# Patient Record
Sex: FEMALE | Race: WHITE | Employment: OTHER | URBAN - METROPOLITAN AREA
[De-identification: names, ages, dates, MRNs, and addresses within clinical notes are randomized per-mention and may not be internally consistent; named-entity substitution may affect disease eponyms.]

---

## 2017-01-03 ENCOUNTER — OP HISTORICAL/CONVERTED ENCOUNTER (OUTPATIENT)
Dept: OTHER | Age: 82
End: 2017-01-03

## 2017-01-13 ENCOUNTER — IP HISTORICAL/CONVERTED ENCOUNTER (OUTPATIENT)
Dept: OTHER | Age: 82
End: 2017-01-13

## 2017-01-24 ENCOUNTER — OFFICE VISIT (OUTPATIENT)
Dept: ENDOCRINOLOGY | Age: 82
End: 2017-01-24

## 2017-01-24 VITALS
RESPIRATION RATE: 20 BRPM | HEART RATE: 62 BPM | DIASTOLIC BLOOD PRESSURE: 57 MMHG | SYSTOLIC BLOOD PRESSURE: 112 MMHG | HEIGHT: 66 IN | WEIGHT: 183 LBS | BODY MASS INDEX: 29.41 KG/M2 | OXYGEN SATURATION: 95 % | TEMPERATURE: 98.7 F

## 2017-01-24 DIAGNOSIS — M81.0 OSTEOPOROSIS, UNSPECIFIED: ICD-10-CM

## 2017-01-24 DIAGNOSIS — E11.65 TYPE 2 DIABETES MELLITUS WITH HYPERGLYCEMIA, WITHOUT LONG-TERM CURRENT USE OF INSULIN (HCC): Primary | ICD-10-CM

## 2017-01-24 RX ORDER — ISOSORBIDE MONONITRATE 30 MG/1
TABLET, EXTENDED RELEASE ORAL
Refills: 0 | COMMUNITY
Start: 2017-01-09 | End: 2018-09-11 | Stop reason: CLARIF

## 2017-01-24 RX ORDER — HYDROCODONE BITARTRATE AND ACETAMINOPHEN 10; 325 MG/1; MG/1
TABLET ORAL
Refills: 0 | COMMUNITY
Start: 2017-01-17 | End: 2020-12-17

## 2017-01-24 RX ORDER — AMMONIUM LACTATE 12 G/100G
LOTION TOPICAL AS NEEDED
Refills: 0 | COMMUNITY
Start: 2016-12-20 | End: 2020-12-17

## 2017-01-24 RX ORDER — FOLIC ACID 1 MG/1
TABLET ORAL DAILY
Refills: 0 | COMMUNITY
Start: 2016-12-20

## 2017-01-24 RX ORDER — ALENDRONATE SODIUM 70 MG/1
TABLET ORAL
COMMUNITY
End: 2018-09-11 | Stop reason: CLARIF

## 2017-01-24 RX ORDER — ESTRADIOL 0.1 MG/G
CREAM VAGINAL 2 TIMES DAILY
Refills: 0 | COMMUNITY
Start: 2016-12-21

## 2017-01-24 RX ORDER — BENZONATATE 100 MG/1
CAPSULE ORAL
Refills: 0 | COMMUNITY
Start: 2016-12-16 | End: 2020-12-17

## 2017-01-24 RX ORDER — BUTALBITAL, ACETAMINOPHEN AND CAFFEINE 50; 325; 40 MG/1; MG/1; MG/1
1 TABLET ORAL
Refills: 0 | COMMUNITY
Start: 2017-01-05

## 2017-01-24 RX ORDER — LANOLIN ALCOHOL/MO/W.PET/CERES
CREAM (GRAM) TOPICAL
Refills: 0 | COMMUNITY
Start: 2016-12-22

## 2017-01-24 RX ORDER — DICYCLOMINE HYDROCHLORIDE 10 MG/1
1 CAPSULE ORAL 3 TIMES DAILY
Refills: 0 | COMMUNITY
Start: 2016-10-21 | End: 2020-12-17

## 2017-01-24 NOTE — MR AVS SNAPSHOT
Visit Information Date & Time Provider Department Dept. Phone Encounter #  
 1/24/2017  3:45 PM Pricila Ma MD [facility] [phone] [id number] Follow-up Instructions Return in about 6 months (around 7/24/2017). Upcoming Health Maintenance Date Due  
 FOOT EXAM Q1 7/13/1945 EYE EXAM RETINAL OR DILATED Q1 7/13/1945 DTaP/Tdap/Td series (1 - Tdap) 7/13/1958 ZOSTER VACCINE AGE 60> 7/13/1997 GLAUCOMA SCREENING Q2Y 7/13/2002 Pneumococcal 65+ Low/Medium Risk (1 of 2 - PCV13) 7/13/2002 MEDICARE YEARLY EXAM 7/13/2002 INFLUENZA AGE 9 TO ADULT 8/1/2016 HEMOGLOBIN A1C Q6M 4/30/2017 MICROALBUMIN Q1 5/17/2017 LIPID PANEL Q1 10/31/2017 Allergies as of 1/24/2017  Review Complete On: 1/24/2017 By: Pricila Ma MD  
  
 Severity Noted Reaction Type Reactions Iodinated Contrast Media - Oral And Iv Dye High 10/10/2011    Other (comments) Iv dye causes heart to stop Chicken Derived  10/01/2014    Other (comments) Closes throat Darvon [Propoxyphene]  02/08/2011    Rash Tetanus Vaccines And Toxoid  02/08/2011    Other (comments) Blisters all over body Current Immunizations  Never Reviewed No immunizations on file. Not reviewed this visit You Were Diagnosed With   
  
 Codes Comments Osteoporosis, unspecified    -  Primary ICD-10-CM: M81.0 ICD-9-CM: 733.00 Type 2 diabetes mellitus with hyperglycemia, without long-term current use of insulin (HCC)     ICD-10-CM: E11.65 ICD-9-CM: 250.00, 790.29 Vitals BP Pulse Temp Resp Height(growth percentile) Weight(growth percentile) 112/57 62 98.7 °F (37.1 °C) (Oral) 20 5' 6\" (1.676 m) 183 lb (83 kg) SpO2 BMI OB Status Smoking Status 95% 29.54 kg/m2 Hysterectomy Former Smoker BMI and BSA Data Body Mass Index Body Surface Area  
 29.54 kg/m 2 1.97 m 2 Preferred Pharmacy Pharmacy Name Phone  [id number] - [address] [facility] [phone] Your Updated Medication List  
  
   
This list is accurate as of: 1/24/17  4:29 PM.  Always use your most recent med list.  
  
  
  
  
 ammonium lactate 12 % lotion Commonly known as:  LAC-HYDRIN  
  
 aspirin delayed-release 81 mg tablet Take  by mouth daily. B-12 DOTS PO Take  by mouth.  
  
 benzonatate 100 mg capsule Commonly known as:  TESSALON  
take 1 capsule by mouth every 6 hours if needed for cough  
  
 beta carotene 25,000 unit capsule Take 25,000 Units by mouth daily. butalbital-acetaminophen-caffeine -40 mg per tablet Commonly known as:  FIORICET, ESGIC  
  
 CALCIUM 500 500 mg calcium (1,250 mg) tablet Generic drug:  calcium carbonate Take 1,250 mg by mouth daily. CeleBREX 100 mg capsule Generic drug:  celecoxib Take 400 mg by mouth daily. CINNAMON PO Take  by mouth two (2) times a day. 2 in the am and one in the pm  
  
 dicyclomine 10 mg capsule Commonly known as:  BENTYL Take 1 Cap by mouth three (3) times daily. ESTRACE 0.01 % (0.1 mg/gram) vaginal cream  
Generic drug:  estradiol  
  
 ferrous sulfate 325 mg (65 mg iron) tablet  
take 1 tablet by mouth three times a day  
  
 folic acid 1 mg tablet Commonly known as:  FOLVITE  
  
 gabapentin 300 mg capsule Commonly known as:  NEURONTIN Take 300 mg by mouth nightly. GINKOBA PO Take  by mouth. glucose blood VI test strips strip Commonly known as:  TRUE METRIX GLUCOSE TEST STRIP Test 2 times daily Dx Code E11.65 HYDROcodone-acetaminophen  mg tablet Commonly known as:  NORCO  
take 1 tablet by mouth every 6 hours if needed for pain Insulin Needles (Disposable) 32 gauge x 5/32\" Ndle Commonly known as:  Venus Pen Needle Use twice daily. Dx code 250.00  
  
 isosorbide mononitrate ER 30 mg tablet Commonly known as:  IMDUR  
take 1 tablet by mouth once daily Lancets Misc Test 2 times daily Dx Code E11.65 (True Metrix Meter) LASIX 40 mg tablet Generic drug:  furosemide Take  by mouth daily. Liraglutide 0.6 mg/0.1 mL (18 mg/3 mL) sub-q pen Commonly known as:  Kamilla Halon 3-WEST INJECT 1.8 MG UNDER THE SKIN DAILY (STOP HUMALOG AND LANTUS)  
  
 meclizine 25 mg tablet Commonly known as:  ANTIVERT Take  by mouth three (3) times daily as needed. METHOTREXATE  
by Does Not Apply route every seven (7) days. metoprolol tartrate 25 mg tablet Commonly known as:  LOPRESSOR  
TAKE ONE-HALF (1/2) TABLET TWICE A DAY  
  
 nitrofurantoin 25 mg capsule Commonly known as:  MACRODANTIN Take 50 mg by mouth daily. NITROSTAT 0.4 mg SL tablet Generic drug:  nitroglycerin  
  
 omega-3 acid ethyl esters 1 gram capsule Commonly known as:  Carollynn Raw TAKE 1 CAPSULE TWICE A DAY  
  
 ondansetron hcl 4 mg tablet Commonly known as:  Alexandra Scriver Take 4 mg by mouth every twelve (12) hours as needed. pantoprazole 40 mg tablet Commonly known as:  PROTONIX Take 40 mg by mouth daily. polyethylene glycol 17 gram/dose powder Commonly known as:  Leta Saima Take 17 g by mouth daily. potassium chloride SA 10 mEq capsule Commonly known as:  Navarro Balint Take 10 mEq by mouth daily. promethazine 25 mg tablet Commonly known as:  PHENERGAN Take 25 mg by mouth every six (6) hours as needed. selenium 200 mcg Tab Take  by mouth. SINGULAIR 10 mg tablet Generic drug:  montelukast  
Take 10 mg by mouth two (2) times a day. sotalol 80 mg tablet Commonly known as:  Delle Charles Take  by mouth. VITAMIN C 1,000 mg tablet Generic drug:  ascorbic acid (vitamin C) Take 1,000 mg by mouth daily. vitamin e 1,000 unit capsule Commonly known as:  E GEMS Take 1,000 Units by mouth daily. Follow-up Instructions Return in about 6 months (around 7/24/2017). To-Do List   
 01/24/2017   Imaging:  DEXA BONE DENSITY STUDY AXIAL Patient Instructions Off insulin  
 
continue on victoza 1.8 mg a day Introducing [facility]! Wayne Beltran introduces [employer] patient portal. Now you can access parts of your medical record, email your doctor's office, and request medication refills online. 1. In your internet browser, go to [email or web address] 2. Click on the First Time User? Click Here link in the Sign In box. You will see the New Member Sign Up page. 3. Enter your [employer] Access Code exactly as it appears below. You will not need to use this code after youve completed the sign-up process. If you do not sign up before the expiration date, you must request a new code. · [employer] Access Code: [email or web address] Expires: 4/24/2017  4:22 PM 
 
4. Enter the last four digits of your Social Security Number (xxxx) and Date of Birth (mm/dd/yyyy) as indicated and click Submit. You will be taken to the next sign-up page. 5. Create a [employer] ID. This will be your [employer] login ID and cannot be changed, so think of one that is secure and easy to remember. 6. Create a [employer] password. You can change your password at any time. 7. Enter your Password Reset Question and Answer. This can be used at a later time if you forget your password. 8. Enter your e-mail address. You will receive e-mail notification when new information is available in [address] 9. Click Sign Up. You can now view and download portions of your medical record. 10. Click the Download Summary menu link to download a portable copy of your medical information. If you have questions, please visit the Frequently Asked Questions section of the [employer] website. Remember, [employer] is NOT to be used for urgent needs. For medical emergencies, dial 911. Now available from your iPhone and Android! Please provide this summary of care documentation to your next provider.  
  
  
 Your primary care clinician is listed as Chadd Renteria. If you have any questions after today's visit, please call [phone].

## 2017-01-24 NOTE — PROGRESS NOTES
HISTORY OF PRESENT ILLNESS   Bela Martinez is a 80 y.o. female. HPI   f/u for DM2 management after Aug 2016    visit --     Had  A long gap in f/u   She needed to talk to me reg her health     She had left carpel tunnel surgery     She had GI bleeding and got hospitalized   She has hematochezia     [place] [facility] [place]   On and off  prednisone          Review of Systems   Constitutional: Negative. HENT: Negative. Eyes: Negative for pain and redness. Respiratory: Negative. Cardiovascular: Negative for chest pain, palpitations and leg swelling. Gastrointestinal: Negative. Negative for constipation. Genitourinary: Negative. Musculoskeletal: Negative for myalgias. Skin: Negative. Neurological: Negative. Endo/Heme/Allergies: Negative. Psychiatric/Behavioral: Negative for depression and memory loss. The patient does not have insomnia. Physical Exam   Constitutional: She is oriented to person, place, and time. She appears well-developed and well-nourished. HENT:   Head: Normocephalic. Eyes: Conjunctivae and extraocular motions are normal. Pupils are equal, round, and reactive to light. Neck: Normal range of motion. Neck supple. No JVD present. No tracheal deviation present. No thyromegaly present. Cardiovascular: Normal rate, regular rhythm and normal heart sounds. No murmur heard. Pulmonary/Chest: Breath sounds normal.   Abdominal: Soft. Bowel sounds are normal.   Midline surgical scar healed , abdominal skin around the wound is healed and looks clean   Musculoskeletal: Normal range of motion. Lymphadenopathy:   She has no cervical adenopathy. Neurological: She is alert and oriented to person, place, and time. She has normal reflexes. Skin: Skin is warm. Psychiatric: She has a normal mood and affect.           Lab Results   Component Value Date/Time    Hemoglobin A1c 5.5 10/31/2016 09:43 AM    Hemoglobin A1c 6.3 05/17/2016 08:42 AM Hemoglobin A1c 5.9 04/07/2015 08:39 AM    Glucose 90 10/31/2016 09:43 AM    Glucose  04/14/2015 01:59 PM    Microalb/Creat ratio (ug/mg creat.) <42.9 05/17/2016 08:42 AM    LDL, calculated 112 10/31/2016 09:43 AM    Creatinine 0.76 10/31/2016 09:43 AM      Lab Results   Component Value Date/Time    Cholesterol, total 180 10/31/2016 09:43 AM    HDL Cholesterol 44 10/31/2016 09:43 AM    LDL, calculated 112 10/31/2016 09:43 AM    Triglyceride 119 10/31/2016 09:43 AM     Lab Results   Component Value Date/Time    ALT 24 10/31/2016 09:43 AM    AST 37 10/31/2016 09:43 AM    Alk. phosphatase 68 10/31/2016 09:43 AM    Bilirubin, total 0.4 10/31/2016 09:43 AM     Lab Results   Component Value Date/Time    GFR est AA 86 10/31/2016 09:43 AM    GFR est non-AA 75 10/31/2016 09:43 AM    Creatinine 0.76 10/31/2016 09:43 AM    BUN 6 10/31/2016 09:43 AM    Sodium 141 10/31/2016 09:43 AM    Potassium 4.3 10/31/2016 09:43 AM    Chloride 98 10/31/2016 09:43 AM    CO2 28 10/31/2016 09:43 AM            ASSESSMENT and PLAN         1. DM2 uncontrolled : A1c is  5.5 %    From oct 2016   Compared to   6.3 %   From aug 2016 compared to  5.9 %   From  April 2015   Compared to 5.3 %    From sept 2014 compared to  6.2 %   From  March 2014   5.9 % from dec 2013 compared to   5.5 % June 2013 compared to 5.7 % from sept 2012 compared to 5.3 % compared to 5.7 % compared to 6.2 % ; A1c 10/2010 was 6.1% and from 2/8/11 A1C is 6.0 %. And it was 8.1 % from June 2011      maintained glycemic control only on victoza  Off  prednisone 5 mg bid     Agree with staying off [address] and she has done well, as the intention of starting her on insulin was purely to help her heal well  she is tolerating Victoza at 1.8 mcg a day and would like to continue only on this   For future will consider oral incretins or invokana      2. HTN : well controlled, Continuing sotalol  and cozaar. 3. Hypertriglyceridemia ; lipids are great .  continue on Lovaza 1 gm bid , on pravachol. 4.. DEXA -   Date : oct 2012  Bone DEXA  ap spine T-score -0.3; left femoral Neck T- score  -0.5, right femoral neck T-score -0.1  Her forearm scores are >-3.0    Date : oct 2014  Bone DEXA  ap spine T-score n/a; left femoral Neck T- score  -0.3, right femoral neck T-score -0.2    Due for dexa in oct 2016     She has likely secondary causes , not primary cause like bone loss from aging    She takes bisphosphonates  Weekly fosamax-- prescribed by Dr. Zavala Phi  Will change to risedrinate/actonel   Because of GI distress   She is on several meds, to cause GERD- prednisone, iron, potassium       5. - f/u with cardio and pulm  Due for pulm testing - history of partial pneumonectomy     H/O CAD - H/o afib -- last episode was jan to april 2015    she had her last heart attack - over 5 years   F/u with  Dr. Florian Martinez   OFF PRADEXA  ON ASA      6. Rheumatoid arthritis : on MTX, she gets on and off prednisone   Not on prednisone      7. On neurontin for RLS       8.  Recurrent  UTI  From bladder prolapse   She self catheters herself 5 times a day   She sees 2 urologists and takes estrogen  Vaginally   On cranberry pills   On chronic antibiotic       F/u in 3 months

## 2017-01-24 NOTE — PROGRESS NOTES
Wt Readings from Last 3 Encounters:   01/24/17 183 lb (83 kg)   08/01/16 185 lb (83.9 kg)   04/14/15 190 lb (86.2 kg)     Temp Readings from Last 3 Encounters:   01/24/17 98.7 °F (37.1 °C) (Oral)   08/01/16 97 °F (36.1 °C) (Oral)   04/14/15 97.8 °F (36.6 °C) (Oral)     BP Readings from Last 3 Encounters:   01/24/17 112/57   08/01/16 113/81   04/14/15 126/49     Pulse Readings from Last 3 Encounters:   01/24/17 62   08/01/16 71   04/14/15 (!) 59     Lab Results   Component Value Date/Time    Hemoglobin A1c 5.5 10/31/2016 09:43 AM    Hemoglobin A1c (POC) 5.3 10/01/2014 10:30 AM     No Podiatry  Last Eye exam Jan 2017

## 2017-01-31 ENCOUNTER — HOSPITAL ENCOUNTER (OUTPATIENT)
Dept: MAMMOGRAPHY | Age: 82
Discharge: HOME | End: 2017-01-31
Attending: INTERNAL MEDICINE
Payer: MEDICARE

## 2017-01-31 DIAGNOSIS — M81.0 OSTEOPOROSIS, UNSPECIFIED: ICD-10-CM

## 2017-01-31 DIAGNOSIS — E11.65 TYPE 2 DIABETES MELLITUS WITH HYPERGLYCEMIA, WITHOUT LONG-TERM CURRENT USE OF INSULIN (HCC): ICD-10-CM

## 2017-01-31 PROCEDURE — 77080 DXA BONE DENSITY AXIAL: CPT

## 2017-02-18 ENCOUNTER — IP HISTORICAL/CONVERTED ENCOUNTER (OUTPATIENT)
Dept: OTHER | Age: 82
End: 2017-02-18

## 2017-03-30 DIAGNOSIS — E11.9 TYPE 2 DIABETES MELLITUS WITHOUT COMPLICATION (HCC): ICD-10-CM

## 2017-04-17 RX ORDER — OMEGA-3-ACID ETHYL ESTERS 1 G/1
CAPSULE, LIQUID FILLED ORAL
Qty: 180 CAP | Refills: 3 | OUTPATIENT
Start: 2017-04-17

## 2017-06-11 RX ORDER — OMEGA-3-ACID ETHYL ESTERS 1 G/1
CAPSULE, LIQUID FILLED ORAL
Qty: 180 CAP | Refills: 4 | OUTPATIENT
Start: 2017-06-11

## 2017-07-17 ENCOUNTER — HOSPITAL ENCOUNTER (OUTPATIENT)
Dept: LAB | Age: 82
Discharge: HOME | End: 2017-07-17
Payer: MEDICARE

## 2017-07-17 PROCEDURE — 36415 COLL VENOUS BLD VENIPUNCTURE: CPT

## 2017-07-17 PROCEDURE — 83036 HEMOGLOBIN GLYCOSYLATED A1C: CPT

## 2017-07-17 PROCEDURE — 82306 VITAMIN D 25 HYDROXY: CPT

## 2017-07-17 PROCEDURE — 80053 COMPREHEN METABOLIC PANEL: CPT

## 2017-07-17 PROCEDURE — 80061 LIPID PANEL: CPT

## 2017-07-31 ENCOUNTER — OFFICE VISIT (OUTPATIENT)
Dept: ENDOCRINOLOGY | Age: 82
End: 2017-07-31

## 2017-07-31 VITALS
WEIGHT: 179 LBS | BODY MASS INDEX: 28.77 KG/M2 | HEIGHT: 66 IN | HEART RATE: 61 BPM | DIASTOLIC BLOOD PRESSURE: 63 MMHG | SYSTOLIC BLOOD PRESSURE: 115 MMHG | OXYGEN SATURATION: 96 % | TEMPERATURE: 98.1 F | RESPIRATION RATE: 18 BRPM

## 2017-07-31 DIAGNOSIS — M81.0 OSTEOPOROSIS, UNSPECIFIED OSTEOPOROSIS TYPE, UNSPECIFIED PATHOLOGICAL FRACTURE PRESENCE: ICD-10-CM

## 2017-07-31 DIAGNOSIS — E11.65 TYPE 2 DIABETES MELLITUS WITH HYPERGLYCEMIA, WITHOUT LONG-TERM CURRENT USE OF INSULIN (HCC): Primary | ICD-10-CM

## 2017-07-31 RX ORDER — DICLOFENAC SODIUM 10 MG/G
GEL TOPICAL AS NEEDED
Refills: 0 | COMMUNITY
Start: 2017-06-28

## 2017-07-31 NOTE — MR AVS SNAPSHOT
Visit Information Date & Time Provider Department Dept. Phone Encounter #  
 7/31/2017  9:45 AM Ivis Aponte [facility] [phone] [id number] Follow-up Instructions Return in about 6 months (around 1/31/2018). Upcoming Health Maintenance Date Due  
 FOOT EXAM Q1 7/13/1945 EYE EXAM RETINAL OR DILATED Q1 7[birth year]13/1945 DTaP/Tdap/Td series (1 - Tdap) 7/13/1958 ZOSTER VACCINE AGE 60> 5/13/1997 GLAUCOMA SCREENING Q2Y 7/13/2002 Pneumococcal 65+ Low/Medium Risk (1 of 2 - PCV13) 7/13/2002 MEDICARE YEARLY EXAM 7/13/2002 MICROALBUMIN Q1 5/17/2017 INFLUENZA AGE 9 TO ADULT 8/1/2017 HEMOGLOBIN A1C Q6M 1/17/2018 LIPID PANEL Q1 7/17/2018 Allergies as of 7/31/2017  Review Complete On: 7/31/2017 By: Ivis Aponte MD  
  
 Severity Noted Reaction Type Reactions Iodinated Contrast- Oral And Iv Dye High 10/10/2011    Other (comments) Iv dye causes heart to stop Chicken Derived  10/01/2014    Other (comments) Closes throat Darvon [Propoxyphene]  02/08/2011    Rash Tetanus Vaccines And Toxoid  02/08/2011    Other (comments) Blisters all over body Current Immunizations  Never Reviewed No immunizations on file. Not reviewed this visit Vitals BP Pulse Temp Resp Height(growth percentile) Weight(growth percentile)  
 115/63 61 98.1 °F (36.7 °C) (Oral) 18 5' 6\" (1.676 m) 179 lb (81.2 kg) SpO2 BMI OB Status Smoking Status 96% 28.89 kg/m2 Hysterectomy Former Smoker BMI and BSA Data Body Mass Index Body Surface Area  
 28.89 kg/m 2 1.94 m 2 Preferred Pharmacy Pharmacy Name Phone [facility] [phone] Your Updated Medication List  
  
   
This list is accurate as of: 7/31/17 10:19 AM.  Always use your most recent med list.  
  
  
  
  
 ammonium lactate 12 % lotion Commonly known as:  LAC-HYDRIN  
  
 aspirin delayed-release 81 mg tablet Take  by mouth daily. B-12 DOTS PO Take  by mouth.  
  
 benzonatate 100 mg capsule Commonly known as:  TESSALON  
take 1 capsule by mouth every 6 hours if needed for cough  
  
 beta carotene 25,000 unit capsule Take 25,000 Units by mouth daily. butalbital-acetaminophen-caffeine -40 mg per tablet Commonly known as:  FIORICET, ESGIC  
  
 CALCIUM 500 500 mg calcium (1,250 mg) tablet Generic drug:  calcium carbonate Take 1,250 mg by mouth daily. CeleBREX 100 mg capsule Generic drug:  celecoxib Take 400 mg by mouth daily. CINNAMON PO Take  by mouth two (2) times a day. 2 in the am and one in the pm  
  
 diclofenac 1 % Gel Commonly known as:  VOLTAREN  
  
 dicyclomine 10 mg capsule Commonly known as:  BENTYL Take 1 Cap by mouth three (3) times daily. ESTRACE 0.01 % (0.1 mg/gram) vaginal cream  
Generic drug:  estradiol  
  
 ferrous sulfate 325 mg (65 mg iron) tablet  
take 1 tablet by mouth three times a day  
  
 folic acid 1 mg tablet Commonly known as:  FOLVITE  
  
 FOSAMAX 70 mg tablet Generic drug:  alendronate Take  by mouth every seven (7) days. gabapentin 300 mg capsule Commonly known as:  NEURONTIN Take 300 mg by mouth nightly. GINKOBA PO Take  by mouth. glucose blood VI test strips strip Commonly known as:  TRUE METRIX GLUCOSE TEST STRIP Test 2 times daily Dx Code E11.65 HYDROcodone-acetaminophen  mg tablet Commonly known as:  NORCO  
take 1 tablet by mouth every 6 hours if needed for pain Insulin Needles (Disposable) 32 gauge x 5/32\" Ndle Commonly known as:  Venus Pen Needle Use twice daily. Dx code 250.00  
  
 isosorbide mononitrate ER 30 mg tablet Commonly known as:  IMDUR  
take 1 tablet by mouth once daily Lancets Misc Test 2 times daily Dx Code E11.65 (True Metrix Meter) LASIX 40 mg tablet Generic drug:  furosemide Take  by mouth daily.   
  
 meclizine 25 mg tablet Commonly known as:  ANTIVERT Take  by mouth three (3) times daily as needed. METHOTREXATE  
by Does Not Apply route every seven (7) days. metoprolol tartrate 25 mg tablet Commonly known as:  LOPRESSOR  
TAKE ONE-HALF (1/2) TABLET TWICE A DAY  
  
 nitrofurantoin 25 mg capsule Commonly known as:  MACRODANTIN Take 50 mg by mouth daily. NITROSTAT 0.4 mg SL tablet Generic drug:  nitroglycerin  
  
 omega-3 acid ethyl esters 1 gram capsule Commonly known as:  Fairy Camel TAKE 1 CAPSULE TWICE DAILY  
  
 ondansetron hcl 4 mg tablet Commonly known as:  Amina Kurk Take 4 mg by mouth every twelve (12) hours as needed. pantoprazole 40 mg tablet Commonly known as:  PROTONIX Take 40 mg by mouth daily. polyethylene glycol 17 gram/dose powder Commonly known as:  Rodríguez Lies Take 17 g by mouth daily. potassium chloride SA 10 mEq capsule Commonly known as:  Ebbie Skeans Take 10 mEq by mouth daily. promethazine 25 mg tablet Commonly known as:  PHENERGAN Take 25 mg by mouth every six (6) hours as needed. selenium 200 mcg Tab Take  by mouth. SINGULAIR 10 mg tablet Generic drug:  montelukast  
Take 10 mg by mouth two (2) times a day. sotalol 80 mg tablet Commonly known as:  [place] Take  by mouth. VITAMIN C 1,000 mg tablet Generic drug:  ascorbic acid (vitamin C) Take 1,000 mg by mouth daily. vitamin e 1,000 unit capsule Commonly known as:  E GEMS Take 1,000 Units by mouth daily. Follow-up Instructions Return in about 6 months (around 1/31/2018). Patient Instructions STOP Victoza I/v reclast -- stop Fosamax Introducing [facility]! Belgica Damon introduces [employer] patient portal. Now you can access parts of your medical record, email your doctor's office, and request medication refills online.    
 
1. In your internet browser, go to [email or web address] 2. Click on the First Time User? Click Here link in the Sign In box. You will see the New Member Sign Up page. 3. Enter your [employer] Access Code exactly as it appears below. You will not need to use this code after youve completed the sign-up process. If you do not sign up before the expiration date, you must request a new code. · [employer] Access Code: [email or web address] Expires: 10/29/2017 10:18 AM 
 
4. Enter the last four digits of your Social Security Number (xxxx) and Date of Birth (mm/dd/yyyy) as indicated and click Submit. You will be taken to the next sign-up page. 5. Create a [employer] ID. This will be your [employer] login ID and cannot be changed, so think of one that is secure and easy to remember. 6. Create a [employer] password. You can change your password at any time. 7. Enter your Password Reset Question and Answer. This can be used at a later time if you forget your password. 8. Enter your e-mail address. You will receive e-mail notification when new information is available in [address] 9. Click Sign Up. You can now view and download portions of your medical record. 10. Click the Download Summary menu link to download a portable copy of your medical information. If you have questions, please visit the Frequently Asked Questions section of the [employer] website. Remember, [employer] is NOT to be used for urgent needs. For medical emergencies, dial 911. Now available from your iPhone and Android! Please provide this summary of care documentation to your next provider. Your primary care clinician is listed as Ruben Maldonado. If you have any questions after today's visit, please call [phone].

## 2017-07-31 NOTE — PROGRESS NOTES
Wt Readings from Last 3 Encounters:   07/31/17 179 lb (81.2 kg)   01/24/17 183 lb (83 kg)   08/01/16 185 lb (83.9 kg)     Temp Readings from Last 3 Encounters:   07/31/17 98.1 °F (36.7 °C) (Oral)   01/24/17 98.7 °F (37.1 °C) (Oral)   08/01/16 97 °F (36.1 °C) (Oral)     BP Readings from Last 3 Encounters:   07/31/17 115/63   01/24/17 112/57   08/01/16 113/81     Pulse Readings from Last 3 Encounters:   07/31/17 61   01/24/17 62   08/01/16 71     Lab Results   Component Value Date/Time    Hemoglobin A1c 5.1 07/17/2017 09:24 AM    Hemoglobin A1c (POC) 5.3 10/01/2014 10:30 AM     No Podiatry  Last Eye exam 2016

## 2017-07-31 NOTE — PROGRESS NOTES
HISTORY OF PRESENT ILLNESS   Nori Lopez is a [de-identified] y.o. female. HPI   f/u for DM2 management after jan 24 2017     visit --     Had  A varicose vein bleeding on right leg , had to get it stripped off     She sees dr. Stephie Mota in [facility] for Rheumatoid arthritis   On and off  prednisone          Review of Systems   Constitutional: Negative. HENT: Negative. Eyes: Negative for pain and redness. Respiratory: Negative. Cardiovascular: Negative for chest pain, palpitations and leg swelling. Gastrointestinal: Negative. Negative for constipation. Genitourinary: Negative. Musculoskeletal: Negative for myalgias. Skin: Negative. Neurological: Negative. Endo/Heme/Allergies: Negative. Psychiatric/Behavioral: Negative for depression and memory loss. The patient does not have insomnia. Physical Exam   Constitutional: She is oriented to person, place, and time. She appears well-developed and well-nourished. HENT:   Head: Normocephalic. Eyes: Conjunctivae and extraocular motions are normal. Pupils are equal, round, and reactive to light. Neck: Normal range of motion. Neck supple. No JVD present. No tracheal deviation present. No thyromegaly present. Cardiovascular: Normal rate, regular rhythm and normal heart sounds. No murmur heard. Pulmonary/Chest: Breath sounds normal.   Abdominal: Soft. Bowel sounds are normal.   Midline surgical scar healed , abdominal skin around the wound is healed and looks clean   Musculoskeletal: Normal range of motion. Lymphadenopathy:   She has no cervical adenopathy. Neurological: She is alert and oriented to person, place, and time. She has normal reflexes. Skin: Skin is warm. Psychiatric: She has a normal mood and affect.           Lab Results   Component Value Date/Time    Hemoglobin A1c 5.1 07/17/2017 09:24 AM    Hemoglobin A1c 5.5 10/31/2016 09:43 AM    Hemoglobin A1c 6.3 05/17/2016 08:42 AM    Glucose 93 07/17/2017 09:24 AM    Glucose  04/14/2015 01:59 PM    Microalb/Creat ratio (ug/mg creat.) <42.9 05/17/2016 08:42 AM    LDL, calculated 103 07/17/2017 09:24 AM    Creatinine 0.77 07/17/2017 09:24 AM      Lab Results   Component Value Date/Time    Cholesterol, total 179 07/17/2017 09:24 AM    HDL Cholesterol 60 07/17/2017 09:24 AM    LDL, calculated 103 07/17/2017 09:24 AM    Triglyceride 78 07/17/2017 09:24 AM     Lab Results   Component Value Date/Time    ALT (SGPT) 22 07/17/2017 09:24 AM    AST (SGOT) 30 07/17/2017 09:24 AM    Alk. phosphatase 82 07/17/2017 09:24 AM    Bilirubin, total 0.5 07/17/2017 09:24 AM     Lab Results   Component Value Date/Time    GFR est AA 84 07/17/2017 09:24 AM    GFR est non-AA 73 07/17/2017 09:24 AM    Creatinine 0.77 07/17/2017 09:24 AM    BUN 10 07/17/2017 09:24 AM    Sodium 143 07/17/2017 09:24 AM    Potassium 4.6 07/17/2017 09:24 AM    Chloride 101 07/17/2017 09:24 AM    CO2 28 07/17/2017 09:24 AM            ASSESSMENT and PLAN         1. DM2 uncontrolled : A1c is  5.1 %   From  July 2017      Compared to    5.5 %    From oct 2016   Compared to   6.3 %   From aug 2016 compared to  5.9 %   From  April 2015   Compared to 5.3 %    From sept 2014 compared to  6.2 %   From  March 2014   5.9 % from dec 2013 compared to   5.5 % June 2013 compared to 5.7 % from sept 2012 compared to 5.3 % compared to 5.7 % compared to 6.2 % ; A1c 10/2010 was 6.1% and from 2/8/11 A1C is 6.0 %. And it was 8.1 % from June 2011      maintained glycemic control only on victoza  Off  prednisone 5 mg bid     Agree with staying off [address] and she has done well, as the intention of starting her on insulin was purely to help her heal well  she is tolerating Victoza at 1.8 mcg a day and would like to continue only on this   For NAUSEA being her only symptom-    Will STOP  victoza     For future will consider oral incretins or invokana      2. HTN : well controlled, Continuing sotalol  and cozaar. 3. Hypertriglyceridemia ; lipids are great . continue on Lovaza 1 gm bid , on pravachol. 4.. DEXA -   Date : oct 2012  Bone DEXA  ap spine T-score -0.3; left femoral Neck T- score  -0.5, right femoral neck T-score -0.1  Her forearm scores are >-3.0    Date : oct 2014  Bone DEXA  ap spine T-score n/a; left femoral Neck T- score  -0.3, right femoral neck T-score -0.2  Date : [facility] 2017   Bone DEXA  ap spine T-score ( steel )n/a; left femoral Neck T- score  n/a, right femoral neck T-score -1.2     Distal third of radius is -2.6      She has likely secondary causes , not primary cause like bone loss from aging    She takes bisphosphonates  Weekly fosamax-- prescribed by Dr. Diana Gardner  Will change to risedrinate/actonel   Because of GI distress   She is on several meds, to cause GERD- prednisone, iron, potassium     Yearly reclast   OR   prolia 6 months ( has RA )      5. - f/u with cardio and pulm  Due for pulm testing - history of partial pneumonectomy     H/O CAD - H/o afib -- last episode was jan to april 2015    she had her last heart attack - over 5 years   F/u with  Dr. Kumar Later   OFF PRADEXA  ON ASA      6. Rheumatoid arthritis : on MTX, she gets on and off prednisone   Not on prednisone      7. On neurontin for RLS       8.  Recurrent  UTI  From bladder prolapse   She self catheters herself 5 times a day   She sees 2 urologists and takes estrogen  Vaginally   On cranberry pills   On chronic antibiotic       F/u in 6  months

## 2017-08-02 ENCOUNTER — TELEPHONE (OUTPATIENT)
Dept: ENDOCRINOLOGY | Age: 82
End: 2017-08-02

## 2017-08-02 NOTE — TELEPHONE ENCOUNTER
Called patient and informed her IV reclast has been set up for August 7, 2017 at 3pm at [address] [place] [address] infusion [place]. Patient given phone number and address. Patient verbalized understanding. Patient will call with any further questions.

## 2017-08-04 DIAGNOSIS — M81.0 SENILE OSTEOPOROSIS: Primary | ICD-10-CM

## 2017-08-04 RX ORDER — ZOLEDRONIC ACID 5 MG/100ML
5 INJECTION, SOLUTION INTRAVENOUS ONCE
Qty: 100 ML | Refills: 0 | Status: SHIPPED | OUTPATIENT
Start: 2017-08-04 | End: 2017-08-04

## 2017-08-07 ENCOUNTER — HOSPITAL ENCOUNTER (OUTPATIENT)
Dept: INFUSION THERAPY | Age: 82
Discharge: HOME | End: 2017-08-07
Payer: MEDICARE

## 2017-08-07 VITALS
HEART RATE: 67 BPM | TEMPERATURE: 99 F | BODY MASS INDEX: 28.93 KG/M2 | DIASTOLIC BLOOD PRESSURE: 64 MMHG | RESPIRATION RATE: 18 BRPM | SYSTOLIC BLOOD PRESSURE: 127 MMHG | OXYGEN SATURATION: 95 % | WEIGHT: 180 LBS | HEIGHT: 66 IN

## 2017-08-07 LAB
ALBUMIN SERPL BCP-MCNC: 3.6 G/DL (ref 3.5–5)
ANION GAP BLD CALC-SCNC: 8 MMOL/L (ref 5–15)
BUN SERPL-MCNC: 10 MG/DL (ref 6–20)
BUN/CREAT SERPL: 13 (ref 12–20)
CALCIUM SERPL-MCNC: 8.8 MG/DL (ref 8.5–10.1)
CHLORIDE SERPL-SCNC: 102 MMOL/L (ref 97–108)
CO2 SERPL-SCNC: 30 MMOL/L (ref 21–32)
CREAT SERPL-MCNC: 0.79 MG/DL (ref 0.55–1.02)
GLUCOSE SERPL-MCNC: 146 MG/DL (ref 65–100)
PHOSPHATE SERPL-MCNC: 3.8 MG/DL (ref 2.6–4.7)
POTASSIUM SERPL-SCNC: 3.8 MMOL/L (ref 3.5–5.1)
SODIUM SERPL-SCNC: 140 MMOL/L (ref 136–145)

## 2017-08-07 PROCEDURE — 80069 RENAL FUNCTION PANEL: CPT | Performed by: INTERNAL MEDICINE

## 2017-08-07 PROCEDURE — 74011250636 HC RX REV CODE- 250/636: Performed by: INTERNAL MEDICINE

## 2017-08-07 PROCEDURE — 96374 THER/PROPH/DIAG INJ IV PUSH: CPT

## 2017-08-07 PROCEDURE — 36415 COLL VENOUS BLD VENIPUNCTURE: CPT | Performed by: INTERNAL MEDICINE

## 2017-08-07 RX ORDER — ZOLEDRONIC ACID 5 MG/100ML
5 INJECTION, SOLUTION INTRAVENOUS ONCE
Status: COMPLETED | OUTPATIENT
Start: 2017-08-07 | End: 2017-08-07

## 2017-08-07 RX ADMIN — ZOLEDRONIC ACID 5 MG: 5 INJECTION, SOLUTION INTRAVENOUS at 15:41

## 2017-08-07 NOTE — PROGRESS NOTES
[facility] Progress Note    Date: 4266    Name: Jody Cesar    MRN: [id number]         : 1935    Ms. [address] Arrived ambulatory and in no distress for RECLAST regimen. Assessment was completed, no acute issues at this time, no new complaints voiced. 24 gauge IV placed to the left FA, labs drawn and processed. Ms. [place] vitals were reviewed. Visit Vitals    /66 (BP 1 Location: Right arm, BP Patient Position: Sitting)    Pulse 74    Temp 99 °F (37.2 °C)    Resp 18    Ht 5' 5.5\" (1.664 m)    Wt 81.6 kg (180 lb)    SpO2 95%    Breastfeeding No    BMI 29.5 kg/m2       Lab results were obtained and reviewed. Recent Results (from the past 12 hour(s))   RENAL FUNCTION PANEL    Collection Time: 17  3:17 PM   Result Value Ref Range    Sodium 140 136 - 145 mmol/L    Potassium 3.8 3.5 - 5.1 mmol/L    Chloride 102 97 - 108 mmol/L    CO2 30 21 - 32 mmol/L    Anion gap 8 5 - 15 mmol/L    Glucose 146 (H) 65 - 100 mg/dL    BUN 10 6 - 20 MG/DL    Creatinine 0.79 0.55 - 1.02 MG/DL    BUN/Creatinine ratio 13 12 - 20      GFR est AA >60 >60 ml/min/1.73m2    GFR est non-AA >60 >60 ml/min/1.73m2    Calcium 8.8 8.5 - 10.1 MG/DL    Phosphorus 3.8 2.6 - 4.7 MG/DL    Albumin 3.6 3.5 - 5.0 g/dL       Medications administered:  zoledronic acid (RECLAST) IVPB 5 mg       Ms. Victor tolerated treatment well, IV removed and site wrapped in coban, patient was discharged from [place] [address] in stable condition at 1605. Patient states she had to leave and could not stay for the 30 mins observation period because she had to go  her great grand child.      Jimbo Mckinley RN     2017

## 2017-12-14 ENCOUNTER — IP HISTORICAL/CONVERTED ENCOUNTER (OUTPATIENT)
Dept: OTHER | Age: 82
End: 2017-12-14

## 2018-04-12 RX ORDER — OMEGA-3-ACID ETHYL ESTERS 1 G/1
CAPSULE, LIQUID FILLED ORAL
Qty: 180 CAP | Refills: 3 | Status: SHIPPED | OUTPATIENT
Start: 2018-04-12 | End: 2019-04-20 | Stop reason: SDUPTHER

## 2018-05-15 ENCOUNTER — OP HISTORICAL/CONVERTED ENCOUNTER (OUTPATIENT)
Dept: OTHER | Age: 83
End: 2018-05-15

## 2018-06-05 ENCOUNTER — OP HISTORICAL/CONVERTED ENCOUNTER (OUTPATIENT)
Dept: OTHER | Age: 83
End: 2018-06-05

## 2018-06-07 ENCOUNTER — ED HISTORICAL/CONVERTED ENCOUNTER (OUTPATIENT)
Dept: OTHER | Age: 83
End: 2018-06-07

## 2018-07-01 ENCOUNTER — OP HISTORICAL/CONVERTED ENCOUNTER (OUTPATIENT)
Dept: OTHER | Age: 83
End: 2018-07-01

## 2018-07-07 ENCOUNTER — ED HISTORICAL/CONVERTED ENCOUNTER (OUTPATIENT)
Dept: OTHER | Age: 83
End: 2018-07-07

## 2018-07-08 ENCOUNTER — IP HISTORICAL/CONVERTED ENCOUNTER (OUTPATIENT)
Dept: OTHER | Age: 83
End: 2018-07-08

## 2018-07-13 ENCOUNTER — ED HISTORICAL/CONVERTED ENCOUNTER (OUTPATIENT)
Dept: OTHER | Age: 83
End: 2018-07-13

## 2018-09-11 RX ORDER — SULFASALAZINE 500 MG/1
500 TABLET ORAL 2 TIMES DAILY
COMMUNITY
End: 2020-12-17

## 2018-09-12 ENCOUNTER — ANESTHESIA EVENT (OUTPATIENT)
Dept: ENDOSCOPY | Age: 83
End: 2018-09-12
Payer: MEDICARE

## 2018-09-12 ENCOUNTER — HOSPITAL ENCOUNTER (OUTPATIENT)
Age: 83
Setting detail: OUTPATIENT SURGERY
Discharge: HOME | End: 2018-09-12
Attending: INTERNAL MEDICINE | Admitting: INTERNAL MEDICINE
Payer: MEDICARE

## 2018-09-12 ENCOUNTER — ANESTHESIA (OUTPATIENT)
Dept: ENDOSCOPY | Age: 83
End: 2018-09-12
Payer: MEDICARE

## 2018-09-12 VITALS
HEART RATE: 56 BPM | OXYGEN SATURATION: 100 % | DIASTOLIC BLOOD PRESSURE: 77 MMHG | BODY MASS INDEX: 30.32 KG/M2 | SYSTOLIC BLOOD PRESSURE: 181 MMHG | WEIGHT: 182 LBS | TEMPERATURE: 97.6 F | HEIGHT: 65 IN | RESPIRATION RATE: 10 BRPM

## 2018-09-12 PROCEDURE — 74011250636 HC RX REV CODE- 250/636: Performed by: INTERNAL MEDICINE

## 2018-09-12 PROCEDURE — 74011250636 HC RX REV CODE- 250/636: Performed by: ANESTHESIOLOGY

## 2018-09-12 PROCEDURE — 76040000007: Performed by: INTERNAL MEDICINE

## 2018-09-12 PROCEDURE — 77030019988 HC FCPS ENDOSC DISP BSC -B: Performed by: INTERNAL MEDICINE

## 2018-09-12 PROCEDURE — 88305 TISSUE EXAM BY PATHOLOGIST: CPT | Performed by: INTERNAL MEDICINE

## 2018-09-12 PROCEDURE — 74011250636 HC RX REV CODE- 250/636

## 2018-09-12 PROCEDURE — 76060000032 HC ANESTHESIA 0.5 TO 1 HR: Performed by: INTERNAL MEDICINE

## 2018-09-12 PROCEDURE — 77030022875 HC PRB AR PLSM COAG ERBE -C: Performed by: INTERNAL MEDICINE

## 2018-09-12 PROCEDURE — 77030008565 HC TBNG SUC IRR ERBE -B: Performed by: INTERNAL MEDICINE

## 2018-09-12 RX ORDER — SODIUM CHLORIDE 0.9 % (FLUSH) 0.9 %
5-10 SYRINGE (ML) INJECTION EVERY 8 HOURS
Status: DISCONTINUED | OUTPATIENT
Start: 2018-09-12 | End: 2018-09-12 | Stop reason: HOSPADM

## 2018-09-12 RX ORDER — SODIUM CHLORIDE 9 MG/ML
50 INJECTION, SOLUTION INTRAVENOUS CONTINUOUS
Status: DISCONTINUED | OUTPATIENT
Start: 2018-09-12 | End: 2018-09-12 | Stop reason: HOSPADM

## 2018-09-12 RX ORDER — SODIUM CHLORIDE 0.9 % (FLUSH) 0.9 %
5-10 SYRINGE (ML) INJECTION AS NEEDED
Status: DISCONTINUED | OUTPATIENT
Start: 2018-09-12 | End: 2018-09-12 | Stop reason: HOSPADM

## 2018-09-12 RX ORDER — LIDOCAINE HYDROCHLORIDE 20 MG/ML
INJECTION, SOLUTION EPIDURAL; INFILTRATION; INTRACAUDAL; PERINEURAL AS NEEDED
Status: DISCONTINUED | OUTPATIENT
Start: 2018-09-12 | End: 2018-09-12 | Stop reason: HOSPADM

## 2018-09-12 RX ORDER — ONDANSETRON 2 MG/ML
4 INJECTION INTRAMUSCULAR; INTRAVENOUS ONCE
Status: COMPLETED | OUTPATIENT
Start: 2018-09-12 | End: 2018-09-12

## 2018-09-12 RX ORDER — PROPOFOL 10 MG/ML
INJECTION, EMULSION INTRAVENOUS AS NEEDED
Status: DISCONTINUED | OUTPATIENT
Start: 2018-09-12 | End: 2018-09-12 | Stop reason: HOSPADM

## 2018-09-12 RX ORDER — SODIUM CHLORIDE 9 MG/ML
75 INJECTION, SOLUTION INTRAVENOUS CONTINUOUS
Status: DISCONTINUED | OUTPATIENT
Start: 2018-09-12 | End: 2018-09-12 | Stop reason: HOSPADM

## 2018-09-12 RX ADMIN — PROPOFOL 30 MG: 10 INJECTION, EMULSION INTRAVENOUS at 09:25

## 2018-09-12 RX ADMIN — PROPOFOL 30 MG: 10 INJECTION, EMULSION INTRAVENOUS at 09:18

## 2018-09-12 RX ADMIN — PROPOFOL 30 MG: 10 INJECTION, EMULSION INTRAVENOUS at 09:12

## 2018-09-12 RX ADMIN — SODIUM CHLORIDE 75 ML/HR: 900 INJECTION, SOLUTION INTRAVENOUS at 08:56

## 2018-09-12 RX ADMIN — PROPOFOL 30 MG: 10 INJECTION, EMULSION INTRAVENOUS at 09:15

## 2018-09-12 RX ADMIN — PROPOFOL 80 MG: 10 INJECTION, EMULSION INTRAVENOUS at 09:09

## 2018-09-12 RX ADMIN — SODIUM CHLORIDE: 900 INJECTION, SOLUTION INTRAVENOUS at 08:56

## 2018-09-12 RX ADMIN — LIDOCAINE HYDROCHLORIDE 80 MG: 20 INJECTION, SOLUTION EPIDURAL; INFILTRATION; INTRACAUDAL; PERINEURAL at 09:09

## 2018-09-12 RX ADMIN — ONDANSETRON 4 MG: 2 INJECTION, SOLUTION INTRAMUSCULAR; INTRAVENOUS at 08:56

## 2018-09-12 RX ADMIN — PROPOFOL 30 MG: 10 INJECTION, EMULSION INTRAVENOUS at 09:22

## 2018-09-12 RX ADMIN — PROPOFOL 30 MG: 10 INJECTION, EMULSION INTRAVENOUS at 09:31

## 2018-09-12 RX ADMIN — PROPOFOL 30 MG: 10 INJECTION, EMULSION INTRAVENOUS at 09:28

## 2018-09-12 NOTE — H&P
Pre-endoscopy H and P    The patient was seen and examined in the room/pre-op holding area. The airway was assessed and documented. The problem list, past medical history, and medications were reviewed. Patient Active Problem List   Diagnosis Code    Stroke ([facility] I63.9    Heart attack ([facility] I21.9    Chest pain 786.5    SOB (shortness of breath) R06.02    DM (diabetes mellitus) ([facility] E11.9    Stroke ([facility]) I63.9     Social History     Social History    Marital status:      Spouse name: N/A    Number of children: N/A    Years of education: N/A     Occupational History    Not on file. Social History Main Topics    Smoking status: Former Smoker     Packs/day: 1.00     Years: 40.00     Quit date: 2/8/2011    Smokeless tobacco: Never Used    Alcohol use Yes      Comment: occassionally alcohol    Drug use: No    Sexual activity: Not on file     Other Topics Concern    Not on file     Social History Narrative     Past Medical History:   Diagnosis Date    Arrhythmia     a. 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 Taking? CINNAMON BARK (CINNAMON PO) Not Taking at Unknown time  Yes No   Sig: Take  by mouth two (2) times a day. 2 in the am and one in the pm    CYANOCOBALAMIN, VITAMIN B-12, (B-12 DOTS PO) 9/11/2018  Yes No   Sig: Take 1 Tab by mouth daily. ESTRACE 0.01 % (0.1 mg/gram) vaginal cream Not Taking at Unknown time  Yes No   Sig: Insert  into vagina two (2) times a day. HYDROcodone-acetaminophen (NORCO)  mg tablet   Yes No   Sig: take 1 tablet by mouth every 6 hours if needed for pain   Insulin Needles, Disposable, (YULIYA PEN NEEDLE) 32 x 5/32 \" ndle   No No   Sig: Use twice daily.  Dx code 250.00   Lancets misc   No No   Sig: Test 2 times daily Dx Code E11.65 (True Metrix Meter)   METHOTREXATE 2018  Yes No   Si mg by Does Not Apply route every seven (7) days. NITROSTAT 0.4 mg SL tablet 9/10/2018  Yes No   Sig: by SubLINGual route every five (5) minutes as needed. ammonium lactate (LAC-HYDRIN) 12 % lotion   Yes No   Sig: Apply  to affected area as needed. apixaban (ELIQUIS) 2.5 mg tablet 9/10/2018  Yes Yes   Sig: Take 2.5 mg by mouth two (2) times a day. ascorbic acid (VITAMIN C) 1,000 mg tablet 2018  Yes No   Sig: Take 1,000 mg by mouth daily. benzonatate (TESSALON) 100 mg capsule Not Taking at Unknown time  Yes No   Sig: take 1 capsule by mouth every 6 hours if needed for cough   beta carotene 25,000 unit capsule 2018  Yes No   Sig: Take 25,000 Units by mouth daily. butalbital-acetaminophen-caffeine (FIORICET, ESGIC) -40 mg per tablet 9/10/2018  Yes No   Sig: Take 1 Tab by mouth every four (4) hours as needed. calcium carbonate (CALCIUM 500) 500 mg (1,250 mg) tablet 2018  Yes No   Sig: Take 1,250 mg by mouth daily. diclofenac (VOLTAREN) 1 % gel 2018 at Unknown time  Yes Yes   Sig: as needed. dicyclomine (BENTYL) 10 mg capsule 2018 at Unknown time  Yes Yes   Sig: Take 1 Cap by mouth three (3) times daily. ferrous sulfate 325 mg (65 mg iron) tablet 2018  Yes No   Sig: take 1 tablet by mouth one time a day   folic acid (FOLVITE) 1 mg tablet 2018  Yes No   Sig: Take  by mouth daily. furosemide (LASIX) 40 mg tablet 2018  Yes No   Sig: Take  by mouth daily. gabapentin (NEURONTIN) 300 mg capsule 2018 at Unknown time  Yes Yes   Sig: Take 300 mg by mouth nightly. glucose blood VI test strips (TRUE METRIX GLUCOSE TEST STRIP) strip   No No   Sig: Test 2 times daily Dx Code E11.65   meclizine (ANTIVERT) 25 mg tablet 2018 at Unknown time  Yes Yes   Sig: Take  by mouth three (3) times daily as needed.    metoprolol (LOPRESSOR) 25 mg tablet 2018  No No   Sig: TAKE ONE-HALF (1/2) TABLET TWICE A DAY   montelukast (SINGULAIR) 10 mg tablet 9/11/2018  Yes No   Sig: Take 10 mg by mouth daily. nitrofurantoin (MACRODANTIN) 25 mg capsule   Yes No   Sig: Take 100 mg by mouth nightly. omega-3 acid ethyl esters (LOVAZA) 1 gram capsule Not Taking at Unknown time  No No   Sig: TAKE 1 CAPSULE TWICE DAILY   ondansetron hcl (ZOFRAN) 4 mg tablet   Yes No   Sig: Take 4 mg by mouth every twelve (12) hours as needed. pantoprazole (PROTONIX) 40 mg tablet 9/11/2018  Yes No   Sig: Take 40 mg by mouth daily. polyethylene glycol (MIRALAX) 17 gram/dose powder   Yes No   Sig: Take 17 g by mouth daily. potassium chloride SA (MICRO-K) 10 mEq capsule 9/11/2018  Yes No   Sig: Take 10 mEq by mouth daily. promethazine (PHENERGAN) 25 mg tablet   Yes No   Sig: Take 25 mg by mouth every six (6) hours as needed. sotalol (BETAPACE) 80 mg tablet   Yes No   Sig: Take 80 mg by mouth two (2) times a day. sulfaSALAzine (AZULFIDINE) 500 mg tablet 9/11/2018  Yes Yes   Sig: Take 500 mg by mouth two (2) times a day. vitamin e (E GEMS) 1,000 unit capsule 9/11/2018 at Unknown time  Yes Yes   Sig: Take 1,000 Units by mouth daily. Facility-Administered Medications: None           The review of systems is:  Negative  for shortness of breath or chest pain      The heart, lungs, and mental status were satisfactory for the administration of deep sedation and for the procedure. I discussed with the patient the objectives, risks, consequences and alternatives to the procedure.       Margarita Lee MD  9/12/2018  9:03 AM

## 2018-09-12 NOTE — ROUTINE PROCESS
[facility]  [birth year]  [id number]    Situation:  Verbal report received from: Carleen Mahoney RN  Procedure: Procedure(s):  COLONOSCOPY  ESOPHAGOGASTRODUODENOSCOPY (EGD)  ESOPHAGOGASTRODUODENAL (EGD) BIOPSY  ENDOSCOPIC ARGON PLASMA COAGULATION    Background:    Preoperative diagnosis: Chronic constipation [K59.09]  Abnormal swallowing [R13.10]  Black stool [K92.1]  Abdominal cramping in left lower quadrant [R10.32]  Chemotherapy-induced nausea [R11.0, T45.1X5A]  Postoperative diagnosis: Esaphagitis  Hiatal Hernia  Gastritis  Diverticulosis  Hemrrhoids  Anastamosis    :  Dr. Amy Mixon  Assistant(s): Endoscopy Technician-1: Antonia Wolfe  Endoscopy RN-1: Steph Warner RN    Specimens:   ID Type Source Tests Collected by Time Destination   1 : Duodenum bx Preservative Duodenum  Boyd Trinh MD 9/12/2018 1460 Pathology   2 : Gastic bx Preservative Gastric  Moses Perez MD 9/12/2018 0920 Pathology     H. Pylori  no    Assessment:  Intra-procedure medications     Anesthesia gave intra-procedure sedation and medications, see anesthesia flow sheet     Intravenous fluids: NS@ KVO     Vital signs stable     Abdominal assessment: round and soft     Recommendation:  Discharge patient per MD order.   Family or Friend   Permission to share finding with family or friend yes

## 2018-09-12 NOTE — DISCHARGE INSTRUCTIONS
[place] Office: [phone]    Sary Menjivar  [id number]  1935    EGD/COLONOSCOPY DISCHARGE INSTRUCTIONS  Discomfort:  Sore throat- throat lozenges or warm salt water gargle  redness at IV site- apply warm compress to area; if redness or soreness persist- contact your physician  Gaseous discomfort- walking, belching will help relieve any discomfort  You may not operate a vehicle for 12 hours  You may not engage in an occupation involving machinery or appliances for rest of today. You may not drink alcoholic beverages for at least 12 hours  Avoid making any critical decisions for at least 24 hour  DIET  You may resume your regular diet - however -  remember your colon is empty and a heavy meal will produce gas. Avoid these foods:  fried / greasy foods, excessive carbonated drinks or too much caffeine  MEDICATIONS   Regarding Aspirin or Nonsteroidal medications specifically, please see below. ACTIVITY  You may resume your normal daily activities. Spend the remainder of the day resting -  avoid any strenuous activity. CALL M.D. ANY SIGN OF   Increasing pain, nausea, vomiting  Abdominal distension (swelling)  New increased bleeding (oral or rectal)  Fever (chills)  Pain in chest area  Bloody discharge from nose or mouth  Shortness of breath    You may not take any Advil, Aspirin, Ibuprofen, Motrin, Aleve, or Goodys for 5 days, ONLY  Tylenol as needed for pain. Start Eliquis in 3 days. Follow-up Instructions:   Call  Boyd Wilson MD for any questions or concerns  Results of procedure / biopsy in 7 days   Telephone # [phone]      Follow-up Information     None

## 2018-09-12 NOTE — IP AVS SNAPSHOT
[address] [facility] 
[phone] Patient: Luigi Mondragon MRN: [id number] :1935 About your hospitalization You were admitted on:  2018 You last received care in the:  [facility] ENDOSCOPY You were discharged on:  2018 Why you were hospitalized Your primary diagnosis was:  Not on File Follow-up Information None Discharge Orders None A check anna indicates which time of day the medication should be taken. My Medications CONTINUE taking these medications Instructions Each Dose to Equal  
 Morning Noon Evening Bedtime  
 ammonium lactate 12 % lotion Commonly known as:  LAC-HYDRIN Your last dose was: Your next dose is:    
   
   
 Apply  to affected area as needed. B-12 DOTS PO Your last dose was: Your next dose is: Take 1 Tab by mouth daily. 1 Tab  
    
   
   
   
  
 benzonatate 100 mg capsule Commonly known as:  TESSALON Your last dose was: Your next dose is:    
   
   
 take 1 capsule by mouth every 6 hours if needed for cough  
     
   
   
   
  
 beta carotene 25,000 unit capsule Your last dose was: Your next dose is: Take 25,000 Units by mouth daily. 41623 Units  
    
   
   
   
  
 butalbital-acetaminophen-caffeine -40 mg per tablet Commonly known as:  Laveda [place] Your last dose was: Your next dose is: Take 1 Tab by mouth every four (4) hours as needed. 1 Tab CALCIUM 500 500 mg calcium (1,250 mg) tablet Generic drug:  calcium carbonate Your last dose was: Your next dose is: Take 1,250 mg by mouth daily. 1250 mg  
    
   
   
   
  
 CINNAMON PO Your last dose was: Your next dose is:     
   
   
 Take  by mouth two (2) times a day. 2 in the am and one in the pm  
     
   
   
   
  
 diclofenac 1 % Gel Commonly known as:  VOLTAREN Your last dose was: Your next dose is:    
   
   
 as needed. dicyclomine 10 mg capsule Commonly known as:  BENTYL Your last dose was: Your next dose is: Take 1 Cap by mouth three (3) times daily. 1 Cap ELIQUIS 2.5 mg tablet Generic drug:  apixaban Your last dose was: Your next dose is: Take 2.5 mg by mouth two (2) times a day. 2.5 mg  
    
   
   
   
  
 ESTRACE 0.01 % (0.1 mg/gram) vaginal cream  
Generic drug:  estradiol Your last dose was: Your next dose is: Insert  into vagina two (2) times a day. ferrous sulfate 325 mg (65 mg iron) tablet Your last dose was: Your next dose is:    
   
   
 take 1 tablet by mouth one time a day  
     
   
   
   
  
 folic acid 1 mg tablet Commonly known as:  Catrachito Your last dose was: Your next dose is: Take  by mouth daily. gabapentin 300 mg capsule Commonly known as:  NEURONTIN Your last dose was: Your next dose is: Take 300 mg by mouth nightly. 300 mg  
    
   
   
   
  
 glucose blood VI test strips strip Commonly known as:  TRUE METRIX GLUCOSE TEST STRIP Your last dose was: Your next dose is:    
   
   
 Test 2 times daily Dx Code E11.65 HYDROcodone-acetaminophen  mg tablet Commonly known as:  Bridgette Wisdom Your last dose was: Your next dose is:    
   
   
 take 1 tablet by mouth every 6 hours if needed for pain Insulin Needles (Disposable) 32 gauge x 5/32\" Ndle Commonly known as:  Venus Pen Needle Your last dose was: Your next dose is:    
   
   
 Use twice daily. Dx code 250.00 Lancets Misc Your last dose was: Your next dose is:    
   
   
 Test 2 times daily Dx Code E11.65 (True Metrix Meter) LASIX 40 mg tablet Generic drug:  furosemide Your last dose was: Your next dose is: Take  by mouth daily. meclizine 25 mg tablet Commonly known as:  ANTIVERT Your last dose was: Your next dose is: Take  by mouth three (3) times daily as needed. METHOTREXATE Your last dose was: Your next dose is:    
   
   
 20 mg by Does Not Apply route every seven (7) days. 20 mg  
    
   
   
   
  
 metoprolol tartrate 25 mg tablet Commonly known as:  LOPRESSOR Your last dose was: Your next dose is: TAKE ONE-HALF (1/2) TABLET TWICE A DAY  
     
   
   
   
  
 nitrofurantoin 25 mg capsule Commonly known as:  MACRODANTIN Your last dose was: Your next dose is: Take 100 mg by mouth nightly. 100 mg NITROSTAT 0.4 mg SL tablet Generic drug:  nitroglycerin Your last dose was: Your next dose is:    
   
   
 by SubLINGual route every five (5) minutes as needed. omega-3 acid ethyl esters 1 gram capsule Commonly known as:  Mare Hernandez Your last dose was: Your next dose is: TAKE 1 CAPSULE TWICE DAILY  
     
   
   
   
  
 ondansetron hcl 4 mg tablet Commonly known as:  Gloria Bhatt Your last dose was: Your next dose is: Take 4 mg by mouth every twelve (12) hours as needed. 4 mg  
    
   
   
   
  
 pantoprazole 40 mg tablet Commonly known as:  PROTONIX Your last dose was: Your next dose is: Take 40 mg by mouth daily. 40 mg  
    
   
   
   
  
 polyethylene glycol 17 gram/dose powder Commonly known as:  Beola [place] Your last dose was:    
   
Your next dose is: Take 17 g by mouth daily. 17 g  
    
   
   
   
  
 potassium chloride SA 10 mEq capsule Commonly known as:  Isauro Donna Your last dose was: Your next dose is: Take 10 mEq by mouth daily. 10 mEq  
    
   
   
   
  
 promethazine 25 mg tablet Commonly known as:  PHENERGAN Your last dose was: Your next dose is: Take 25 mg by mouth every six (6) hours as needed. 25 mg  
    
   
   
   
  
 SINGULAIR 10 mg tablet Generic drug:  montelukast  
   
Your last dose was: Your next dose is: Take 10 mg by mouth daily. 10 mg  
    
   
   
   
  
 sotalol 80 mg tablet Commonly known as:  Swapna Fair Your last dose was: Your next dose is: Take 80 mg by mouth two (2) times a day. 80 mg  
    
   
   
   
  
 sulfaSALAzine 500 mg tablet Commonly known as:  AZULFIDINE Your last dose was: Your next dose is: Take 500 mg by mouth two (2) times a day. 500 mg  
    
   
   
   
  
 VITAMIN C 1,000 mg tablet Generic drug:  ascorbic acid (vitamin C) Your last dose was: Your next dose is: Take 1,000 mg by mouth daily. 1000 mg  
    
   
   
   
  
 vitamin e 1,000 unit capsule Commonly known as:  E GEMS Your last dose was: Your next dose is: Take 1,000 Units by mouth daily. 1000 Units Opioid Education Prescription Opioids: What You Need to Know: 
 
Prescription opioids can be used to help relieve moderate-to-severe pain and are often prescribed following a surgery or injury, or for certain health conditions. These medications can be an important part of treatment but also come with serious risks. Opioids are strong pain medicines. Examples include hydrocodone, oxycodone, fentanyl, and morphine. Heroin is an example of an illegal opioid.   It is important to work with your health care provider to make sure you are getting the safest, most effective care. WHAT ARE THE RISKS AND SIDE EFFECTS OF OPIOID USE? Prescription opioids carry serious risks of addiction and overdose, especially with prolonged use. An opioid overdose, often marked by slow breathing, can cause sudden death. The use of prescription opioids can have a number of side effects as well, even when taken as directed. · Tolerance-meaning you might need to take more of a medication for the same pain relief · Physical dependence-meaning you have symptoms of withdrawal when the medication is stopped. Withdrawal symptoms can include nausea, sweating, chills, diarrhea, stomach cramps, and muscle aches. Withdrawal can last up to several weeks, depending on which drug you took and how long you took it. · Increased sensitivity to pain · Constipation · Nausea, vomiting, and dry mouth · Sleepiness and dizziness · Confusion · Depression · Low levels of testosterone that can result in lower sex drive, energy, and strength · Itching and sweating RISKS ARE GREATER WITH:      
· History of drug misuse, substance use disorder, or overdose · Mental health conditions (such as depression or anxiety) · Sleep apnea · Older age (72 years or older) · Pregnancy Avoid alcohol while taking prescription opioids. Also, unless specifically advised by your health care provider, medications to avoid include: · Benzodiazepines (such as Xanax or Valium) · Muscle relaxants (such as Soma or Flexeril) · Hypnotics (such as Ambien or Lunesta) · Other prescription opioids KNOW YOUR OPTIONS Talk to your health care provider about ways to manage your pain that don't involve prescription opioids. Some of these options may actually work better and have fewer risks and side effects. Options may include: 
· Pain relievers such as acetaminophen, ibuprofen, and naproxen · Some medications that are also used for depression or seizures · Physical therapy and exercise · Counseling to help patients learn how to cope better with triggers of pain and stress. · Application of heat or cold compress · Massage therapy · Relaxation techniques Be Informed Make sure you know the name of your medication, how much and how often to take it, and its potential risks & side effects. IF YOU ARE PRESCRIBED OPIOIDS FOR PAIN: 
· Never take opioids in greater amounts or more often than prescribed. Remember the goal is not to be pain-free but to manage your pain at a tolerable level. · Follow up with your primary care provider to: · Work together to create a plan on how to manage your pain. · Talk about ways to help manage your pain that don't involve prescription opioids. · Talk about any and all concerns and side effects. · Help prevent misuse and abuse. · Never sell or share prescription opioids · Help prevent misuse and abuse. · Store prescription opioids in a secure place and out of reach of others (this may include visitors, children, friends, and family). · Safely dispose of unused/unwanted prescription opioids: Find your community drug take-back program or your pharmacy mail-back program, or flush them down the toilet, following guidance from the Food and Drug Administration (www.fda.gov/Drugs/ResourcesForYou). · Visit www.cdc.gov/drugoverdose to learn about the risks of opioid abuse and overdose. · If you believe you may be struggling with addiction, tell your health care provider and ask for guidance or call [address] [place] [address] at [phone]. Discharge Instructions [place] Office: [phone] [place] [phone] 
1935 EGD/COLONOSCOPY DISCHARGE INSTRUCTIONS Discomfort: 
Sore throat- throat lozenges or warm salt water gargle 
redness at IV site- apply warm compress to area; if redness or soreness persist- contact your physician Gaseous discomfort- walking, belching will help relieve any discomfort You may not operate a vehicle for 12 hours You may not engage in an occupation involving machinery or appliances for rest of today. You may not drink alcoholic beverages for at least 12 hours Avoid making any critical decisions for at least 24 hour DIET You may resume your regular diet  however -  remember your colon is empty and a heavy meal will produce gas. Avoid these foods:  fried / greasy foods, excessive carbonated drinks or too much caffeine MEDICATIONS Regarding Aspirin or Nonsteroidal medications specifically, please see below. ACTIVITY You may resume your normal daily activities. Spend the remainder of the day resting -  avoid any strenuous activity. CALL M.D. ANY SIGN OF Increasing pain, nausea, vomiting Abdominal distension (swelling) New increased bleeding (oral or rectal) Fever (chills) Pain in chest area Bloody discharge from nose or mouth Shortness of breath You may not take any Advil, Aspirin, Ibuprofen, Motrin, Aleve, or Goodys for 5 days, ONLY  Tylenol as needed for pain. Start Eliquis in 3 days. Follow-up Instructions: 
 Call  Boyd Quintanilla MD for any questions or concerns Results of procedure / biopsy in 7 days Telephone # [phone] Follow-up Information None Introducing [facility]! Juanito Cruz introduces [employer] patient portal. Now you can access parts of your medical record, email your doctor's office, and request medication refills online. 1. In your internet browser, go to [email or web address] [employer]akt 2. Click on the First Time User? Click Here link in the Sign In box. You will see the New Member Sign Up page. 3. Enter your [employer] Access Code exactly as it appears below. You will not need to use this code after youve completed the sign-up process.  If you do not sign up before the expiration date, you must request a new code. · [employer] Access Code: [email or web address] Expires: 9/14/2018 11:03 AM 
 
4. Enter the last four digits of your Social Security Number (xxxx) and Date of Birth (mm/dd/yyyy) as indicated and click Submit. You will be taken to the next sign-up page. 5. Create a [employer] ID. This will be your [employer] login ID and cannot be changed, so think of one that is secure and easy to remember. 6. Create a [employer] password. You can change your password at any time. 7. Enter your Password Reset Question and Answer. This can be used at a later time if you forget your password. 8. Enter your e-mail address. You will receive e-mail notification when new information is available in [address] 9. Click Sign Up. You can now view and download portions of your medical record. 10. Click the Download Summary menu link to download a portable copy of your medical information. If you have questions, please visit the Frequently Asked Questions section of the [employer] website. Remember, [employer] is NOT to be used for urgent needs. For medical emergencies, dial 911. Now available from your iPhone and Android! Introducing Adan Hunter As a [facility] patient, I wanted to make you aware of our electronic visit tool called Adan Lewislacey. [facility] 24/7 allows you to connect within minutes with a medical provider 24 hours a day, seven days a week via a mobile device or tablet or logging into a secure website from your computer. You can access Adan Nickersonfin from anywhere in the [place]. A virtual visit might be right for you when you have a simple condition and feel like you just dont want to get out of bed, or cant get away from work for an appointment, when your regular [facility] provider is not available (evenings, weekends or holidays), or when youre out of town and need minor care.   Electronic visits cost only $49 and if the Adan Lewislacey provider determines a prescription is needed to treat your condition, one can be electronically transmitted to a nearby pharmacy*. Please take a moment to enroll today if you have not already done so. The enrollment process is free and takes just a few minutes. To enroll, please download the [employer] 24/7 dilan to your tablet or phone, or visit [email or web address] to enroll on your computer. And, as an [address] [place] [address] patient with a [email or web address] account, the results of your visits will be scanned into your electronic medical record and your primary care provider will be able to view the scanned results. We urge you to continue to see your regular [employer] provider for your ongoing medical care. And while your primary care provider may not be the one available when you seek a [employer] virtual visit, the peace of mind you get from getting a real diagnosis real time can be priceless. For more information on [employer], view our Frequently Asked Questions (FAQs) at [email or web address]. Sincerely, 
 
Analilia Art MD 
Chief Medical Officer [address] [place] [address] *:  certain medications cannot be prescribed via [employer] Providers Seen During Your Hospitalization Provider Specialty Primary office phone Hans Rodriguez MD Gastroenterology [phone] Your Primary Care Physician (PCP) Primary Care Physician Office Phone Office Fax 9257 [address] [phone] You are allergic to the following Allergen Reactions Iodinated Contrast- Oral And Iv Dye Other (comments) Iv dye causes heart to stop Chicken Derived Other (comments) Closes throat Darvon (Propoxyphene) Rash Tetanus Vaccines And Toxoid Other (comments) Blisters all over body Recent Documentation Height Weight Breastfeeding? BMI OB Status Smoking Status  1.651 m 82.6 kg No 30.29 kg/m2 Hysterectomy Former Smoker Emergency Contacts Name Discharge Info Relation Home Work Mobile [employer] DISCHARGE CAREGIVER [3] Spouse [3] [phone] Patient Belongings The following personal items are in your possession at time of discharge: 
  Dental Appliances: Uppers, Lowers  Visual Aid: Glasses Please provide this summary of care documentation to your next provider. Signatures-by signing, you are acknowledging that this After Visit Summary has been reviewed with you and you have received a copy. Patient Signature:  ____________________________________________________________ Date:  ____________________________________________________________  
  
[facility] Signature:  ____________________________________________________________ Date:  ____________________________________________________________

## 2018-09-12 NOTE — PROCEDURES
[place] Office: [phone]      Esophagogastroduodenoscopy Procedure Note      Kendra Pena  [birth year]  [id number]    Indication: nausea    : Brian Heard MD    Referring Provider:  Miah Gilbert MD    Sedation:  MAC anesthesia Propofol    Procedure Details:  After detailed informed consent was obtained for the procedure, with all risks and benefits of procedure explained the patient was taken to the endoscopy suite and placed in the left lateral decubitus position. Following sequential administration of sedation as per above, the endoscope was inserted into the mouth and advanced under direct vision to second portion of the duodenum. A careful inspection was made as the gastroscope was withdrawn, including a retroflexed view of the proximal stomach; findings and interventions are described below. Findings:     Oropharynx: Copious whitish pink liquid is noted. ? Fixodent    Esophagus: The esophageal mucosa in the proximal, mid and distal esophagus is covered by the same liquid as above. It can be washed. There is distyal grade C esophagitis and a 2 cm hiatal hernia. The squamo-columnar junction is at 40 cm where the Z-line was noted. Stomach: The gastric mucosa has shiny diffuse erythema. Antral and body biopsies are taken. A tiny polyp was seen and removed. The fundus was found to be normal with no lesions noted on retroflexion. The angularis is normal as well. Duodenum:   The bulb and post bulbar mucosa is normal in appearance. The duodenal folds are normal. Biopsies taken to r/o celiac disease. Therapies:  biopsy of stomach body, antrum  biopsy of duodenal distal bulb, second portion    Specimen: Specimens were collected as described and send to the laboratory. Complications:   None were encountered during the procedure. EBL:  None. Recommendations:     -Continue acid suppression. ,   -Await pathology. ,   -Follow symptoms. ,   -No NSAIDS      Thank you for entrusting me with this patient's care. Please do not hesitate to contact me with any questions or if I can be of assistance with any of your other patients' GI needs. Boyd Vera MD  9/12/2018  9:18 AM

## 2018-09-12 NOTE — PROGRESS NOTES
Anesthesia reports 290 mg Propofol, 80 mg Lidocaine and 400 Normal Saline were given during procedure. Received report from anesthesia staff on vital signs and status of patient.

## 2018-09-12 NOTE — ANESTHESIA POSTPROCEDURE EVALUATION
Post-Anesthesia Evaluation and Assessment Patient: William Kang MRN: [id number]  SSN: [id number] YOB: 1935  Age: 82 y.o. Sex: female Cardiovascular Function/Vital Signs Visit Vitals  /77  Pulse (!) 56  Temp 36.4 °C (97.6 °F)  Resp 10  
 Ht 5' 5\" (1.651 m)  Wt 82.6 kg (182 lb)  SpO2 100%  Breastfeeding No  
 BMI 30.29 kg/m2 Patient is status post MAC anesthesia for Procedure(s): 
COLONOSCOPY 
ESOPHAGOGASTRODUODENOSCOPY (EGD) ESOPHAGOGASTRODUODENAL (EGD) BIOPSY ENDOSCOPIC ARGON PLASMA COAGULATION. Nausea/Vomiting: None Postoperative hydration reviewed and adequate. Pain: 
Pain Scale 1: Numeric (0 - 10) (09/12/18 1018) Pain Intensity 1: 0 (09/12/18 1018) Managed Neurological Status: At baseline Mental Status and Level of Consciousness: Arousable Pulmonary Status:  
O2 Device: Room air (09/12/18 1018) Adequate oxygenation and airway patent Complications related to anesthesia: None Post-anesthesia assessment completed. No concerns Signed By: Thu Varma MD   
 September 12, 2018

## 2018-09-12 NOTE — PROCEDURES
Colonoscopy Procedure Note    [facility]  2/[birth year]/5252  [id number]    Indications:  Please see below. Pre-operative Diagnosis: hematochezia    Post-operative Diagnosis:  AVM colon, diverticulosis,internal hemorrhoids    : Boyd Brunson MD    Referring Provider: Yon Ashford MD    Sedation:  MAC anesthesia Propofol        Procedure Details:    After detailed informed consent was obtained with all risks and benefits of procedure explained and preoperative exam completed, the patient was taken to the endoscopy suite and placed in the left lateral decubitus position. Upon sequential sedation as per above, a digital rectal exam was performed  And was normal.  The Olympus videocolonoscope  was inserted in the rectum and carefully advanced to the cecum, which was identified by the ileocecal valve and appendiceal orifice. The quality of preparation was good. The colonoscope was slowly withdrawn with careful evaluation between folds. Retroflexion in the rectum was performed. 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 A medium sized proximal transverse colon and a small proximal ascending colon. Both were ablated with APC rt colon setting with a straight firing APC probe. · [place] colonic anastomosis is seen at 20 cm. · There is mild left sided diverticulosis. · Rest of mucosa is normal  · Large internal hemorrhoids are noted on retroflexion      Therapies:  APC of colonic AVMs    Specimen:  none     Complications: None were encountered during the procedure. EBL:  None. Recommendations:     -High fiber diet.  -Avoid constipation. -CRS referral for hemorrhoid surgery. Boyd Brunson MD  9/12/2018  9:41 AM

## 2019-03-02 ENCOUNTER — OP HISTORICAL/CONVERTED ENCOUNTER (OUTPATIENT)
Dept: OTHER | Age: 84
End: 2019-03-02

## 2019-04-21 RX ORDER — OMEGA-3-ACID ETHYL ESTERS 1 G/1
CAPSULE, LIQUID FILLED ORAL
Qty: 180 CAP | Refills: 3 | Status: SHIPPED | OUTPATIENT
Start: 2019-04-21

## 2020-01-20 ENCOUNTER — OP HISTORICAL/CONVERTED ENCOUNTER (OUTPATIENT)
Dept: OTHER | Age: 85
End: 2020-01-20

## 2020-02-26 ENCOUNTER — HOSPITAL ENCOUNTER (OUTPATIENT)
Dept: CT IMAGING | Age: 85
Discharge: HOME | End: 2020-02-26
Attending: SURGERY
Payer: MEDICARE

## 2020-02-26 DIAGNOSIS — I65.29 CAROTID ARTERY STENOSIS: ICD-10-CM

## 2020-02-26 LAB — CREAT BLD-MCNC: 0.6 MG/DL (ref 0.6–1.3)

## 2020-02-26 PROCEDURE — 70498 CT ANGIOGRAPHY NECK: CPT

## 2020-02-26 PROCEDURE — 74011636320 HC RX REV CODE- 636/320: Performed by: SURGERY

## 2020-02-26 PROCEDURE — 82565 ASSAY OF CREATININE: CPT

## 2020-02-26 RX ORDER — SODIUM CHLORIDE 0.9 % (FLUSH) 0.9 %
10 SYRINGE (ML) INJECTION
Status: COMPLETED | OUTPATIENT
Start: 2020-02-26 | End: 2020-02-26

## 2020-02-26 RX ADMIN — Medication 10 ML: at 09:05

## 2020-02-26 RX ADMIN — IOPAMIDOL 100 ML: 755 INJECTION, SOLUTION INTRAVENOUS at 09:05

## 2020-04-14 RX ORDER — OMEGA-3-ACID ETHYL ESTERS 1 G/1
CAPSULE, LIQUID FILLED ORAL
Qty: 180 CAP | Refills: 3 | OUTPATIENT
Start: 2020-04-14

## 2020-11-04 ENCOUNTER — TRANSCRIBE ORDER (OUTPATIENT)
Dept: SCHEDULING | Age: 85
End: 2020-11-04

## 2020-11-04 DIAGNOSIS — R10.30 LOWER ABDOMINAL PAIN: Primary | ICD-10-CM

## 2020-11-04 DIAGNOSIS — R11.0 NAUSEA: ICD-10-CM

## 2020-11-11 ENCOUNTER — HOSPITAL ENCOUNTER (OUTPATIENT)
Dept: NUCLEAR MEDICINE | Age: 85
Discharge: HOME | End: 2020-11-11
Attending: NURSE PRACTITIONER
Payer: MEDICARE

## 2020-11-11 DIAGNOSIS — R10.30 LOWER ABDOMINAL PAIN: ICD-10-CM

## 2020-11-11 DIAGNOSIS — R11.0 NAUSEA: ICD-10-CM

## 2020-11-11 PROCEDURE — 78264 GASTRIC EMPTYING IMG STUDY: CPT

## 2020-12-17 ENCOUNTER — HOSPITAL ENCOUNTER (EMERGENCY)
Age: 85
Discharge: HOME | End: 2020-12-17
Attending: EMERGENCY MEDICINE
Payer: MEDICARE

## 2020-12-17 ENCOUNTER — APPOINTMENT (OUTPATIENT)
Dept: GENERAL RADIOLOGY | Age: 85
End: 2020-12-17
Attending: EMERGENCY MEDICINE
Payer: MEDICARE

## 2020-12-17 VITALS
OXYGEN SATURATION: 98 % | DIASTOLIC BLOOD PRESSURE: 69 MMHG | RESPIRATION RATE: 14 BRPM | SYSTOLIC BLOOD PRESSURE: 172 MMHG | WEIGHT: 183 LBS | TEMPERATURE: 98.5 F | BODY MASS INDEX: 30.49 KG/M2 | HEIGHT: 65 IN

## 2020-12-17 DIAGNOSIS — M25.512 ARTHRALGIA OF SHOULDER REGION, LEFT: Primary | ICD-10-CM

## 2020-12-17 PROCEDURE — 99283 EMERGENCY DEPT VISIT LOW MDM: CPT

## 2020-12-17 PROCEDURE — 73030 X-RAY EXAM OF SHOULDER: CPT

## 2020-12-17 RX ORDER — PREDNISONE 5 MG/1
TABLET ORAL
COMMUNITY

## 2020-12-17 RX ORDER — MELATONIN 10 MG
CAPSULE ORAL
COMMUNITY

## 2020-12-17 RX ORDER — PRAVASTATIN SODIUM 40 MG/1
TABLET ORAL
COMMUNITY

## 2020-12-17 RX ORDER — CARVEDILOL 6.25 MG/1
TABLET ORAL
COMMUNITY

## 2020-12-17 RX ORDER — PROMETHAZINE HYDROCHLORIDE 25 MG/1
TABLET ORAL
COMMUNITY

## 2020-12-17 RX ORDER — PANTOPRAZOLE SODIUM 40 MG/1
TABLET, DELAYED RELEASE ORAL
COMMUNITY

## 2020-12-17 RX ORDER — TRIFAROTENE 50 UG/G
CREAM TOPICAL
COMMUNITY
Start: 2020-11-16

## 2020-12-17 RX ORDER — CYCLOBENZAPRINE HCL 10 MG
TABLET ORAL
COMMUNITY
Start: 2020-12-14 | End: 2020-12-17 | Stop reason: ALTCHOICE

## 2020-12-17 RX ORDER — TIZANIDINE 2 MG/1
TABLET ORAL
COMMUNITY
Start: 2020-12-09 | End: 2020-12-17 | Stop reason: ALTCHOICE

## 2020-12-17 RX ORDER — METHOTREXATE 2.5 MG/1
TABLET ORAL
COMMUNITY
Start: 2020-12-16

## 2020-12-17 RX ORDER — LOSARTAN POTASSIUM 100 MG/1
TABLET ORAL
COMMUNITY
Start: 2020-10-18

## 2020-12-17 RX ORDER — ATORVASTATIN CALCIUM 40 MG/1
TABLET, FILM COATED ORAL
COMMUNITY
Start: 2020-10-09

## 2020-12-17 RX ORDER — ISOSORBIDE MONONITRATE 30 MG/1
TABLET, EXTENDED RELEASE ORAL
COMMUNITY
Start: 2020-12-14

## 2020-12-17 RX ORDER — ONDANSETRON 8 MG/1
TABLET, ORALLY DISINTEGRATING ORAL
COMMUNITY
Start: 2020-11-25

## 2020-12-17 RX ORDER — FUROSEMIDE 40 MG/1
TABLET ORAL
COMMUNITY

## 2020-12-17 RX ORDER — HYDROCODONE BITARTRATE AND ACETAMINOPHEN 10; 325 MG/1; MG/1
1 TABLET ORAL
COMMUNITY
Start: 2019-10-17

## 2020-12-17 RX ORDER — BACLOFEN 10 MG/1
10 TABLET ORAL 3 TIMES DAILY
Qty: 15 TAB | Refills: 0 | Status: SHIPPED | OUTPATIENT
Start: 2020-12-17 | End: 2020-12-17 | Stop reason: ALTCHOICE

## 2020-12-17 RX ORDER — HYDROCORTISONE ACETATE PRAMOXINE HCL 2.5; 1 G/100G; G/100G
CREAM TOPICAL
COMMUNITY

## 2020-12-17 RX ORDER — BETHANECHOL CHLORIDE 50 MG/1
TABLET ORAL
COMMUNITY

## 2020-12-17 RX ORDER — MONTELUKAST SODIUM 10 MG/1
TABLET ORAL
COMMUNITY

## 2020-12-17 RX ORDER — METHYLPREDNISOLONE 4 MG/1
TABLET ORAL
COMMUNITY
Start: 2020-12-09

## 2020-12-17 RX ORDER — MECLIZINE HYDROCHLORIDE 25 MG/1
TABLET ORAL
COMMUNITY

## 2020-12-17 RX ORDER — MUPIROCIN 20 MG/G
OINTMENT TOPICAL
COMMUNITY
Start: 2020-10-08

## 2020-12-17 NOTE — ED TRIAGE NOTES
Pt c/o L shoulder pain x10+ yrs, exacerbation x2 wks, received \"another shot for bursitis\" approx 1 wk ago, but this time had little relief. Increased pain with movement. Denies any other c/o.

## 2020-12-17 NOTE — DISCHARGE INSTRUCTIONS
Take medicines as prescribed avoid any strenuous use of your shoulder. Follow-up with your orthopedic surgeon tomorrow for further evaluation and treatment, to discuss elective left shoulder replacement surgery. Continue Flexeril and hydrocodone as prescribed by your PCP.

## 2020-12-17 NOTE — ED PROVIDER NOTES
EMERGENCY DEPARTMENT HISTORY AND PHYSICAL EXAM      Date: 12/17/2020  Patient Name: Pati Dukes    History of Presenting Illness     Chief Complaint   Patient presents with    Shoulder Pain       History Provided By: Patient    HPI: Pati Dukes, 82 y.o. female with a past medical history significant diabetes, hypertension, hyperlipidemia, stroke and Atrial fibrillation presents to the ED with cc of recurrent left shoulder pain s/p steroid injection left shoulder. She denies any injury, fall or strain and has had no relief despite use of hydrocodone/acetaminophen 10/325 mg tablets. She was last seen by her orthopedic nurse practitioner 6 days ago when she had the steroid injections reportedly. There are no other complaints, changes, or physical findings at this time. PCP: Juice Arevalo MD    No current facility-administered medications on file prior to encounter. Current Outpatient Medications on File Prior to Encounter   Medication Sig Dispense Refill    HYDROcodone-acetaminophen (NORCO)  mg tablet Take 1 Tab by mouth every four (4) hours as needed.       meclizine (ANTIVERT) 25 mg tablet meclizine 25 mg tablet      montelukast (SINGULAIR) 10 mg tablet montelukast 10 mg tablet      pantoprazole (PROTONIX) 40 mg tablet pantoprazole 40 mg tablet,delayed release      promethazine (PHENERGAN) 25 mg tablet promethazine 25 mg tablet      furosemide (LASIX) 40 mg tablet furosemide 40 mg tablet      apixaban (ELIQUIS) 2.5 mg tablet Eliquis 2.5 mg tablet   take 1 tablet by mouth twice a day      tiotropium-olodateroL (STIOLTO RESPIMAT) 2.5-2.5 mcg/actuation inhaler Stiolto Respimat 2.5 mcg-2.5 mcg/actuation solution for inhalation      predniSONE (DELTASONE) 5 mg tablet prednisone 5 mg tablet      pravastatin (PRAVACHOL) 40 mg tablet pravastatin 40 mg tablet      ondansetron (ZOFRAN ODT) 8 mg disintegrating tablet Place 1 tablet (8 mg) on the tongue and allow to dissolve every 6 hours as needed      mupirocin (BACTROBAN) 2 % ointment apply to affected area three times a day      methylPREDNISolone (MEDROL DOSEPACK) 4 mg tablet use as directed FOLLOW DIRECTIONS ON BACK OF FOIL PACK      methotrexate (RHEUMATREX) 2.5 mg tablet       melatonin 10 mg capsule melatonin 10 mg capsule   Take by oral route.  losartan (COZAAR) 100 mg tablet take 1 tablet by mouth once daily      isosorbide mononitrate ER (IMDUR) 30 mg tablet take 1 tablet by mouth once daily      hydrocortisone-pramoxine (ANALPRAM HC 2.5%-1%) 2.5-1 % rectal cream hydrocortisone-pramoxine 2.5 %-1 % (4g) rectal cream      carvediloL (COREG) 6.25 mg tablet carvedilol 6.25 mg tablet      bethanechol chloride (URECHOLINE) 50 mg tablet bethanechol chloride 50 mg tablet      atorvastatin (LIPITOR) 40 mg tablet take 2 tablets by mouth once daily      Aklief 0.005 % crea apply to affected area once daily      [DISCONTINUED] tiZANidine (ZANAFLEX) 2 mg tablet take 1 tablet by mouth every 6 hours if needed      [DISCONTINUED] cyclobenzaprine (FLEXERIL) 10 mg tablet TAKE 1/2 tablet BY MOUTH 3 times a day as needed      omega-3 acid ethyl esters (LOVAZA) 1 gram capsule TAKE 1 CAPSULE TWICE DAILY 180 Cap 3    [DISCONTINUED] sulfaSALAzine (AZULFIDINE) 500 mg tablet Take 500 mg by mouth two (2) times a day.  [DISCONTINUED] apixaban (ELIQUIS) 2.5 mg tablet Take 2.5 mg by mouth two (2) times a day.  diclofenac (VOLTAREN) 1 % gel as needed. 0    butalbital-acetaminophen-caffeine (FIORICET, ESGIC) -40 mg per tablet Take 1 Tab by mouth every four (4) hours as needed. 0    ESTRACE 0.01 % (0.1 mg/gram) vaginal cream Insert  into vagina two (2) times a day.  0    folic acid (FOLVITE) 1 mg tablet Take  by mouth daily. 0    ferrous sulfate 325 mg (65 mg iron) tablet take 1 tablet by mouth one time a day  0    [DISCONTINUED] ammonium lactate (LAC-HYDRIN) 12 % lotion Apply  to affected area as needed.   0    [DISCONTINUED] benzonatate (TESSALON) 100 mg capsule take 1 capsule by mouth every 6 hours if needed for cough  0    [DISCONTINUED] dicyclomine (BENTYL) 10 mg capsule Take 1 Cap by mouth three (3) times daily. 0    [DISCONTINUED] HYDROcodone-acetaminophen (NORCO)  mg tablet take 1 tablet by mouth every 6 hours if needed for pain  0    glucose blood VI test strips (TRUE METRIX GLUCOSE TEST STRIP) strip Test 2 times daily Dx Code E11.65 100 Strip 6    Lancets misc Test 2 times daily Dx Code E11.65 (True Metrix Meter) 100 Each 6    [DISCONTINUED] METHOTREXATE 20 mg by Does Not Apply route every seven (7) days.  Insulin Needles, Disposable, (YULIYA PEN NEEDLE) 32 x 5/32 \" ndle Use twice daily. Dx code 250.00 200 Each 4    [DISCONTINUED] metoprolol (LOPRESSOR) 25 mg tablet TAKE ONE-HALF (1/2) TABLET TWICE A DAY 90 tablet 2    [DISCONTINUED] sotalol (BETAPACE) 80 mg tablet Take 80 mg by mouth two (2) times a day.  gabapentin (NEURONTIN) 300 mg capsule Take 300 mg by mouth nightly.  NITROSTAT 0.4 mg SL tablet by SubLINGual route every five (5) minutes as needed.  [DISCONTINUED] ondansetron hcl (ZOFRAN) 4 mg tablet Take 4 mg by mouth every twelve (12) hours as needed.  polyethylene glycol (MIRALAX) 17 gram/dose powder Take 17 g by mouth daily.  potassium chloride SA (MICRO-K) 10 mEq capsule Take 10 mEq by mouth daily.  [DISCONTINUED] pantoprazole (PROTONIX) 40 mg tablet Take 40 mg by mouth daily.  [DISCONTINUED] meclizine (ANTIVERT) 25 mg tablet Take  by mouth three (3) times daily as needed.  [DISCONTINUED] montelukast (SINGULAIR) 10 mg tablet Take 10 mg by mouth daily.  [DISCONTINUED] furosemide (LASIX) 40 mg tablet Take  by mouth daily.  [DISCONTINUED] promethazine (PHENERGAN) 25 mg tablet Take 25 mg by mouth every six (6) hours as needed.  [DISCONTINUED] nitrofurantoin (MACRODANTIN) 25 mg capsule Take 100 mg by mouth nightly.       [DISCONTINUED] ascorbic acid (VITAMIN C) 1,000 mg tablet Take 1,000 mg by mouth daily.  [DISCONTINUED] calcium carbonate (CALCIUM 500) 500 mg (1,250 mg) tablet Take 1,250 mg by mouth daily.  [DISCONTINUED] vitamin e (E GEMS) 1,000 unit capsule Take 1,000 Units by mouth daily.  [DISCONTINUED] CYANOCOBALAMIN, VITAMIN B-12, (B-12 DOTS PO) Take 1 Tab by mouth daily.  [DISCONTINUED] beta carotene 25,000 unit capsule Take 25,000 Units by mouth daily.  [DISCONTINUED] CINNAMON BARK (CINNAMON PO) Take  by mouth two (2) times a day. 2 in the am and one in the pm          Past History     Past Medical History:  Past Medical History:   Diagnosis Date    Arrhythmia     a. 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     SOB (shortness of breath)     on exertion    Stroke ([facility] [address] 2017       Past Surgical History:  Past Surgical History:   Procedure Laterality Date    COLONOSCOPY Left 2018    COLONOSCOPY performed by Wolfgang Dumont MD at [facility] ENDOSCOPY    HX BACK SURGERY      eight surgeries    HX CATARACT REMOVAL      left eye    HX HERNIA REPAIR      HX OTHER SURGICAL      rt middle lobe removed d/t necrosis    HX PARTIAL HYSTERECTOMY      HX REFRACTIVE SURGERY      PART REMOVAL COLON,ABD/TRANSANAL RALPH      pt unsure why    REMOVAL GALLBLADDER         Family History:  Family History   Problem Relation Age of Onset    Diabetes Mother     Heart Disease Father     Stroke Father     Diabetes Sister     Cancer Brother     Diabetes Maternal Aunt     Heart Disease Maternal Aunt     Cancer Maternal Grandmother        Social History:  Social History     Tobacco Use    Smoking status: Former Smoker     Packs/day: 1.00     Years: 40.00     Pack years: 40.00     Quit date: 2011     Years since quittin.8    Smokeless tobacco: Never Used   Substance Use Topics    Alcohol use: Yes     Comment: occassionally alcohol    Drug use:  No Allergies: Allergies   Allergen Reactions    Iodinated Contrast Media Other (comments)     Iv dye causes heart to stop    Chicken Derived Other (comments)     Closes throat    Darvon [Propoxyphene] Rash    Tetanus Vaccines And Toxoid Other (comments)     Blisters all over body         Review of Systems     Review of Systems   Constitutional: Negative. HENT: Negative. Eyes: Negative. Respiratory: Negative. Cardiovascular: Negative. Gastrointestinal: Negative. Endocrine: Negative. Genitourinary: Negative. Musculoskeletal: Positive for arthralgias. Left shoulder pain. Skin: Negative. Neurological: Negative. Hematological: Negative. Psychiatric/Behavioral: Negative. All other systems reviewed and are negative. Physical Exam     Physical Exam  Vitals signs and nursing note reviewed. Constitutional:       Appearance: Normal appearance. She is normal weight. HENT:      Head: Normocephalic and atraumatic. Nose: Nose normal.      Mouth/Throat:      Mouth: Mucous membranes are moist.      Pharynx: Oropharynx is clear. Eyes:      Extraocular Movements: Extraocular movements intact. Conjunctiva/sclera: Conjunctivae normal.      Pupils: Pupils are equal, round, and reactive to light. Neck:      Musculoskeletal: Normal range of motion and neck supple. Cardiovascular:      Rate and Rhythm: Normal rate and regular rhythm. Pulses: Normal pulses. Heart sounds: Normal heart sounds. Pulmonary:      Effort: Pulmonary effort is normal.      Breath sounds: Normal breath sounds. Abdominal:      General: Abdomen is flat. Palpations: Abdomen is soft. Musculoskeletal: Normal range of motion. General: Tenderness and signs of injury present. Comments: Reduced range of motion left shoulder. Skin:     General: Skin is warm and dry. Capillary Refill: Capillary refill takes less than 2 seconds.    Neurological:      General: No focal deficit present. Mental Status: She is alert and oriented to person, place, and time. Psychiatric:         Mood and Affect: Mood normal.         Behavior: Behavior normal.         Lab and Diagnostic Study Results     Labs -   No results found for this or any previous visit (from the past 12 hour(s)). Radiologic Studies -     CT Results  (Last 48 hours)    None        CXR Results  (Last 48 hours)    None        Study Result    Shoulder pain.     FINDINGS: 3 views of the left shoulder. No acute fracture or dislocation. Glenohumeral joint narrowing, sclerosis, osteophytosis. Small osteophytes from  the inferior aspect of the [facility] joint. Calcific atherosclerosis. Cardiac valve  prosthesis. Anastomotic suture line right hemithorax.     IMPRESSION  IMPRESSION:  1. No acute bony findings. 2. Left shoulder joint osteoarthritis. Medical Decision Making     - I am the first provider for this patient. - I reviewed the vital signs, available nursing notes, past medical history, past surgical history, family history and social history. - Initial assessment performed. The patients presenting problems have been discussed, and they are in agreement with the care plan formulated and outlined with them. I have encouraged them to ask questions as they arise throughout their visit. Vital Signs-Reviewed the patient's vital signs. Patient Vitals for the past 12 hrs:   Temp Resp BP SpO2   12/17/20 1107 98.5 °F (36.9 °C) 14 (!) 172/69 98 %       Records Reviewed: Nursing Notes    ED Course/Provider Notes (Medical Decision Making): Uneventful ED course, clinical improvement with therapy, patient will be discharged to followup with PCP as directed      Disposition     Disposition: Condition unchanged  DC- Adult Discharges: All of the diagnostic tests were reviewed and questions answered. Diagnosis, care plan and treatment options were discussed.   The patient understands the instructions and will follow up as directed. The patients results have been reviewed with them. They have been counseled regarding their diagnosis. The patient verbally convey understanding and agreement of the signs, symptoms, diagnosis, treatment and prognosis and additionally agrees to follow up as recommended with their PCP in 24 - 48 hours. They also agree with the care-plan and convey that all of their questions have been answered. I have also put together some discharge instructions for them that include: 1) educational information regarding their diagnosis, 2) how to care for their diagnosis at home, as well a 3) list of reasons why they would want to return to the ED prior to their follow-up appointment, should their condition change. Discharged    DISCHARGE PLAN:  1. Current Discharge Medication List      CONTINUE these medications which have NOT CHANGED    Details   HYDROcodone-acetaminophen (NORCO)  mg tablet Take 1 Tab by mouth every four (4) hours as needed.       meclizine (ANTIVERT) 25 mg tablet meclizine 25 mg tablet      montelukast (SINGULAIR) 10 mg tablet montelukast 10 mg tablet      pantoprazole (PROTONIX) 40 mg tablet pantoprazole 40 mg tablet,delayed release      promethazine (PHENERGAN) 25 mg tablet promethazine 25 mg tablet      furosemide (LASIX) 40 mg tablet furosemide 40 mg tablet      apixaban (ELIQUIS) 2.5 mg tablet Eliquis 2.5 mg tablet   take 1 tablet by mouth twice a day      tiZANidine (ZANAFLEX) 2 mg tablet take 1 tablet by mouth every 6 hours if needed      tiotropium-olodateroL (STIOLTO RESPIMAT) 2.5-2.5 mcg/actuation inhaler Stiolto Respimat 2.5 mcg-2.5 mcg/actuation solution for inhalation      predniSONE (DELTASONE) 5 mg tablet prednisone 5 mg tablet      pravastatin (PRAVACHOL) 40 mg tablet pravastatin 40 mg tablet      ondansetron (ZOFRAN ODT) 8 mg disintegrating tablet Place 1 tablet (8 mg) on the tongue and allow to dissolve every 6 hours as needed      mupirocin (BACTROBAN) 2 % ointment apply to affected area three times a day      methylPREDNISolone (MEDROL DOSEPACK) 4 mg tablet use as directed FOLLOW DIRECTIONS ON BACK OF FOIL PACK      methotrexate (RHEUMATREX) 2.5 mg tablet       melatonin 10 mg capsule melatonin 10 mg capsule   Take by oral route. losartan (COZAAR) 100 mg tablet take 1 tablet by mouth once daily      isosorbide mononitrate ER (IMDUR) 30 mg tablet take 1 tablet by mouth once daily      hydrocortisone-pramoxine (ANALPRAM HC 2.5%-1%) 2.5-1 % rectal cream hydrocortisone-pramoxine 2.5 %-1 % (4g) rectal cream      carvediloL (COREG) 6.25 mg tablet carvedilol 6.25 mg tablet      cyclobenzaprine (FLEXERIL) 10 mg tablet TAKE 1/2 tablet BY MOUTH 3 times a day as needed      bethanechol chloride (URECHOLINE) 50 mg tablet bethanechol chloride 50 mg tablet      atorvastatin (LIPITOR) 40 mg tablet take 2 tablets by mouth once daily      Aklief 0.005 % crea apply to affected area once daily      omega-3 acid ethyl esters (LOVAZA) 1 gram capsule TAKE 1 CAPSULE TWICE DAILY  Qty: 180 Cap, Refills: 3      diclofenac (VOLTAREN) 1 % gel as needed. Refills: 0    Associated Diagnoses: Type 2 diabetes mellitus with hyperglycemia, without long-term current use of insulin ([facility] [address] Osteoporosis, unspecified osteoporosis type, unspecified pathological fracture presence      butalbital-acetaminophen-caffeine (FIORICET, ESGIC) -40 mg per tablet Take 1 Tab by mouth every four (4) hours as needed. Refills: 0    Associated Diagnoses: Osteoporosis, unspecified; Type 2 diabetes mellitus with hyperglycemia, without long-term current use of insulin ([facility])      ESTRACE 0.01 % (0.1 mg/gram) vaginal cream Insert  into vagina two (2) times a day. Refills: 0    Associated Diagnoses: Osteoporosis, unspecified; Type 2 diabetes mellitus with hyperglycemia, without long-term current use of insulin ([facility])      folic acid (FOLVITE) 1 mg tablet Take  by mouth daily.   Refills: 0    Associated Diagnoses: Osteoporosis, unspecified; Type 2 diabetes mellitus with hyperglycemia, without long-term current use of insulin (HCC)      ferrous sulfate 325 mg (65 mg iron) tablet take 1 tablet by mouth one time a day  Refills: 0    Associated Diagnoses: Osteoporosis, unspecified; Type 2 diabetes mellitus with hyperglycemia, without long-term current use of insulin (HCC)      glucose blood VI test strips (TRUE METRIX GLUCOSE TEST STRIP) strip Test 2 times daily Dx Code E11.65  Qty: 100 Strip, Refills: 6    Associated Diagnoses: Type 2 diabetes mellitus without complication (HCC)      Lancets misc Test 2 times daily Dx Code E11.65 (True Metrix Meter)  Qty: 100 Each, Refills: 6    Associated Diagnoses: Type 2 diabetes mellitus without complication ([facility])      Insulin Needles, Disposable, (YULIYA PEN NEEDLE) 32 x 5/32 \" ndle Use twice daily. Dx code 250.00  Qty: 200 Each, Refills: 4      gabapentin (NEURONTIN) 300 mg capsule Take 300 mg by mouth nightly. Associated Diagnoses: DM (diabetes mellitus) ([facility] [address] Type II or unspecified type diabetes mellitus without mention of complication, not stated as uncontrolled      NITROSTAT 0.4 mg SL tablet by SubLINGual route every five (5) minutes as needed. Associated Diagnoses: DM (diabetes mellitus) ([address] Type II or unspecified type diabetes mellitus without mention of complication, not stated as uncontrolled      polyethylene glycol (MIRALAX) 17 gram/dose powder Take 17 g by mouth daily. Associated Diagnoses: Type II or unspecified type diabetes mellitus without mention of complication, not stated as uncontrolled      potassium chloride SA (MICRO-K) 10 mEq capsule Take 10 mEq by mouth daily. Associated Diagnoses: Type II or unspecified type diabetes mellitus without mention of complication, not stated as uncontrolled           2.    Follow-up Information     Follow up With Specialties Details Why Contact Info    Jamia Llamas MD Internal Medicine   [address] [id number]  [phone]      Follow-up with your orthopedic surgeon tomorrow for further evaluation and treatment. In 1 day If symptoms worsen, As needed         3. Return to ED if worse   4.    Discharge Medication List as of 12/17/2020  1:10 PM      CONTINUE these medications which have NOT CHANGED    Details   HYDROcodone-acetaminophen (NORCO)  mg tablet Take 1 Tab by mouth every four (4) hours as needed., Historical Med      meclizine (ANTIVERT) 25 mg tablet meclizine 25 mg tablet, Historical Med      montelukast (SINGULAIR) 10 mg tablet montelukast 10 mg tablet, Historical Med      pantoprazole (PROTONIX) 40 mg tablet pantoprazole 40 mg tablet,delayed release, Historical Med      promethazine (PHENERGAN) 25 mg tablet promethazine 25 mg tablet, Historical Med      furosemide (LASIX) 40 mg tablet furosemide 40 mg tablet, Historical Med      apixaban (ELIQUIS) 2.5 mg tablet Eliquis 2.5 mg tablet   take 1 tablet by mouth twice a day, Historical Med      tiotropium-olodateroL (STIOLTO RESPIMAT) 2.5-2.5 mcg/actuation inhaler Stiolto Respimat 2.5 mcg-2.5 mcg/actuation solution for inhalation, Historical Med      predniSONE (DELTASONE) 5 mg tablet prednisone 5 mg tablet, Historical Med      pravastatin (PRAVACHOL) 40 mg tablet pravastatin 40 mg tablet, Historical Med      ondansetron (ZOFRAN ODT) 8 mg disintegrating tablet Place 1 tablet (8 mg) on the tongue and allow to dissolve every 6 hours as needed, Historical Med      mupirocin (BACTROBAN) 2 % ointment apply to affected area three times a day, Historical Med      methylPREDNISolone (MEDROL DOSEPACK) 4 mg tablet use as directed FOLLOW DIRECTIONS ON BACK OF FOIL PACK, Historical Med      methotrexate (RHEUMATREX) 2.5 mg tablet Historical Med      melatonin 10 mg capsule melatonin 10 mg capsule   Take by oral route., Historical Med      losartan (COZAAR) 100 mg tablet take 1 tablet by mouth once daily, Historical Med      isosorbide mononitrate ER (IMDUR) 30 mg tablet take 1 tablet by mouth once daily, Historical Med      hydrocortisone-pramoxine (ANALPRAM HC 2.5%-1%) 2.5-1 % rectal cream hydrocortisone-pramoxine 2.5 %-1 % (4g) rectal cream, Historical Med      carvediloL (COREG) 6.25 mg tablet carvedilol 6.25 mg tablet, Historical Med      bethanechol chloride (URECHOLINE) 50 mg tablet bethanechol chloride 50 mg tablet, Historical Med      atorvastatin (LIPITOR) 40 mg tablet take 2 tablets by mouth once daily, Historical Med      Aklief 0.005 % crea apply to affected area once daily, Historical Med, EN      omega-3 acid ethyl esters (LOVAZA) 1 gram capsule TAKE 1 CAPSULE TWICE DAILY, Normal, Disp-180 Cap, R-3      diclofenac (VOLTAREN) 1 % gel as needed., Historical Med, R-0      butalbital-acetaminophen-caffeine (FIORICET, ESGIC) -40 mg per tablet Take 1 Tab by mouth every four (4) hours as needed., Historical Med, R-0      ESTRACE 0.01 % (0.1 mg/gram) vaginal cream Insert  into vagina two (2) times a day., Historical Med, R-0, EN      folic acid (FOLVITE) 1 mg tablet Take  by mouth daily. , Historical Med, R-0      ferrous sulfate 325 mg (65 mg iron) tablet take 1 tablet by mouth one time a day, Historical Med, R-0      glucose blood VI test strips (TRUE METRIX GLUCOSE TEST STRIP) strip Test 2 times daily Dx Code E11.65, Normal, Disp-100 Strip, R-6      Lancets misc Test 2 times daily Dx Code E11.65 (True Metrix Meter), Normal, Disp-100 Each, R-6      Insulin Needles, Disposable, (YULIYA PEN NEEDLE) 32 x 5/32 \" ndle Use twice daily. Dx code 250.00, Normal, Disp-200 Each, R-4      gabapentin (NEURONTIN) 300 mg capsule Take 300 mg by mouth nightly., Historical Med      NITROSTAT 0.4 mg SL tablet by SubLINGual route every five (5) minutes as needed., Historical Med      polyethylene glycol (MIRALAX) 17 gram/dose powder Take 17 g by mouth daily. , Historical Med      potassium chloride SA (MICRO-K) 10 mEq capsule Take 10 mEq by mouth daily. , Historical Med         STOP taking these medications       tiZANidine (ZANAFLEX) 2 mg tablet Comments:   Reason for Stopping:         cyclobenzaprine (FLEXERIL) 10 mg tablet Comments:   Reason for Stopping:                 Diagnosis     Clinical Impression:   1. Arthralgia of shoulder region, left        Attestations:    Eliazar Hinson MD    Please note that this dictation was completed with [employer], the computer voice recognition software. Quite often unanticipated grammatical, syntax, homophones, and other interpretive errors are inadvertently transcribed by the computer software. Please disregard these errors. Please excuse any errors that have escaped final proofreading. Thank you.

## 2021-05-30 ENCOUNTER — HOSPITAL ENCOUNTER (OUTPATIENT)
Dept: LAB | Age: 86
Discharge: HOME | End: 2021-05-30

## 2021-05-30 LAB
ALBUMIN SERPL-MCNC: 2.3 G/DL (ref 3.5–5)
ALBUMIN/GLOB SERPL: 0.6 {RATIO} (ref 1.1–2.2)
ALP SERPL-CCNC: 67 U/L (ref 45–117)
ALT SERPL-CCNC: 29 U/L (ref 12–78)
ANION GAP SERPL CALC-SCNC: 4 MMOL/L (ref 5–15)
AST SERPL W P-5'-P-CCNC: 46 U/L (ref 15–37)
BASOPHILS # BLD: 0.1 K/UL (ref 0–0.1)
BASOPHILS NFR BLD: 1 % (ref 0–1)
BILIRUB SERPL-MCNC: 0.8 MG/DL (ref 0.2–1)
BUN SERPL-MCNC: 12 MG/DL (ref 6–20)
BUN/CREAT SERPL: 21 (ref 12–20)
CA-I BLD-MCNC: 8.6 MG/DL (ref 8.5–10.1)
CHLORIDE SERPL-SCNC: 100 MMOL/L (ref 97–108)
CO2 SERPL-SCNC: 32 MMOL/L (ref 21–32)
CREAT SERPL-MCNC: 0.58 MG/DL (ref 0.55–1.02)
DIFFERENTIAL METHOD BLD: ABNORMAL
EOSINOPHIL # BLD: 0.1 K/UL (ref 0–0.4)
EOSINOPHIL NFR BLD: 1 % (ref 0–7)
ERYTHROCYTE [DISTWIDTH] IN BLOOD BY AUTOMATED COUNT: 14.3 % (ref 11.5–14.5)
GLOBULIN SER CALC-MCNC: 3.6 G/DL (ref 2–4)
GLUCOSE SERPL-MCNC: 120 MG/DL (ref 65–100)
HCT VFR BLD AUTO: 28.7 % (ref 35–47)
HGB BLD-MCNC: 9.4 G/DL (ref 11.5–16)
IMM GRANULOCYTES # BLD AUTO: 0.1 K/UL (ref 0–0.04)
IMM GRANULOCYTES NFR BLD AUTO: 1 % (ref 0–0.5)
LYMPHOCYTES # BLD: 1.3 K/UL (ref 0.8–3.5)
LYMPHOCYTES NFR BLD: 11 % (ref 12–49)
MCH RBC QN AUTO: 33.2 PG (ref 26–34)
MCHC RBC AUTO-ENTMCNC: 32.8 G/DL (ref 30–36.5)
MCV RBC AUTO: 101.4 FL (ref 80–99)
MONOCYTES # BLD: 1.6 K/UL (ref 0–1)
MONOCYTES NFR BLD: 14 % (ref 5–13)
NEUTS SEG # BLD: 8 K/UL (ref 1.8–8)
NEUTS SEG NFR BLD: 72 % (ref 32–75)
NRBC # BLD: 0 K/UL (ref 0–0.01)
NRBC BLD-RTO: 0 PER 100 WBC
PLATELET # BLD AUTO: 220 K/UL (ref 150–400)
PMV BLD AUTO: 12.6 FL (ref 8.9–12.9)
POTASSIUM SERPL-SCNC: 3.6 MMOL/L (ref 3.5–5.1)
PROT SERPL-MCNC: 5.9 G/DL (ref 6.4–8.2)
RBC # BLD AUTO: 2.83 M/UL (ref 3.8–5.2)
SODIUM SERPL-SCNC: 136 MMOL/L (ref 136–145)
WBC # BLD AUTO: 11.1 K/UL (ref 3.6–11)

## 2021-05-30 PROCEDURE — 85025 COMPLETE CBC W/AUTO DIFF WBC: CPT

## 2021-05-30 PROCEDURE — 80053 COMPREHEN METABOLIC PANEL: CPT

## 2021-05-31 ENCOUNTER — HOSPITAL ENCOUNTER (OUTPATIENT)
Dept: LAB | Age: 86
Discharge: HOME | End: 2021-05-31

## 2021-05-31 LAB
APPEARANCE UR: CLEAR
BACTERIA URNS QL MICRO: NEGATIVE /HPF
BILIRUB UR QL: NEGATIVE
COLOR UR: ABNORMAL
GLUCOSE UR STRIP.AUTO-MCNC: NEGATIVE MG/DL
HGB UR QL STRIP: ABNORMAL
KETONES UR QL STRIP.AUTO: NEGATIVE MG/DL
LEUKOCYTE ESTERASE UR QL STRIP.AUTO: ABNORMAL
NITRITE UR QL STRIP.AUTO: NEGATIVE
PH UR STRIP: 6 [PH] (ref 5–8)
PROT UR STRIP-MCNC: NEGATIVE MG/DL
RBC #/AREA URNS HPF: >100 /HPF (ref 0–5)
SP GR UR REFRACTOMETRY: 1.01 (ref 1–1.03)
UA: UC IF INDICATED,UAUC: ABNORMAL
UROBILINOGEN UR QL STRIP.AUTO: 0.1 EU/DL (ref 0.1–1)
WBC URNS QL MICRO: ABNORMAL /HPF (ref 0–4)

## 2021-05-31 PROCEDURE — 81001 URINALYSIS AUTO W/SCOPE: CPT

## 2021-05-31 PROCEDURE — 87086 URINE CULTURE/COLONY COUNT: CPT

## 2021-06-01 ENCOUNTER — HOSPITAL ENCOUNTER (OUTPATIENT)
Dept: LAB | Age: 86
Discharge: HOME | End: 2021-06-01

## 2021-06-01 LAB
ANION GAP SERPL CALC-SCNC: 6 MMOL/L (ref 5–15)
BASOPHILS # BLD: 0.1 K/UL (ref 0–0.1)
BASOPHILS NFR BLD: 1 % (ref 0–1)
BUN SERPL-MCNC: 10 MG/DL (ref 6–20)
BUN/CREAT SERPL: 18 (ref 12–20)
CA-I BLD-MCNC: 8.5 MG/DL (ref 8.5–10.1)
CHLORIDE SERPL-SCNC: 99 MMOL/L (ref 97–108)
CO2 SERPL-SCNC: 31 MMOL/L (ref 21–32)
CREAT SERPL-MCNC: 0.57 MG/DL (ref 0.55–1.02)
DIFFERENTIAL METHOD BLD: ABNORMAL
EOSINOPHIL # BLD: 0.1 K/UL (ref 0–0.4)
EOSINOPHIL NFR BLD: 1 % (ref 0–7)
ERYTHROCYTE [DISTWIDTH] IN BLOOD BY AUTOMATED COUNT: 14.3 % (ref 11.5–14.5)
FOLATE SERPL-MCNC: 23 NG/ML (ref 5–21)
GLUCOSE SERPL-MCNC: 173 MG/DL (ref 65–100)
HCT VFR BLD AUTO: 31 % (ref 35–47)
HGB BLD-MCNC: 10.1 G/DL (ref 11.5–16)
IMM GRANULOCYTES # BLD AUTO: 0.2 K/UL (ref 0–0.04)
IMM GRANULOCYTES NFR BLD AUTO: 2 % (ref 0–0.5)
LYMPHOCYTES # BLD: 1.1 K/UL (ref 0.8–3.5)
LYMPHOCYTES NFR BLD: 11 % (ref 12–49)
MAGNESIUM SERPL-MCNC: 1.8 MG/DL (ref 1.6–2.4)
MCH RBC QN AUTO: 32.6 PG (ref 26–34)
MCHC RBC AUTO-ENTMCNC: 32.6 G/DL (ref 30–36.5)
MCV RBC AUTO: 100 FL (ref 80–99)
MONOCYTES # BLD: 1.7 K/UL (ref 0–1)
MONOCYTES NFR BLD: 17 % (ref 5–13)
NEUTS SEG # BLD: 7.1 K/UL (ref 1.8–8)
NEUTS SEG NFR BLD: 68 % (ref 32–75)
NRBC # BLD: 0 K/UL (ref 0–0.01)
NRBC BLD-RTO: 0 PER 100 WBC
PLATELET # BLD AUTO: 301 K/UL (ref 150–400)
PMV BLD AUTO: 12.5 FL (ref 8.9–12.9)
POTASSIUM SERPL-SCNC: 3 MMOL/L (ref 3.5–5.1)
RBC # BLD AUTO: 3.1 M/UL (ref 3.8–5.2)
SODIUM SERPL-SCNC: 136 MMOL/L (ref 136–145)
VIT B12 SERPL-MCNC: 1130 PG/ML (ref 193–986)
WBC # BLD AUTO: 10.3 K/UL (ref 3.6–11)

## 2021-06-01 PROCEDURE — 80048 BASIC METABOLIC PNL TOTAL CA: CPT

## 2021-06-01 PROCEDURE — 83735 ASSAY OF MAGNESIUM: CPT

## 2021-06-01 PROCEDURE — 82746 ASSAY OF FOLIC ACID SERUM: CPT

## 2021-06-01 PROCEDURE — 85025 COMPLETE CBC W/AUTO DIFF WBC: CPT

## 2021-06-02 LAB
BACTERIA SPEC CULT: NORMAL
SPECIAL REQUESTS,SREQ: NORMAL

## 2021-06-03 ENCOUNTER — HOSPITAL ENCOUNTER (OUTPATIENT)
Dept: LAB | Age: 86
Discharge: HOME | End: 2021-06-03

## 2021-06-03 LAB
MAGNESIUM SERPL-MCNC: 1.9 MG/DL (ref 1.6–2.4)
POTASSIUM SERPL-SCNC: 3.4 MMOL/L (ref 3.5–5.1)

## 2021-06-03 PROCEDURE — 84132 ASSAY OF SERUM POTASSIUM: CPT

## 2021-06-03 PROCEDURE — 83735 ASSAY OF MAGNESIUM: CPT

## 2021-06-19 ENCOUNTER — HOSPITAL ENCOUNTER (EMERGENCY)
Age: 86
Discharge: HOME | End: 2021-06-19
Attending: STUDENT IN AN ORGANIZED HEALTH CARE EDUCATION/TRAINING PROGRAM
Payer: MEDICARE

## 2021-06-19 ENCOUNTER — APPOINTMENT (OUTPATIENT)
Dept: GENERAL RADIOLOGY | Age: 86
End: 2021-06-19
Attending: STUDENT IN AN ORGANIZED HEALTH CARE EDUCATION/TRAINING PROGRAM
Payer: MEDICARE

## 2021-06-19 VITALS
TEMPERATURE: 98.5 F | HEIGHT: 65 IN | HEART RATE: 96 BPM | RESPIRATION RATE: 18 BRPM | OXYGEN SATURATION: 99 % | DIASTOLIC BLOOD PRESSURE: 86 MMHG | WEIGHT: 183 LBS | SYSTOLIC BLOOD PRESSURE: 195 MMHG | BODY MASS INDEX: 30.49 KG/M2

## 2021-06-19 DIAGNOSIS — Z96.619 HISTORY OF REVISION OF TOTAL SHOULDER ARTHROPLASTY: ICD-10-CM

## 2021-06-19 DIAGNOSIS — M25.512 PAIN IN JOINT OF LEFT SHOULDER: Primary | ICD-10-CM

## 2021-06-19 PROCEDURE — 99282 EMERGENCY DEPT VISIT SF MDM: CPT

## 2021-06-19 PROCEDURE — 74011250637 HC RX REV CODE- 250/637: Performed by: STUDENT IN AN ORGANIZED HEALTH CARE EDUCATION/TRAINING PROGRAM

## 2021-06-19 PROCEDURE — 73030 X-RAY EXAM OF SHOULDER: CPT

## 2021-06-19 RX ORDER — OXYCODONE AND ACETAMINOPHEN 5; 325 MG/1; MG/1
1 TABLET ORAL
Status: COMPLETED | OUTPATIENT
Start: 2021-06-19 | End: 2021-06-19

## 2021-06-19 RX ADMIN — OXYCODONE AND ACETAMINOPHEN 1 TABLET: 5; 325 TABLET ORAL at 10:37

## 2021-06-19 NOTE — ED TRIAGE NOTES
Patient reports left shoulder surgery on 5/26/21 at [facility] by Dr Chyna Nolasco, states that this morning & when she got up from using the bathroom she felt a pop & has had increased pain there. States that she had her entire shoulder replaced.

## 2021-06-19 NOTE — ED PROVIDER NOTES
EMERGENCY DEPARTMENT HISTORY AND PHYSICAL EXAM      Date: 6/19/2021  Patient Name: Margarita Stoll    History of Presenting Illness     Chief Complaint   Patient presents with    Shoulder Pain       History Provided By: Patient    HPI: Margarita Stoll, 82 y.o. female with a past medical history significant Fib, CAD, recent left shoulder surgery. presents to the ED with cc of left shoulder pain. Patient states she was getting up out of bed today moved her arm backwards and suddenly felt sharp pain to her left shoulder. Describes pain as severe, anterior left shoulder pain worse with any movement of arm. Denies any falls or significant trauma. Patient denies any chest pain denies any shortness of breath. Patient took Karlee Pintos at home with moderate relief of pain. No numbness/tingling in extremities. There are no other complaints, changes, or physical findings at this time. PCP: Walter Quintana MD    No current facility-administered medications on file prior to encounter. Current Outpatient Medications on File Prior to Encounter   Medication Sig Dispense Refill    HYDROcodone-acetaminophen (NORCO)  mg tablet Take 1 Tab by mouth every four (4) hours as needed.       meclizine (ANTIVERT) 25 mg tablet meclizine 25 mg tablet      montelukast (SINGULAIR) 10 mg tablet montelukast 10 mg tablet      pantoprazole (PROTONIX) 40 mg tablet pantoprazole 40 mg tablet,delayed release      promethazine (PHENERGAN) 25 mg tablet promethazine 25 mg tablet      furosemide (LASIX) 40 mg tablet furosemide 40 mg tablet      apixaban (ELIQUIS) 2.5 mg tablet Eliquis 2.5 mg tablet   take 1 tablet by mouth twice a day      tiotropium-olodateroL (STIOLTO RESPIMAT) 2.5-2.5 mcg/actuation inhaler Stiolto Respimat 2.5 mcg-2.5 mcg/actuation solution for inhalation      predniSONE (DELTASONE) 5 mg tablet prednisone 5 mg tablet      pravastatin (PRAVACHOL) 40 mg tablet pravastatin 40 mg tablet      ondansetron (ZOFRAN ODT) 8 mg disintegrating tablet Place 1 tablet (8 mg) on the tongue and allow to dissolve every 6 hours as needed      mupirocin (BACTROBAN) 2 % ointment apply to affected area three times a day      methylPREDNISolone (MEDROL DOSEPACK) 4 mg tablet use as directed FOLLOW DIRECTIONS ON BACK OF FOIL PACK      methotrexate (RHEUMATREX) 2.5 mg tablet       melatonin 10 mg capsule melatonin 10 mg capsule   Take by oral route.  losartan (COZAAR) 100 mg tablet take 1 tablet by mouth once daily      isosorbide mononitrate ER (IMDUR) 30 mg tablet take 1 tablet by mouth once daily      hydrocortisone-pramoxine (ANALPRAM HC 2.5%-1%) 2.5-1 % rectal cream hydrocortisone-pramoxine 2.5 %-1 % (4g) rectal cream      carvediloL (COREG) 6.25 mg tablet carvedilol 6.25 mg tablet      bethanechol chloride (URECHOLINE) 50 mg tablet bethanechol chloride 50 mg tablet      atorvastatin (LIPITOR) 40 mg tablet take 2 tablets by mouth once daily      Aklief 0.005 % crea apply to affected area once daily      omega-3 acid ethyl esters (LOVAZA) 1 gram capsule TAKE 1 CAPSULE TWICE DAILY 180 Cap 3    diclofenac (VOLTAREN) 1 % gel as needed. 0    butalbital-acetaminophen-caffeine (FIORICET, ESGIC) -40 mg per tablet Take 1 Tab by mouth every four (4) hours as needed. 0    ESTRACE 0.01 % (0.1 mg/gram) vaginal cream Insert  into vagina two (2) times a day.  0    folic acid (FOLVITE) 1 mg tablet Take  by mouth daily. 0    ferrous sulfate 325 mg (65 mg iron) tablet take 1 tablet by mouth one time a day  0    glucose blood VI test strips (TRUE METRIX GLUCOSE TEST STRIP) strip Test 2 times daily Dx Code E11.65 100 Strip 6    Lancets misc Test 2 times daily Dx Code E11.65 (True Metrix Meter) 100 Each 6    Insulin Needles, Disposable, (YULIYA PEN NEEDLE) 32 x 5/32 \" ndle Use twice daily. Dx code 250.00 200 Each 4    gabapentin (NEURONTIN) 300 mg capsule Take 300 mg by mouth nightly.       NITROSTAT 0.4 mg SL tablet by SubLINGual route every five (5) minutes as needed.  polyethylene glycol (MIRALAX) 17 gram/dose powder Take 17 g by mouth daily.  potassium chloride SA (MICRO-K) 10 mEq capsule Take 10 mEq by mouth daily. Past History     Past Medical History:  Past Medical History:   Diagnosis Date    Arrhythmia     a. fib    Chest pain     Heart attack ([facility] [address] 2016    Ill-defined condition 1974    middle lobe necrosis rt     SOB (shortness of breath)     on exertion    Stroke ([facility] [address] 12/2017       Past Surgical History:  Past Surgical History:   Procedure Laterality Date    COLONOSCOPY Left 9/12/2018    COL[facility]COPY performed by [facility] at [facility] ENDOSCOPY    HX BACK SURGERY      eight surgeries    HX CATARACT REMOVAL      left eye    HX HERNIA REPAIR      HX OTHER SURGICAL      rt middle lobe removed d/t necrosis    HX PARTIAL HYSTERECTOMY      HX REFRACTIVE SURGERY      LA PART REMOVAL COLON,ABD/TRANSANAL RALPH      pt unsure why    LA REMOVAL GALLBLADDER         Family History:  Family History   Problem Relation Age of Onset    Diabetes Mother     Heart Disease Father     Stroke Father     Diabetes Sister     Cancer Brother     Diabetes Maternal Aunt     Heart Disease Maternal Aunt     Cancer Maternal Grandmother        Social History:  Social History     Tobacco Use    Smoking status: Former Smoker     Packs/day: 1.00     Years: 40.00     Pack years: 40.00     Quit date: 2/8/2011     Years since quitting: 10.3    Smokeless tobacco: Never Used   Substance Use Topics    Alcohol use: Yes     Comment: occassionally alcohol    Drug use: No       Allergies:   Allergies   Allergen Reactions    Iodinated Contrast Media Other (comments)     Iv dye causes heart to stop    Chicken Derived Other (comments)     Closes throat    Darvon [Propoxyphene] Rash    Tetanus Vaccines And Toxoid Other (comments)     Blisters all over body         Review of Systems     Review of Systems   Constitutional: Negative for activity change, chills, fatigue and fever. HENT: Negative for congestion and trouble swallowing. Eyes: Negative for photophobia and visual disturbance. Respiratory: Negative for cough, chest tightness and shortness of breath. Cardiovascular: Negative for chest pain and palpitations. Gastrointestinal: Negative for abdominal distention, abdominal pain, diarrhea, nausea and vomiting. Genitourinary: Negative for dysuria, flank pain and frequency. Musculoskeletal: Positive for arthralgias and myalgias. Negative for back pain. Skin: Negative for color change, pallor and rash. Neurological: Negative for dizziness, tremors, weakness and headaches. Psychiatric/Behavioral: Negative for confusion. Physical Exam     Physical Exam  Vitals and nursing note reviewed. Constitutional:       General: She is not in acute distress. Appearance: Normal appearance. She is normal weight. She is not ill-appearing. HENT:      Head: Normocephalic and atraumatic. Nose: Nose normal.      Mouth/Throat:      Mouth: Mucous membranes are moist.   Cardiovascular:      Rate and Rhythm: Normal rate and regular rhythm. Pulses: Normal pulses. Pulmonary:      Effort: Pulmonary effort is normal.   Abdominal:      Tenderness: There is no abdominal tenderness. There is no guarding. Musculoskeletal:      Left shoulder: Tenderness present. No deformity or effusion. Decreased range of motion. Cervical back: Neck supple. No muscular tenderness. Comments: L shoulder tender to palpation over superior aspect,   Difficult to fully range above head   Skin:     General: Skin is warm and dry. Neurological:      General: No focal deficit present. Mental Status: She is alert and oriented to person, place, and time. Sensory: No sensory deficit. Motor: No weakness.          Diagnostic Study Results     Labs -   No results found for this or any previous visit (from the past 12 hour(s)). Radiologic Studies -   @lastxrresult@  CT Results  (Last 48 hours)    None        CXR Results  (Last 48 hours)    None        XR SHOULDER LT AP/LAT MIN 2 V   Final Result   No findings of acute osseous abnormality. Reverse total shoulder arthroplasty. No findings of hardware complication. Medical Decision Making   I am the first provider for this patient. I reviewed the vital signs, available nursing notes, past medical history, past surgical history, family history and social history. Vital Signs-Reviewed the patient's vital signs. Patient Vitals for the past 12 hrs:   Temp Pulse Resp BP SpO2   06/19/21 0849 98.5 °F (36.9 °C) 96 18 (!) 195/86 99 %       Records Reviewed: Nursing Notes    The patient presents with L shoulder pain with a differential diagnosis of arthroplasty malfunction, dislocation, fracture, muscle strain, muscle spasm      Provider Notes (Medical Decision Making):     MDM   82-year-old female, history of left shoulder arthroplasty approximately 1 month ago, states this morning was getting out of bed when felt sudden sharp pain to left shoulder, heard a \"pop\" was concerned about possible dislocation or hardware malfunction. Physical exam shows uncomfortable female, however no distress, no obvious deformity to shoulder, pulses intact, tenderness palpation over superior shoulder area difficulty in full range of motion secondary to pain. Shoulder x-ray ordered,      ED Course:   Initial assessment performed. The patients presenting problems have been discussed, and they are in agreement with the care plan formulated and outlined with them. I have encouraged them to ask questions as they arise throughout their visit. ED Course as of Jun 19 1042   Sat Jun 19, 2021   0881 X-ray reviewed by myself and radiologist, radiologist notes arthroplasty intact, no signs of fracture, no signs of dislocation.     Reviewed results with patient, patient still complaining of severe shoulder pain, able to lift arm proximately mid chest area however cannot lift above head, at this time no radiology evidence of dislocation. This patient requesting more pain meds will administer Percocet p.o., discharge patient home instructed to keep arm immobilized, follow-up with orthopedist on Monday. [PZ]      ED Course User Index  [PZ] Leo German MD         PROCEDURES  Procedures         PLAN:  1. Current Discharge Medication List        2. Follow-up Information     Follow up With Specialties Details Why Contact Info    Your orthopedist  In 3 days      [facility] EMERGENCY DEPT Emergency Medicine  If symptoms worsen JaniceJoel Fontaine [facility] [address]  [phone]        Return to ED if worse     Diagnosis     Clinical Impression:   1. Pain in joint of left shoulder    2.  History of revision of total shoulder arthroplasty

## 2021-08-03 PROBLEM — I63.9 STROKE (HCC): Status: RESOLVED | Noted: 2021-08-03 | Resolved: 2021-08-03

## 2021-12-29 ENCOUNTER — HOSPITAL ENCOUNTER (EMERGENCY)
Age: 86
Discharge: HOME | End: 2021-12-29
Attending: EMERGENCY MEDICINE
Payer: MEDICARE

## 2021-12-29 VITALS
DIASTOLIC BLOOD PRESSURE: 75 MMHG | WEIGHT: 176 LBS | HEART RATE: 81 BPM | TEMPERATURE: 98.1 F | HEIGHT: 66 IN | RESPIRATION RATE: 19 BRPM | SYSTOLIC BLOOD PRESSURE: 155 MMHG | BODY MASS INDEX: 28.28 KG/M2 | OXYGEN SATURATION: 97 %

## 2021-12-29 DIAGNOSIS — U07.1 COVID: Primary | ICD-10-CM

## 2021-12-29 PROCEDURE — 99282 EMERGENCY DEPT VISIT SF MDM: CPT

## 2021-12-29 RX ORDER — ALBUTEROL SULFATE 90 UG/1
2 AEROSOL, METERED RESPIRATORY (INHALATION)
Qty: 1 EACH | Refills: 0 | Status: SHIPPED | OUTPATIENT
Start: 2021-12-29

## 2021-12-29 RX ORDER — FLUTICASONE PROPIONATE 50 MCG
2 SPRAY, SUSPENSION (ML) NASAL DAILY
Qty: 1 EACH | Refills: 0 | Status: SHIPPED | OUTPATIENT
Start: 2021-12-29

## 2021-12-29 NOTE — ED PROVIDER NOTES
EMERGENCY DEPARTMENT HISTORY AND PHYSICAL EXAM      Date: (Not on file)  Patient Name: Jeet Clinton    History of Presenting Illness     Chief Complaint   Patient presents with    Positive For Covid-19    Cough    Nasal Congestion    Fatigue       History Provided By: Patient    HPI: Jeet Clinton, 82 y.o. female with a past medical history significant Diagnosis of Covid presents to the ED with cc of mild cough and congestion with runny nose and sinus pressure. She says been going on for the past couple of days. She is not running a fever. She is treating with over-the-counter remedies without complete relief of her symptoms. She denies any fever, chills, chest pain, rash, diarrhea, shortness of breath, headache, night sweats. Tums are mild and nonprogressive    There are no other complaints, changes, or physical findings at this time. PCP: Lemuel Chirinos MD    No current facility-administered medications on file prior to encounter. Current Outpatient Medications on File Prior to Encounter   Medication Sig Dispense Refill    HYDROcodone-acetaminophen (NORCO)  mg tablet Take 1 Tab by mouth every four (4) hours as needed.       meclizine (ANTIVERT) 25 mg tablet meclizine 25 mg tablet      montelukast (SINGULAIR) 10 mg tablet montelukast 10 mg tablet      pantoprazole (PROTONIX) 40 mg tablet pantoprazole 40 mg tablet,delayed release      promethazine (PHENERGAN) 25 mg tablet promethazine 25 mg tablet      furosemide (LASIX) 40 mg tablet furosemide 40 mg tablet      apixaban (ELIQUIS) 2.5 mg tablet Eliquis 2.5 mg tablet   take 1 tablet by mouth twice a day      tiotropium-olodateroL (STIOLTO RESPIMAT) 2.5-2.5 mcg/actuation inhaler Stiolto Respimat 2.5 mcg-2.5 mcg/actuation solution for inhalation      predniSONE (DELTASONE) 5 mg tablet prednisone 5 mg tablet      pravastatin (PRAVACHOL) 40 mg tablet pravastatin 40 mg tablet      ondansetron (ZOFRAN ODT) 8 mg disintegrating tablet [facility] Progress Note:    Patient: Lg Rogers Date: 2017    : 1962 Attending: Vicky Jones MD   55 year old male [id number]       Operation/Procedure: Robotic transhiatal esophagectomy with transcervical endoscopic esophageal mobilization, botox pyloroplasty and J-tube placement  Post-op Day: 6  Hospital Day: 7  Subjective: Feeling improved. J tube occluded overnight. Tolerating fulls well.     Allergies:   ALLERGIES:   Allergen Reactions   • Lovenox RASH and PRURITUS       Vitals:    Vital Last Value 24 Hour Range   Temperature 98 °F (36.7 °C) Temp  Min: 98 °F (36.7 °C)  Max: 98.6 °F (37 °C)   Pulse 98 Pulse  Min: 95  Max: 98   Respiratory 18 Resp  Min: 18  Max: 18   BloodPressure 132/80 BP  Min: 126/82  Max: 132/80   Pulse Oximetry 99 % SpO2  Min: 96 %  Max: 99 %   CVP   No Data Recorded     Vital Today Admit   Weight 116.1 kg Weight: 113.9 kg   Height N/A Height: 5' 11\" (180.3 cm)   BMI N/A BMI (Calculated): 35.1     Weightover the past 48 Hours:  Patient Vitals for the past 48 hrs:   Weight   17 0830 116.1 kg        Diet: clears    Intake/Output:  Last Stool Occurrence: 2 (per ptn) (170)      Intake/Output Summary (Last 24 hours) at 17 1135  Last data filed at 17 0542   Gross per 24 hour   Intake             1741 ml   Output                0 ml   Net             1741 ml       I/O last 3 completed shifts:  In: 1981 [P.O.:1230; I.V.:537; NG/GT:214]  Out: -       Physical Exam:   Gen: NAD  Heent: wnl  Neck: Stable dry intact incision  Lungs: Clear  Cardiac: RRR  Abdomen: Soft, stable dry intact incision, J tube does not flush  active bs  MS: ROM intact  skion    Laboratory Results:      Recent Labs  Lab 17  0550 17  1320 17  0730   WBC 7.2  --  6.4   HCT 33.0*  --  33.1*   HGB 11.0*  --  10.8*     --  143   INR 1.0  --  1.0   PTT 28  --  28   BUN 8 9  --    CREATININE 0.63* 0.58*  --    SODIUM 142 141  --    POTASSIUM 3.6 3.5  --     CHLORIDE 109* 108*  --    CO2 25 25  --    GLUCOSE 89 108*  --    MG 2.2 2.2  --    CALCIUM 8.5 8.7  --            Medications/Infusions:  Scheduled:   • fondaparinux  10 mg Subcutaneous Daily   • guaifenesin  200 mg Oral 4x Daily   • sodium chloride (PF)  2 mL Intravenous 2 times per day   • famotidine  20 mg Intravenous 2 times per day       Continuous Infusions:   • sodium chloride 0.9% infusion         Central Lines: No    Surgical Care Improvement Project:  Cardona: Yes - Indication: I&O    DVT/VTE Prophylaxis: Yes    Antibiotics D/C'd within 24 hours of surgery end:Yes    Central Line:No    Beta Blocker Pre-Op: No    GI Prophylaxis:  Yes    Diagnosis:  -Esophageal Cancer  -Robotic transhiatal esophagectomy with transcervical endoscopic esophageal mobilization, botox pyloroplasty and J-tube placement        Plans/Recommendations:   -Diet; Fulls, TF to 30 ml per hr.    All meals sitting upright  -Continue Arixtra, nursing to instruct on self injections.  -Check Video swallow study today.  -Plan for home on tube feeds tomorrow.    Poli Cantrell PA-C  [phone]  6/20/17    Attending Note:  F/u video swallow.  D/c planning.  May require TF for up to 6 months.    I have examined the patient, reviewed the pertinent diagnostic studies and medical data, and have participated in the development of the treatment plan with the Advanced Practice Practitioner. I have made appropriate addendums and agree with the documentation stated above.      Mando Hernandez MD FACS  Surgical Oncology          1 tablet (8 mg) on the tongue and allow to dissolve every 6 hours as needed      mupirocin (BACTROBAN) 2 % ointment apply to affected area three times a day      methylPREDNISolone (MEDROL DOSEPACK) 4 mg tablet use as directed FOLLOW DIRECTIONS ON BACK OF FOIL PACK      methotrexate (RHEUMATREX) 2.5 mg tablet       melatonin 10 mg capsule melatonin 10 mg capsule   Take by oral route.  losartan (COZAAR) 100 mg tablet take 1 tablet by mouth once daily      isosorbide mononitrate ER (IMDUR) 30 mg tablet take 1 tablet by mouth once daily      hydrocortisone-pramoxine (ANALPRAM HC 2.5%-1%) 2.5-1 % rectal cream hydrocortisone-pramoxine 2.5 %-1 % (4g) rectal cream      carvediloL (COREG) 6.25 mg tablet carvedilol 6.25 mg tablet      bethanechol chloride (URECHOLINE) 50 mg tablet bethanechol chloride 50 mg tablet      atorvastatin (LIPITOR) 40 mg tablet take 2 tablets by mouth once daily      Aklief 0.005 % crea apply to affected area once daily      omega-3 acid ethyl esters (LOVAZA) 1 gram capsule TAKE 1 CAPSULE TWICE DAILY 180 Cap 3    diclofenac (VOLTAREN) 1 % gel as needed. 0    butalbital-acetaminophen-caffeine (FIORICET, ESGIC) -40 mg per tablet Take 1 Tab by mouth every four (4) hours as needed. 0    ESTRACE 0.01 % (0.1 mg/gram) vaginal cream Insert  into vagina two (2) times a day.  0    folic acid (FOLVITE) 1 mg tablet Take  by mouth daily. 0    ferrous sulfate 325 mg (65 mg iron) tablet take 1 tablet by mouth one time a day  0    glucose blood VI test strips (TRUE METRIX GLUCOSE TEST STRIP) strip Test 2 times daily Dx Code E11.65 100 Strip 6    Lancets misc Test 2 times daily Dx Code E11.65 (True Metrix Meter) 100 Each 6    Insulin Needles, Disposable, (YULIYA PEN NEEDLE) 32 x 5/32 \" ndle Use twice daily. Dx code 250.00 200 Each 4    gabapentin (NEURONTIN) 300 mg capsule Take 300 mg by mouth nightly.       NITROSTAT 0.4 mg SL tablet by SubLINGual route every five (5) minutes as needed.  polyethylene glycol (MIRALAX) 17 gram/dose powder Take 17 g by mouth daily.  potassium chloride SA (MICRO-K) 10 mEq capsule Take 10 mEq by mouth daily. Past History     Past Medical History:  Past Medical History:   Diagnosis Date    Arrhythmia     a. fib    Chest pain     Heart attack ([facility] [address] 2016    Ill-defined condition 1974    middle lobe necrosis rt     SOB (shortness of breath)     on exertion    Stroke ([facility] [address] 12/2017       Past Surgical History:  Past Surgical History:   Procedure Laterality Date    COLONOSCOPY Left 9/12/2018    COLONOSCOPY [facility] Sulaiman Turcios [facility] ENDOSCOPY    HX BACK SURGERY      eight surgeries    HX CATARACT REMOVAL      left eye    HX HERNIA REPAIR      HX OTHER SURGICAL      rt middle lobe removed d/t necrosis    HX PARTIAL HYSTERECTOMY      HX REFRACTIVE SURGERY      FL PART REMOVAL COLON,ABD/TRANSANAL RALPH      pt unsure why    FL REMOVAL GALLBLADDER         Family History:  Family History   Problem Relation Age of Onset    Diabetes Mother     Heart Disease Father     Stroke Father     Diabetes Sister     Cancer Brother     Diabetes Maternal Aunt     Heart Disease Maternal Aunt     Cancer Maternal Grandmother        Social History:  Social History     Tobacco Use    Smoking status: Former Smoker     Packs/day: 1.00     Years: 40.00     Pack years: 40.00     Quit date: 2/8/2011     Years since quitting: 10.8    Smokeless tobacco: Never Used   Substance Use Topics    Alcohol use: Yes     Comment: occassionally alcohol    Drug use: No       Allergies: Allergies   Allergen Reactions    Iodinated Contrast Media Other (comments)     Iv dye causes heart to stop    Chicken Derived Other (comments)     Closes throat    Darvon [Propoxyphene] Rash    Tetanus Vaccines And Toxoid Other (comments)     Blisters all over body         Review of Systems     Review of Systems   Constitutional: Negative.   Negative for appetite change, chills, fatigue and fever. HENT: Positive for congestion and rhinorrhea. Negative for sinus pain. Eyes: Negative. Negative for pain and visual disturbance. Respiratory: Positive for cough. Negative for chest tightness and shortness of breath. Cardiovascular: Negative. Negative for chest pain. Gastrointestinal: Negative. Negative for abdominal pain, diarrhea, nausea and vomiting. Genitourinary: Negative. Negative for difficulty urinating. No discharge   Musculoskeletal: Negative. Negative for arthralgias. Skin: Negative. Negative for rash. Neurological: Negative. Negative for weakness and headaches. Hematological: Negative. Psychiatric/Behavioral: Negative. Negative for agitation. The patient is not nervous/anxious. All other systems reviewed and are negative. Physical Exam     Physical Exam  Vitals and nursing note reviewed. Constitutional:       General: She is not in acute distress. Appearance: She is well-developed. HENT:      Head: Normocephalic and atraumatic. Nose: Nose normal.      Mouth/Throat:      Mouth: Mucous membranes are moist.      Pharynx: Oropharynx is clear. No oropharyngeal exudate. Eyes:      General:         Right eye: No discharge. Left eye: No discharge. Conjunctiva/sclera: Conjunctivae normal.      Pupils: Pupils are equal, round, and reactive to light. Cardiovascular:      Rate and Rhythm: Normal rate and regular rhythm. Chest Wall: PMI is not displaced. No thrill. Heart sounds: Normal heart sounds. No murmur heard. No friction rub. No gallop. Pulmonary:      Effort: Pulmonary effort is normal. No respiratory distress. Breath sounds: Normal breath sounds. No wheezing or rales. Chest:      Chest wall: No tenderness. Abdominal:      General: Bowel sounds are normal. There is no distension. Palpations: Abdomen is soft. There is no mass. Tenderness: There is no abdominal tenderness.  There is no guarding or rebound. Musculoskeletal:         General: Normal range of motion. Cervical back: Normal range of motion and neck supple. Lymphadenopathy:      Cervical: No cervical adenopathy. Skin:     General: Skin is warm and dry. Capillary Refill: Capillary refill takes less than 2 seconds. Findings: No erythema or rash. Neurological:      Mental Status: She is alert and oriented to person, place, and time. Cranial Nerves: No cranial nerve deficit. Coordination: Coordination normal.   Psychiatric:         Mood and Affect: Mood normal.         Behavior: Behavior normal.         Lab and Diagnostic Study Results     Labs -   No results found for this or any previous visit (from the past 12 hour(s)). Radiologic Studies -   @lastxrresult@  CT Results  (Last 48 hours)    None        CXR Results  (Last 48 hours)    None            Medical Decision Making   - I am the first provider for this patient. - I reviewed the vital signs, available nursing notes, past medical history, past surgical history, family history and social history. - Initial assessment performed. The patients presenting problems have been discussed, and they are in agreement with the care plan formulated and outlined with them. I have encouraged them to ask questions as they arise throughout their visit. Vital Signs-Reviewed the patient's vital signs. Patient Vitals for the past 12 hrs:   Temp Pulse Resp BP SpO2   12/29/21 1112 98.1 °F (36.7 °C) 81 19 (!) 155/75 97 %       Records Reviewed: Nursing Notes    ED Course:          Provider Notes (Medical Decision Making):   79-year-old female not hypoxic or tachycardic or toxic appearing presents with Covid-like symptoms.   Will treat symptomatically as an outpatient  MDM       Procedures   Medical Decision Makingedical Decision Making  Performed by: Wing Chopra MD  PROCEDURES:  Procedures       Disposition   Disposition: Condition stable    Discharged    DISCHARGE PLAN:  1. Current Discharge Medication List      START taking these medications    Details   fluticasone propionate (FLONASE) 50 mcg/actuation nasal spray 2 Sprays by Both Nostrils route daily. Qty: 1 Each, Refills: 0      albuterol (PROVENTIL HFA, VENTOLIN HFA, PROAIR HFA) 90 mcg/actuation inhaler Take 2 Puffs by inhalation every four (4) hours as needed for Wheezing. Qty: 1 Each, Refills: 0         CONTINUE these medications which have NOT CHANGED    Details   HYDROcodone-acetaminophen (NORCO)  mg tablet Take 1 Tab by mouth every four (4) hours as needed. meclizine (ANTIVERT) 25 mg tablet meclizine 25 mg tablet      montelukast (SINGULAIR) 10 mg tablet montelukast 10 mg tablet      pantoprazole (PROTONIX) 40 mg tablet pantoprazole 40 mg tablet,delayed release      promethazine (PHENERGAN) 25 mg tablet promethazine 25 mg tablet      furosemide (LASIX) 40 mg tablet furosemide 40 mg tablet      apixaban (ELIQUIS) 2.5 mg tablet Eliquis 2.5 mg tablet   take 1 tablet by mouth twice a day      tiotropium-olodateroL (STIOLTO RESPIMAT) 2.5-2.5 mcg/actuation inhaler Stiolto Respimat 2.5 mcg-2.5 mcg/actuation solution for inhalation      predniSONE (DELTASONE) 5 mg tablet prednisone 5 mg tablet      pravastatin (PRAVACHOL) 40 mg tablet pravastatin 40 mg tablet      ondansetron (ZOFRAN ODT) 8 mg disintegrating tablet Place 1 tablet (8 mg) on the tongue and allow to dissolve every 6 hours as needed      mupirocin (BACTROBAN) 2 % ointment apply to affected area three times a day      methylPREDNISolone (MEDROL DOSEPACK) 4 mg tablet use as directed FOLLOW DIRECTIONS ON BACK OF FOIL PACK      methotrexate (RHEUMATREX) 2.5 mg tablet       melatonin 10 mg capsule melatonin 10 mg capsule   Take by oral route.       losartan (COZAAR) 100 mg tablet take 1 tablet by mouth once daily      isosorbide mononitrate ER (IMDUR) 30 mg tablet take 1 tablet by mouth once daily hydrocortisone-pramoxine (ANALPRAM HC 2.5%-1%) 2.5-1 % rectal cream hydrocortisone-pramoxine 2.5 %-1 % (4g) rectal cream      carvediloL (COREG) 6.25 mg tablet carvedilol 6.25 mg tablet      bethanechol chloride (URECHOLINE) 50 mg tablet bethanechol chloride 50 mg tablet      atorvastatin (LIPITOR) 40 mg tablet take 2 tablets by mouth once daily      Aklief 0.005 % crea apply to affected area once daily      omega-3 acid ethyl esters (LOVAZA) 1 gram capsule TAKE 1 CAPSULE TWICE DAILY  Qty: 180 Cap, Refills: 3      diclofenac (VOLTAREN) 1 % gel as needed. Refills: 0    Associated Diagnoses: Type 2 diabetes mellitus with hyperglycemia, without long-term current use of insulin ([facility] [address] Osteoporosis, unspecified osteoporosis type, unspecified pathological fracture presence      butalbital-acetaminophen-caffeine (FIORICET, ESGIC) -40 mg per tablet Take 1 Tab by mouth every four (4) hours as needed. Refills: 0    Associated Diagnoses: Osteoporosis, unspecified; Type 2 diabetes mellitus with hyperglycemia, without long-term current use of insulin ([facility])      ESTRACE 0.01 % (0.1 mg/gram) vaginal cream Insert  into vagina two (2) times a day. Refills: 0    Associated Diagnoses: Osteoporosis, unspecified; Type 2 diabetes mellitus with hyperglycemia, without long-term current use of insulin ([facility])      folic acid (FOLVITE) 1 mg tablet Take  by mouth daily.   Refills: 0    Associated Diagnoses: Osteoporosis, unspecified; Type 2 diabetes mellitus with hyperglycemia, without long-term current use of insulin ([facility])      ferrous sulfate 325 mg (65 mg iron) tablet take 1 tablet by mouth one time a day  Refills: 0    Associated Diagnoses: Osteoporosis, unspecified; Type 2 diabetes mellitus with hyperglycemia, without long-term current use of insulin ([facility])      glucose blood VI test strips (TRUE METRIX GLUCOSE TEST STRIP) strip Test 2 times daily Dx Code E11.65  Qty: 100 Strip, Refills: 6    Associated Diagnoses: Type 2 diabetes mellitus without complication (HCC)      Lancets misc Test 2 times daily Dx Code E11.65 (True Metrix Meter)  Qty: 100 Each, Refills: 6    Associated Diagnoses: Type 2 diabetes mellitus without complication (HCC)      Insulin Needles, Disposable, (YULIYA PEN NEEDLE) 32 x 5/32 \" ndle Use twice daily. Dx code 250.00  Qty: 200 Each, Refills: 4      gabapentin (NEURONTIN) 300 mg capsule Take 300 mg by mouth nightly. Associated Diagnoses: DM (diabetes mellitus) ([facility] Type II or unspecified type diabetes mellitus without mention of complication, not stated as uncontrolled      NITROSTAT 0.4 mg SL tablet by SubLINGual route every five (5) minutes as needed. Associated Diagnoses: DM (diabetes mellitus) ([facility] Type II or unspecified type diabetes mellitus without mention of complication, not stated as uncontrolled      polyethylene glycol (MIRALAX) 17 gram/dose powder Take 17 g by mouth daily. Associated Diagnoses: Type II or unspecified type diabetes mellitus without mention of complication, not stated as uncontrolled      potassium chloride SA (MICRO-K) 10 mEq capsule Take 10 mEq by mouth daily. Associated Diagnoses: Type II or unspecified type diabetes mellitus without mention of complication, not stated as uncontrolled           2. Follow-up Information     Follow up With Specialties Details Why Contact Info    Shivani Cordero MD Internal Medicine Call in 2 days As needed, If symptoms worsen [facility]  [phone]          3. Return to ED if worse   4. Current Discharge Medication List      START taking these medications    Details   fluticasone propionate (FLONASE) 50 mcg/actuation nasal spray 2 Sprays by Both Nostrils route daily. Qty: 1 Each, Refills: 0  Start date: 12/29/2021      albuterol (PROVENTIL HFA, VENTOLIN HFA, PROAIR HFA) 90 mcg/actuation inhaler Take 2 Puffs by inhalation every four (4) hours as needed for Wheezing.   Qty: 1 Each, Refills: 0  Start date: 12/29/2021 Diagnosis     Clinical Impression:   1. COVID        Attestations:    Burgess Luis MD    Please note that this dictation was completed with [email or web address], the computer voice recognition software. Quite often unanticipated grammatical, syntax, homophones, and other interpretive errors are inadvertently transcribed by the computer software. Please disregard these errors. Please excuse any errors that have escaped final proofreading. Thank you.

## 2021-12-29 NOTE — ED NOTES
Pt a&ox4. gcs 15. Pt self ambulated out of ED with discharge paperwork and personal belongings to ED parking lot to get into private vehicle with family member.

## 2021-12-29 NOTE — ED TRIAGE NOTES
Pt has a cough and nasal congestion with fatigue, was tested for covid the day before yesterday and it came back positive yesterday

## 2021-12-30 ENCOUNTER — PATIENT OUTREACH (OUTPATIENT)
Dept: CASE MANAGEMENT | Age: 86
End: 2021-12-30

## 2021-12-30 NOTE — PROGRESS NOTES
Patient contacted regarding COVID-19 exposure, diagnosis. Discussed COVID-19 related testing which was available at this time. Test results were positive. Patient informed of results, if available? Tested 21. LPN Care Coordinator contacted the patient by telephone to perform post discharge assessment. Call within 2 business days of discharge: Yes Verified name and  with patient as identifiers. Provided introduction to self, and explanation of the CTN/ACM role, and reason for call due to risk factors for infection and/or exposure to COVID-19. Symptoms reviewed with patient who verbalized the following symptoms: fatigue and pain or aching joints      Due to no new or worsening symptoms encounter was not routed to provider for escalation. Discussed follow-up appointments. If no appointment was previously scheduled, appointment scheduling offered:  no. [facility] follow up appointment(s): No future appointments. Non-[facility] follow up appointment(s): advised to contact PCP for follow up    Interventions to address risk factors: Education of patient/family/caregiver/guardian to support self-management-VDH and covid numbers given     Advance Care Planning:   Does patient have an Advance Directive: patient declined education. Educated patient about risk for severe COVID-19 due to risk factors according to CDC guidelines. LPN CC reviewed discharge instructions, medical action plan and red flag symptoms with the patient who verbalized understanding. Discussed COVID vaccination status: yes. Education provided on COVID-19 vaccination as appropriate. Discussed exposure protocols and quarantine with CDC Guidelines. Patient was given an opportunity to verbalize any questions and concerns and agrees to contact LPN CC or health care provider for questions related to their healthcare.     Reviewed and educated patient on any new and changed medications related to discharge diagnosis     Was patient discharged with a pulse oximeter? no     LPN CC provided contact information. Plan for follow-up call in 5-7 days based on severity of symptoms and risk factors.

## 2022-01-06 ENCOUNTER — PATIENT OUTREACH (OUTPATIENT)
Dept: CASE MANAGEMENT | Age: 87
End: 2022-01-06

## 2022-01-06 NOTE — PROGRESS NOTES
Patient resolved from [address] Transitions episode on 1/6/22. Discussed COVID-19 related testing which was available at this time. Test results were positive. Patient informed of results, if available? Already advised     Patient/family has been provided the following resources and education related to COVID-19:                         Signs, symptoms and red flags related to COVID-19            [facility] exposure and quarantine guidelines            [employer] exposure contact - [phone]            Contact for their local Department of Health                 Patient currently reports that the following symptoms have improved:  cough and nasal congestion. patient states she has green nasal congestion advised to contact PCP for follow up    No further outreach scheduled with this CTN/ACM/LPN/HC/ MA. Episode of Care resolved. Patient has this CTN/ACM/LPN/HC/MA contact information if future needs arise.

## 2022-01-30 ENCOUNTER — HOSPITAL ENCOUNTER (EMERGENCY)
Age: 87
Discharge: HOME | End: 2022-01-30
Attending: EMERGENCY MEDICINE
Payer: MEDICARE

## 2022-01-30 ENCOUNTER — APPOINTMENT (OUTPATIENT)
Dept: CT IMAGING | Age: 87
End: 2022-01-30
Attending: EMERGENCY MEDICINE
Payer: MEDICARE

## 2022-01-30 VITALS
RESPIRATION RATE: 20 BRPM | TEMPERATURE: 98.5 F | DIASTOLIC BLOOD PRESSURE: 89 MMHG | WEIGHT: 170 LBS | BODY MASS INDEX: 28.32 KG/M2 | HEART RATE: 54 BPM | SYSTOLIC BLOOD PRESSURE: 139 MMHG | HEIGHT: 65 IN | OXYGEN SATURATION: 96 %

## 2022-01-30 DIAGNOSIS — R73.9 HYPERGLYCEMIA: ICD-10-CM

## 2022-01-30 DIAGNOSIS — R42 DIZZINESS: Primary | ICD-10-CM

## 2022-01-30 DIAGNOSIS — B37.9 CANDIDIASIS: ICD-10-CM

## 2022-01-30 LAB
ALBUMIN SERPL-MCNC: 3.9 G/DL (ref 3.5–5)
ALBUMIN/GLOB SERPL: 1.3 {RATIO} (ref 1.1–2.2)
ALP SERPL-CCNC: 124 U/L (ref 45–117)
ALT SERPL-CCNC: 36 U/L (ref 12–78)
ANION GAP SERPL CALC-SCNC: 10 MMOL/L (ref 5–15)
APPEARANCE UR: CLEAR
AST SERPL W P-5'-P-CCNC: 23 U/L (ref 15–37)
BACTERIA URNS QL MICRO: ABNORMAL /HPF
BASOPHILS # BLD: 0 K/UL (ref 0–0.1)
BASOPHILS NFR BLD: 1 % (ref 0–1)
BILIRUB SERPL-MCNC: 0.5 MG/DL (ref 0.2–1)
BILIRUB UR QL: NEGATIVE
BUN SERPL-MCNC: 17 MG/DL (ref 6–20)
BUN/CREAT SERPL: 15 (ref 12–20)
CA-I BLD-MCNC: 9.1 MG/DL (ref 8.5–10.1)
CHLORIDE SERPL-SCNC: 98 MMOL/L (ref 97–108)
CO2 SERPL-SCNC: 29 MMOL/L (ref 21–32)
COLOR UR: YELLOW
CREAT SERPL-MCNC: 1.12 MG/DL (ref 0.55–1.02)
DIFFERENTIAL METHOD BLD: ABNORMAL
EOSINOPHIL # BLD: 0 K/UL (ref 0–0.4)
EOSINOPHIL NFR BLD: 0 % (ref 0–7)
EPITH CASTS URNS QL MICRO: ABNORMAL /LPF
ERYTHROCYTE [DISTWIDTH] IN BLOOD BY AUTOMATED COUNT: 13.3 % (ref 11.5–14.5)
GLOBULIN SER CALC-MCNC: 2.9 G/DL (ref 2–4)
GLUCOSE BLD STRIP.AUTO-MCNC: 336 MG/DL (ref 65–117)
GLUCOSE SERPL-MCNC: 353 MG/DL (ref 65–100)
GLUCOSE UR STRIP.AUTO-MCNC: >1000 MG/DL
HCT VFR BLD AUTO: 37.5 % (ref 35–47)
HGB BLD-MCNC: 13 G/DL (ref 11.5–16)
HGB UR QL STRIP: NEGATIVE
IMM GRANULOCYTES # BLD AUTO: 0.1 K/UL (ref 0–0.04)
IMM GRANULOCYTES NFR BLD AUTO: 1 % (ref 0–0.5)
KETONES UR QL STRIP.AUTO: NEGATIVE MG/DL
LEUKOCYTE ESTERASE UR QL STRIP.AUTO: ABNORMAL
LYMPHOCYTES # BLD: 1.1 K/UL (ref 0.8–3.5)
LYMPHOCYTES NFR BLD: 12 % (ref 12–49)
MCH RBC QN AUTO: 34.1 PG (ref 26–34)
MCHC RBC AUTO-ENTMCNC: 34.7 G/DL (ref 30–36.5)
MCV RBC AUTO: 98.4 FL (ref 80–99)
MONOCYTES # BLD: 0.5 K/UL (ref 0–1)
MONOCYTES NFR BLD: 6 % (ref 5–13)
NEUTS SEG # BLD: 7 K/UL (ref 1.8–8)
NEUTS SEG NFR BLD: 80 % (ref 32–75)
NITRITE UR QL STRIP.AUTO: NEGATIVE
PERFORMED BY, TECHID: ABNORMAL
PH UR STRIP: 6 [PH] (ref 5–8)
PLATELET # BLD AUTO: 155 K/UL (ref 150–400)
PMV BLD AUTO: 12.4 FL (ref 8.9–12.9)
POTASSIUM SERPL-SCNC: 3.9 MMOL/L (ref 3.5–5.1)
PROT SERPL-MCNC: 6.8 G/DL (ref 6.4–8.2)
PROT UR STRIP-MCNC: NEGATIVE MG/DL
RBC # BLD AUTO: 3.81 M/UL (ref 3.8–5.2)
RBC #/AREA URNS HPF: ABNORMAL /HPF (ref 0–5)
SODIUM SERPL-SCNC: 137 MMOL/L (ref 136–145)
SP GR UR REFRACTOMETRY: 1.02 (ref 1–1.03)
TROPONIN-HIGH SENSITIVITY: 14 NG/L (ref 0–51)
UROBILINOGEN UR QL STRIP.AUTO: 0.1 EU/DL (ref 0.2–1)
WBC # BLD AUTO: 8.7 K/UL (ref 3.6–11)
WBC CASTS URNS QL MICRO: PRESENT
WBC URNS QL MICRO: ABNORMAL /HPF (ref 0–4)

## 2022-01-30 PROCEDURE — 74011250636 HC RX REV CODE- 250/636: Performed by: EMERGENCY MEDICINE

## 2022-01-30 PROCEDURE — 81003 URINALYSIS AUTO W/O SCOPE: CPT

## 2022-01-30 PROCEDURE — 82962 GLUCOSE BLOOD TEST: CPT

## 2022-01-30 PROCEDURE — 70450 CT HEAD/BRAIN W/O DYE: CPT

## 2022-01-30 PROCEDURE — 93005 ELECTROCARDIOGRAM TRACING: CPT

## 2022-01-30 PROCEDURE — 96374 THER/PROPH/DIAG INJ IV PUSH: CPT

## 2022-01-30 PROCEDURE — 84484 ASSAY OF TROPONIN QUANT: CPT

## 2022-01-30 PROCEDURE — 36415 COLL VENOUS BLD VENIPUNCTURE: CPT

## 2022-01-30 PROCEDURE — 85025 COMPLETE CBC W/AUTO DIFF WBC: CPT

## 2022-01-30 PROCEDURE — 99284 EMERGENCY DEPT VISIT MOD MDM: CPT

## 2022-01-30 PROCEDURE — 80053 COMPREHEN METABOLIC PANEL: CPT

## 2022-01-30 RX ORDER — FLUCONAZOLE 150 MG/1
150 TABLET ORAL
Qty: 1 TABLET | Refills: 0 | Status: SHIPPED | OUTPATIENT
Start: 2022-01-30 | End: 2022-01-30

## 2022-01-30 RX ORDER — ONDANSETRON 4 MG/1
4 TABLET, FILM COATED ORAL
Qty: 20 TABLET | Refills: 0 | Status: SHIPPED | OUTPATIENT
Start: 2022-01-30

## 2022-01-30 RX ORDER — GLIMEPIRIDE 4 MG/1
4 TABLET ORAL
COMMUNITY

## 2022-01-30 RX ORDER — ONDANSETRON 2 MG/ML
4 INJECTION INTRAMUSCULAR; INTRAVENOUS
Status: COMPLETED | OUTPATIENT
Start: 2022-01-30 | End: 2022-01-30

## 2022-01-30 RX ORDER — MECLIZINE HCL 12.5 MG 12.5 MG/1
25 TABLET ORAL
Status: COMPLETED | OUTPATIENT
Start: 2022-01-30 | End: 2022-01-30

## 2022-01-30 RX ADMIN — MECLIZINE 25 MG: 12.5 TABLET ORAL at 13:50

## 2022-01-30 RX ADMIN — ONDANSETRON 4 MG: 2 INJECTION INTRAMUSCULAR; INTRAVENOUS at 13:51

## 2022-01-30 NOTE — ED PROVIDER NOTES
EMERGENCY DEPARTMENT HISTORY AND PHYSICAL EXAM      Date: 1/30/2022  Patient Name: Everardo Terrell      History of Presenting Illness     Chief Complaint   Patient presents with    Dizziness    High Blood Sugar       History Provided By: Patient    HPI: Everardo Terrell, 82 y.o. female with a past medical history significant diabetes presents to the ED with cc of 2 weeks history of dizziness. Patient reports the dizziness is unsteady, worse with head movement or standing, resolves with rest.  Patient denies chest pain or shortness of breath, denies fevers or chills, denies neck pain. There are no other complaints, changes, or physical findings at this time. PCP: Shilo Torres MD    Current Outpatient Medications   Medication Sig Dispense Refill    glimepiride (AMARYL) 4 mg tablet Take 4 mg by mouth every morning.  ondansetron hcl (Zofran) 4 mg tablet Take 1 Tablet by mouth every eight (8) hours as needed for Nausea or PRN Reason (Other) (Nausea or dizziness). 20 Tablet 0    fluticasone propionate (FLONASE) 50 mcg/actuation nasal spray 2 Sprays by Both Nostrils route daily. 1 Each 0    albuterol (PROVENTIL HFA, VENTOLIN HFA, PROAIR HFA) 90 mcg/actuation inhaler Take 2 Puffs by inhalation every four (4) hours as needed for Wheezing. 1 Each 0    HYDROcodone-acetaminophen (NORCO)  mg tablet Take 1 Tab by mouth every four (4) hours as needed.       meclizine (ANTIVERT) 25 mg tablet meclizine 25 mg tablet      montelukast (SINGULAIR) 10 mg tablet montelukast 10 mg tablet      pantoprazole (PROTONIX) 40 mg tablet pantoprazole 40 mg tablet,delayed release      promethazine (PHENERGAN) 25 mg tablet promethazine 25 mg tablet      furosemide (LASIX) 40 mg tablet furosemide 40 mg tablet      apixaban (ELIQUIS) 2.5 mg tablet Eliquis 2.5 mg tablet   take 1 tablet by mouth twice a day      tiotropium-olodateroL (STIOLTO RESPIMAT) 2.5-2.5 mcg/actuation inhaler Stiolto Respimat 2.5 mcg-2.5 mcg/actuation solution for inhalation      predniSONE (DELTASONE) 5 mg tablet prednisone 5 mg tablet      pravastatin (PRAVACHOL) 40 mg tablet pravastatin 40 mg tablet      ondansetron (ZOFRAN ODT) 8 mg disintegrating tablet Place 1 tablet (8 mg) on the tongue and allow to dissolve every 6 hours as needed      mupirocin (BACTROBAN) 2 % ointment apply to affected area three times a day      methylPREDNISolone (MEDROL DOSEPACK) 4 mg tablet use as directed FOLLOW DIRECTIONS ON BACK OF FOIL PACK      methotrexate (RHEUMATREX) 2.5 mg tablet       melatonin 10 mg capsule melatonin 10 mg capsule   Take by oral route.  losartan (COZAAR) 100 mg tablet take 1 tablet by mouth once daily      isosorbide mononitrate ER (IMDUR) 30 mg tablet take 1 tablet by mouth once daily      hydrocortisone-pramoxine (ANALPRAM HC 2.5%-1%) 2.5-1 % rectal cream hydrocortisone-pramoxine 2.5 %-1 % (4g) rectal cream      carvediloL (COREG) 6.25 mg tablet carvedilol 6.25 mg tablet      bethanechol chloride (URECHOLINE) 50 mg tablet bethanechol chloride 50 mg tablet      atorvastatin (LIPITOR) 40 mg tablet take 2 tablets by mouth once daily      Aklief 0.005 % crea apply to affected area once daily      omega-3 acid ethyl esters (LOVAZA) 1 gram capsule TAKE 1 CAPSULE TWICE DAILY 180 Cap 3    diclofenac (VOLTAREN) 1 % gel as needed. 0    butalbital-acetaminophen-caffeine (FIORICET, ESGIC) -40 mg per tablet Take 1 Tab by mouth every four (4) hours as needed. 0    ESTRACE 0.01 % (0.1 mg/gram) vaginal cream Insert  into vagina two (2) times a day.  0    folic acid (FOLVITE) 1 mg tablet Take  by mouth daily.   0    ferrous sulfate 325 mg (65 mg iron) tablet take 1 tablet by mouth one time a day  0    glucose blood VI test strips (TRUE METRIX GLUCOSE TEST STRIP) strip Test 2 times daily Dx Code E11.65 100 Strip 6    Lancets misc Test 2 times daily Dx Code E11.65 (True Metrix Meter) 100 Each 6    Insulin Needles, Disposable, (YULIYA PEN NEEDLE) 32 x 5/32 \" ndle Use twice daily. Dx code 250.00 200 Each 4    gabapentin (NEURONTIN) 300 mg capsule Take 300 mg by mouth nightly.  NITROSTAT 0.4 mg SL tablet by SubLINGual route every five (5) minutes as needed.  polyethylene glycol (MIRALAX) 17 gram/dose powder Take 17 g by mouth daily.  potassium chloride SA (MICRO-K) 10 mEq capsule Take 10 mEq by mouth daily. Past History     Past Medical History:  Past Medical History:   Diagnosis Date    Arrhythmia     a. 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     Lung abnormality     SOB (shortness of breath)     on exertion    Stroke ([facility] [address] 12/2017       Past Surgical History:  Past Surgical History:   Procedure Laterality Date    COLONOSCOPY Left 9/12/2018    COLONOSCOPY performed by Marianna Hoskins MD at hospitals ENDOSCOPY    HX BACK SURGERY      eight surgeries    HX CATARACT REMOVAL      left eye    HX HERNIA REPAIR      HX OTHER SURGICAL      rt middle lobe removed d/t necrosis    HX PARTIAL HYSTERECTOMY      HX REFRACTIVE SURGERY      MD PART REMOVAL COLON,ABD/TRANSANAL RALPH      pt unsure why    MD REMOVAL GALLBLADDER         Family History:  Family History   Problem Relation Age of Onset    Diabetes Mother     Heart Disease Father     Stroke Father     Diabetes Sister     Cancer Brother     Diabetes Maternal Aunt     Heart Disease Maternal Aunt     Cancer Maternal Grandmother        Social History:  Social History     Tobacco Use    Smoking status: Former Smoker     Packs/day: 1.00     Years: 40.00     Pack years: 40.00     Quit date: 2/8/2011     Years since quitting: 10.9    Smokeless tobacco: Never Used   Substance Use Topics    Alcohol use: Yes     Comment: occassionally alcohol    Drug use: No       Allergies:   Allergies   Allergen Reactions    Iodinated Contrast Media Other (comments)     Iv dye causes heart to stop  Chicken Derived Other (comments)     Closes throat    Darvon [Propoxyphene] Rash    Tetanus Vaccines And Toxoid Other (comments)     Blisters all over body         Review of Systems   Review of Systems   Constitutional: Negative for chills and fever. HENT: Negative for sinus pressure and sinus pain. Eyes: Negative for photophobia and redness. Respiratory: Negative for shortness of breath and wheezing. Cardiovascular: Negative for chest pain and palpitations. Gastrointestinal: Negative for abdominal pain and nausea. Genitourinary: Negative for flank pain and hematuria. Musculoskeletal: Negative for arthralgias and gait problem. Skin: Negative for color change and pallor. Neurological: Positive for dizziness and weakness. Review of Systems    Physical Exam   Physical Exam  Constitutional:       General: No acute distress. Appearance: Normal appearance. Not toxic-appearing. HENT:      Head: Normocephalic and atraumatic. Nose: Nose normal.      Mouth/Throat:      Mouth: Mucous membranes are moist.   Eyes:      Extraocular Movements: Extraocular movements intact. Pupils: Pupils are equal, round, and reactive to light. Cardiovascular:      Rate and Rhythm: Normal rate. Pulses: Normal pulses. Pulmonary:      Effort: Pulmonary effort is normal.      Breath sounds: No stridor, clear to auscultation bilaterally  Abdominal:      General: Abdomen is flat. There is no distension. Musculoskeletal:         General: Normal range of motion. Cervical back: Normal range of motion and neck supple. Skin:     General: Skin is warm and dry. Capillary Refill: Capillary refill takes less than 2 seconds. Neurological:      General: No focal deficit present. Cranial nerves II to XII intact. No nystagmus. Mental Status: Alert and oriented to person, place, and time.    Psychiatric:         Mood and Affect: Mood normal.         Behavior: Behavior normal.     Physical Exam    Lab and Diagnostic Study Results     Labs -     Recent Results (from the past 12 hour(s))   GLUCOSE, POC    Collection Time: 01/30/22 11:03 AM   Result Value Ref Range    Glucose (POC) 336 (H) 65 - 117 mg/dL    Performed by TOREY GARCIA    CBC WITH AUTOMATED DIFF    Collection Time: 01/30/22 11:20 AM   Result Value Ref Range    WBC 8.7 3.6 - 11.0 K/uL    RBC 3.81 3.80 - 5.20 M/uL    HGB 13.0 11.5 - 16.0 g/dL    HCT 37.5 35.0 - 47.0 %    MCV 98.4 80.0 - 99.0 FL    MCH 34.1 (H) 26.0 - 34.0 PG    MCHC 34.7 30.0 - 36.5 g/dL    RDW 13.3 11.5 - 14.5 %    PLATELET 133 406 - 294 K/uL    MPV 12.4 8.9 - 12.9 FL    NEUTROPHILS 80 (H) 32 - 75 %    LYMPHOCYTES 12 12 - 49 %    MONOCYTES 6 5 - 13 %    EOSINOPHILS 0 0 - 7 %    BASOPHILS 1 0 - 1 %    IMMATURE GRANULOCYTES 1 (H) 0.0 - 0.5 %    ABS. NEUTROPHILS 7.0 1.8 - 8.0 K/UL    ABS. LYMPHOCYTES 1.1 0.8 - 3.5 K/UL    ABS. MONOCYTES 0.5 0.0 - 1.0 K/UL    ABS. EOSINOPHILS 0.0 0.0 - 0.4 K/UL    ABS. BASOPHILS 0.0 0.0 - 0.1 K/UL    ABS. IMM. GRANS. 0.1 (H) 0.00 - 0.04 K/UL    DF AUTOMATED     METABOLIC PANEL, COMPREHENSIVE    Collection Time: 01/30/22 11:20 AM   Result Value Ref Range    Sodium 137 136 - 145 mmol/L    Potassium 3.9 3.5 - 5.1 mmol/L    Chloride 98 97 - 108 mmol/L    CO2 29 21 - 32 mmol/L    Anion gap 10 5 - 15 mmol/L    Glucose 353 (H) 65 - 100 mg/dL    BUN 17 6 - 20 mg/dL    Creatinine 1.12 (H) 0.55 - 1.02 mg/dL    BUN/Creatinine ratio 15 12 - 20      GFR est AA 56 (L) >60 ml/min/1.73m2    GFR est non-AA 46 (L) >60 ml/min/1.73m2    Calcium 9.1 8.5 - 10.1 mg/dL    Bilirubin, total 0.5 0.2 - 1.0 mg/dL    AST (SGOT) 23 15 - 37 U/L    ALT (SGPT) 36 12 - 78 U/L    Alk.  phosphatase 124 (H) 45 - 117 U/L    Protein, total 6.8 6.4 - 8.2 g/dL    Albumin 3.9 3.5 - 5.0 g/dL    Globulin 2.9 2.0 - 4.0 g/dL    A-G Ratio 1.3 1.1 - 2.2     TROPONIN-HIGH SENSITIVITY    Collection Time: 01/30/22 11:20 AM   Result Value Ref Range    Troponin-High Sensitivity 14 0 - 51 ng/L URINALYSIS W/ RFLX MICROSCOPIC    Collection Time: 01/30/22 11:32 AM   Result Value Ref Range    Color Yellow      Appearance Clear Clear      Specific gravity 1.020 1.003 - 1.030      pH (UA) 6.0 5.0 - 8.0      Protein Negative Negative mg/dL    Glucose >1,000 (A) Negative mg/dL    Ketone Negative Negative mg/dL    Bilirubin Negative Negative      Blood Negative Negative      Urobilinogen 0.1 (L) 0.2 - 1.0 EU/dL    Nitrites Negative Negative      Leukocyte Esterase Small (A) Negative      WBC 20-50 0 - 4 /hpf    RBC 0-5 0 - 5 /hpf    Epithelial cells Few Few /lpf    Bacteria 1+ (A) Negative /hpf    Budding yeast Present (A) Negative         Radiologic Studies -   [unfilled]  CT Results  (Last 48 hours)               01/30/22 1228  CT HEAD WO CONT Final result    Impression:  Chronic findings as above. Narrative:  CT head without contrast:       No comparison. Dose reduction: All CT scans at this facility are performed using dose reduction   optimization techniques as appropriate to a performed exam including the   following: Automated exposure control, adjustments of the mA and/or kV according   to patient size, or use of iterative reconstruction technique. Noncontrast axial images were performed with coronal and sagittal   reconstructions. There is age-appropriate cerebral atrophy with chronic small vessel ischemic the   periventricular white matter. There are left basal ganglia lacunar infarctions. There is no acute intracranial hemorrhage, midline shift or other mass. The calvarium is intact. 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 There is chronic ethmoid sinusitis. The mastoid air cells are clear. CXR Results  (Last 48 hours)    None          Medical Decision Making and ED Course   - I am the first and primary provider for this patient AND AM THE PRIMARY PROVIDER OF RECORD.     - I reviewed the vital signs, available nursing notes, past medical history, past surgical history, family history and social history. - Initial assessment performed. The patients presenting problems have been discussed, and the staff are in agreement with the care plan formulated and outlined with them. I have encouraged them to ask questions as they arise throughout their visit. Vital Signs-Reviewed the patient's vital signs. Patient Vitals for the past 12 hrs:   Temp Pulse Resp BP SpO2   01/30/22 1507  (!) 54 20 139/89 96 %   01/30/22 1105 98.5 °F (36.9 °C) 64 16 (!) 146/64 95 %       ED Course:       ED Course as of 01/30/22 1536   Sun Jan 30, 2022   1534 Patient ambulated in the department without difficulty without assistance. Patient reports feeling better at this time, states she is ready to be discharged home. Discussed with patient and family member present the plan to treat as vertigo, discussed the need for close follow-up and blood glucose monitoring as well as return precautions. [CS]      ED Course User Index  [CS] Clarence Trent MD         Provider Notes (Medical Decision Making):   Patient with symptoms consistent with peripheral source of dizziness/vertigo. Improved with medications in the department. MDM           Disposition     Disposition: DC- Adult Discharges: All of the diagnostic tests were reviewed and questions answered. Diagnosis, care plan and treatment options were discussed. The patient understands the instructions and will follow up as directed. The patients results have been reviewed with them. They have been counseled regarding their diagnosis. The patient verbally convey understanding and agreement of the signs, symptoms, diagnosis, treatment and prognosis and additionally agrees to follow up as recommended with their PCP in 24 - 48 hours. They also agree with the care-plan and convey that all of their questions have been answered.   I have also put together some discharge instructions for them that include: 1) educational information regarding their diagnosis, 2) how to care for their diagnosis at home, as well a 3) list of reasons why they would want to return to the ED prior to their follow-up appointment, should their condition change. Remove if not discharged  DISCHARGE PLAN:  1. Current Discharge Medication List      CONTINUE these medications which have NOT CHANGED    Details   glimepiride (AMARYL) 4 mg tablet Take 4 mg by mouth every morning. fluticasone propionate (FLONASE) 50 mcg/actuation nasal spray 2 Sprays by Both Nostrils route daily. Qty: 1 Each, Refills: 0      albuterol (PROVENTIL HFA, VENTOLIN HFA, PROAIR HFA) 90 mcg/actuation inhaler Take 2 Puffs by inhalation every four (4) hours as needed for Wheezing. Qty: 1 Each, Refills: 0      HYDROcodone-acetaminophen (NORCO)  mg tablet Take 1 Tab by mouth every four (4) hours as needed.       meclizine (ANTIVERT) 25 mg tablet meclizine 25 mg tablet      montelukast (SINGULAIR) 10 mg tablet montelukast 10 mg tablet      pantoprazole (PROTONIX) 40 mg tablet pantoprazole 40 mg tablet,delayed release      promethazine (PHENERGAN) 25 mg tablet promethazine 25 mg tablet      furosemide (LASIX) 40 mg tablet furosemide 40 mg tablet      apixaban (ELIQUIS) 2.5 mg tablet Eliquis 2.5 mg tablet   take 1 tablet by mouth twice a day      tiotropium-olodateroL (STIOLTO RESPIMAT) 2.5-2.5 mcg/actuation inhaler Stiolto Respimat 2.5 mcg-2.5 mcg/actuation solution for inhalation      predniSONE (DELTASONE) 5 mg tablet prednisone 5 mg tablet      pravastatin (PRAVACHOL) 40 mg tablet pravastatin 40 mg tablet      ondansetron (ZOFRAN ODT) 8 mg disintegrating tablet Place 1 tablet (8 mg) on the tongue and allow to dissolve every 6 hours as needed      mupirocin (BACTROBAN) 2 % ointment apply to affected area three times a day      methylPREDNISolone (MEDROL DOSEPACK) 4 mg tablet use as directed FOLLOW DIRECTIONS ON BACK OF FOIL PACK      methotrexate (RHEUMATREX) 2.5 mg tablet       melatonin 10 mg capsule melatonin 10 mg capsule   Take by oral route. losartan (COZAAR) 100 mg tablet take 1 tablet by mouth once daily      isosorbide mononitrate ER (IMDUR) 30 mg tablet take 1 tablet by mouth once daily      hydrocortisone-pramoxine (ANALPRAM HC 2.5%-1%) 2.5-1 % rectal cream hydrocortisone-pramoxine 2.5 %-1 % (4g) rectal cream      carvediloL (COREG) 6.25 mg tablet carvedilol 6.25 mg tablet      bethanechol chloride (URECHOLINE) 50 mg tablet bethanechol chloride 50 mg tablet      atorvastatin (LIPITOR) 40 mg tablet take 2 tablets by mouth once daily      Aklief 0.005 % crea apply to affected area once daily      omega-3 acid ethyl esters (LOVAZA) 1 gram capsule TAKE 1 CAPSULE TWICE DAILY  Qty: 180 Cap, Refills: 3      diclofenac (VOLTAREN) 1 % gel as needed. Refills: 0    Associated Diagnoses: Type 2 diabetes mellitus with hyperglycemia, without long-term current use of insulin ([facility] [address] Osteoporosis, unspecified osteoporosis type, unspecified pathological fracture presence      butalbital-acetaminophen-caffeine (FIORICET, ESGIC) -40 mg per tablet Take 1 Tab by mouth every four (4) hours as needed. Refills: 0    Associated Diagnoses: Osteoporosis, unspecified; Type 2 diabetes mellitus with hyperglycemia, without long-term current use of insulin ([facility])      ESTRACE 0.01 % (0.1 mg/gram) vaginal cream Insert  into vagina two (2) times a day. Refills: 0    Associated Diagnoses: Osteoporosis, unspecified; Type 2 diabetes mellitus with hyperglycemia, without long-term current use of insulin ([facility])      folic acid (FOLVITE) 1 mg tablet Take  by mouth daily.   Refills: 0    Associated Diagnoses: Osteoporosis, unspecified; Type 2 diabetes mellitus with hyperglycemia, without long-term current use of insulin ([facility])      ferrous sulfate 325 mg (65 mg iron) tablet take 1 tablet by mouth one time a day  Refills: 0    Associated Diagnoses: Osteoporosis, unspecified; Type 2 diabetes mellitus with hyperglycemia, without long-term current use of insulin ([facility])      glucose blood VI test strips (TRUE METRIX GLUCOSE TEST STRIP) strip Test 2 times daily Dx Code E11.65  Qty: 100 Strip, Refills: 6    Associated Diagnoses: Type 2 diabetes mellitus without complication ([facility])      Lancets misc Test 2 times daily Dx Code E11.65 (True Metrix Meter)  Qty: 100 Each, Refills: 6    Associated Diagnoses: Type 2 diabetes mellitus without complication ([facility])      Insulin Needles, Disposable, (YULIYA PEN NEEDLE) 32 x 5/32 \" ndle Use twice daily. Dx code 250.00  Qty: 200 Each, Refills: 4      gabapentin (NEURONTIN) 300 mg capsule Take 300 mg by mouth nightly. Associated Diagnoses: DM (diabetes mellitus) ([facility] [address] Type II or unspecified type diabetes mellitus without mention of complication, not stated as uncontrolled      NITROSTAT 0.4 mg SL tablet by SubLINGual route every five (5) minutes as needed. Associated Diagnoses: DM (diabetes mellitus) ([facility] [address] Type II or unspecified type diabetes mellitus without mention of complication, not stated as uncontrolled      polyethylene glycol (MIRALAX) 17 gram/dose powder Take 17 g by mouth daily. Associated Diagnoses: Type II or unspecified type diabetes mellitus without mention of complication, not stated as uncontrolled      potassium chloride SA (MICRO-K) 10 mEq capsule Take 10 mEq by mouth daily. Associated Diagnoses: Type II or unspecified type diabetes mellitus without mention of complication, not stated as uncontrolled           2. Follow-up Information     Follow up With Specialties Details Why Hedy Wang MD Otolaryngology Schedule an appointment as soon as possible for a visit   [address] [place] [address]  [phone]          3. Return to ED if worse   4.    Current Discharge Medication List      START taking these medications    Details   ondansetron hcl (Zofran) 4 mg tablet Take 1 Tablet by mouth every eight (8) hours as needed for Nausea or PRN Reason (Other) (Nausea or dizziness). Qty: 20 Tablet, Refills: 0  Start date: 1/30/2022             Diagnosis     Clinical Impression:   1. Dizziness    2. Hyperglycemia        Attestations:    Jessica Norton MD    Please note that this dictation was completed with [employer], the computer voice recognition software. Quite often unanticipated grammatical, syntax, homophones, and other interpretive errors are inadvertently transcribed by the computer software. Please disregard these errors. Please excuse any errors that have escaped final proofreading. Thank you.

## 2022-01-30 NOTE — DISCHARGE INSTRUCTIONS
Thank you! Thank you for allowing me to care for you in the emergency department. I sincerely hope that you are satisfied with your visit today. It is my goal to provide you with excellent care. Below you will find a list of your labs and imaging from your visit today. Should you have any questions regarding these results please do not hesitate to call the emergency department. Labs -     Recent Results (from the past 12 hour(s))   GLUCOSE, POC    Collection Time: 01/30/22 11:03 AM   Result Value Ref Range    Glucose (POC) 336 (H) 65 - 117 mg/dL    Performed by TOREY GARCIA    CBC WITH AUTOMATED DIFF    Collection Time: 01/30/22 11:20 AM   Result Value Ref Range    WBC 8.7 3.6 - 11.0 K/uL    RBC 3.81 3.80 - 5.20 M/uL    HGB 13.0 11.5 - 16.0 g/dL    HCT 37.5 35.0 - 47.0 %    MCV 98.4 80.0 - 99.0 FL    MCH 34.1 (H) 26.0 - 34.0 PG    MCHC 34.7 30.0 - 36.5 g/dL    RDW 13.3 11.5 - 14.5 %    PLATELET 478 907 - 531 K/uL    MPV 12.4 8.9 - 12.9 FL    NEUTROPHILS 80 (H) 32 - 75 %    LYMPHOCYTES 12 12 - 49 %    MONOCYTES 6 5 - 13 %    EOSINOPHILS 0 0 - 7 %    BASOPHILS 1 0 - 1 %    IMMATURE GRANULOCYTES 1 (H) 0.0 - 0.5 %    ABS. NEUTROPHILS 7.0 1.8 - 8.0 K/UL    ABS. LYMPHOCYTES 1.1 0.8 - 3.5 K/UL    ABS. MONOCYTES 0.5 0.0 - 1.0 K/UL    ABS. EOSINOPHILS 0.0 0.0 - 0.4 K/UL    ABS. BASOPHILS 0.0 0.0 - 0.1 K/UL    ABS. IMM.  GRANS. 0.1 (H) 0.00 - 0.04 K/UL    DF AUTOMATED     METABOLIC PANEL, COMPREHENSIVE    Collection Time: 01/30/22 11:20 AM   Result Value Ref Range    Sodium 137 136 - 145 mmol/L    Potassium 3.9 3.5 - 5.1 mmol/L    Chloride 98 97 - 108 mmol/L    CO2 29 21 - 32 mmol/L    Anion gap 10 5 - 15 mmol/L    Glucose 353 (H) 65 - 100 mg/dL    BUN 17 6 - 20 mg/dL    Creatinine 1.12 (H) 0.55 - 1.02 mg/dL    BUN/Creatinine ratio 15 12 - 20      GFR est AA 56 (L) >60 ml/min/1.73m2    GFR est non-AA 46 (L) >60 ml/min/1.73m2    Calcium 9.1 8.5 - 10.1 mg/dL    Bilirubin, total 0.5 0.2 - 1.0 mg/dL    AST (SGOT) 23 15 - 37 U/L    ALT (SGPT) 36 12 - 78 U/L    Alk. phosphatase 124 (H) 45 - 117 U/L    Protein, total 6.8 6.4 - 8.2 g/dL    Albumin 3.9 3.5 - 5.0 g/dL    Globulin 2.9 2.0 - 4.0 g/dL    A-G Ratio 1.3 1.1 - 2.2     TROPONIN-HIGH SENSITIVITY    Collection Time: 01/30/22 11:20 AM   Result Value Ref Range    Troponin-High Sensitivity 14 0 - 51 ng/L   URINALYSIS W/ RFLX MICROSCOPIC    Collection Time: 01/30/22 11:32 AM   Result Value Ref Range    Color Yellow      Appearance Clear Clear      Specific gravity 1.020 1.003 - 1.030      pH (UA) 6.0 5.0 - 8.0      Protein Negative Negative mg/dL    Glucose >1,000 (A) Negative mg/dL    Ketone Negative Negative mg/dL    Bilirubin Negative Negative      Blood Negative Negative      Urobilinogen 0.1 (L) 0.2 - 1.0 EU/dL    Nitrites Negative Negative      Leukocyte Esterase Small (A) Negative      WBC 20-50 0 - 4 /hpf    RBC 0-5 0 - 5 /hpf    Epithelial cells Few Few /lpf    Bacteria 1+ (A) Negative /hpf    Budding yeast Present (A) Negative         Radiologic Studies -   CT HEAD WO CONT   Final Result   Chronic findings as above. CT Results  (Last 48 hours)                 01/30/22 1228  CT HEAD WO CONT Final result    Impression:  Chronic findings as above. Narrative:  CT head without contrast:       No comparison. Dose reduction: All CT scans at this facility are performed using dose reduction   optimization techniques as appropriate to a performed exam including the   following: Automated exposure control, adjustments of the mA and/or kV according   to patient size, or use of iterative reconstruction technique. Noncontrast axial images were performed with coronal and sagittal   reconstructions. There is age-appropriate cerebral atrophy with chronic small vessel ischemic the   periventricular white matter. There are left basal ganglia lacunar infarctions. There is no acute intracranial hemorrhage, midline shift or other mass.        The calvarium is intact. 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 There is chronic ethmoid sinusitis. The mastoid air cells are clear. CXR Results  (Last 48 hours)      None               If you feel that you have not received excellent quality care or timely care, please ask to speak to the nurse manager. Please choose us in the future for your continued health care needs. ------------------------------------------------------------------------------------------------------------  The exam and treatment you received in the Emergency Department were for an urgent problem and are not intended as complete care. It is important that you follow-up with a doctor, nurse practitioner, or physician assistant to:  (1) confirm your diagnosis,  (2) re-evaluation of changes in your illness and treatment, and  (3) for ongoing care. If your symptoms become worse or you do not improve as expected and you are unable to reach your usual health care provider, you should return to the Emergency Department. We are available 24 hours a day. Please take your discharge instructions with you when you go to your follow-up appointment. If you have any problem arranging a follow-up appointment, contact the Emergency Department immediately. If a prescription has been provided, please have it filled as soon as possible to prevent a delay in treatment. Read the entire medication instruction sheet provided to you by the pharmacy. If you have any questions or reservations about taking the medication due to side effects or interactions with other medications, please call your primary care physician or contact the ER to speak with the charge nurse. Make an appointment with your family doctor or the physician you were referred to for follow-up of this visit as instructed on your discharge paperwork, as this is a mandatory follow-up.  Return to the ER if you are unable to be seen or if you are unable to be seen in a timely manner. If you have any problem arranging the follow-up visit, contact the Emergency Department immediately.

## 2022-01-31 LAB
ATRIAL RATE: 59 BPM
CALCULATED P AXIS, ECG09: -77 DEGREES
CALCULATED R AXIS, ECG10: -48 DEGREES
CALCULATED T AXIS, ECG11: -40 DEGREES
DIAGNOSIS, 93000: NORMAL
P-R INTERVAL, ECG05: 146 MS
Q-T INTERVAL, ECG07: 482 MS
QRS DURATION, ECG06: 128 MS
QTC CALCULATION (BEZET), ECG08: 477 MS
VENTRICULAR RATE, ECG03: 59 BPM

## 2022-05-25 ENCOUNTER — TRANSCRIBE ORDER (OUTPATIENT)
Dept: SCHEDULING | Age: 87
End: 2022-05-25

## 2022-05-25 ENCOUNTER — HOSPITAL ENCOUNTER (OUTPATIENT)
Dept: NON INVASIVE DIAGNOSTICS | Age: 87
Discharge: HOME | End: 2022-05-25
Attending: INTERNAL MEDICINE
Payer: MEDICARE

## 2022-05-25 DIAGNOSIS — R60.9 EDEMA: ICD-10-CM

## 2022-05-25 DIAGNOSIS — R60.9 EDEMA: Primary | ICD-10-CM

## 2022-05-25 DIAGNOSIS — M79.605 LEFT LEG PAIN: ICD-10-CM

## 2022-05-25 PROCEDURE — 93971 EXTREMITY STUDY: CPT

## 2023-01-23 ENCOUNTER — TRANSCRIBE ORDER (OUTPATIENT)
Dept: SCHEDULING | Age: 88
End: 2023-01-23

## 2023-01-23 DIAGNOSIS — R41.3 MEMORY LOSS: Primary | ICD-10-CM

## 2023-01-25 ENCOUNTER — HOSPITAL ENCOUNTER (OUTPATIENT)
Dept: NEUROLOGY | Age: 88
Discharge: HOME | End: 2023-01-25
Attending: PSYCHIATRY & NEUROLOGY
Payer: MEDICARE

## 2023-01-25 DIAGNOSIS — R41.3 MEMORY LOSS: ICD-10-CM

## 2023-01-25 PROCEDURE — 95816 EEG AWAKE AND DROWSY: CPT

## 2023-01-28 NOTE — PROCEDURES
[facility]  EEG    Name:  Divina King  MR#:  [id number]  :  1935  ACCOUNT #:  [de-identified]  DATE OF SERVICE:  2023    DIAGNOSIS:  Memory loss. DESCRIPTION:  Recording is done digitally on a computer. Electrodes are placed in a 10-20 [employer]. Initial recording is equipment calibration followed by biocalibration. Initial montages are bipolar montages. TECHNIQUE:  Tracing started with the patient having a background frequency of 9 Hz. As the recording continues, the patient is hooked up to an EKG machine that shows a heart rate of 72. Tracings continue with the patient being exposed to photic stimulation without any abnormality. Hyperventilation is not done. IMPRESSION:  This is a normal EEG, awake.         Timoteo Holland MD      [id number]  D:  2023 12:14  T:  2023 21:17  JOB #:  [id number]

## 2023-03-02 ENCOUNTER — APPOINTMENT (OUTPATIENT)
Dept: GENERAL RADIOLOGY | Age: 88
DRG: 699 | End: 2023-03-02
Attending: EMERGENCY MEDICINE
Payer: MEDICARE

## 2023-03-02 ENCOUNTER — HOSPITAL ENCOUNTER (INPATIENT)
Age: 88
LOS: 3 days | Discharge: HOME | DRG: 699 | End: 2023-03-05
Attending: EMERGENCY MEDICINE | Admitting: INTERNAL MEDICINE
Payer: MEDICARE

## 2023-03-02 DIAGNOSIS — N12 PYELONEPHRITIS: Primary | ICD-10-CM

## 2023-03-02 DIAGNOSIS — N39.0 COMPLICATED UTI (URINARY TRACT INFECTION): ICD-10-CM

## 2023-03-02 DIAGNOSIS — A41.9 SEPSIS WITHOUT ACUTE ORGAN DYSFUNCTION, DUE TO UNSPECIFIED ORGANISM (HCC): ICD-10-CM

## 2023-03-02 DIAGNOSIS — E11.65 UNCONTROLLED TYPE 2 DIABETES MELLITUS WITH HYPERGLYCEMIA (HCC): ICD-10-CM

## 2023-03-02 PROBLEM — Z86.79 HISTORY OF ATRIAL FIBRILLATION: Status: ACTIVE | Noted: 2023-03-02

## 2023-03-02 PROBLEM — Z86.73 HISTORY OF STROKE: Status: ACTIVE | Noted: 2023-03-02

## 2023-03-02 LAB
ALBUMIN SERPL-MCNC: 3.5 G/DL (ref 3.5–5)
ALBUMIN/GLOB SERPL: 0.9 (ref 1.1–2.2)
ALP SERPL-CCNC: 103 U/L (ref 45–117)
ALT SERPL-CCNC: 27 U/L (ref 12–78)
ANION GAP SERPL CALC-SCNC: 3 MMOL/L (ref 5–15)
APPEARANCE UR: ABNORMAL
AST SERPL W P-5'-P-CCNC: 36 U/L (ref 15–37)
ATRIAL RATE: 62 BPM
BACTERIA URNS QL MICRO: ABNORMAL /HPF
BACTERIA URNS QL MICRO: ABNORMAL /HPF
BASOPHILS # BLD: 0.1 K/UL (ref 0–0.1)
BASOPHILS NFR BLD: 1 % (ref 0–1)
BILIRUB SERPL-MCNC: 0.4 MG/DL (ref 0.2–1)
BILIRUB UR QL: NEGATIVE
BNP SERPL-MCNC: 356 PG/ML
BUN SERPL-MCNC: 8 MG/DL (ref 6–20)
BUN/CREAT SERPL: 14 (ref 12–20)
CA-I BLD-MCNC: 9.1 MG/DL (ref 8.5–10.1)
CALCULATED P AXIS, ECG09: -94 DEGREES
CALCULATED R AXIS, ECG10: -52 DEGREES
CALCULATED T AXIS, ECG11: 58 DEGREES
CHLORIDE SERPL-SCNC: 106 MMOL/L (ref 97–108)
CO2 SERPL-SCNC: 28 MMOL/L (ref 21–32)
COLOR UR: ABNORMAL
CREAT SERPL-MCNC: 0.57 MG/DL (ref 0.55–1.02)
DIAGNOSIS, 93000: NORMAL
DIFFERENTIAL METHOD BLD: ABNORMAL
EOSINOPHIL # BLD: 0.2 K/UL (ref 0–0.4)
EOSINOPHIL NFR BLD: 3 % (ref 0–7)
EPITH CASTS URNS QL MICRO: ABNORMAL /LPF
ERYTHROCYTE [DISTWIDTH] IN BLOOD BY AUTOMATED COUNT: 16.7 % (ref 11.5–14.5)
EST. AVERAGE GLUCOSE BLD GHB EST-MCNC: 126 MG/DL
GLOBULIN SER CALC-MCNC: 3.7 G/DL (ref 2–4)
GLUCOSE BLD STRIP.AUTO-MCNC: 114 MG/DL (ref 65–100)
GLUCOSE BLD STRIP.AUTO-MCNC: 119 MG/DL (ref 65–100)
GLUCOSE SERPL-MCNC: 119 MG/DL (ref 65–100)
GLUCOSE UR STRIP.AUTO-MCNC: >300 MG/DL
HBA1C MFR BLD: 6 % (ref 4–5.6)
HCT VFR BLD AUTO: 40.3 % (ref 35–47)
HGB BLD-MCNC: 12.6 G/DL (ref 11.5–16)
HGB UR QL STRIP: NEGATIVE
IMM GRANULOCYTES # BLD AUTO: 0.1 K/UL (ref 0–0.04)
IMM GRANULOCYTES NFR BLD AUTO: 1 % (ref 0–0.5)
INR PPP: 0.9 (ref 0.9–1.1)
KETONES UR QL STRIP.AUTO: NEGATIVE MG/DL
LEUKOCYTE ESTERASE UR QL STRIP.AUTO: ABNORMAL
LYMPHOCYTES # BLD: 1.4 K/UL (ref 0.8–3.5)
LYMPHOCYTES NFR BLD: 17 % (ref 12–49)
MCH RBC QN AUTO: 28.6 PG (ref 26–34)
MCHC RBC AUTO-ENTMCNC: 31.3 G/DL (ref 30–36.5)
MCV RBC AUTO: 91.6 FL (ref 80–99)
MONOCYTES # BLD: 0.9 K/UL (ref 0–1)
MONOCYTES NFR BLD: 11 % (ref 5–13)
MUCOUS THREADS URNS QL MICRO: NEGATIVE /LPF
NEUTS SEG # BLD: 5.6 K/UL (ref 1.8–8)
NEUTS SEG NFR BLD: 67 % (ref 32–75)
NITRITE UR QL STRIP.AUTO: POSITIVE
NRBC # BLD: 0 K/UL (ref 0–0.01)
NRBC BLD-RTO: 0 PER 100 WBC
P-R INTERVAL, ECG05: 154 MS
PERFORMED BY, TECHID: ABNORMAL
PERFORMED BY, TECHID: ABNORMAL
PH UR STRIP: 6 (ref 5–8)
PLATELET # BLD AUTO: 190 K/UL (ref 150–400)
PMV BLD AUTO: 11.5 FL (ref 8.9–12.9)
POTASSIUM SERPL-SCNC: 4.5 MMOL/L (ref 3.5–5.1)
PROT SERPL-MCNC: 7.2 G/DL (ref 6.4–8.2)
PROT UR STRIP-MCNC: 30 MG/DL
PROTHROMBIN TIME: 12.4 SEC (ref 11.9–14.6)
Q-T INTERVAL, ECG07: 480 MS
QRS DURATION, ECG06: 120 MS
QTC CALCULATION (BEZET), ECG08: 487 MS
RBC # BLD AUTO: 4.4 M/UL (ref 3.8–5.2)
RBC #/AREA URNS HPF: ABNORMAL /HPF (ref 0–5)
RBC #/AREA URNS HPF: ABNORMAL /HPF (ref 0–5)
SODIUM SERPL-SCNC: 137 MMOL/L (ref 136–145)
SP GR UR REFRACTOMETRY: 1.02 (ref 1–1.03)
TROPONIN I SERPL HS-MCNC: 18 NG/L (ref 0–51)
UROBILINOGEN UR QL STRIP.AUTO: 0.1 EU/DL (ref 0.1–1)
VENTRICULAR RATE, ECG03: 62 BPM
WBC # BLD AUTO: 8.2 K/UL (ref 3.6–11)
WBC CASTS URNS QL MICRO: PRESENT
WBC URNS QL MICRO: >100 /HPF (ref 0–4)
WBC URNS QL MICRO: >100 /HPF (ref 0–4)
YEAST URNS QL MICRO: PRESENT

## 2023-03-02 PROCEDURE — 74011000250 HC RX REV CODE- 250: Performed by: LICENSED PRACTICAL NURSE

## 2023-03-02 PROCEDURE — 93005 ELECTROCARDIOGRAM TRACING: CPT

## 2023-03-02 PROCEDURE — 96361 HYDRATE IV INFUSION ADD-ON: CPT

## 2023-03-02 PROCEDURE — 87077 CULTURE AEROBIC IDENTIFY: CPT

## 2023-03-02 PROCEDURE — 65270000029 HC RM PRIVATE

## 2023-03-02 PROCEDURE — 82962 GLUCOSE BLOOD TEST: CPT

## 2023-03-02 PROCEDURE — 85610 PROTHROMBIN TIME: CPT

## 2023-03-02 PROCEDURE — 74011250636 HC RX REV CODE- 250/636: Performed by: EMERGENCY MEDICINE

## 2023-03-02 PROCEDURE — 74011250636 HC RX REV CODE- 250/636: Performed by: INTERNAL MEDICINE

## 2023-03-02 PROCEDURE — 96374 THER/PROPH/DIAG INJ IV PUSH: CPT

## 2023-03-02 PROCEDURE — 74011000250 HC RX REV CODE- 250: Performed by: INTERNAL MEDICINE

## 2023-03-02 PROCEDURE — 87086 URINE CULTURE/COLONY COUNT: CPT

## 2023-03-02 PROCEDURE — 81001 URINALYSIS AUTO W/SCOPE: CPT

## 2023-03-02 PROCEDURE — 83036 HEMOGLOBIN GLYCOSYLATED A1C: CPT

## 2023-03-02 PROCEDURE — 83880 ASSAY OF NATRIURETIC PEPTIDE: CPT

## 2023-03-02 PROCEDURE — 74011250637 HC RX REV CODE- 250/637: Performed by: LICENSED PRACTICAL NURSE

## 2023-03-02 PROCEDURE — 85025 COMPLETE CBC W/AUTO DIFF WBC: CPT

## 2023-03-02 PROCEDURE — 74011000250 HC RX REV CODE- 250: Performed by: EMERGENCY MEDICINE

## 2023-03-02 PROCEDURE — 96375 TX/PRO/DX INJ NEW DRUG ADDON: CPT

## 2023-03-02 PROCEDURE — 74011000258 HC RX REV CODE- 258: Performed by: INTERNAL MEDICINE

## 2023-03-02 PROCEDURE — 99285 EMERGENCY DEPT VISIT HI MDM: CPT

## 2023-03-02 PROCEDURE — 80053 COMPREHEN METABOLIC PANEL: CPT

## 2023-03-02 PROCEDURE — 87186 SC STD MICRODIL/AGAR DIL: CPT

## 2023-03-02 PROCEDURE — 36415 COLL VENOUS BLD VENIPUNCTURE: CPT

## 2023-03-02 PROCEDURE — 84484 ASSAY OF TROPONIN QUANT: CPT

## 2023-03-02 PROCEDURE — 74011250636 HC RX REV CODE- 250/636: Performed by: LICENSED PRACTICAL NURSE

## 2023-03-02 PROCEDURE — 71045 X-RAY EXAM CHEST 1 VIEW: CPT

## 2023-03-02 RX ORDER — LOSARTAN POTASSIUM 50 MG/1
100 TABLET ORAL DAILY
Status: DISCONTINUED | OUTPATIENT
Start: 2023-03-03 | End: 2023-03-05 | Stop reason: HOSPADM

## 2023-03-02 RX ORDER — SODIUM CHLORIDE 0.9 % (FLUSH) 0.9 %
5-40 SYRINGE (ML) INJECTION EVERY 8 HOURS
Status: DISCONTINUED | OUTPATIENT
Start: 2023-03-02 | End: 2023-03-05 | Stop reason: HOSPADM

## 2023-03-02 RX ORDER — HYDRALAZINE HYDROCHLORIDE 20 MG/ML
10 INJECTION INTRAMUSCULAR; INTRAVENOUS
Status: COMPLETED | OUTPATIENT
Start: 2023-03-02 | End: 2023-03-02

## 2023-03-02 RX ORDER — FUROSEMIDE 40 MG/1
40 TABLET ORAL DAILY
Status: DISCONTINUED | OUTPATIENT
Start: 2023-03-03 | End: 2023-03-05 | Stop reason: HOSPADM

## 2023-03-02 RX ORDER — SULFASALAZINE 500 MG/1
1000 TABLET ORAL 2 TIMES DAILY
COMMUNITY
End: 2023-03-05

## 2023-03-02 RX ORDER — ICOSAPENT ETHYL 1000 MG/1
2 CAPSULE ORAL 2 TIMES DAILY WITH MEALS
COMMUNITY
End: 2023-03-05

## 2023-03-02 RX ORDER — ONDANSETRON 2 MG/ML
4 INJECTION INTRAMUSCULAR; INTRAVENOUS
Status: DISCONTINUED | OUTPATIENT
Start: 2023-03-02 | End: 2023-03-05 | Stop reason: HOSPADM

## 2023-03-02 RX ORDER — MONTELUKAST SODIUM 10 MG/1
10 TABLET ORAL
Status: DISCONTINUED | OUTPATIENT
Start: 2023-03-02 | End: 2023-03-05 | Stop reason: HOSPADM

## 2023-03-02 RX ORDER — POTASSIUM CHLORIDE 750 MG/1
10 TABLET, FILM COATED, EXTENDED RELEASE ORAL DAILY
Status: DISCONTINUED | OUTPATIENT
Start: 2023-03-03 | End: 2023-03-05 | Stop reason: HOSPADM

## 2023-03-02 RX ORDER — MECLIZINE HCL 12.5 MG 12.5 MG/1
25 TABLET ORAL
Status: DISCONTINUED | OUTPATIENT
Start: 2023-03-02 | End: 2023-03-05 | Stop reason: HOSPADM

## 2023-03-02 RX ORDER — PANTOPRAZOLE SODIUM 40 MG/1
40 TABLET, DELAYED RELEASE ORAL
Status: DISCONTINUED | OUTPATIENT
Start: 2023-03-03 | End: 2023-03-05 | Stop reason: HOSPADM

## 2023-03-02 RX ORDER — NITROGLYCERIN 0.4 MG/1
0.4 TABLET SUBLINGUAL
Status: DISCONTINUED | OUTPATIENT
Start: 2023-03-02 | End: 2023-03-05 | Stop reason: HOSPADM

## 2023-03-02 RX ORDER — DIPHENHYDRAMINE HCL 25 MG
25 CAPSULE ORAL
Status: DISCONTINUED | OUTPATIENT
Start: 2023-03-02 | End: 2023-03-05 | Stop reason: HOSPADM

## 2023-03-02 RX ORDER — HYDRALAZINE HYDROCHLORIDE 20 MG/ML
10 INJECTION INTRAMUSCULAR; INTRAVENOUS
Status: DISCONTINUED | OUTPATIENT
Start: 2023-03-02 | End: 2023-03-02

## 2023-03-02 RX ORDER — ISOSORBIDE MONONITRATE 60 MG/1
30 TABLET, EXTENDED RELEASE ORAL DAILY
Status: DISCONTINUED | OUTPATIENT
Start: 2023-03-03 | End: 2023-03-05 | Stop reason: HOSPADM

## 2023-03-02 RX ORDER — PROMETHAZINE HYDROCHLORIDE 25 MG/1
25 TABLET ORAL
COMMUNITY
End: 2023-03-05

## 2023-03-02 RX ORDER — LEVOFLOXACIN 5 MG/ML
750 INJECTION, SOLUTION INTRAVENOUS EVERY 24 HOURS
Status: DISCONTINUED | OUTPATIENT
Start: 2023-03-02 | End: 2023-03-02

## 2023-03-02 RX ORDER — GABAPENTIN 300 MG/1
300 CAPSULE ORAL 2 TIMES DAILY
Status: DISCONTINUED | OUTPATIENT
Start: 2023-03-02 | End: 2023-03-05 | Stop reason: HOSPADM

## 2023-03-02 RX ORDER — CHOLECALCIFEROL (VITAMIN D3) 125 MCG
10 CAPSULE ORAL
Status: DISCONTINUED | OUTPATIENT
Start: 2023-03-02 | End: 2023-03-05 | Stop reason: HOSPADM

## 2023-03-02 RX ORDER — FLUCONAZOLE 100 MG/1
200 TABLET ORAL DAILY
Status: DISCONTINUED | OUTPATIENT
Start: 2023-03-02 | End: 2023-03-04

## 2023-03-02 RX ORDER — SOTALOL HYDROCHLORIDE 80 MG/1
80 TABLET ORAL 2 TIMES DAILY
COMMUNITY
End: 2023-03-05

## 2023-03-02 RX ORDER — LANOLIN ALCOHOL/MO/W.PET/CERES
1 CREAM (GRAM) TOPICAL
Status: DISCONTINUED | OUTPATIENT
Start: 2023-03-03 | End: 2023-03-05 | Stop reason: HOSPADM

## 2023-03-02 RX ORDER — HYDROCODONE BITARTRATE AND ACETAMINOPHEN 10; 325 MG/1; MG/1
1 TABLET ORAL
Status: DISCONTINUED | OUTPATIENT
Start: 2023-03-02 | End: 2023-03-05 | Stop reason: HOSPADM

## 2023-03-02 RX ORDER — ACETAMINOPHEN 325 MG/1
650 TABLET ORAL
Status: DISCONTINUED | OUTPATIENT
Start: 2023-03-02 | End: 2023-03-05 | Stop reason: HOSPADM

## 2023-03-02 RX ORDER — DONEPEZIL HYDROCHLORIDE 10 MG/1
10 TABLET, FILM COATED ORAL
COMMUNITY

## 2023-03-02 RX ORDER — INSULIN LISPRO 100 [IU]/ML
INJECTION, SOLUTION INTRAVENOUS; SUBCUTANEOUS
Status: DISCONTINUED | OUTPATIENT
Start: 2023-03-02 | End: 2023-03-05 | Stop reason: HOSPADM

## 2023-03-02 RX ORDER — HYDRALAZINE HYDROCHLORIDE 20 MG/ML
20 INJECTION INTRAMUSCULAR; INTRAVENOUS
Status: DISCONTINUED | OUTPATIENT
Start: 2023-03-02 | End: 2023-03-02

## 2023-03-02 RX ORDER — BUTALBITAL, ACETAMINOPHEN AND CAFFEINE 50; 325; 40 MG/1; MG/1; MG/1
1 TABLET ORAL
Status: DISCONTINUED | OUTPATIENT
Start: 2023-03-02 | End: 2023-03-05 | Stop reason: HOSPADM

## 2023-03-02 RX ORDER — BETHANECHOL CHLORIDE 50 MG/1
50 TABLET ORAL 3 TIMES DAILY
Status: DISCONTINUED | OUTPATIENT
Start: 2023-03-02 | End: 2023-03-02

## 2023-03-02 RX ORDER — SODIUM CHLORIDE 0.9 % (FLUSH) 0.9 %
5-40 SYRINGE (ML) INJECTION AS NEEDED
Status: DISCONTINUED | OUTPATIENT
Start: 2023-03-02 | End: 2023-03-05 | Stop reason: HOSPADM

## 2023-03-02 RX ORDER — DICYCLOMINE HYDROCHLORIDE 10 MG/1
10 CAPSULE ORAL 3 TIMES DAILY
COMMUNITY
End: 2023-03-05

## 2023-03-02 RX ORDER — METOCLOPRAMIDE HYDROCHLORIDE 5 MG/ML
10 INJECTION INTRAMUSCULAR; INTRAVENOUS
Status: COMPLETED | OUTPATIENT
Start: 2023-03-02 | End: 2023-03-02

## 2023-03-02 RX ORDER — IBUPROFEN 200 MG
4 TABLET ORAL AS NEEDED
Status: DISCONTINUED | OUTPATIENT
Start: 2023-03-02 | End: 2023-03-05 | Stop reason: HOSPADM

## 2023-03-02 RX ORDER — AMITRIPTYLINE HYDROCHLORIDE 10 MG/1
10 TABLET, FILM COATED ORAL
COMMUNITY
End: 2023-03-05

## 2023-03-02 RX ORDER — POLYETHYLENE GLYCOL 3350 17 G/17G
17 POWDER, FOR SOLUTION ORAL DAILY PRN
Status: DISCONTINUED | OUTPATIENT
Start: 2023-03-02 | End: 2023-03-03

## 2023-03-02 RX ORDER — HYDRALAZINE HYDROCHLORIDE 20 MG/ML
10 INJECTION INTRAMUSCULAR; INTRAVENOUS
Status: DISCONTINUED | OUTPATIENT
Start: 2023-03-02 | End: 2023-03-05 | Stop reason: HOSPADM

## 2023-03-02 RX ORDER — METFORMIN HYDROCHLORIDE 500 MG/1
500 TABLET ORAL 2 TIMES DAILY WITH MEALS
COMMUNITY
End: 2023-03-05

## 2023-03-02 RX ORDER — CYCLOBENZAPRINE HCL 10 MG
5 TABLET ORAL
COMMUNITY

## 2023-03-02 RX ORDER — BISACODYL 5 MG
5 TABLET, DELAYED RELEASE (ENTERIC COATED) ORAL DAILY PRN
Status: DISCONTINUED | OUTPATIENT
Start: 2023-03-02 | End: 2023-03-05 | Stop reason: HOSPADM

## 2023-03-02 RX ORDER — FOLIC ACID 1 MG/1
1 TABLET ORAL DAILY
Status: DISCONTINUED | OUTPATIENT
Start: 2023-03-03 | End: 2023-03-05 | Stop reason: HOSPADM

## 2023-03-02 RX ORDER — ACETAMINOPHEN 650 MG/1
650 SUPPOSITORY RECTAL
Status: DISCONTINUED | OUTPATIENT
Start: 2023-03-02 | End: 2023-03-05 | Stop reason: HOSPADM

## 2023-03-02 RX ORDER — ATORVASTATIN CALCIUM 40 MG/1
40 TABLET, FILM COATED ORAL
Status: DISCONTINUED | OUTPATIENT
Start: 2023-03-02 | End: 2023-03-05 | Stop reason: HOSPADM

## 2023-03-02 RX ORDER — CARVEDILOL 3.12 MG/1
6.25 TABLET ORAL 2 TIMES DAILY WITH MEALS
Status: DISCONTINUED | OUTPATIENT
Start: 2023-03-02 | End: 2023-03-03

## 2023-03-02 RX ORDER — ONDANSETRON 4 MG/1
4 TABLET, ORALLY DISINTEGRATING ORAL
Status: DISCONTINUED | OUTPATIENT
Start: 2023-03-02 | End: 2023-03-05 | Stop reason: HOSPADM

## 2023-03-02 RX ORDER — METHOTREXATE 2.5 MG/1
20 TABLET ORAL
Status: DISCONTINUED | OUTPATIENT
Start: 2023-03-07 | End: 2023-03-05 | Stop reason: HOSPADM

## 2023-03-02 RX ADMIN — METOCLOPRAMIDE HYDROCHLORIDE 10 MG: 5 INJECTION INTRAMUSCULAR; INTRAVENOUS at 09:28

## 2023-03-02 RX ADMIN — ATORVASTATIN CALCIUM 40 MG: 40 TABLET, FILM COATED ORAL at 22:59

## 2023-03-02 RX ADMIN — CEFTRIAXONE SODIUM 1 G: 1 INJECTION, POWDER, FOR SOLUTION INTRAMUSCULAR; INTRAVENOUS at 10:58

## 2023-03-02 RX ADMIN — MEROPENEM 1 G: 1 INJECTION, POWDER, FOR SOLUTION INTRAVENOUS at 23:11

## 2023-03-02 RX ADMIN — HYDRALAZINE HYDROCHLORIDE 10 MG: 20 INJECTION, SOLUTION INTRAMUSCULAR; INTRAVENOUS at 09:23

## 2023-03-02 RX ADMIN — SODIUM CHLORIDE, PRESERVATIVE FREE 1 G: 5 INJECTION INTRAVENOUS at 16:13

## 2023-03-02 RX ADMIN — SODIUM CHLORIDE, PRESERVATIVE FREE 10 ML: 5 INJECTION INTRAVENOUS at 14:00

## 2023-03-02 RX ADMIN — HYDROCODONE BITARTRATE AND ACETAMINOPHEN 1 TABLET: 10; 325 TABLET ORAL at 18:32

## 2023-03-02 RX ADMIN — APIXABAN 2.5 MG: 2.5 TABLET, FILM COATED ORAL at 22:59

## 2023-03-02 RX ADMIN — FLUCONAZOLE 200 MG: 100 TABLET ORAL at 20:03

## 2023-03-02 RX ADMIN — HYDRALAZINE HYDROCHLORIDE 10 MG: 20 INJECTION, SOLUTION INTRAMUSCULAR; INTRAVENOUS at 18:26

## 2023-03-02 RX ADMIN — SODIUM CHLORIDE, PRESERVATIVE FREE 10 ML: 5 INJECTION INTRAVENOUS at 23:00

## 2023-03-02 RX ADMIN — MONTELUKAST 10 MG: 10 TABLET, FILM COATED ORAL at 22:59

## 2023-03-02 RX ADMIN — LEVOFLOXACIN 750 MG: 5 INJECTION, SOLUTION INTRAVENOUS at 13:43

## 2023-03-02 RX ADMIN — DIPHENHYDRAMINE HYDROCHLORIDE 25 MG: 25 CAPSULE ORAL at 14:52

## 2023-03-02 RX ADMIN — GABAPENTIN 300 MG: 300 CAPSULE ORAL at 22:59

## 2023-03-02 RX ADMIN — SODIUM CHLORIDE 1000 ML: 9 INJECTION, SOLUTION INTRAVENOUS at 09:05

## 2023-03-02 RX ADMIN — APIXABAN 2.5 MG: 2.5 TABLET, FILM COATED ORAL at 13:42

## 2023-03-02 RX ADMIN — HYDROCODONE BITARTRATE AND ACETAMINOPHEN 1 TABLET: 10; 325 TABLET ORAL at 23:10

## 2023-03-02 NOTE — ED NOTES
called at this time to place perfect serve to [place], pt alerted this RN that her iv site was itching, hives and redness noticed and localized to iv site, iv antibiotic stopped, no airway compromise or shortness of breath, 02 at 99 percent, pt speaking in full sentences, lung sounds clear

## 2023-03-02 NOTE — PROGRESS NOTES
Admission Medication Reconciliation:    Information obtained from:  Patient and     Comments/Recommendations: Reviewed PTA medications and patient's allergies. Removed bethanechol 50 mg  Removed estrace vaginal cream  Removed mupirocin 2% oint  Removed ondansetron odt 8 mg  Removed ondansetron 4 mg    Pharmacy: [employer] ([facility] [place] [place] [facility])      Allergies:  Iodinated contrast media, Chicken derived, Darvon [propoxyphene], and Tetanus vaccines and toxoid    Significant PMH/Disease States:   Past Medical History:   Diagnosis Date    Arrhythmia     a. 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 Taking? HYDROcodone-acetaminophen (NORCO)  mg tablet  Self Yes Yes   Sig: Take 1 Tab by mouth every four (4) hours as needed. NITROSTAT 0.4 mg SL tablet  Self Yes Yes   Si.4 mg by SubLINGual route every five (5) minutes as needed. amitriptyline (ELAVIL) 10 mg tablet  Self Yes Yes   Sig: Take 10 mg by mouth nightly. apixaban (ELIQUIS) 2.5 mg tablet 3/1/2023 Self Yes Yes   Sig: Take 2.5 mg by mouth two (2) times a day. atorvastatin (LIPITOR) 40 mg tablet 3/1/2023 Self Yes Yes   Sig: Take 40 mg by mouth nightly. butalbital-acetaminophen-caffeine (FIORICET, ESGIC) -40 mg per tablet  Self Yes Yes   Sig: Take 1 Tab by mouth every four (4) hours as needed. carvediloL (COREG) 6.25 mg tablet 3/1/2023 Self Yes Yes   Sig: Take 6.25 mg by mouth two (2) times a day. cyclobenzaprine (FLEXERIL) 10 mg tablet  Self Yes Yes   Sig: Take 5 mg by mouth three (3) times daily as needed for Muscle Spasm(s). dapagliflozin (FARXIGA) 10 mg tab tablet  Self Yes Yes   Sig: Take 10 mg by mouth daily. diclofenac (VOLTAREN) 1 % gel  Self Yes Yes   Sig: Apply 4 g to affected area as needed. dicyclomine (BENTYL) 10 mg capsule  Self Yes Yes   Sig: Take 10 mg by mouth three (3) times daily. donepeziL (ARICEPT) 10 mg tablet  Self Yes Yes   Sig: Take 10 mg by mouth nightly. ferrous sulfate 325 mg (65 mg iron) tablet 3/1/2023 Self Yes Yes   Sig: Take 325 mg by mouth daily. folic acid (FOLVITE) 1 mg tablet  Self Yes Yes   Sig: Take 1 mg by mouth daily. furosemide (LASIX) 40 mg tablet  Self Yes Yes   Sig: Take 40 mg by mouth daily. gabapentin (NEURONTIN) 300 mg capsule  Self Yes Yes   Sig: Take 300 mg by mouth two (2) times a day. glimepiride (AMARYL) 4 mg tablet  Self Yes Yes   Sig: Take 4 mg by mouth every morning. icosapent ethyL (VASCEPA) 1 gram capsule  Self Yes Yes   Sig: Take 2 Capsules by mouth two (2) times daily (with meals). isosorbide mononitrate ER (IMDUR) 30 mg tablet 3/1/2023 Self Yes Yes   Sig: Take 30 mg by mouth daily. losartan (COZAAR) 100 mg tablet 3/1/2023 Self Yes Yes   Sig: take 1 tablet by mouth once daily   meclizine (ANTIVERT) 25 mg tablet 3/1/2023 Self Yes Yes   Sig: meclizine 25 mg tablet   melatonin 10 mg capsule 3/1/2023 Self Yes Yes   Sig: melatonin 10 mg capsule   Take by oral route. metFORMIN (GLUCOPHAGE) 500 mg tablet  Self Yes Yes   Sig: Take 500 mg by mouth two (2) times daily (with meals). methotrexate (RHEUMATREX) 2.5 mg tablet 3/1/2023 Self Yes Yes   Sig: Take 20 mg by mouth every Tuesday. montelukast (SINGULAIR) 10 mg tablet  Self Yes Yes   Sig: Take 10 mg by mouth daily. pantoprazole (PROTONIX) 40 mg tablet 3/1/2023 Self Yes Yes   Sig: Take 40 mg by mouth.   polyethylene glycol (MIRALAX) 17 gram/dose powder 3/1/2023 Self Yes Yes   Sig: Take 17 g by mouth daily as needed.    potassium chloride SA (MICRO-K) 10 mEq capsule 3/1/2023 Self Yes Yes   Sig: Take 10 mEq by mouth daily. pravastatin (PRAVACHOL) 40 mg tablet 3/1/2023 Self Yes Yes   Sig: pravastatin 40 mg tablet   promethazine (PHENERGAN) 25 mg tablet  Self Yes Yes   Sig: Take 25 mg by mouth every six (6) hours as needed for Nausea. sotaloL (BETAPACE) 80 mg tablet  Self Yes Yes   Sig: Take 80 mg by mouth two (2) times a day. sulfaSALAzine (AZULFIDINE) 500 mg tablet  Self Yes Yes   Sig: Take 1,000 mg by mouth two (2) times a day.       Facility-Administered Medications: None       Malka Villarreal

## 2023-03-02 NOTE — PROGRESS NOTES
Admission Medication Reconciliation:    Information obtained from:  Patient and     Comments/Recommendations: Reviewed PTA medications and patient's allergies. Removed bethanechol 50 mg  Removed carvedilol 6.25 mg  Removed estrace vaginal cream  Removed mupirocin 2% oint  Removed ondansetron odt 8 mg  Removed ondansetron 4 mg    Pharmacy: [employer] ([facility] [place] [place] [facility])      Allergies:  Iodinated contrast media, Chicken derived, Darvon [propoxyphene], and Tetanus vaccines and toxoid    Significant PMH/Disease States:   Past Medical History:   Diagnosis Date    Arrhythmia     a. 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 Taking? HYDROcodone-acetaminophen (NORCO)  mg tablet  Self Yes Yes   Sig: Take 1 Tab by mouth every four (4) hours as needed. NITROSTAT 0.4 mg SL tablet  Self Yes Yes   Si.4 mg by SubLINGual route every five (5) minutes as needed. amitriptyline (ELAVIL) 10 mg tablet  Self Yes Yes   Sig: Take 10 mg by mouth nightly. apixaban (ELIQUIS) 2.5 mg tablet 3/1/2023 Self Yes Yes   Sig: Take 2.5 mg by mouth two (2) times a day. atorvastatin (LIPITOR) 40 mg tablet 3/1/2023 Self Yes Yes   Sig: Take 40 mg by mouth nightly. butalbital-acetaminophen-caffeine (FIORICET, ESGIC) -40 mg per tablet  Self Yes Yes   Sig: Take 1 Tab by mouth every four (4) hours as needed. carvediloL (COREG) 6.25 mg tablet 3/1/2023 Self Yes Yes   Sig: Take 6.25 mg by mouth two (2) times a day.    cyclobenzaprine (FLEXERIL) 10 mg tablet  Self Yes Yes   Sig: Take 5 mg by mouth three (3) times daily as needed for Muscle Spasm(s). dapagliflozin (FARXIGA) 10 mg tab tablet  Self Yes Yes   Sig: Take 10 mg by mouth daily. diclofenac (VOLTAREN) 1 % gel  Self Yes Yes   Sig: Apply 4 g to affected area as needed. dicyclomine (BENTYL) 10 mg capsule  Self Yes Yes   Sig: Take 10 mg by mouth three (3) times daily. donepeziL (ARICEPT) 10 mg tablet  Self Yes Yes   Sig: Take 10 mg by mouth nightly. ferrous sulfate 325 mg (65 mg iron) tablet 3/1/2023 Self Yes Yes   Sig: Take 325 mg by mouth daily. folic acid (FOLVITE) 1 mg tablet  Self Yes Yes   Sig: Take 1 mg by mouth daily. furosemide (LASIX) 40 mg tablet  Self Yes Yes   Sig: Take 40 mg by mouth daily. gabapentin (NEURONTIN) 300 mg capsule  Self Yes Yes   Sig: Take 300 mg by mouth two (2) times a day. glimepiride (AMARYL) 4 mg tablet  Self Yes Yes   Sig: Take 4 mg by mouth every morning. icosapent ethyL (VASCEPA) 1 gram capsule  Self Yes Yes   Sig: Take 2 Capsules by mouth two (2) times daily (with meals). isosorbide mononitrate ER (IMDUR) 30 mg tablet 3/1/2023 Self Yes Yes   Sig: Take 30 mg by mouth daily. losartan (COZAAR) 100 mg tablet 3/1/2023 Self Yes Yes   Sig: take 1 tablet by mouth once daily   meclizine (ANTIVERT) 25 mg tablet 3/1/2023 Self Yes Yes   Sig: meclizine 25 mg tablet   melatonin 10 mg capsule 3/1/2023 Self Yes Yes   Sig: melatonin 10 mg capsule   Take by oral route. metFORMIN (GLUCOPHAGE) 500 mg tablet  Self Yes Yes   Sig: Take 500 mg by mouth two (2) times daily (with meals). methotrexate (RHEUMATREX) 2.5 mg tablet 3/1/2023 Self Yes Yes   Sig: Take 20 mg by mouth every Tuesday. montelukast (SINGULAIR) 10 mg tablet  Self Yes Yes   Sig: Take 10 mg by mouth daily. pantoprazole (PROTONIX) 40 mg tablet 3/1/2023 Self Yes Yes   Sig: Take 40 mg by mouth.   polyethylene glycol (MIRALAX) 17 gram/dose powder 3/1/2023 Self Yes Yes   Sig: Take 17 g by mouth daily as needed.    potassium chloride SA (MICRO-K) 10 mEq capsule 3/1/2023 Self Yes Yes   Sig: Take 10 mEq by mouth daily. pravastatin (PRAVACHOL) 40 mg tablet 3/1/2023 Self Yes Yes   Sig: pravastatin 40 mg tablet   promethazine (PHENERGAN) 25 mg tablet  Self Yes Yes   Sig: Take 25 mg by mouth every six (6) hours as needed for Nausea. sotaloL (BETAPACE) 80 mg tablet  Self Yes Yes   Sig: Take 80 mg by mouth two (2) times a day. sulfaSALAzine (AZULFIDINE) 500 mg tablet  Self Yes Yes   Sig: Take 1,000 mg by mouth two (2) times a day.       Facility-Administered Medications: None       Malka Villarreal

## 2023-03-02 NOTE — H&P
History and Physical        Patient: Lily Maldonado MRN: [id number]  SSN: [id number]    YOB: 1935  Age: 82 y.o. Sex: female      Subjective:      Lily Maldonado is a 82 y.o. female with a past medical history significant for A-fib, MI, stroke, rheumatoid arthritis, diabetes, that presents to the emergency department because she \"just feels bad. She states that over the past 2 days she has developed some chest discomfort, generalized body aches, nausea, vomiting, fatigue. She states that her chest discomfort is 4 out of 10 in severity localized to her left side and is intermittent in nature. In addition she reports some suprapubic pelvic pain, left-sided abdominal pain, back pain. She denies chills, urinary symptoms, changes in vision, palpitations, fever. Labs in the ED are unremarkable. Troponin and BNP are within normal limits. Urinalysis is positive for nitrites, LE, 4+ bacteria and budding yeast.  Chest x-ray reveals shallow lung volumes but clear lungs with cardiomegaly. Patient admitted for management of complicated UTI. *Spoke with Pharmacy regarding adjustments to medications Patient takes 40 mg Atorvastatin; 20mg Methotrexate; she doesn't take Bethanecol anymore; We will hold Coreg.  Patient's corrected Qtc is 420; will continue Fluconazole*    Past Medical History:   Diagnosis Date    Arrhythmia     a. 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     Lung abnormality     SOB (shortness of breath)     on exertion    Stroke ([address] 12/2017     Past Surgical History:   Procedure Laterality Date    COLONOSCOPY Left 9/12/2018    COLONOSCOPY performed by Chanda Davis MD at [facility] ENDOSCOPY    HX BACK SURGERY      eight surgeries    HX CATARACT REMOVAL      left eye    HX HERNIA REPAIR      HX OTHER SURGICAL      rt middle lobe removed d/t necrosis    HX PARTIAL HYSTERECTOMY      HX REFRACTIVE SURGERY      IN CHOLECYSTECTOMY      IN COLECTOMY PRTL ABDOMINAL & TRANSANAL APPROACH      pt unsure why      Family History   Problem Relation Age of Onset    Diabetes Mother     Heart Disease Father     Stroke Father     Diabetes Sister     Cancer Brother     Diabetes Maternal Aunt     Heart Disease Maternal Aunt     Cancer Maternal Grandmother      Social History     Tobacco Use    Smoking status: Former     Packs/day: 1.00     Years: 40.00     Pack years: 40.00     Types: Cigarettes     Quit date: 2011     Years since quittin.0    Smokeless tobacco: Never   Substance Use Topics    Alcohol use: Yes     Comment: occassionally alcohol      Prior to Admission medications    Medication Sig Start Date End Date Taking? Authorizing Provider   glimepiride (AMARYL) 4 mg tablet Take 4 mg by mouth every morning. Yes Other, MD Baudilio   meclizine (ANTIVERT) 25 mg tablet meclizine 25 mg tablet   Yes Other, MD Baudilio   pantoprazole (PROTONIX) 40 mg tablet pantoprazole 40 mg tablet,delayed release   Yes Other, MD Baudilio   furosemide (LASIX) 40 mg tablet furosemide 40 mg tablet   Yes Other, MD Baudilio   apixaban (ELIQUIS) 2.5 mg tablet Eliquis 2.5 mg tablet   take 1 tablet by mouth twice a day   Yes Other, MD Baudilio   pravastatin (PRAVACHOL) 40 mg tablet pravastatin 40 mg tablet   Yes Other, MD Baudilio   methotrexate (RHEUMATREX) 2.5 mg tablet  20  Yes Other, MD Baudilio   melatonin 10 mg capsule melatonin 10 mg capsule   Take by oral route.    Yes Other, MD Baudilio   losartan (COZAAR) 100 mg tablet take 1 tablet by mouth once daily 10/18/20  Yes Other, MD Baudilio   isosorbide mononitrate ER (IMDUR) 30 mg tablet take 1 tablet by mouth once daily 20  Yes Other, MD Baudilio   carvediloL (COREG) 6.25 mg tablet carvedilol 6.25 mg tablet   Yes Other, MD Baudilio   atorvastatin (LIPITOR) 40 mg tablet take 2 tablets by mouth once daily 10/9/20  Yes Other, MD Baudilio   Aklief 0.005 % crea apply to affected area once daily 11/16/20  Yes Other, MD Baudilio   omega-3 acid ethyl esters (LOVAZA) 1 gram capsule TAKE 1 CAPSULE TWICE DAILY 4/21/19  Yes Faisal Hamilton MD   butalbital-acetaminophen-caffeine (FIORICET, ESGIC) -40 mg per tablet Take 1 Tab by mouth every four (4) hours as needed. 1/5/17  Yes Provider, Historical   ferrous sulfate 325 mg (65 mg iron) tablet take 1 tablet by mouth one time a day 12/22/16  Yes Provider, Historical   glucose blood VI test strips (TRUE METRIX GLUCOSE TEST STRIP) strip Test 2 times daily Dx Code E11.65 8/1/16  Yes Faisal Hamilton MD   Lancets misc Test 2 times daily Dx Code E11.65 (True Metrix Meter) 8/1/16  Yes Faisal Hamilton MD   Insulin Needles, Disposable, (YULIYA PEN NEEDLE) 32 x 5/32 \" ndle Use twice daily. Dx code 250.00 8/19/15  Yes Faisal Hamilton MD   gabapentin (NEURONTIN) 300 mg capsule Take 300 mg by mouth nightly. 4/4/11  Yes Provider, Historical   NITROSTAT 0.4 mg SL tablet by SubLINGual route every five (5) minutes as needed. 3/8/11  Yes Provider, Historical   polyethylene glycol (MIRALAX) 17 gram/dose powder Take 17 g by mouth daily. Yes Provider, Historical   potassium chloride SA (MICRO-K) 10 mEq capsule Take 10 mEq by mouth daily. 2/8/11  Yes Provider, Historical   ondansetron hcl (Zofran) 4 mg tablet Take 1 Tablet by mouth every eight (8) hours as needed for Nausea or PRN Reason (Other) (Nausea or dizziness). 1/30/22   Cholo Love MD   HYDROcodone-acetaminophen [facility])  mg tablet Take 1 Tab by mouth every four (4) hours as needed.  10/17/19   Binh, MD Baudilio   montelukast (SINGULAIR) 10 mg tablet montelukast 10 mg tablet    Other, MD Baudilio   ondansetron (ZOFRAN ODT) 8 mg disintegrating tablet Place 1 tablet (8 mg) on the tongue and allow to dissolve every 6 hours as needed 11/25/20   Binh, MD Baudilio   mupirocin (BACTROBAN) 2 % ointment apply to affected area three times a day 10/8/20   Other, MD Baudilio   bethanechol chloride (URECHOLINE) 50 mg tablet bethanechol chloride 50 mg tablet    Other, MD Baudilio   diclofenac (VOLTAREN) 1 % gel as needed. 6/28/17   Provider, Historical   ESTRACE 0.01 % (0.1 mg/gram) vaginal cream Insert  into vagina two (2) times a day. 12/21/16   Provider, Historical   folic acid (FOLVITE) 1 mg tablet Take  by mouth daily. 12/20/16   Provider, Historical        Allergies   Allergen Reactions    Iodinated Contrast Media Other (comments)     Iv dye causes heart to stop    Chicken Derived Other (comments)     Closes throat    Darvon [Propoxyphene] Rash    Tetanus Vaccines And Toxoid Other (comments)     Blisters all over body       Review of Systems   Constitutional:  Positive for malaise/fatigue. HENT: Negative. Eyes: Negative. Cardiovascular:  Positive for chest pain (4 out of 10). Negative for palpitations and leg swelling. Gastrointestinal:  Positive for abdominal pain (localized to left side). Genitourinary:  Positive for dysuria and flank pain (left side). Negative for hematuria. History of self-catheterization   Musculoskeletal:  Positive for joint pain (Right wrist.  Following orthopedics outpatient). Negative for myalgias. Skin: Negative. Neurological:  Negative for dizziness and headaches. Psychiatric/Behavioral:  Negative for depression. Objective:     Vitals:    03/02/23 0831 03/02/23 0841 03/02/23 0928 03/02/23 1000   BP:  (!) 196/88 (!) 194/76 136/65   Pulse:  66 66 93   Resp:  17 20 11   Temp:  97.6 °F (36.4 °C)     SpO2: 99% 97% 100% 95%   Weight:       Height:            Physical Exam  Vitals and nursing note reviewed. Constitutional:       General: She is not in acute distress. Appearance: Normal appearance. She is ill-appearing. HENT:      Head: Normocephalic and atraumatic. Right Ear: External ear normal.      Left Ear: External ear normal.      Nose: Nose normal.   Eyes:      Extraocular Movements: Extraocular movements intact.       Conjunctiva/sclera: Conjunctivae normal. Cardiovascular:      Rate and Rhythm: Normal rate. Heart sounds: Murmur heard. Pulmonary:      Effort: Pulmonary effort is normal. No respiratory distress. Breath sounds: No wheezing or rhonchi. Chest:      Chest wall: Tenderness (Left middle ribcage) present. Abdominal:      General: Abdomen is flat. There is no distension. Palpations: Abdomen is soft. Tenderness: There is abdominal tenderness (Localized to left side). There is left CVA tenderness. Musculoskeletal:      Right lower leg: No edema. Left lower leg: No edema. Comments: Right wrist is in a guard   Skin:     General: Skin is warm. Neurological:      Mental Status: She is alert. Mental status is at baseline.    Psychiatric:         Mood and Affect: Mood normal.         Behavior: Behavior normal.         Recent Results (from the past 24 hour(s))   EKG, 12 LEAD, INITIAL    Collection Time: 03/02/23  8:21 AM   Result Value Ref Range    Ventricular Rate 62 BPM    Atrial Rate 62 BPM    P-R Interval 154 ms    QRS Duration 120 ms    Q-T Interval 480 ms    QTC Calculation (Bezet) 487 ms    Calculated P Axis -94 degrees    Calculated R Axis -52 degrees    Calculated T Axis 58 degrees    Diagnosis       Unusual P axis, possible ectopic atrial rhythm with occasional Premature   ventricular complexes  Pulmonary disease pattern  Left anterior fascicular block  Nonspecific ST and T wave abnormality  Abnormal ECG    Confirmed by [facility] DMITRIY ([id number]) on 3/2/2023 6:94:08 AM     METABOLIC PANEL, COMPREHENSIVE    Collection Time: 03/02/23  8:45 AM   Result Value Ref Range    Sodium 137 136 - 145 mmol/L    Potassium 4.5 3.5 - 5.1 mmol/L    Chloride 106 97 - 108 mmol/L    CO2 28 21 - 32 mmol/L    Anion gap 3 (L) 5 - 15 mmol/L    Glucose 119 (H) 65 - 100 mg/dL    BUN 8 6 - 20 mg/dL    Creatinine 0.57 0.55 - 1.02 mg/dL    BUN/Creatinine ratio 14 12 - 20      eGFR >60 >60 ml/min/1.73m2    Calcium 9.1 8.5 - 10.1 mg/dL    Bilirubin, total 0.4 0.2 - 1.0 mg/dL    AST (SGOT) 36 15 - 37 U/L    ALT (SGPT) 27 12 - 78 U/L    Alk. phosphatase 103 45 - 117 U/L    Protein, total 7.2 6.4 - 8.2 g/dL    Albumin 3.5 3.5 - 5.0 g/dL    Globulin 3.7 2.0 - 4.0 g/dL    A-G Ratio 0.9 (L) 1.1 - 2.2     TROPONIN-HIGH SENSITIVITY    Collection Time: 03/02/23  8:45 AM   Result Value Ref Range    Troponin-High Sensitivity 18 0 - 51 ng/L   PROTHROMBIN TIME + INR    Collection Time: 03/02/23  8:45 AM   Result Value Ref Range    Prothrombin time 12.4 11.9 - 14.6 sec    INR 0.9 0.9 - 1.1     NT-PRO BNP    Collection Time: 03/02/23  8:45 AM   Result Value Ref Range    NT pro- <450 pg/mL   CBC WITH AUTOMATED DIFF    Collection Time: 03/02/23  8:45 AM   Result Value Ref Range    WBC 8.2 3.6 - 11.0 K/uL    RBC 4.40 3.80 - 5.20 M/uL    HGB 12.6 11.5 - 16.0 g/dL    HCT 40.3 35.0 - 47.0 %    MCV 91.6 80.0 - 99.0 FL    MCH 28.6 26.0 - 34.0 PG    MCHC 31.3 30.0 - 36.5 g/dL    RDW 16.7 (H) 11.5 - 14.5 %    PLATELET 035 273 - 214 K/uL    MPV 11.5 8.9 - 12.9 FL    NRBC 0.0 0.0  WBC    ABSOLUTE NRBC 0.00 0.00 - 0.01 K/uL    NEUTROPHILS 67 32 - 75 %    LYMPHOCYTES 17 12 - 49 %    MONOCYTES 11 5 - 13 %    EOSINOPHILS 3 0 - 7 %    BASOPHILS 1 0 - 1 %    IMMATURE GRANULOCYTES 1 (H) 0 - 0.5 %    ABS. NEUTROPHILS 5.6 1.8 - 8.0 K/UL    ABS. LYMPHOCYTES 1.4 0.8 - 3.5 K/UL    ABS. MONOCYTES 0.9 0.0 - 1.0 K/UL    ABS. EOSINOPHILS 0.2 0.0 - 0.4 K/UL    ABS. BASOPHILS 0.1 0.0 - 0.1 K/UL    ABS. IMM.  GRANS. 0.1 (H) 0.00 - 0.04 K/UL    DF AUTOMATED     URINALYSIS W/ RFLX MICROSCOPIC    Collection Time: 03/02/23 10:23 AM   Result Value Ref Range    Color Donna      Appearance Turbid (A) Clear      Specific gravity 1.022 1.003 - 1.030      pH (UA) 6.0 5.0 - 8.0      Protein 30 (A) Negative mg/dL    Glucose >300 (A) Negative mg/dL    Ketone Negative Negative mg/dL    Bilirubin Negative Negative      Blood Negative Negative      Urobilinogen 0.1 0.1 - 1.0 EU/dL    Nitrites Positive (A) Negative      Leukocyte Esterase Large (A) Negative      WBC >100 (H) 0 - 4 /hpf    RBC 10-20 0 - 5 /hpf    Bacteria 4+ (A) Negative /hpf    PRESUMPTIVE YEAST Present     URINE MICROSCOPIC    Collection Time: 03/02/23 10:23 AM   Result Value Ref Range    WBC >100 (H) 0 - 4 /hpf    RBC 10-20 0 - 5 /hpf    Epithelial cells Few Few /lpf    Bacteria 4+ (A) Negative /hpf    Mucus Negative Negative /lpf    Budding yeast Present (A) Negative         XR Results (most recent):  Results from Hospital Encounter encounter on 03/02/23    XR CHEST PORT    Narrative  PORTABLE CHEST RADIOGRAPH/S: 3/2/2023 8:50 AM    INDICATION: Chest pain. COMPARISON: None. TECHNIQUE: Portable frontal upright radiograph/s of the chest.    FINDINGS:  The heart is enlarged, even given portable technique. Post aortic valve  replacement, right humeral ORIF, left reverse shoulder replacement, and  partially imaged lumbar fusion. Surgical clips overlie the inferior right hilum. The lungs are slightly hypoinflated, but clear. The central airways are patent. No pneumothorax and no large pleural effusion. Impression  Shallow volumes, clear lungs. Cardiomegaly. CT Results (most recent):  Results from Hospital Encounter encounter on 01/30/22    CT HEAD WO CONT    Narrative  CT head without contrast:    No comparison. Dose reduction: All CT scans at this facility are performed using dose reduction  optimization techniques as appropriate to a performed exam including the  following: Automated exposure control, adjustments of the mA and/or kV according  to patient size, or use of iterative reconstruction technique. Noncontrast axial images were performed with coronal and sagittal  reconstructions. There is age-appropriate cerebral atrophy with chronic small vessel ischemic the  periventricular white matter. There are left basal ganglia lacunar infarctions. There is no acute intracranial hemorrhage, midline shift or other mass.     The calvarium is intact. There is incompletely assessed arthrosis in the C1/C2  articulation. There is chronic ethmoid sinusitis. The mastoid air cells are clear. Impression  Chronic findings as above. MRI Results (most recent):  No results found for this or any previous visit. Active Problems:    UTI (urinary tract infection) (3/2/2023)      Pyelonephritis (3/2/2023)      History of stroke (3/2/2023)      History of atrial fibrillation (3/2/2023)        Assessment/Plan:     #Abnormal UA, possible complicated UTI/Pyelonephritis, urinary retention  --Patient has a history of self-catheterization for urinary retention  --UA reveals positive nitrites, LE, 4+ bacteria, budding yeast  --Previous urine culture revealed E. Coli MDRO. Started patient on levofloxacin. Discontinued Levofloxacin secondary to allergic reaction. Started patient on Meropenem. Will add PO fluconazole for presumptive yeast on UA. Fluconazole will be needed for 14 days. (Day 1/14)  --Oral benadryl for allergic reaction  --ID consulted    #Chest discomfort; costochondritis  --Pain is reproducible by touch to left middle rib cage. --Troponin and BNP [facility]  -- EKG interpreted by Dr. Seth Murillo reveals left ventricular fascicular block, pulmonary disease pattern with occasional PVCs  --CXR reveals shallow volumes, clear lungs with cardiomegaly. -- If cardiology is needed patient follows with [address] [place] [address] cardiology; Dr. Emmanuel Jonas    #HTN, HLD, Hx of Stroke, Hx of MI  -- We will continue home medications (atorvastatin, , furosemide, Imdur, Cozaar, Eliquis)  --Hold Coreg  --IV Hydralazine 10 q4h as needed    #Rheumatoid arthritis  - We will continue methotrexate. Patient takes methotrexate every Tuesday  - Continue folic acid daily    History of diabetes  - Patient normally takes glimepiride at home we will hold initiate normal sensitivity sliding scale and continue to monitor as needed.       DVT Prophylaxis:Eliquis; on for Stroke and Fib   Code Status:Full  POA/NOK: Cephus Or;     This care involved high complexity medical decision making. I personally:  Reviewed the flow sheet and previous days notes  Reviewed and summarized records/history from previous days note or discussions with staff and family  Reviewed High Risk Drug therapy requiring intensive monitoring for toxicity to include but is not limited to steroids, pressors and antibiotics  Reviewed and/or clinical laboratory tests  Reviewed images and/or ordered radiology tests  Reviewed the patient's EKG/telemetry  Discussed my assessment/management with: Nursing, Patient, Family, and Hospitalist colleagues for coordination of care.      Total Time spent in direct and indirect care including assessment review of labs and coordination of services and consultations:  75 minutes      Signed By: CHRISTIAN Dailey     March 2, 2023

## 2023-03-02 NOTE — ED NOTES
TRANSFER - OUT REPORT:    Verbal report given to Emily Meier (name) on Michael López  being transferred to (unit) for routine progression of care       Report consisted of patients Situation, Background, Assessment and   Recommendations(SBAR). Information from the following report(s) SBAR, ED Summary, MAR, and Cardiac Rhythm NSR  was reviewed with the receiving nurse. Lines:   Peripheral IV 03/02/23 Anterior;Distal;Left Forearm (Active)        Opportunity for questions and clarification was provided.       Patient transported with:   Monitor

## 2023-03-02 NOTE — CONSULTS
Consult Date: 3/2/2023    Consults  UTI, possible pyelonephritis, history of self catheterization for urinary retention, urine culture pending    Subjective   This is an 82year old female with severe malaise and nausea along with chest pain. She was afebrile with normal WBC. Urine was turbid with marked pyuria, bacteria and budding yeasts, in addition to positive Nitrites. CXR showed clear lungs. Images viewed by me. Urine culture was sent and patient was started on Ceftriaxone and Levaquin along with Fluconazole. ID has been consulted for this reason. Patient lying in bed. She affirms feeling \"very sick\" but no urinary symptoms or flank pain. She reportedly has had multiple UTIs, most diagnosed  at [facility], but she was unable to say why they were following her.  at the bedside.     Past Medical History:   Diagnosis Date    Arrhythmia     a. 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     Lung abnormality     SOB (shortness of breath)     on exertion    Stroke ([address] 12/2017      Past Surgical History:   Procedure Laterality Date    COLONOSCOPY Left 9/12/2018    COLONOSCOPY performed by Wai Moncada MD at [facility] ENDOSCOPY    HX BACK SURGERY      eight surgeries    HX CATARACT REMOVAL      left eye    HX HERNIA REPAIR      HX OTHER SURGICAL      rt middle lobe removed d/t necrosis    HX PARTIAL HYSTERECTOMY      HX REFRACTIVE SURGERY      NJ CHOLECYSTECTOMY      NJ COLECTOMY PRTL ABDOMINAL & TRANSANAL APPROACH      pt unsure why     Family History   Problem Relation Age of Onset    Diabetes Mother     Heart Disease Father     Stroke Father     Diabetes Sister     Cancer Brother     Diabetes Maternal Aunt     Heart Disease Maternal Aunt     Cancer Maternal Grandmother       Social History     Tobacco Use    Smoking status: Former     Packs/day: 1.00     Years: 40.00     Pack years: 40.00     Types: Cigarettes     Quit date: 2011     Years since quittin.0    Smokeless tobacco: Never   Substance Use Topics    Alcohol use: Yes     Comment: occassionally alcohol       Current Facility-Administered Medications   Medication Dose Route Frequency Provider Last Rate Last Admin    apixaban (ELIQUIS) tablet 2.5 mg  2.5 mg Oral BID Winston Horowitz PA   2.5 mg at 23 1342    [START ON 3/3/2023] atorvastatin (LIPITOR) tablet 80 mg  80 mg Oral DAILY Winston Horowitz PA        bethanechol chloride (URECHOLINE) tablet 50 mg  50 mg Oral TID Winston Horowitz PA        butalbital-acetaminophen-caffeine Ittoqqortoormiit, ESGIC) -40 mg per tablet 1 Tablet  1 Tablet Oral Q4H PRN Winston Florian PA        carvediloL (COREG) tablet 6.25 mg  6.25 mg Oral BID WITH MEALS [place], [place]        [START ON 3/3/2023] ferrous sulfate tablet 325 mg  1 Tablet Oral DAILY WITH [place], [place]        [START ON 3/3/2023] furosemide (LASIX) tablet 40 mg  40 mg Oral DAILY [place], [place]        [START ON 2948] folic acid (FOLVITE) tablet 1 mg  1 mg Oral DAILY Winston Horowitz PA        gabapentin (NEURONTIN) capsule 300 mg  300 mg Oral QHS Winston Horowitz PA        HYDROcodone-acetaminophen (NORCO)  mg tablet 1 Tablet  1 Tablet Oral Q4H PRN CHRISTIAN Gloria        [START ON 3/3/2023] isosorbide mononitrate ER (IMDUR) tablet 30 mg  30 mg [facility]ILY [facility] [place] [place]        Mary Devin ON 3/3/2023] losartan (COZAAR) tablet 100 mg  100 mg Oral DAILY Winston Horowitz PA        meclizine (ANTIVERT) tablet 25 mg  25 mg Oral Q6H PRN Winston Horowitz PA        melatonin tablet 10 mg  10 mg Oral QHS PRN Winston Horowitz PA        montelukast (SINGULAIR) tablet 10 mg  10 mg Oral QHS Winston Horowitz PA        nitroglycerin (NITROSTAT) tablet 0.4 mg  0.4 mg SubLINGual Q5MIN PRN Winston Florian PA        [START ON 3/3/2023] pantoprazole (PROTONIX) tablet 40 mg  40 mg Oral ACB CHRISTIAN Perera        polyethylene glycol [facility]) packet 17 g  17 g Oral DAILY PRN Korinajose Olvera [place]        [START ON 3/3/2023] potassium chloride SR (KLOR-CON 10) tablet 10 mEq  10 mEq Oral DAILY Winston Franks PA        sodium chloride (NS) flush 5-40 mL  5-40 mL IntraVENous Q8H Winston Horowitz PA        sodium chloride (NS) flush 5-40 mL  5-40 mL IntraVENous PRN Winston Franks PA        acetaminophen (TYLENOL) tablet 650 mg  650 mg Oral Q6H PRN Winston Franks PA        Or    acetaminophen (TYLENOL) suppository 650 mg  650 mg Rectal Q6H PRN Winston Franks PA        ondansetron (ZOFRAN ODT) tablet 4 mg  4 mg Oral Q6H PRN Winston Franks PA        Or    ondansetron [facility]) injection 4 mg  4 mg IntraVENous Q6H PRN Winston Horowitz PA        bisacodyL (DULCOLAX) tablet 5 mg  5 mg Oral DAILY PRN Winston Franks PA        glucose chewable tablet 16 g  4 Tablet Oral PRN Winston Franks PA        glucagon (GLUCAGEN) injection 1 mg  1 mg IntraMUSCular PRN Winston Horowitz PA        insulin lispro (HUMALOG) injection   SubCUTAneous AC&HS Kameron [place]ward [place], [place]        [START ON 3/7/2023] methotrexate (RHEUMATREX) tablet 2.5 mg  2.5 mg Oral every Tuesday Winston Horowitz [place]        levoFLOXacin (LEVAQUIN) 750 mg in D5W IVPB  750 mg IntraVENous Q24H CHRISTIAN Perera 100 mL/hr at 03/02/23 1343 750 mg at 03/02/23 1343    [START ON 3/3/2023] fluconazole (DIFLUCAN) tablet 200 mg  200 mg Oral DAILY CHRISTIAN Perera         Current Outpatient Medications   Medication Sig Dispense Refill    amitriptyline (ELAVIL) 10 mg tablet Take 10 mg by mouth nightly. donepeziL (ARICEPT) 10 mg tablet Take 10 mg by mouth nightly. dicyclomine (BENTYL) 10 mg capsule Take 10 mg by mouth three (3) times daily. dapagliflozin (FARXIGA) 10 mg tab tablet Take 10 mg by mouth daily.       cyclobenzaprine (FLEXERIL) 10 mg tablet Take 5 mg by mouth three (3) times daily as needed for Muscle Spasm(s). metFORMIN (GLUCOPHAGE) 500 mg tablet Take 500 mg by mouth two (2) times daily (with meals). sotaloL (BETAPACE) 80 mg tablet Take 80 mg by mouth two (2) times a day. sulfaSALAzine (AZULFIDINE) 500 mg tablet Take 1,000 mg by mouth two (2) times a day. promethazine (PHENERGAN) 25 mg tablet Take 25 mg by mouth every six (6) hours as needed for Nausea. icosapent ethyL (VASCEPA) 1 gram capsule Take 2 Capsules by mouth two (2) times daily (with meals). glimepiride (AMARYL) 4 mg tablet Take 4 mg by mouth every morning. HYDROcodone-acetaminophen (NORCO)  mg tablet Take 1 Tab by mouth every four (4) hours as needed. meclizine (ANTIVERT) 25 mg tablet meclizine 25 mg tablet      montelukast (SINGULAIR) 10 mg tablet Take 10 mg by mouth daily. pantoprazole (PROTONIX) 40 mg tablet Take 40 mg by mouth. furosemide (LASIX) 40 mg tablet Take 40 mg by mouth daily. apixaban (ELIQUIS) 2.5 mg tablet Take 2.5 mg by mouth two (2) times a day. pravastatin (PRAVACHOL) 40 mg tablet pravastatin 40 mg tablet      methotrexate (RHEUMATREX) 2.5 mg tablet Take 20 mg by mouth every Tuesday. melatonin 10 mg capsule melatonin 10 mg capsule   Take by oral route. losartan (COZAAR) 100 mg tablet take 1 tablet by mouth once daily      isosorbide mononitrate ER (IMDUR) 30 mg tablet Take 30 mg by mouth daily. atorvastatin (LIPITOR) 40 mg tablet Take 40 mg by mouth nightly. diclofenac (VOLTAREN) 1 % gel Apply 4 g to affected area as needed. 0    butalbital-acetaminophen-caffeine (FIORICET, ESGIC) -40 mg per tablet Take 1 Tab by mouth every four (4) hours as needed. 0    folic acid (FOLVITE) 1 mg tablet Take 1 mg by mouth daily. 0    ferrous sulfate 325 mg (65 mg iron) tablet Take 325 mg by mouth daily. 0    gabapentin (NEURONTIN) 300 mg capsule Take 300 mg by mouth two (2) times a day.       NITROSTAT 0.4 mg SL tablet 0.4 mg by SubLINGual route every five (5) minutes as needed. polyethylene glycol (MIRALAX) 17 gram/dose powder Take 17 g by mouth daily as needed. potassium chloride SA (MICRO-K) 10 mEq capsule Take 10 mEq by mouth daily. Review of Systems   Constitutional:  Positive for chills and fatigue. Negative for fever. HENT: Negative. Eyes: Negative. Respiratory: Negative. Cardiovascular:  Positive for chest pain. Gastrointestinal:  Positive for nausea and vomiting. Endocrine: Negative. Genitourinary: Negative. Musculoskeletal: Negative. Skin: Negative. Allergic/Immunologic: Negative. Neurological: Negative. Hematological: Negative. Psychiatric/Behavioral: Negative. Objective     Vital signs for last 24 hours:  Visit Vitals  BP (!) 158/78   Pulse 100   Temp 97.6 °F (36.4 °C)   Resp 16   Ht 5' 6\" (1.676 m)   Wt 175 lb (79.4 kg)   SpO2 100%   BMI 28.25 kg/m²       Intake/Output this shift:  Current Shift: No intake/output data recorded. Last 3 Shifts: No intake/output data recorded.     Data Review:   Recent Results (from the past 24 hour(s))   EKG, 12 LEAD, INITIAL    Collection Time: 03/02/23  8:21 AM   Result Value Ref Range    Ventricular Rate 62 BPM    Atrial Rate 62 BPM    P-R Interval 154 ms    QRS Duration 120 ms    Q-T Interval 480 ms    QTC Calculation (Bezet) 487 ms    Calculated P Axis -94 degrees    Calculated R Axis -52 degrees    Calculated T Axis 58 degrees    Diagnosis       Unusual P axis, possible ectopic atrial rhythm with occasional Premature   ventricular complexes  Pulmonary disease pattern  Left anterior fascicular block  Nonspecific ST and T wave abnormality  Abnormal ECG    Confirmed by [facility] ([id number][facility] on 3/2/2023 0:41:48 AM     METABOLIC PANEL, COMPREHENSIVE    Collection Time: 03/02/23  8:45 AM   Result Value Ref Range    Sodium 137 136 - 145 mmol/L    Potassium 4.5 3.5 - 5.1 mmol/L    Chloride 106 97 - 108 mmol/L    CO2 28 21 - 32 mmol/L    Anion gap 3 (L) 5 - 15 mmol/L    Glucose 119 (H) 65 - 100 mg/dL    BUN 8 6 - 20 mg/dL    Creatinine 0.57 0.55 - 1.02 mg/dL    BUN/Creatinine ratio 14 12 - 20      eGFR >60 >60 ml/min/1.73m2    Calcium 9.1 8.5 - 10.1 mg/dL    Bilirubin, total 0.4 0.2 - 1.0 mg/dL    AST (SGOT) 36 15 - 37 U/L    ALT (SGPT) 27 12 - 78 U/L    Alk. phosphatase 103 45 - 117 U/L    Protein, total 7.2 6.4 - 8.2 g/dL    Albumin 3.5 3.5 - 5.0 g/dL    Globulin 3.7 2.0 - 4.0 g/dL    A-G Ratio 0.9 (L) 1.1 - 2.2     TROPONIN-HIGH SENSITIVITY    Collection Time: 03/02/23  8:45 AM   Result Value Ref Range    Troponin-High Sensitivity 18 0 - 51 ng/L   PROTHROMBIN TIME + INR    Collection Time: 03/02/23  8:45 AM   Result Value Ref Range    Prothrombin time 12.4 11.9 - 14.6 sec    INR 0.9 0.9 - 1.1     NT-PRO BNP    Collection Time: 03/02/23  8:45 AM   Result Value Ref Range    NT pro- <450 pg/mL   CBC WITH AUTOMATED DIFF    Collection Time: 03/02/23  8:45 AM   Result Value Ref Range    WBC 8.2 3.6 - 11.0 K/uL    RBC 4.40 3.80 - 5.20 M/uL    HGB 12.6 11.5 - 16.0 g/dL    HCT 40.3 35.0 - 47.0 %    MCV 91.6 80.0 - 99.0 FL    MCH 28.6 26.0 - 34.0 PG    MCHC 31.3 30.0 - 36.5 g/dL    RDW 16.7 (H) 11.5 - 14.5 %    PLATELET 592 643 - 728 K/uL    MPV 11.5 8.9 - 12.9 FL    NRBC 0.0 0.0  WBC    ABSOLUTE NRBC 0.00 0.00 - 0.01 K/uL    NEUTROPHILS 67 32 - 75 %    LYMPHOCYTES 17 12 - 49 %    MONOCYTES 11 5 - 13 %    EOSINOPHILS 3 0 - 7 %    BASOPHILS 1 0 - 1 %    IMMATURE GRANULOCYTES 1 (H) 0 - 0.5 %    ABS. NEUTROPHILS 5.6 1.8 - 8.0 K/UL    ABS. LYMPHOCYTES 1.4 0.8 - 3.5 K/UL    ABS. MONOCYTES 0.9 0.0 - 1.0 K/UL    ABS. EOSINOPHILS 0.2 0.0 - 0.4 K/UL    ABS. BASOPHILS 0.1 0.0 - 0.1 K/UL    ABS. IMM.  GRANS. 0.1 (H) 0.00 - 0.04 K/UL    DF AUTOMATED     URINALYSIS W/ RFLX MICROSCOPIC    Collection Time: 03/02/23 10:23 AM   Result Value Ref Range    Color Donna      Appearance Turbid (A) Clear      Specific gravity 1.022 1.003 - 1.030      pH (UA) 6.0 5.0 - 8.0      Protein 30 (A) Negative mg/dL    Glucose >300 (A) Negative mg/dL    Ketone Negative Negative mg/dL    Bilirubin Negative Negative      Blood Negative Negative      Urobilinogen 0.1 0.1 - 1.0 EU/dL    Nitrites Positive (A) Negative      Leukocyte Esterase Large (A) Negative      WBC >100 (H) 0 - 4 /hpf    RBC 10-20 0 - 5 /hpf    Bacteria 4+ (A) Negative /hpf    PRESUMPTIVE YEAST Present     URINE MICROSCOPIC    Collection Time: 03/02/23 10:23 AM   Result Value Ref Range    WBC >100 (H) 0 - 4 /hpf    RBC 10-20 0 - 5 /hpf    Epithelial cells Few Few /lpf    Bacteria 4+ (A) Negative /hpf    Mucus Negative Negative /lpf    Budding yeast Present (A) Negative         Physical Exam  Vitals and nursing note reviewed. Exam conducted with a chaperone present (). Constitutional:       General: She is not in acute distress. Appearance: She is ill-appearing. HENT:      Head: Normocephalic and atraumatic. Right Ear: External ear normal.      Left Ear: External ear normal.      Nose: Nose normal.      Mouth/Throat:      Pharynx: Oropharynx is clear. Eyes:      Pupils: Pupils are equal, round, and reactive to light. Cardiovascular:      Rate and Rhythm: Normal rate and regular rhythm. Heart sounds: No murmur heard. Pulmonary:      Effort: Pulmonary effort is normal.      Breath sounds: Normal breath sounds. Abdominal:      General: Bowel sounds are normal. There is no distension. Palpations: Abdomen is soft. Tenderness: There is abdominal tenderness. Genitourinary:     Comments: No Garzon  Musculoskeletal:      Cervical back: Neck supple. Right lower leg: No edema. Left lower leg: No edema. Skin:     Findings: No rash. Neurological:      General: No focal deficit present. Mental Status: She is alert and oriented to person, place, and time. Psychiatric:         Mood and Affect: Mood normal.         Behavior: Behavior normal.         Thought Content:  Thought content normal. Judgment: Judgment normal.     ASSESSMENT/PLAN    Possible complicated UTI with marked pyuria, hematuria, bacteria, budding yeasts and positive Nitrites  2. Uncontrolled diabetes mellitus    Comment: Strong case for UTI either bacterial and/or fungal, however, yeasts may represent colonization. Discontinue Ceftriaxone and Levaquin  Start IV Meropenem (Zosyn causes drug interaction with Methotrexate)  Continue Fluconazole  Follow-up urine culture  5. In am, repeat CBC, CRP, procal,blood cultures  6. Send for medical records from [facility]  7.   US Retroperitoneum    Ronen Guallpa MD

## 2023-03-02 NOTE — ACP (ADVANCE CARE PLANNING)
Advance Care Planning   Healthcare Decision Maker:       Primary Decision Maker: Sveta 29 - Spouse - [phone]

## 2023-03-02 NOTE — PROGRESS NOTES
Reason for Admission:  Pyelonephritis                     RUR Score:   11%                  Plan for utilizing home health: None @ this time/uses cane/awaiting therapy evals. Performs own ADLs/no stairs @ home. PCP: First and Last name:  Jennifer Barger MD     Name of Practice:    Are you a current patient: Yes/No: Yes   Approximate date of last visit:  Seen earlier this week. Can you participate in a virtual visit with your PCP: Yes/Call. Current Advanced Directive/Advance Care Plan: Full Code      Healthcare Decision Maker:              Primary Decision Maker: Humble Victor - Spouse - [phone]                  Transition of Care Plan:                    D/C Plan is home with  &  to transport. Send Rxs to [employer] in [place] upon discharge.

## 2023-03-03 ENCOUNTER — APPOINTMENT (OUTPATIENT)
Dept: ULTRASOUND IMAGING | Age: 88
DRG: 699 | End: 2023-03-03
Attending: INTERNAL MEDICINE
Payer: MEDICARE

## 2023-03-03 LAB
ANION GAP SERPL CALC-SCNC: ABNORMAL MMOL/L (ref 5–15)
BUN SERPL-MCNC: 7 MG/DL (ref 6–20)
BUN/CREAT SERPL: 11 (ref 12–20)
CA-I BLD-MCNC: 9.1 MG/DL (ref 8.5–10.1)
CHLORIDE SERPL-SCNC: 108 MMOL/L (ref 97–108)
CO2 SERPL-SCNC: 30 MMOL/L (ref 21–32)
CREAT SERPL-MCNC: 0.66 MG/DL (ref 0.55–1.02)
CRP SERPL-MCNC: 0.41 MG/DL (ref 0–0.6)
ERYTHROCYTE [DISTWIDTH] IN BLOOD BY AUTOMATED COUNT: 16.9 % (ref 11.5–14.5)
GLUCOSE BLD STRIP.AUTO-MCNC: 118 MG/DL (ref 65–100)
GLUCOSE BLD STRIP.AUTO-MCNC: 131 MG/DL (ref 65–100)
GLUCOSE BLD STRIP.AUTO-MCNC: 146 MG/DL (ref 65–100)
GLUCOSE BLD STRIP.AUTO-MCNC: 73 MG/DL (ref 65–100)
GLUCOSE SERPL-MCNC: 227 MG/DL (ref 65–100)
HCT VFR BLD AUTO: 39.5 % (ref 35–47)
HGB BLD-MCNC: 12.5 G/DL (ref 11.5–16)
MCH RBC QN AUTO: 29.1 PG (ref 26–34)
MCHC RBC AUTO-ENTMCNC: 31.6 G/DL (ref 30–36.5)
MCV RBC AUTO: 92.1 FL (ref 80–99)
NRBC # BLD: 0 K/UL (ref 0–0.01)
NRBC BLD-RTO: 0 PER 100 WBC
PERFORMED BY, TECHID: ABNORMAL
PERFORMED BY, TECHID: NORMAL
PLATELET # BLD AUTO: 226 K/UL (ref 150–400)
PMV BLD AUTO: 11.9 FL (ref 8.9–12.9)
POTASSIUM SERPL-SCNC: 4.2 MMOL/L (ref 3.5–5.1)
PROCALCITONIN SERPL-MCNC: <0.05 NG/ML
RBC # BLD AUTO: 4.29 M/UL (ref 3.8–5.2)
SODIUM SERPL-SCNC: 137 MMOL/L (ref 136–145)
WBC # BLD AUTO: 7.1 K/UL (ref 3.6–11)

## 2023-03-03 PROCEDURE — 65270000029 HC RM PRIVATE

## 2023-03-03 PROCEDURE — 80048 BASIC METABOLIC PNL TOTAL CA: CPT

## 2023-03-03 PROCEDURE — 84145 PROCALCITONIN (PCT): CPT

## 2023-03-03 PROCEDURE — 36415 COLL VENOUS BLD VENIPUNCTURE: CPT

## 2023-03-03 PROCEDURE — 74011000258 HC RX REV CODE- 258: Performed by: INTERNAL MEDICINE

## 2023-03-03 PROCEDURE — 76770 US EXAM ABDO BACK WALL COMP: CPT

## 2023-03-03 PROCEDURE — 86140 C-REACTIVE PROTEIN: CPT

## 2023-03-03 PROCEDURE — 85027 COMPLETE CBC AUTOMATED: CPT

## 2023-03-03 PROCEDURE — 74011250637 HC RX REV CODE- 250/637: Performed by: LICENSED PRACTICAL NURSE

## 2023-03-03 PROCEDURE — 74011000250 HC RX REV CODE- 250: Performed by: LICENSED PRACTICAL NURSE

## 2023-03-03 PROCEDURE — 82962 GLUCOSE BLOOD TEST: CPT

## 2023-03-03 PROCEDURE — 74011636637 HC RX REV CODE- 636/637: Performed by: LICENSED PRACTICAL NURSE

## 2023-03-03 PROCEDURE — 74011250636 HC RX REV CODE- 250/636: Performed by: INTERNAL MEDICINE

## 2023-03-03 RX ORDER — POLYETHYLENE GLYCOL 3350 17 G/17G
17 POWDER, FOR SOLUTION ORAL DAILY
Status: DISCONTINUED | OUTPATIENT
Start: 2023-03-03 | End: 2023-03-05 | Stop reason: HOSPADM

## 2023-03-03 RX ORDER — CARVEDILOL 3.12 MG/1
3.12 TABLET ORAL 2 TIMES DAILY WITH MEALS
Status: DISCONTINUED | OUTPATIENT
Start: 2023-03-03 | End: 2023-03-05 | Stop reason: HOSPADM

## 2023-03-03 RX ADMIN — APIXABAN 2.5 MG: 2.5 TABLET, FILM COATED ORAL at 20:56

## 2023-03-03 RX ADMIN — INSULIN LISPRO 2 UNITS: 100 INJECTION, SOLUTION INTRAVENOUS; SUBCUTANEOUS at 09:54

## 2023-03-03 RX ADMIN — MEROPENEM 1 G: 1 INJECTION, POWDER, FOR SOLUTION INTRAVENOUS at 07:45

## 2023-03-03 RX ADMIN — LOSARTAN POTASSIUM 100 MG: 50 TABLET, FILM COATED ORAL at 09:50

## 2023-03-03 RX ADMIN — CARVEDILOL 3.12 MG: 3.12 TABLET, FILM COATED ORAL at 18:32

## 2023-03-03 RX ADMIN — FERROUS SULFATE TAB 325 MG (65 MG ELEMENTAL FE) 325 MG: 325 (65 FE) TAB at 09:54

## 2023-03-03 RX ADMIN — POTASSIUM CHLORIDE 10 MEQ: 750 TABLET, FILM COATED, EXTENDED RELEASE ORAL at 09:50

## 2023-03-03 RX ADMIN — SODIUM CHLORIDE, PRESERVATIVE FREE 10 ML: 5 INJECTION INTRAVENOUS at 20:57

## 2023-03-03 RX ADMIN — APIXABAN 2.5 MG: 2.5 TABLET, FILM COATED ORAL at 09:50

## 2023-03-03 RX ADMIN — SODIUM CHLORIDE, PRESERVATIVE FREE 10 ML: 5 INJECTION INTRAVENOUS at 07:46

## 2023-03-03 RX ADMIN — FLUCONAZOLE 200 MG: 100 TABLET ORAL at 18:32

## 2023-03-03 RX ADMIN — POLYETHYLENE GLYCOL 3350 17 G: 17 POWDER, FOR SOLUTION ORAL at 09:55

## 2023-03-03 RX ADMIN — SODIUM CHLORIDE, PRESERVATIVE FREE 10 ML: 5 INJECTION INTRAVENOUS at 15:49

## 2023-03-03 RX ADMIN — GABAPENTIN 300 MG: 300 CAPSULE ORAL at 09:50

## 2023-03-03 RX ADMIN — ISOSORBIDE MONONITRATE 30 MG: 60 TABLET, EXTENDED RELEASE ORAL at 09:51

## 2023-03-03 RX ADMIN — GABAPENTIN 300 MG: 300 CAPSULE ORAL at 20:56

## 2023-03-03 RX ADMIN — PANTOPRAZOLE SODIUM 40 MG: 40 TABLET, DELAYED RELEASE ORAL at 07:45

## 2023-03-03 RX ADMIN — MONTELUKAST 10 MG: 10 TABLET, FILM COATED ORAL at 20:58

## 2023-03-03 RX ADMIN — FUROSEMIDE 40 MG: 40 TABLET ORAL at 09:50

## 2023-03-03 RX ADMIN — FOLIC ACID 1 MG: 1 TABLET ORAL at 09:50

## 2023-03-03 RX ADMIN — ATORVASTATIN CALCIUM 40 MG: 40 TABLET, FILM COATED ORAL at 20:58

## 2023-03-03 RX ADMIN — MEROPENEM 1 G: 1 INJECTION, POWDER, FOR SOLUTION INTRAVENOUS at 15:01

## 2023-03-03 NOTE — PROGRESS NOTES
Hospitalist Progress Note            Daily Progress Note: 3/3/2023 8:44 AM  Hospital course:   Geri Oliver is a 82 y.o. female with a past medical history significant for A-fib, MI, stroke, rheumatoid arthritis, diabetes, that presents to the emergency department because she \"just feels bad. she has developed some chest discomfort, generalized body aches, nausea, vomiting, fatigue. She states that her chest discomfort is 4 out of 10 in severity localized to her left side and is intermittent in nature. In addition she reports some suprapubic pelvic pain, left-sided abdominal pain, back pain. She denies chills, urinary symptoms, changes in vision, palpitations, fever. Labs in the ED are unremarkable. Troponin and BNP are within normal limits. Urinalysis is positive for nitrites, LE, 4+ bacteria and budding yeast.  Chest x-ray reveals shallow lung volumes but clear lungs with cardiomegaly. Patient admitted for management of complicated UTI. Started on Meropenem and fluconazole. ID consulted. Subjective:     Seen and examined at bedside. Patient notes improvement abdominal pain. Denies all other complaints at this time. Spoke at length about treatment plans and goals with patient and family at bedside. Assessment/Plan:   Active Problems:    UTI (urinary tract infection) (3/2/2023)      Pyelonephritis (3/2/2023)      History of stroke (3/2/2023)      History of atrial fibrillation (3/2/2023)    #Abnormal UA, possible complicated UTI/Pyelonephritis, urinary retention  --Patient has a history of self-catheterization for urinary retention  --UA reveals positive nitrites, LE, 4+ bacteria, budding yeast  --Previous urine culture revealed E. Coli MDRO. Started patient on Meropenem. Will add PO fluconazole for presumptive yeast on UA. Fluconazole will be needed for 14 days.  (Day 2/14)  --US retroperitoneal revealed no hydronephrosis but did reveal a subcentimeter right renal cyst and 6 small nonobstructing left renal calculus. --ID consulted and following      #Chest discomfort; costochondritis  --Pain is reproducible by touch to left middle rib cage. --Troponin and BNP WLN  -- EKG interpreted by Dr. Dorothea Curran reveals left ventricular fascicular block, pulmonary disease pattern with occasional PVCs  --CXR reveals shallow volumes, clear lungs with cardiomegaly. -- If cardiology is needed patient follows with [facility] cardiology; Dr. Oral Pena     #HTN, HLD, Hx of Stroke, Hx of MI  -- We will continue home medications (atorvastatin, , furosemide, Imdur, Cozaar, Eliquis)  --Hold Coreg  --IV Hydralazine 10 q4h as needed     #Rheumatoid arthritis  - We will continue methotrexate. Patient takes methotrexate every Tuesday  - Continue folic acid daily    History of diabetes  - Patient normally takes glimepiride at home we will hold initiate normal sensitivity sliding scale and continue to monitor as needed. DVT Prophylaxis: Eliquis  Code Status: Full Code  POA/NOK:    This care involved high complexity medical decision making. I personally:  Reviewed the flow sheet and previous days notes  Reviewed and summarized records/history from previous days note or discussions with staff and family  Reviewed High Risk Drug therapy requiring intensive monitoring for toxicity to include but is not limited to steroids, pressors and antibiotics  Reviewed and/or clinical laboratory tests  Reviewed images and/or ordered radiology tests  Reviewed the patient's EKG/telemetry  Discussed my assessment/management with: Nursing, Patient, Family, and Hospitalist colleagues for coordination of care.      Disposition and discharge barriers:   IV abx, ID clearance, urine culture  Care Plan discussed with: Patient, nursing, case management    Current Facility-Administered Medications   Medication Dose Route Frequency    apixaban (ELIQUIS) tablet 2.5 mg  2.5 mg Oral BID    atorvastatin (LIPITOR) tablet 40 mg  40 mg Oral QHS butalbital-acetaminophen-caffeine (FIORICET, ESGIC) -40 mg per tablet 1 Tablet  1 Tablet Oral Q4H PRN    [Held by provider] carvediloL (COREG) tablet 6.25 mg  6.25 mg Oral BID WITH MEALS    ferrous sulfate tablet 325 mg  1 Tablet Oral DAILY WITH BREAKFAST    furosemide (LASIX) tablet 40 mg  40 mg Oral DAILY    folic acid (FOLVITE) tablet 1 mg  1 mg Oral DAILY    gabapentin (NEURONTIN) capsule 300 mg  300 mg Oral BID    HYDROcodone-acetaminophen (NORCO)  mg tablet 1 Tablet  1 Tablet Oral Q4H PRN    isosorbide mononitrate ER (IMDUR) tablet 30 mg  30 mg Oral DAILY    losartan (COZAAR) tablet 100 mg  100 mg Oral DAILY    meclizine (ANTIVERT) tablet 25 mg  25 mg Oral Q6H PRN    melatonin tablet 10 mg  10 mg Oral QHS PRN    montelukast (SINGULAIR) tablet 10 mg  10 mg Oral QHS    nitroglycerin (NITROSTAT) tablet 0.4 mg  0.4 mg SubLINGual Q5MIN PRN    pantoprazole (PROTONIX) tablet 40 mg  40 mg Oral ACB    polyethylene glycol (MIRALAX) packet 17 g  17 g Oral DAILY PRN    potassium chloride SR (KLOR-CON 10) tablet 10 mEq  10 mEq Oral DAILY    sodium chloride (NS) flush 5-40 mL  5-40 mL IntraVENous Q8H    sodium chloride (NS) flush 5-40 mL  5-40 mL IntraVENous PRN    acetaminophen (TYLENOL) tablet 650 mg  650 mg Oral Q6H PRN    Or    acetaminophen (TYLENOL) suppository 650 mg  650 mg Rectal Q6H PRN    ondansetron (ZOFRAN ODT) tablet 4 mg  4 mg Oral Q6H PRN    Or    ondansetron (ZOFRAN) injection 4 mg  4 mg IntraVENous Q6H PRN    bisacodyL (DULCOLAX) tablet 5 mg  5 mg Oral DAILY PRN    glucose chewable tablet 16 g  4 Tablet Oral PRN    glucagon (GLUCAGEN) injection 1 mg  1 mg IntraMUSCular PRN    insulin lispro (HUMALOG) injection   SubCUTAneous AC&HS    [START ON 3/7/2023] methotrexate (RHEUMATREX) tablet 20 mg  20 mg Oral every Tuesday    fluconazole (DIFLUCAN) tablet 200 mg  200 mg Oral DAILY    diphenhydrAMINE (BENADRYL) capsule 25 mg  25 mg Oral Q6H PRN    meropenem (MERREM) 1 g in 0.9% sodium chloride (MBP/ADV) 50 mL MBP  1 g IntraVENous Q8H    hydrALAZINE (APRESOLINE) 20 mg/mL injection 10 mg  10 mg IntraVENous Q4H PRN        REVIEW OF SYSTEMS    Review of Systems   Constitutional:  Positive for malaise/fatigue. Respiratory:  Negative for cough, shortness of breath and wheezing. Cardiovascular:  Negative for chest pain, claudication and leg swelling. Gastrointestinal:  Positive for abdominal pain (Improved from yesterday). Negative for nausea and vomiting. Neurological:  Negative for dizziness and headaches. Objective:     Visit Vitals  BP (!) 147/67 (BP 1 Location: Left upper arm, BP Patient Position: At rest)   Pulse (!) 101   Temp 98.5 °F (36.9 °C)   Resp 17   Ht 5' 6\" (1.676 m)   Wt 79.4 kg (175 lb)   SpO2 91%   Breastfeeding No   BMI 28.25 kg/m²      O2 Device: None (Room air)    Temp (24hrs), Av.3 °F (36.8 °C), Min:97.8 °F (36.6 °C), Max:98.8 °F (37.1 °C)        PHYSICAL EXAM:    Physical Exam  Vitals and nursing note reviewed. Constitutional:       Appearance: Normal appearance. HENT:      Head: Normocephalic and atraumatic. Cardiovascular:      Rate and Rhythm: Tachycardia present. Rhythm irregular. Heart sounds: Murmur heard. Pulmonary:      Effort: Pulmonary effort is normal. No respiratory distress. Breath sounds: Normal breath sounds. No wheezing. Abdominal:      General: Abdomen is flat. There is no distension. Palpations: Abdomen is soft. Tenderness: There is abdominal tenderness (Localized to left quadrant). Musculoskeletal:         General: Normal range of motion. Skin:     General: Skin is warm. Neurological:      Mental Status: She is alert. Mental status is at baseline.         Data Review    Recent Results (from the past 24 hour(s))   METABOLIC PANEL, COMPREHENSIVE    Collection Time: 23  8:45 AM   Result Value Ref Range    Sodium 137 136 - 145 mmol/L    Potassium 4.5 3.5 - 5.1 mmol/L    Chloride 106 97 - 108 mmol/L    CO2 28 21 - 32 mmol/L    Anion gap 3 (L) 5 - 15 mmol/L    Glucose 119 (H) 65 - 100 mg/dL    BUN 8 6 - 20 mg/dL    Creatinine 0.57 0.55 - 1.02 mg/dL    BUN/Creatinine ratio 14 12 - 20      eGFR >60 >60 ml/min/1.73m2    Calcium 9.1 8.5 - 10.1 mg/dL    Bilirubin, total 0.4 0.2 - 1.0 mg/dL    AST (SGOT) 36 15 - 37 U/L    ALT (SGPT) 27 12 - 78 U/L    Alk. phosphatase 103 45 - 117 U/L    Protein, total 7.2 6.4 - 8.2 g/dL    Albumin 3.5 3.5 - 5.0 g/dL    Globulin 3.7 2.0 - 4.0 g/dL    A-G Ratio 0.9 (L) 1.1 - 2.2     TROPONIN-HIGH SENSITIVITY    Collection Time: 03/02/23  8:45 AM   Result Value Ref Range    Troponin-High Sensitivity 18 0 - 51 ng/L   PROTHROMBIN TIME + INR    Collection Time: 03/02/23  8:45 AM   Result Value Ref Range    Prothrombin time 12.4 11.9 - 14.6 sec    INR 0.9 0.9 - 1.1     NT-PRO BNP    Collection Time: 03/02/23  8:45 AM   Result Value Ref Range    NT pro- <450 pg/mL   CBC WITH AUTOMATED DIFF    Collection Time: 03/02/23  8:45 AM   Result Value Ref Range    WBC 8.2 3.6 - 11.0 K/uL    RBC 4.40 3.80 - 5.20 M/uL    HGB 12.6 11.5 - 16.0 g/dL    HCT 40.3 35.0 - 47.0 %    MCV 91.6 80.0 - 99.0 FL    MCH 28.6 26.0 - 34.0 PG    MCHC 31.3 30.0 - 36.5 g/dL    RDW 16.7 (H) 11.5 - 14.5 %    PLATELET 319 292 - 323 K/uL    MPV 11.5 8.9 - 12.9 FL    NRBC 0.0 0.0  WBC    ABSOLUTE NRBC 0.00 0.00 - 0.01 K/uL    NEUTROPHILS 67 32 - 75 %    LYMPHOCYTES 17 12 - 49 %    MONOCYTES 11 5 - 13 %    EOSINOPHILS 3 0 - 7 %    BASOPHILS 1 0 - 1 %    IMMATURE GRANULOCYTES 1 (H) 0 - 0.5 %    ABS. NEUTROPHILS 5.6 1.8 - 8.0 K/UL    ABS. LYMPHOCYTES 1.4 0.8 - 3.5 K/UL    ABS. MONOCYTES 0.9 0.0 - 1.0 K/UL    ABS. EOSINOPHILS 0.2 0.0 - 0.4 K/UL    ABS. BASOPHILS 0.1 0.0 - 0.1 K/UL    ABS. IMM.  GRANS. 0.1 (H) 0.00 - 0.04 K/UL    DF AUTOMATED     HEMOGLOBIN A1C WITH EAG    Collection Time: 03/02/23  8:45 AM   Result Value Ref Range    Hemoglobin A1c 6.0 (H) 4.0 - 5.6 %    Est. average glucose 126 mg/dL   URINALYSIS W/ RFLX MICROSCOPIC Collection Time: 03/02/23 10:23 AM   Result Value Ref Range    Color Donna      Appearance Turbid (A) Clear      Specific gravity 1.022 1.003 - 1.030      pH (UA) 6.0 5.0 - 8.0      Protein 30 (A) Negative mg/dL    Glucose >300 (A) Negative mg/dL    Ketone Negative Negative mg/dL    Bilirubin Negative Negative      Blood Negative Negative      Urobilinogen 0.1 0.1 - 1.0 EU/dL    Nitrites Positive (A) Negative      Leukocyte Esterase Large (A) Negative      WBC >100 (H) 0 - 4 /hpf    RBC 10-20 0 - 5 /hpf    Bacteria 4+ (A) Negative /hpf    PRESUMPTIVE YEAST Present     URINE MICROSCOPIC    Collection Time: 03/02/23 10:23 AM   Result Value Ref Range    WBC >100 (H) 0 - 4 /hpf    RBC 10-20 0 - 5 /hpf    Epithelial cells Few Few /lpf    Bacteria 4+ (A) Negative /hpf    Mucus Negative Negative /lpf    Budding yeast Present (A) Negative     GLUCOSE, POC    Collection Time: 03/02/23  4:40 PM   Result Value Ref Range    Glucose (POC) 119 (H) 65 - 100 mg/dL    Performed by [facility] [address], POC    Collection Time: 03/02/23 10:56 PM   Result Value Ref Range    Glucose (POC) 114 (H) 65 - 100 mg/dL    Performed by JIE OCHOA    GLUCOSE, POC    Collection Time: 03/03/23  7:46 AM   Result Value Ref Range    Glucose (POC) 146 (H) 65 - 100 mg/dL    Performed by Taniya Mack        XR CHEST PORT   Final Result   Shallow volumes, clear lungs. Cardiomegaly. US RETROPERITONEUM COMP    (Results Pending)       Intake and Output:  Current Shift: 03/03 0701 - 03/03 1900  In: 50 [I.V.:50]  Out: -   Last three shifts: 03/01 1901 - 03/03 0700  In: 9989 [P.O.:240; I.V.:1050]  Out: -       Lab/Data Review:  Recent Labs     03/02/23  0845   WBC 8.2   HGB 12.6   HCT 40.3        Recent Labs     03/02/23  0845      K 4.5      CO2 28   *   BUN 8   CREA 0.57   CA 9.1   ALB 3.5   TBILI 0.4   ALT 27   INR 0.9     No results for input(s): PH, PCO2, PO2, HCO3, FIO2 in the last 72 hours.   Recent Results (from the past 24 hour(s))   METABOLIC PANEL, COMPREHENSIVE    Collection Time: 03/02/23  8:45 AM   Result Value Ref Range    Sodium 137 136 - 145 mmol/L    Potassium 4.5 3.5 - 5.1 mmol/L    Chloride 106 97 - 108 mmol/L    CO2 28 21 - 32 mmol/L    Anion gap 3 (L) 5 - 15 mmol/L    Glucose 119 (H) 65 - 100 mg/dL    BUN 8 6 - 20 mg/dL    Creatinine 0.57 0.55 - 1.02 mg/dL    BUN/Creatinine ratio 14 12 - 20      eGFR >60 >60 ml/min/1.73m2    Calcium 9.1 8.5 - 10.1 mg/dL    Bilirubin, total 0.4 0.2 - 1.0 mg/dL    AST (SGOT) 36 15 - 37 U/L    ALT (SGPT) 27 12 - 78 U/L    Alk. phosphatase 103 45 - 117 U/L    Protein, total 7.2 6.4 - 8.2 g/dL    Albumin 3.5 3.5 - 5.0 g/dL    Globulin 3.7 2.0 - 4.0 g/dL    A-G Ratio 0.9 (L) 1.1 - 2.2     TROPONIN-HIGH SENSITIVITY    Collection Time: 03/02/23  8:45 AM   Result Value Ref Range    Troponin-High Sensitivity 18 0 - 51 ng/L   PROTHROMBIN TIME + INR    Collection Time: 03/02/23  8:45 AM   Result Value Ref Range    Prothrombin time 12.4 11.9 - 14.6 sec    INR 0.9 0.9 - 1.1     NT-PRO BNP    Collection Time: 03/02/23  8:45 AM   Result Value Ref Range    NT pro- <450 pg/mL   CBC WITH AUTOMATED DIFF    Collection Time: 03/02/23  8:45 AM   Result Value Ref Range    WBC 8.2 3.6 - 11.0 K/uL    RBC 4.40 3.80 - 5.20 M/uL    HGB 12.6 11.5 - 16.0 g/dL    HCT 40.3 35.0 - 47.0 %    MCV 91.6 80.0 - 99.0 FL    MCH 28.6 26.0 - 34.0 PG    MCHC 31.3 30.0 - 36.5 g/dL    RDW 16.7 (H) 11.5 - 14.5 %    PLATELET 822 019 - 959 K/uL    MPV 11.5 8.9 - 12.9 FL    NRBC 0.0 0.0  WBC    ABSOLUTE NRBC 0.00 0.00 - 0.01 K/uL    NEUTROPHILS 67 32 - 75 %    LYMPHOCYTES 17 12 - 49 %    MONOCYTES 11 5 - 13 %    EOSINOPHILS 3 0 - 7 %    BASOPHILS 1 0 - 1 %    IMMATURE GRANULOCYTES 1 (H) 0 - 0.5 %    ABS. NEUTROPHILS 5.6 1.8 - 8.0 K/UL    ABS. LYMPHOCYTES 1.4 0.8 - 3.5 K/UL    ABS. MONOCYTES 0.9 0.0 - 1.0 K/UL    ABS. EOSINOPHILS 0.2 0.0 - 0.4 K/UL    ABS. BASOPHILS 0.1 0.0 - 0.1 K/UL    ABS. IMM. GRANS. 0.1 (H) 0.00 - 0.04 K/UL    DF AUTOMATED     HEMOGLOBIN A1C WITH EAG    Collection Time: 03/02/23  8:45 AM   Result Value Ref Range    Hemoglobin A1c 6.0 (H) 4.0 - 5.6 %    Est. average glucose 126 mg/dL   URINALYSIS W/ RFLX MICROSCOPIC    Collection Time: 03/02/23 10:23 AM   Result Value Ref Range    Color Donna      Appearance Turbid (A) Clear      Specific gravity 1.022 1.003 - 1.030      pH (UA) 6.0 5.0 - 8.0      Protein 30 (A) Negative mg/dL    Glucose >300 (A) Negative mg/dL    Ketone Negative Negative mg/dL    Bilirubin Negative Negative      Blood Negative Negative      Urobilinogen 0.1 0.1 - 1.0 EU/dL    Nitrites Positive (A) Negative      Leukocyte Esterase Large (A) Negative      WBC >100 (H) 0 - 4 /hpf    RBC 10-20 0 - 5 /hpf    Bacteria 4+ (A) Negative /hpf    PRESUMPTIVE YEAST Present     URINE MICROSCOPIC    Collection Time: 03/02/23 10:23 AM   Result Value Ref Range    WBC >100 (H) 0 - 4 /hpf    RBC 10-20 0 - 5 /hpf    Epithelial cells Few Few /lpf    Bacteria 4+ (A) Negative /hpf    Mucus Negative Negative /lpf    Budding yeast Present (A) Negative     GLUCOSE, POC    Collection Time: 03/02/23  4:40 PM   Result Value Ref Range    Glucose (POC) 119 (H) 65 - 100 mg/dL    Performed by [address] [place] [address], POC    Collection Time: 03/02/23 10:56 PM   Result Value Ref Range    Glucose (POC) 114 (H) 65 - 100 mg/dL    Performed by JIE OCHOA    GLUCOSE, POC    Collection Time: 03/03/23  7:46 AM   Result Value Ref Range    Glucose (POC) 146 (H) 65 - 100 mg/dL    Performed by Martha Gannon            _____________________________________________________________________________  Time spent in direct care including coordination of service, review of data and examination: 42 minutes    ______________________________________________________________________________    CHRISTIAN Wang    This is dictation was done by lina, computer voice recognition software.   Quite often unanticipated grammatical, syntax, homophones and other interpretive errors or inadvertently transcribed by the computer software. Please excuse errors that have escaped final proofreading. Thank you.

## 2023-03-03 NOTE — PROGRESS NOTES
Physician Progress Note      Kenrick Ramirez  [facility] #:                  [id number]  :                       1935  ADMIT DATE:       3/2/2023 8:15 AM  DISCH DATE:  RESPONDING  PROVIDER #:        Taurus GONZALES          QUERY TEXT:    Patient admitted with UTI, noted to have atrial fibrillation and is maintained on Eliquis. If possible, please document in progress notes and discharge summary if you are evaluating and/or treating any of the following: The medical record reflects the following:  Risk Factors: 82year old female, A. Fib  Clinical Indicators: 3 H&P - past medical history significant for A-fib  Treatment: Eliquis 2.5mg BID continued from home medications      Please email [email or web address] with any questions  Options provided:  -- Secondary hypercoagulable state in a patient with atrial fibrillation  -- Other - I will add my own diagnosis  -- Disagree - Not applicable / Not valid  -- Disagree - Clinically unable to determine / Unknown  -- Refer to Clinical Documentation Reviewer    PROVIDER RESPONSE TEXT:    This patient has secondary hypercoagulable state in a patient with atrial fibrillation. Query created by:  Leo Wren on 3/3/2023 10:30 AM      Electronically signed by:  Taurus GONZALES 3/3/2023 6:24 PM

## 2023-03-03 NOTE — PROGRESS NOTES
Progress Note    Patient: Michael López MRN: [id number]  SSN: [id number]    YOB: 1935  Age: 82 y.o. Sex: female      Admit Date: 3/2/2023    LOS: 1 day     Subjective:   Patient followed for possible complicated UTI with pyuria, bacteria and yeasts. Urine culture pending. Afebrile with normal WBC and CRP. Currently on Meropenem and Fluconazole. Objective:     Vitals:    03/02/23 1957 03/03/23 0301 03/03/23 0735 03/03/23 0800   BP: (!) 160/81 (!) 148/74 (!) 147/67    Pulse: (!) 101 98 (!) 101 81   Resp: 18 16 17    Temp: 97.8 °F (36.6 °C) 98.8 °F (37.1 °C) 98.5 °F (36.9 °C)    SpO2: 96% 97% 91%    Weight:       Height:            Intake and Output:  Current Shift: 03/03 0701 - 03/03 1900  In: 50 [I.V.:50]  Out: -   Last three shifts: 03/01 1901 - 03/03 0700  In: 9869 [P.O.:240; I.V.:1050]  Out: -     Physical Exam:   Vitals and nursing note reviewed. Exam conducted with a chaperone present (). Constitutional:       General: She is not in acute distress. Appearance: She is ill-appearing. HENT: unremarkable   Eyes:      Pupils: Pupils are equal, round, and reactive to light. Cardiovascular:      Rate and Rhythm: Normal rate and regular rhythm. Heart sounds: No murmur heard. Pulmonary:      Effort: Pulmonary effort is normal.      Breath sounds: Normal breath sounds. Abdominal:      General: Bowel sounds are normal. There is no distension. Palpations: Abdomen is soft. Tenderness: There is abdominal tenderness. Genitourinary:     Comments: No Garzon  Musculoskeletal:      Right lower leg: No edema. Left lower leg: No edema. Skin:     Findings: No rash. Neurological:      General: No focal deficit present. Mental Status: She is alert and oriented to person, place, and time. Psychiatric:    normal behavior     Lab/Data Review:     WBC 8,200    CRP 0.41    Urine culture (3/2) in process    US Retroperitonium (3/3) No hydronephrosis. Subcentimeter right renal cyst. 6 small nonobstructing left renal calculus. Assessment:     Active Problems:    UTI (urinary tract infection) (3/2/2023)      Pyelonephritis (3/2/2023)      History of stroke (3/2/2023)      History of atrial fibrillation (3/2/2023)         Suspected complicated UTI (left nephrolithiasis) with marked pyuria, hematuria, bacteria, budding yeasts and positive Nitrites, urine culture pending, Day #2 IV Meropenem and Fluconazole   2. Uncontrolled diabetes mellitus     Comment: Normal CRP is surprising but this does not diminish concern for UTI.     Plan:     Continue IV Meropenem (Zosyn causes drug interaction with Methotrexate)  Continue Fluconazole  Follow-up urine culture  Awaiting medical records from [facility]          Signed By: Luis Hill MD     March 3, 2023

## 2023-03-03 NOTE — PROGRESS NOTES
CM reviewed chart. Current discharge plan is home self care when medically stable. Will continue to follow for needs.

## 2023-03-04 LAB
ANION GAP SERPL CALC-SCNC: 1 MMOL/L (ref 5–15)
BACTERIA SPEC CULT: ABNORMAL
BUN SERPL-MCNC: 9 MG/DL (ref 6–20)
BUN/CREAT SERPL: 16 (ref 12–20)
CA-I BLD-MCNC: 9.2 MG/DL (ref 8.5–10.1)
CHLORIDE SERPL-SCNC: 106 MMOL/L (ref 97–108)
CO2 SERPL-SCNC: 31 MMOL/L (ref 21–32)
COLONY COUNT,CNT: ABNORMAL
CREAT SERPL-MCNC: 0.56 MG/DL (ref 0.55–1.02)
ERYTHROCYTE [DISTWIDTH] IN BLOOD BY AUTOMATED COUNT: 16.6 % (ref 11.5–14.5)
GLUCOSE BLD STRIP.AUTO-MCNC: 130 MG/DL (ref 65–100)
GLUCOSE BLD STRIP.AUTO-MCNC: 138 MG/DL (ref 65–100)
GLUCOSE BLD STRIP.AUTO-MCNC: 151 MG/DL (ref 65–100)
GLUCOSE BLD STRIP.AUTO-MCNC: 164 MG/DL (ref 65–100)
GLUCOSE SERPL-MCNC: 146 MG/DL (ref 65–100)
HCT VFR BLD AUTO: 39.7 % (ref 35–47)
HGB BLD-MCNC: 12.6 G/DL (ref 11.5–16)
MCH RBC QN AUTO: 29 PG (ref 26–34)
MCHC RBC AUTO-ENTMCNC: 31.7 G/DL (ref 30–36.5)
MCV RBC AUTO: 91.5 FL (ref 80–99)
NRBC # BLD: 0 K/UL (ref 0–0.01)
NRBC BLD-RTO: 0 PER 100 WBC
PERFORMED BY, TECHID: ABNORMAL
PLATELET # BLD AUTO: 223 K/UL (ref 150–400)
PMV BLD AUTO: 11.6 FL (ref 8.9–12.9)
POTASSIUM SERPL-SCNC: 3.9 MMOL/L (ref 3.5–5.1)
RBC # BLD AUTO: 4.34 M/UL (ref 3.8–5.2)
SODIUM SERPL-SCNC: 138 MMOL/L (ref 136–145)
SPECIAL REQUESTS,SREQ: ABNORMAL
WBC # BLD AUTO: 7.9 K/UL (ref 3.6–11)

## 2023-03-04 PROCEDURE — 65270000029 HC RM PRIVATE

## 2023-03-04 PROCEDURE — 74011000258 HC RX REV CODE- 258: Performed by: INTERNAL MEDICINE

## 2023-03-04 PROCEDURE — 80048 BASIC METABOLIC PNL TOTAL CA: CPT

## 2023-03-04 PROCEDURE — 74011250636 HC RX REV CODE- 250/636: Performed by: LICENSED PRACTICAL NURSE

## 2023-03-04 PROCEDURE — 74011636637 HC RX REV CODE- 636/637: Performed by: LICENSED PRACTICAL NURSE

## 2023-03-04 PROCEDURE — 36415 COLL VENOUS BLD VENIPUNCTURE: CPT

## 2023-03-04 PROCEDURE — 74011000250 HC RX REV CODE- 250: Performed by: LICENSED PRACTICAL NURSE

## 2023-03-04 PROCEDURE — 74011250636 HC RX REV CODE- 250/636: Performed by: INTERNAL MEDICINE

## 2023-03-04 PROCEDURE — 82962 GLUCOSE BLOOD TEST: CPT

## 2023-03-04 PROCEDURE — 74011250637 HC RX REV CODE- 250/637: Performed by: LICENSED PRACTICAL NURSE

## 2023-03-04 PROCEDURE — 85027 COMPLETE CBC AUTOMATED: CPT

## 2023-03-04 RX ORDER — LEVOFLOXACIN 5 MG/ML
750 INJECTION, SOLUTION INTRAVENOUS EVERY 24 HOURS
Status: DISCONTINUED | OUTPATIENT
Start: 2023-03-04 | End: 2023-03-05 | Stop reason: HOSPADM

## 2023-03-04 RX ADMIN — MEROPENEM 1 G: 1 INJECTION, POWDER, FOR SOLUTION INTRAVENOUS at 08:50

## 2023-03-04 RX ADMIN — APIXABAN 2.5 MG: 2.5 TABLET, FILM COATED ORAL at 21:48

## 2023-03-04 RX ADMIN — CARVEDILOL 3.12 MG: 3.12 TABLET, FILM COATED ORAL at 17:17

## 2023-03-04 RX ADMIN — FERROUS SULFATE TAB 325 MG (65 MG ELEMENTAL FE) 325 MG: 325 (65 FE) TAB at 08:47

## 2023-03-04 RX ADMIN — MEROPENEM 1 G: 1 INJECTION, POWDER, FOR SOLUTION INTRAVENOUS at 00:15

## 2023-03-04 RX ADMIN — POLYETHYLENE GLYCOL 3350 17 G: 17 POWDER, FOR SOLUTION ORAL at 08:50

## 2023-03-04 RX ADMIN — INSULIN LISPRO 2 UNITS: 100 INJECTION, SOLUTION INTRAVENOUS; SUBCUTANEOUS at 17:16

## 2023-03-04 RX ADMIN — HYDRALAZINE HYDROCHLORIDE 10 MG: 20 INJECTION, SOLUTION INTRAMUSCULAR; INTRAVENOUS at 21:50

## 2023-03-04 RX ADMIN — MONTELUKAST 10 MG: 10 TABLET, FILM COATED ORAL at 21:48

## 2023-03-04 RX ADMIN — GABAPENTIN 300 MG: 300 CAPSULE ORAL at 21:48

## 2023-03-04 RX ADMIN — FUROSEMIDE 40 MG: 40 TABLET ORAL at 08:49

## 2023-03-04 RX ADMIN — ONDANSETRON 4 MG: 2 INJECTION INTRAMUSCULAR; INTRAVENOUS at 05:33

## 2023-03-04 RX ADMIN — LEVOFLOXACIN 750 MG: 5 INJECTION, SOLUTION INTRAVENOUS at 22:01

## 2023-03-04 RX ADMIN — PANTOPRAZOLE SODIUM 40 MG: 40 TABLET, DELAYED RELEASE ORAL at 08:48

## 2023-03-04 RX ADMIN — ATORVASTATIN CALCIUM 40 MG: 40 TABLET, FILM COATED ORAL at 21:48

## 2023-03-04 RX ADMIN — GABAPENTIN 300 MG: 300 CAPSULE ORAL at 08:48

## 2023-03-04 RX ADMIN — ACETAMINOPHEN 650 MG: 325 TABLET ORAL at 21:48

## 2023-03-04 RX ADMIN — APIXABAN 2.5 MG: 2.5 TABLET, FILM COATED ORAL at 08:49

## 2023-03-04 RX ADMIN — LOSARTAN POTASSIUM 100 MG: 50 TABLET, FILM COATED ORAL at 08:48

## 2023-03-04 RX ADMIN — MEROPENEM 1 G: 1 INJECTION, POWDER, FOR SOLUTION INTRAVENOUS at 14:50

## 2023-03-04 RX ADMIN — INSULIN LISPRO 2 UNITS: 100 INJECTION, SOLUTION INTRAVENOUS; SUBCUTANEOUS at 08:44

## 2023-03-04 RX ADMIN — CARVEDILOL 3.12 MG: 3.12 TABLET, FILM COATED ORAL at 08:49

## 2023-03-04 RX ADMIN — FOLIC ACID 1 MG: 1 TABLET ORAL at 08:48

## 2023-03-04 RX ADMIN — SODIUM CHLORIDE, PRESERVATIVE FREE 10 ML: 5 INJECTION INTRAVENOUS at 21:56

## 2023-03-04 RX ADMIN — ISOSORBIDE MONONITRATE 30 MG: 60 TABLET, EXTENDED RELEASE ORAL at 08:48

## 2023-03-04 RX ADMIN — POTASSIUM CHLORIDE 10 MEQ: 750 TABLET, FILM COATED, EXTENDED RELEASE ORAL at 08:49

## 2023-03-04 RX ADMIN — SODIUM CHLORIDE, PRESERVATIVE FREE 10 ML: 5 INJECTION INTRAVENOUS at 14:12

## 2023-03-04 NOTE — PROGRESS NOTES
Problem: Falls - Risk of  Goal: *Absence of Falls  Description: Document Alireza Cisse Fall Risk and appropriate interventions in the flowsheet. Outcome: Progressing Towards Goal  Note: Fall Risk Interventions:                                Problem: Patient Education: Go to Patient Education Activity  Goal: Patient/Family Education  Outcome: Progressing Towards Goal     Problem: General Medical Care Plan  Goal: *Absence of infection signs and symptoms  Outcome: Progressing Towards Goal  Goal: *Skin integrity maintained  Outcome: Progressing Towards Goal     Problem: Discharge Planning  Goal: *Discharge to safe environment  Outcome: Progressing Towards Goal  Goal: *Knowledge of medication management  Outcome: Progressing Towards Goal  Goal: *Knowledge of discharge instructions  Outcome: Progressing Towards Goal     Problem: Patient Education: Go to Patient Education Activity  Goal: Patient/Family Education  Outcome: Progressing Towards Goal   Pt AOX4, VSS, Lungs cta +bsx4 last bm 3/4/23, Pt ambulates independently, skin intact, nvi, no evidence of pain at this time. Will continue POC.

## 2023-03-04 NOTE — PROGRESS NOTES
Hospitalist Progress Note            Daily Progress Note: 3/4/2023 8:44 AM  Hospital course:   Gloria Lopez is a 82 y.o. female with a past medical history significant for A-fib, MI, stroke, rheumatoid arthritis, diabetes, that presents to the emergency department because she \"just feels bad. she has developed some chest discomfort, generalized body aches, nausea, vomiting, fatigue. She states that her chest discomfort is 4 out of 10 in severity localized to her left side and is intermittent in nature. In addition she reports some suprapubic pelvic pain, left-sided abdominal pain, back pain. She denies chills, urinary symptoms, changes in vision, palpitations, fever. Labs in the ED are unremarkable. Troponin and BNP are within normal limits. Urinalysis is positive for nitrites, LE, 4+ bacteria and budding yeast.  Chest x-ray reveals shallow lung volumes but clear lungs with cardiomegaly. Patient admitted for management of complicated UTI. Started on Meropenem and fluconazole. ID consulted. Subjective:     Seen and examined at bedside. Patient notes improvement abdominal pain. Denies all other complaints at this time. Spoke at length about treatment plans and goals with patient and family at bedside. Assessment/Plan:   Active Problems:    UTI (urinary tract infection) (3/2/2023)      Pyelonephritis (3/2/2023)      History of stroke (3/2/2023)      History of atrial fibrillation (3/2/2023)  #Abnormal UA, possible complicated UTI/Pyelonephritis, urinary retention  --Patient has a history of self-catheterization for urinary retention  --UA reveals positive nitrites, LE, 4+ bacteria, budding yeast  --Previous urine culture revealed E. Coli MDRO. Started patient on Meropenem. Will add PO fluconazole for presumptive yeast on UA. Fluconazole will be needed for 14 days.  (Day 3/14)  --US retroperitoneal revealed no hydronephrosis but did reveal a subcentimeter right renal cyst and 6 small nonobstructing left renal calculus. --Prelim urine culture: Gram negative Rods  --ID consulted and following      #Chest discomfort; costochondritis  --Pain is reproducible by touch to left middle rib cage. --Troponin and BNP WLN  -- EKG interpreted by Dr. Nallely Sotelo reveals left ventricular fascicular block, pulmonary disease pattern with occasional PVCs  --CXR reveals shallow volumes, clear lungs with cardiomegaly. -- If cardiology is needed patient follows with [facility] cardiology; Dr. Loulou Cerrato     #HTN, HLD, Hx of Stroke, Hx of MI  -- We will continue home medications (atorvastatin, , furosemide, Imdur, Cozaar, Eliquis)  --Hold Coreg  --IV Hydralazine 10 q4h as needed     #Rheumatoid arthritis  - We will continue methotrexate. Patient takes methotrexate every Tuesday  - Continue folic acid daily    History of diabetes  - Patient normally takes glimepiride at home we will hold initiate normal sensitivity sliding scale and continue to monitor as needed. DVT Prophylaxis: Eliquis  Code Status: Full Code  POA/NOK:    This care involved high complexity medical decision making. I personally:  Reviewed the flow sheet and previous days notes  Reviewed and summarized records/history from previous days note or discussions with staff and family  Reviewed High Risk Drug therapy requiring intensive monitoring for toxicity to include but is not limited to steroids, pressors and antibiotics  Reviewed and/or clinical laboratory tests  Reviewed images and/or ordered radiology tests  Reviewed the patient's EKG/telemetry  Discussed my assessment/management with: Nursing, Patient, Family, and Hospitalist colleagues for coordination of care.      Disposition and discharge barriers:   IV abx, ID clearance, urine culture  Care Plan discussed with: Patient, nursing, case management    Current Facility-Administered Medications   Medication Dose Route Frequency    polyethylene glycol (MIRALAX) packet 17 g  17 g Oral DAILY    carvediloL (COREG) tablet 3.125 mg  3.125 mg Oral BID WITH MEALS    apixaban (ELIQUIS) tablet 2.5 mg  2.5 mg Oral BID    atorvastatin (LIPITOR) tablet 40 mg  40 mg Oral QHS    butalbital-acetaminophen-caffeine (FIORICET, ESGIC) -40 mg per tablet 1 Tablet  1 Tablet Oral Q4H PRN    ferrous sulfate tablet 325 mg  1 Tablet Oral DAILY WITH BREAKFAST    furosemide (LASIX) tablet 40 mg  40 mg Oral DAILY    folic acid (FOLVITE) tablet 1 mg  1 mg Oral DAILY    gabapentin (NEURONTIN) capsule 300 mg  300 mg Oral BID    HYDROcodone-acetaminophen (NORCO)  mg tablet 1 Tablet  1 Tablet Oral Q4H PRN    isosorbide mononitrate ER (IMDUR) tablet 30 mg  30 mg Oral DAILY    losartan (COZAAR) tablet 100 mg  100 mg Oral DAILY    meclizine (ANTIVERT) tablet 25 mg  25 mg Oral Q6H PRN    melatonin tablet 10 mg  10 mg Oral QHS PRN    montelukast (SINGULAIR) tablet 10 mg  10 mg Oral QHS    nitroglycerin (NITROSTAT) tablet 0.4 mg  0.4 mg SubLINGual Q5MIN PRN    pantoprazole (PROTONIX) tablet 40 mg  40 mg Oral ACB    potassium chloride SR (KLOR-CON 10) tablet 10 mEq  10 mEq Oral DAILY    sodium chloride (NS) flush 5-40 mL  5-40 mL IntraVENous Q8H    sodium chloride (NS) flush 5-40 mL  5-40 mL IntraVENous PRN    acetaminophen (TYLENOL) tablet 650 mg  650 mg Oral Q6H PRN    Or    acetaminophen (TYLENOL) suppository 650 mg  650 mg Rectal Q6H PRN    ondansetron (ZOFRAN ODT) tablet 4 mg  4 mg Oral Q6H PRN    Or    ondansetron (ZOFRAN) injection 4 mg  4 mg IntraVENous Q6H PRN    bisacodyL (DULCOLAX) tablet 5 mg  5 mg Oral DAILY PRN    glucose chewable tablet 16 g  4 Tablet Oral PRN    glucagon (GLUCAGEN) injection 1 mg  1 mg IntraMUSCular PRN    insulin lispro (HUMALOG) injection   SubCUTAneous AC&HS    [START ON 3/7/2023] methotrexate (RHEUMATREX) tablet 20 mg  20 mg Oral every Tuesday    fluconazole (DIFLUCAN) tablet 200 mg  200 mg Oral DAILY    diphenhydrAMINE (BENADRYL) capsule 25 mg  25 mg Oral Q6H PRN    meropenem (MERREM) 1 g in 0.9% sodium chloride (MBP/ADV) 50 mL MBP  1 g IntraVENous Q8H    hydrALAZINE (APRESOLINE) 20 mg/mL injection 10 mg  10 mg IntraVENous Q4H PRN        REVIEW OF SYSTEMS    Review of Systems   Constitutional:  Positive for malaise/fatigue. Respiratory:  Negative for cough, shortness of breath and wheezing. Cardiovascular:  Negative for chest pain, claudication and leg swelling. Gastrointestinal:  Positive for abdominal pain (Improved from yesterday). Negative for nausea and vomiting. Neurological:  Negative for dizziness and headaches. Objective:     Visit Vitals  BP (!) 153/79 (BP 1 Location: Left upper arm, BP Patient Position: At rest;Supine)   Pulse 98   Temp 97.6 °F (36.4 °C)   Resp 18   Ht 5' 6\" (1.676 m)   Wt 79.4 kg (175 lb)   SpO2 95%   Breastfeeding No   BMI 28.25 kg/m²      O2 Device: None (Room air)    Temp (24hrs), Av.1 °F (37.3 °C), Min:97.6 °F (36.4 °C), Max:100.8 °F (38.2 °C)        PHYSICAL EXAM:    Physical Exam  Vitals and nursing note reviewed. Constitutional:       Appearance: Normal appearance. HENT:      Head: Normocephalic and atraumatic. Cardiovascular:      Rate and Rhythm: Normal rate. Rhythm irregular. Heart sounds: Murmur heard. Pulmonary:      Effort: Pulmonary effort is normal. No respiratory distress. Breath sounds: Normal breath sounds. No wheezing. Abdominal:      General: Abdomen is flat. There is no distension. Palpations: Abdomen is soft. Tenderness: There is abdominal tenderness (LLQ and Suprapubic). Musculoskeletal:         General: Normal range of motion. Skin:     General: Skin is warm. Neurological:      Mental Status: She is alert. Mental status is at baseline.         Data Review    Recent Results (from the past 24 hour(s))   METABOLIC PANEL, BASIC    Collection Time: 23  9:33 AM   Result Value Ref Range    Sodium 137 136 - 145 mmol/L    Potassium 4.2 3.5 - 5.1 mmol/L    Chloride 108 97 - 108 mmol/L    CO2 30 21 - 32 mmol/L Anion gap NEG 1 5 - 15 mmol/L    Glucose 227 (H) 65 - 100 mg/dL    BUN 7 6 - 20 mg/dL    Creatinine 0.66 0.55 - 1.02 mg/dL    BUN/Creatinine ratio 11 (L) 12 - 20      eGFR >60 >60 ml/min/1.73m2    Calcium 9.1 8.5 - 10.1 mg/dL   CBC W/O DIFF    Collection Time: 03/03/23  9:33 AM   Result Value Ref Range    WBC 7.1 3.6 - 11.0 K/uL    RBC 4.29 3.80 - 5.20 M/uL    HGB 12.5 11.5 - 16.0 g/dL    HCT 39.5 35.0 - 47.0 %    MCV 92.1 80.0 - 99.0 FL    MCH 29.1 26.0 - 34.0 PG    MCHC 31.6 30.0 - 36.5 g/dL    RDW 16.9 (H) 11.5 - 14.5 %    PLATELET 239 493 - 212 K/uL    MPV 11.9 8.9 - 12.9 FL    NRBC 0.0 0.0  WBC    ABSOLUTE NRBC 0.00 0.00 - 0.01 K/uL   C REACTIVE PROTEIN, QT    Collection Time: 03/03/23  9:33 AM   Result Value Ref Range    C-Reactive protein 0.41 0.00 - 0.60 mg/dL   PROCALCITONIN    Collection Time: 03/03/23  9:33 AM   Result Value Ref Range    Procalcitonin <0.05 (H) 0 ng/mL   GLUCOSE, POC    Collection Time: 03/03/23 11:39 AM   Result Value Ref Range    Glucose (POC) 73 65 - 100 mg/dL    Performed by Marge Crenshaw    GLUCOSE, POC    Collection Time: 03/03/23  4:48 PM   Result Value Ref Range    Glucose (POC) 118 (H) 65 - 100 mg/dL    Performed by Adele Heck    GLUCOSE, POC    Collection Time: 03/03/23  9:00 PM   Result Value Ref Range    Glucose (POC) 131 (H) 65 - 100 mg/dL    Performed by Mary Fried    METABOLIC PANEL, BASIC    Collection Time: 03/04/23  7:09 AM   Result Value Ref Range    Sodium 138 136 - 145 mmol/L    Potassium 3.9 3.5 - 5.1 mmol/L    Chloride 106 97 - 108 mmol/L    CO2 31 21 - 32 mmol/L    Anion gap 1 (L) 5 - 15 mmol/L    Glucose 146 (H) 65 - 100 mg/dL    BUN 9 6 - 20 mg/dL    Creatinine 0.56 0.55 - 1.02 mg/dL    BUN/Creatinine ratio 16 12 - 20      eGFR >60 >60 ml/min/1.73m2    Calcium 9.2 8.5 - 10.1 mg/dL   CBC W/O DIFF    Collection Time: 03/04/23  7:09 AM   Result Value Ref Range    WBC 7.9 3.6 - 11.0 K/uL    RBC 4.34 3.80 - 5.20 M/uL    HGB 12.6 11.5 - 16.0 g/dL HCT 39.7 35.0 - 47.0 %    MCV 91.5 80.0 - 99.0 FL    MCH 29.0 26.0 - 34.0 PG    MCHC 31.7 30.0 - 36.5 g/dL    RDW 16.6 (H) 11.5 - 14.5 %    PLATELET 097 488 - 933 K/uL    MPV 11.6 8.9 - 12.9 FL    NRBC 0.0 0.0  WBC    ABSOLUTE NRBC 0.00 0.00 - 0.01 K/uL   GLUCOSE, POC    Collection Time: 03/04/23  7:47 AM   Result Value Ref Range    Glucose (POC) 151 (H) 65 - 100 mg/dL    Performed by RAÚL JIMÉNEZ         RETROPERITONEUM COMP   Final Result      No hydronephrosis. Subcentimeter right renal cyst. 6 small nonobstructing left   renal calculus. XR CHEST PORT   Final Result   Shallow volumes, clear lungs. Cardiomegaly. Intake and Output:  Current Shift: No intake/output data recorded. Last three shifts: 03/02 1901 - 03/04 0700  In: 340 [P.O.:240; I.V.:100]  Out: -       Lab/Data Review:  Recent Labs     03/04/23  0709 03/03/23  0933 03/02/23  0845   WBC 7.9 7.1 8.2   HGB 12.6 12.5 12.6   HCT 39.7 39.5 40.3    226 190       Recent Labs     03/04/23  0709 03/03/23  0933 03/02/23  0845    137 137   K 3.9 4.2 4.5    108 106   CO2 31 30 28   * 227* 119*   BUN 9 7 8   CREA 0.56 0.66 0.57   CA 9.2 9.1 9.1   ALB  --   --  3.5   TBILI  --   --  0.4   ALT  --   --  27   INR  --   --  0.9       No results for input(s): PH, PCO2, PO2, HCO3, FIO2 in the last 72 hours.   Recent Results (from the past 24 hour(s))   METABOLIC PANEL, BASIC    Collection Time: 03/03/23  9:33 AM   Result Value Ref Range    Sodium 137 136 - 145 mmol/L    Potassium 4.2 3.5 - 5.1 mmol/L    Chloride 108 97 - 108 mmol/L    CO2 30 21 - 32 mmol/L    Anion gap NEG 1 5 - 15 mmol/L    Glucose 227 (H) 65 - 100 mg/dL    BUN 7 6 - 20 mg/dL    Creatinine 0.66 0.55 - 1.02 mg/dL    BUN/Creatinine ratio 11 (L) 12 - 20      eGFR >60 >60 ml/min/1.73m2    Calcium 9.1 8.5 - 10.1 mg/dL   CBC W/O DIFF    Collection Time: 03/03/23  9:33 AM   Result Value Ref Range    WBC 7.1 3.6 - 11.0 K/uL    RBC 4.29 3.80 - 5.20 M/uL HGB 12.5 11.5 - 16.0 g/dL    HCT 39.5 35.0 - 47.0 %    MCV 92.1 80.0 - 99.0 FL    MCH 29.1 26.0 - 34.0 PG    MCHC 31.6 30.0 - 36.5 g/dL    RDW 16.9 (H) 11.5 - 14.5 %    PLATELET 060 157 - 145 K/uL    MPV 11.9 8.9 - 12.9 FL    NRBC 0.0 0.0  WBC    ABSOLUTE NRBC 0.00 0.00 - 0.01 K/uL   C REACTIVE PROTEIN, QT    Collection Time: 03/03/23  9:33 AM   Result Value Ref Range    C-Reactive protein 0.41 0.00 - 0.60 mg/dL   PROCALCITONIN    Collection Time: 03/03/23  9:33 AM   Result Value Ref Range    Procalcitonin <0.05 (H) 0 ng/mL   GLUCOSE, POC    Collection Time: 03/03/23 11:39 AM   Result Value Ref Range    Glucose (POC) 73 65 - 100 mg/dL    Performed by Juan Diego Matthew, POC    Collection Time: 03/03/23  4:48 PM   Result Value Ref Range    Glucose (POC) 118 (H) 65 - 100 mg/dL    Performed by Kyle Hall    GLUCOSE, POC    Collection Time: 03/03/23  9:00 PM   Result Value Ref Range    Glucose (POC) 131 (H) 65 - 100 mg/dL    Performed by Lindsey Maria    METABOLIC PANEL, BASIC    Collection Time: 03/04/23  7:09 AM   Result Value Ref Range    Sodium 138 136 - 145 mmol/L    Potassium 3.9 3.5 - 5.1 mmol/L    Chloride 106 97 - 108 mmol/L    CO2 31 21 - 32 mmol/L    Anion gap 1 (L) 5 - 15 mmol/L    Glucose 146 (H) 65 - 100 mg/dL    BUN 9 6 - 20 mg/dL    Creatinine 0.56 0.55 - 1.02 mg/dL    BUN/Creatinine ratio 16 12 - 20      eGFR >60 >60 ml/min/1.73m2    Calcium 9.2 8.5 - 10.1 mg/dL   CBC W/O DIFF    Collection Time: 03/04/23  7:09 AM   Result Value Ref Range    WBC 7.9 3.6 - 11.0 K/uL    RBC 4.34 3.80 - 5.20 M/uL    HGB 12.6 11.5 - 16.0 g/dL    HCT 39.7 35.0 - 47.0 %    MCV 91.5 80.0 - 99.0 FL    MCH 29.0 26.0 - 34.0 PG    MCHC 31.7 30.0 - 36.5 g/dL    RDW 16.6 (H) 11.5 - 14.5 %    PLATELET 736 336 - 076 K/uL    MPV 11.6 8.9 - 12.9 FL    NRBC 0.0 0.0  WBC    ABSOLUTE NRBC 0.00 0.00 - 0.01 K/uL   GLUCOSE, POC    Collection Time: 03/04/23  7:47 AM   Result Value Ref Range    Glucose (POC) 151 (H) 65 - 100 mg/dL    Performed by Maggie Field              _____________________________________________________________________________  Time spent in direct care including coordination of service, review of data and examination: 38 minutes    ______________________________________________________________________________    CHRISTIAN Monge    This is dictation was done by [employer], computer voice recognition software. Quite often unanticipated grammatical, syntax, homophones and other interpretive errors or inadvertently transcribed by the computer software. Please excuse errors that have escaped final proofreading. Thank you.

## 2023-03-04 NOTE — PROGRESS NOTES
Progress Note    Patient: Jenny Boyd MRN: [id number]  SSN: [id number]    YOB: 1935  Age: 82 y.o. Sex: female      Admit Date: 3/2/2023    LOS: 2 days     Subjective:   Patient followed for possible complicated UTI with pyuria, bacteria and yeasts. Urine culture has grown Klebsiella pneumoniae. Afebrile with normal WBC and CRP. Currently on Meropenem and Fluconazole. Reviewed medical records from [facility] and it appears that she has a neurogenic bladder with intermittent catheterization. Also has a urethral caruncle treated with topical estrogen. Last urine culture on 1/13/23 grew Klebsiella pneumoniae. It appears that she was prescribed Nitrofuantoin. Renal US in 10/2022 was unremarkable. She is resting comfortably, subjectively much improved.  at bedside. Objective:     Vitals:    03/04/23 0240 03/04/23 0901 03/04/23 1139 03/04/23 1444   BP: (!) 153/79 132/69 132/69 124/60   Pulse: 98 78 78 81   Resp: 18 18 18 20   Temp: 97.6 °F (36.4 °C) 98 °F (36.7 °C) 98 °F (36.7 °C) 98 °F (36.7 °C)   SpO2: 95% 97%  95%   Weight:       Height:            Intake and Output:  Current Shift: No intake/output data recorded. Last three shifts: 03/02 1901 - 03/04 0700  In: 340 [P.O.:240; I.V.:100]  Out: -     Physical Exam:   Vitals and nursing note reviewed. Exam conducted with a chaperone present (). Constitutional:       General: She is not in acute distress. Appearance: She is ill-appearing. HENT: unremarkable   Eyes:      Pupils: Pupils are equal, round, and reactive to light. Cardiovascular:      Rate and Rhythm: Normal rate and regular rhythm. Heart sounds: No murmur heard. Pulmonary:      Effort: Pulmonary effort is normal.      Breath sounds: Normal breath sounds. Abdominal:      General: Bowel sounds are normal. There is no distension. Palpations: Abdomen is soft. Tenderness: There is abdominal tenderness.    Genitourinary:     Comments: No Garzon  Musculoskeletal:      Right lower leg: No edema. Left lower leg: No edema. Skin:     Findings: No rash. Neurological:      General: No focal deficit present. Mental Status: She is alert and oriented to person, place, and time. Psychiatric:    normal behavior     Lab/Data Review:     WBC 7,900    CRP 0.41  Procal <0.05    Urine culture (3/2) >100,000 col/ml Klebsiella pneumoniae resistant only to Ampicillin    US Retroperitonium (3/3) No hydronephrosis. Subcentimeter right renal cyst. 6 small nonobstructing left renal calculus. Assessment:     Active Problems:    UTI (urinary tract infection) (3/2/2023)      Pyelonephritis (3/2/2023)      History of stroke (3/2/2023)      History of atrial fibrillation (3/2/2023)       Suspected complicated UTI (left nephrolithiasis, neurogenic bladder , CIC) with marked pyuria, hematuria, bacteria, budding yeasts and positive Nitrites, secondary to Klebsiella pneumoniae, Day #3 IV Meropenem and Fluconazole   2. Uncontrolled diabetes mellitus     Comment: This appears to be relapse since she had Klebsiella infection last month as well. Cause of relapse uncertain, however, possibly due to short course of therapy and use of antibiotic without reaching significant systemic levels. Levaquin would serve this purpose, however, she is reportedly allergic. Discussed this with patient and she does not feel that she is allergic to Levaquin and was receptive to challenge. No  yeasts identified, therefore, would stop Fluconazole.     Plan:     Discontinue Meropenem and Fluconazole  Start IV Levaquin and watch for adverse reaction; if none, would discharge on oral Levaquin 750 mg po daily for 10 days; if adverse reaction would discharge on IV  Ceftriaxone        Signed By: Uriel Chamberlain MD     March 4, 2023

## 2023-03-04 NOTE — PROGRESS NOTES
Bedside shift change report given to [facility] 74. (oncoming nurse) by Sd Mota (offgoing nurse). Report included the following information SBAR.

## 2023-03-04 NOTE — PROGRESS NOTES
Bedside and Verbal shift change report given to NIGHAT Bustos (oncoming nurse) by Susana Gutierrez RN  (offgoing nurse). Report included the following information SBAR and MAR.

## 2023-03-04 NOTE — PROGRESS NOTES
Problem: Falls - Risk of  Goal: *Absence of Falls  Description: Document Gilberto Garvey Fall Risk and appropriate interventions in the flowsheet.   Outcome: Progressing Towards Goal  Note: Fall Risk Interventions:                                Problem: General Medical Care Plan  Goal: *Absence of infection signs and symptoms  Outcome: Progressing Towards Goal  Goal: *Skin integrity maintained  Outcome: Progressing Towards Goal

## 2023-03-05 VITALS
OXYGEN SATURATION: 95 % | WEIGHT: 175 LBS | HEART RATE: 70 BPM | DIASTOLIC BLOOD PRESSURE: 63 MMHG | SYSTOLIC BLOOD PRESSURE: 153 MMHG | RESPIRATION RATE: 18 BRPM | TEMPERATURE: 98.1 F | BODY MASS INDEX: 28.12 KG/M2 | HEIGHT: 66 IN

## 2023-03-05 LAB
ANION GAP SERPL CALC-SCNC: 3 MMOL/L (ref 5–15)
BUN SERPL-MCNC: 11 MG/DL (ref 6–20)
BUN/CREAT SERPL: 19 (ref 12–20)
CA-I BLD-MCNC: 9 MG/DL (ref 8.5–10.1)
CHLORIDE SERPL-SCNC: 104 MMOL/L (ref 97–108)
CO2 SERPL-SCNC: 29 MMOL/L (ref 21–32)
CREAT SERPL-MCNC: 0.57 MG/DL (ref 0.55–1.02)
ERYTHROCYTE [DISTWIDTH] IN BLOOD BY AUTOMATED COUNT: 16.4 % (ref 11.5–14.5)
GLUCOSE BLD STRIP.AUTO-MCNC: 131 MG/DL (ref 65–100)
GLUCOSE SERPL-MCNC: 159 MG/DL (ref 65–100)
HCT VFR BLD AUTO: 40.5 % (ref 35–47)
HGB BLD-MCNC: 12.5 G/DL (ref 11.5–16)
MCH RBC QN AUTO: 29.1 PG (ref 26–34)
MCHC RBC AUTO-ENTMCNC: 30.9 G/DL (ref 30–36.5)
MCV RBC AUTO: 94.2 FL (ref 80–99)
NRBC # BLD: 0 K/UL (ref 0–0.01)
NRBC BLD-RTO: 0 PER 100 WBC
PERFORMED BY, TECHID: ABNORMAL
PLATELET # BLD AUTO: 228 K/UL (ref 150–400)
PMV BLD AUTO: 11.1 FL (ref 8.9–12.9)
POTASSIUM SERPL-SCNC: 4.2 MMOL/L (ref 3.5–5.1)
RBC # BLD AUTO: 4.3 M/UL (ref 3.8–5.2)
SODIUM SERPL-SCNC: 136 MMOL/L (ref 136–145)
WBC # BLD AUTO: 8.7 K/UL (ref 3.6–11)

## 2023-03-05 PROCEDURE — 82962 GLUCOSE BLOOD TEST: CPT

## 2023-03-05 PROCEDURE — 85027 COMPLETE CBC AUTOMATED: CPT

## 2023-03-05 PROCEDURE — 36415 COLL VENOUS BLD VENIPUNCTURE: CPT

## 2023-03-05 PROCEDURE — 74011000250 HC RX REV CODE- 250: Performed by: LICENSED PRACTICAL NURSE

## 2023-03-05 PROCEDURE — 80048 BASIC METABOLIC PNL TOTAL CA: CPT

## 2023-03-05 PROCEDURE — 74011250637 HC RX REV CODE- 250/637: Performed by: LICENSED PRACTICAL NURSE

## 2023-03-05 RX ORDER — LEVOFLOXACIN 750 MG/1
750 TABLET ORAL DAILY
Qty: 10 TABLET | Refills: 0 | Status: SHIPPED | OUTPATIENT
Start: 2023-03-05 | End: 2023-03-15

## 2023-03-05 RX ORDER — CARVEDILOL 3.12 MG/1
3.12 TABLET ORAL 2 TIMES DAILY WITH MEALS
Qty: 60 TABLET | Refills: 0 | Status: SHIPPED | OUTPATIENT
Start: 2023-03-05 | End: 2023-04-04

## 2023-03-05 RX ADMIN — APIXABAN 2.5 MG: 2.5 TABLET, FILM COATED ORAL at 08:58

## 2023-03-05 RX ADMIN — POLYETHYLENE GLYCOL 3350 17 G: 17 POWDER, FOR SOLUTION ORAL at 08:59

## 2023-03-05 RX ADMIN — FERROUS SULFATE TAB 325 MG (65 MG ELEMENTAL FE) 325 MG: 325 (65 FE) TAB at 08:59

## 2023-03-05 RX ADMIN — SODIUM CHLORIDE, PRESERVATIVE FREE 10 ML: 5 INJECTION INTRAVENOUS at 06:57

## 2023-03-05 RX ADMIN — GABAPENTIN 300 MG: 300 CAPSULE ORAL at 08:58

## 2023-03-05 RX ADMIN — POTASSIUM CHLORIDE 10 MEQ: 750 TABLET, FILM COATED, EXTENDED RELEASE ORAL at 08:58

## 2023-03-05 RX ADMIN — FUROSEMIDE 40 MG: 40 TABLET ORAL at 08:59

## 2023-03-05 RX ADMIN — FOLIC ACID 1 MG: 1 TABLET ORAL at 08:58

## 2023-03-05 RX ADMIN — CARVEDILOL 3.12 MG: 3.12 TABLET, FILM COATED ORAL at 08:58

## 2023-03-05 RX ADMIN — PANTOPRAZOLE SODIUM 40 MG: 40 TABLET, DELAYED RELEASE ORAL at 06:55

## 2023-03-05 RX ADMIN — ISOSORBIDE MONONITRATE 30 MG: 60 TABLET, EXTENDED RELEASE ORAL at 08:58

## 2023-03-05 RX ADMIN — LOSARTAN POTASSIUM 100 MG: 50 TABLET, FILM COATED ORAL at 08:58

## 2023-03-05 NOTE — DISCHARGE SUMMARY
Hospitalist Discharge Summary     Patient ID:    Kaylah Marshall  [id number]  82 y.o.  1935    Admit date: 3/2/2023    Discharge date : 3/5/2023      Final Diagnoses: Active Problems:    UTI (urinary tract infection) (3/2/2023)      Pyelonephritis (3/2/2023)      History of stroke (3/2/2023)      History of atrial fibrillation (3/2/2023)      Reason for Hospitalization/Hospital Course:   Kaylah Marshall is a 82 y.o. female with a past medical history significant for A-fib, MI, stroke, rheumatoid arthritis, diabetes, that presents to the emergency department because she \"just feels bad. she has developed some chest discomfort, generalized body aches, nausea, vomiting, fatigue. She states that her chest discomfort is 4 out of 10 in severity localized to her left side and is intermittent in nature. In addition she reports some suprapubic pelvic pain, left-sided abdominal pain, back pain. She denies chills, urinary symptoms, changes in vision, palpitations, fever. Labs in the ED are unremarkable. Troponin and BNP are within normal limits. Urinalysis is positive for nitrites, LE, 4+ bacteria and budding yeast.  Chest x-ray reveals shallow lung volumes but clear lungs with cardiomegaly. Patient admitted for management of complicated UTI. Originally, started on Levofloxacin to which she developed hives, so antibiotic was switched to Meropenem and fluconazole. ID consulted. Final urine cultures returned and revealed Klebsiella with multiple susceptibilities. Patietn didn't think levofloxacin was a ture allergy. Levofloxacin challenge initiated to which patient tolerated the drug well. Patient medically stable for discharge. She will be discharged home with  on levofloxacin 750 mg by mouth for 10 days.   She has instructions to follow-up with primary care for management of comorbid conditions and careful management of blood pressure and diabetes (A1c on 03/02/2023 was 6%)    Discharge Medications:   Current Discharge Medication List        START taking these medications    Details   levoFLOXacin (LEVAQUIN) 750 mg tablet Take 1 Tablet by mouth daily for 10 days. Qty: 10 Tablet, Refills: 0  Start date: 3/5/2023, End date: 3/15/2023           CONTINUE these medications which have CHANGED    Details   carvediloL (COREG) 3.125 mg tablet Take 1 Tablet by mouth two (2) times daily (with meals) for 30 days. Qty: 60 Tablet, Refills: 0  Start date: 3/5/2023, End date: 4/4/2023           CONTINUE these medications which have NOT CHANGED    Details   donepeziL (ARICEPT) 10 mg tablet Take 10 mg by mouth nightly. cyclobenzaprine (FLEXERIL) 10 mg tablet Take 5 mg by mouth three (3) times daily as needed for Muscle Spasm(s). glimepiride (AMARYL) 4 mg tablet Take 4 mg by mouth every morning. HYDROcodone-acetaminophen (NORCO)  mg tablet Take 1 Tab by mouth every four (4) hours as needed. meclizine (ANTIVERT) 25 mg tablet meclizine 25 mg tablet      montelukast (SINGULAIR) 10 mg tablet Take 10 mg by mouth daily. pantoprazole (PROTONIX) 40 mg tablet Take 40 mg by mouth. furosemide (LASIX) 40 mg tablet Take 40 mg by mouth daily. apixaban (ELIQUIS) 2.5 mg tablet Take 2.5 mg by mouth two (2) times a day. methotrexate (RHEUMATREX) 2.5 mg tablet Take 20 mg by mouth every Tuesday. melatonin 10 mg capsule melatonin 10 mg capsule   Take by oral route. losartan (COZAAR) 100 mg tablet take 1 tablet by mouth once daily      isosorbide mononitrate ER (IMDUR) 30 mg tablet Take 30 mg by mouth daily. atorvastatin (LIPITOR) 40 mg tablet Take 40 mg by mouth nightly. butalbital-acetaminophen-caffeine (FIORICET, ESGIC) -40 mg per tablet Take 1 Tab by mouth every four (4) hours as needed.   Refills: 0    Associated Diagnoses: Osteoporosis, unspecified; Type 2 diabetes mellitus with hyperglycemia, without long-term current use of insulin ([address] folic acid (FOLVITE) 1 mg tablet Take 1 mg by mouth daily. Refills: 0    Associated Diagnoses: Osteoporosis, unspecified; Type 2 diabetes mellitus with hyperglycemia, without long-term current use of insulin ([facility])      ferrous sulfate 325 mg (65 mg iron) tablet Take 325 mg by mouth daily. Refills: 0    Associated Diagnoses: Osteoporosis, unspecified; Type 2 diabetes mellitus with hyperglycemia, without long-term current use of insulin ([facility])      gabapentin (NEURONTIN) 300 mg capsule Take 300 mg by mouth two (2) times a day. Associated Diagnoses: DM (diabetes mellitus) ([facility] Type II or unspecified type diabetes mellitus without mention of complication, not stated as uncontrolled      NITROSTAT 0.4 mg SL tablet 0.4 mg by SubLINGual route every five (5) minutes as needed. Associated Diagnoses: DM (diabetes mellitus) ([facility] Type II or unspecified type diabetes mellitus without mention of complication, not stated as uncontrolled      polyethylene glycol (MIRALAX) 17 gram/dose powder Take 17 g by mouth daily as needed. Associated Diagnoses: Type II or unspecified type diabetes mellitus without mention of complication, not stated as uncontrolled      potassium chloride SA (MICRO-K) 10 mEq capsule Take 10 mEq by mouth daily.     Associated Diagnoses: Type II or unspecified type diabetes mellitus without mention of complication, not stated as uncontrolled           STOP taking these medications       amitriptyline (ELAVIL) 10 mg tablet Comments:   Reason for Stopping:         dicyclomine (BENTYL) 10 mg capsule Comments:   Reason for Stopping:         dapagliflozin (FARXIGA) 10 mg tab tablet Comments:   Reason for Stopping:         metFORMIN (GLUCOPHAGE) 500 mg tablet Comments:   Reason for Stopping:         sotaloL (BETAPACE) 80 mg tablet Comments:   Reason for Stopping:         sulfaSALAzine (AZULFIDINE) 500 mg tablet Comments:   Reason for Stopping:         promethazine (PHENERGAN) 25 mg tablet Comments: Reason for Stopping:         icosapent ethyL (VASCEPA) 1 gram capsule Comments:   Reason for Stopping:         pravastatin (PRAVACHOL) 40 mg tablet Comments:   Reason for Stopping:         diclofenac (VOLTAREN) 1 % gel Comments:   Reason for Stopping: Follow up Care:    1. Kiera Guy MD in 1-2 weeks. Follow-up Information       Follow up With Specialties Details Why Chris Dyer MD Internal Medicine Physician Follow up in 2 week(s) As needed, If symptoms worsen [facility]  [phone]                Patient Follow Up Instructions: Activity: Activity as tolerated  Diet:  Cardiac Diet and Diabetic Diet  Wound Care: None needed     Condition at Discharge:  Stable  __________________________________________________________________    Disposition  Home or Self Care  ____________________________________________________________________    Code Status:  Full Code  ___________________________________________________________________    Discharge Exam:  Patient seen and examined by me on discharge day. Pertinent Findings:  Gen:    Not in distress  Chest: Clear lungs  CVS:   Regular rhythm.   No edema  Abd:  Soft, not distended, not tender  Neuro:  Alert        CONSULTATIONS: ID    Significant Diagnostic Studies:   Recent Results (from the past 24 hour(s))   GLUCOSE, POC    Collection Time: 03/04/23 11:07 AM   Result Value Ref Range    Glucose (POC) 130 (H) 65 - 100 mg/dL    Performed by Sweetie Hurtado    GLUCOSE, POC    Collection Time: 03/04/23  3:44 PM   Result Value Ref Range    Glucose (POC) 164 (H) 65 - 100 mg/dL    Performed by Schuyler Mays    GLUCOSE, POC    Collection Time: 03/04/23  9:55 PM   Result Value Ref Range    Glucose (POC) 138 (H) 65 - 100 mg/dL    Performed by Susan Boston    GLUCOSE, POC    Collection Time: 03/05/23  7:21 AM   Result Value Ref Range    Glucose (POC) 131 (H) 65 - 100 mg/dL    Performed by JONATAN ROMERO    CBC W/O DIFF Collection Time: 03/05/23  8:10 AM   Result Value Ref Range    WBC 8.7 3.6 - 11.0 K/uL    RBC 4.30 3.80 - 5.20 M/uL    HGB 12.5 11.5 - 16.0 g/dL    HCT 40.5 35.0 - 47.0 %    MCV 94.2 80.0 - 99.0 FL    MCH 29.1 26.0 - 34.0 PG    MCHC 30.9 30.0 - 36.5 g/dL    RDW 16.4 (H) 11.5 - 14.5 %    PLATELET 256 605 - 688 K/uL    MPV 11.1 8.9 - 12.9 FL    NRBC 0.0 0.0  WBC    ABSOLUTE NRBC 0.00 0.00 - 2.55 K/uL   METABOLIC PANEL, BASIC    Collection Time: 03/05/23  8:10 AM   Result Value Ref Range    Sodium 136 136 - 145 mmol/L    Potassium 4.2 3.5 - 5.1 mmol/L    Chloride 104 97 - 108 mmol/L    CO2 29 21 - 32 mmol/L    Anion gap 3 (L) 5 - 15 mmol/L    Glucose 159 (H) 65 - 100 mg/dL    BUN 11 6 - 20 mg/dL    Creatinine 0.57 0.55 - 1.02 mg/dL    BUN/Creatinine ratio 19 12 - 20      eGFR >60 >60 ml/min/1.73m2    Calcium 9.0 8.5 - 10.1 mg/dL     US RETROPERITONEUM COMP   Final Result      No hydronephrosis. Subcentimeter right renal cyst. 6 small nonobstructing left   renal calculus. XR CHEST PORT   Final Result   Shallow volumes, clear lungs. Cardiomegaly.           Time spent in direct and indirect care including coordination of services: 36 minutes    Signed:  Isiah Sandoval  3/5/2023  9:46 AM

## 2023-03-05 NOTE — PROGRESS NOTES
Problem: Falls - Risk of  Goal: *Absence of Falls  Description: Document Gisell Degroot Fall Risk and appropriate interventions in the flowsheet.   3/5/2023 1054 by Aura Ortiz RN  Outcome: Resolved/Met  Note: Fall Risk Interventions:                             3/5/2023 1054 by Aura Ortiz RN  Outcome: Progressing Towards Goal  Note: Fall Risk Interventions:                             3/5/2023 0805 by Aura Ortiz RN  Outcome: Progressing Towards Goal  Note: Fall Risk Interventions:                                Problem: Patient Education: Go to Patient Education Activity  Goal: Patient/Family Education  3/5/2023 1054 by Aura Ortiz RN  Outcome: Resolved/Met  3/5/2023 1054 by Aura Ortiz RN  Outcome: Progressing Towards Goal  3/5/2023 0805 by Aura Ortiz RN  Outcome: Progressing Towards Goal     Problem: General Medical Care Plan  Goal: *Absence of infection signs and symptoms  3/5/2023 1054 by Aura Ortiz RN  Outcome: Resolved/Met  3/5/2023 1054 by Aura Ortiz RN  Outcome: Progressing Towards Goal  3/5/2023 0805 by Aura Ortiz RN  Outcome: Progressing Towards Goal  Goal: *Skin integrity maintained  3/5/2023 1054 by Aura Ortiz RN  Outcome: Resolved/Met  3/5/2023 1054 by Aura Ortiz RN  Outcome: Progressing Towards Goal  3/5/2023 0805 by Aura Ortiz RN  Outcome: Progressing Towards Goal     Problem: Discharge Planning  Goal: *Discharge to safe environment  3/5/2023 1054 by Aura Ortiz RN  Outcome: Resolved/Met  3/5/2023 1054 by Aura Ortiz RN  Outcome: Progressing Towards Goal  3/5/2023 0805 by Aura Ortiz RN  Outcome: Progressing Towards Goal  Goal: *Knowledge of medication management  3/5/2023 1054 by Aura Ortiz RN  Outcome: Resolved/Met  3/5/2023 1054 by Aura Ortiz RN  Outcome: Progressing Towards Goal  3/5/2023 0805 by Aura Ortiz RN  Outcome: Progressing Towards Goal  Goal: *Knowledge of discharge instructions  3/5/2023 1054 by Alvino Orona RN  Outcome: Resolved/Met  3/5/2023 1054 by Alvino Orona RN  Outcome: Progressing Towards Goal  3/5/2023 0805 by Alvino Orona RN  Outcome: Progressing Towards Goal     Problem: Patient Education: Go to Patient Education Activity  Goal: Patient/Family Education  3/5/2023 1054 by Alvino Orona RN  Outcome: Resolved/Met  3/5/2023 1054 by Alvino Orona RN  Outcome: Progressing Towards Goal  3/5/2023 0805 by Alvino Orona RN  Outcome: Progressing Towards Goal

## 2023-03-05 NOTE — PROGRESS NOTES
Problem: Falls - Risk of  Goal: *Absence of Falls  Description: Document Quita Guaman Fall Risk and appropriate interventions in the flowsheet.   3/5/2023 1054 by Tim Pino RN  Outcome: Progressing Towards Goal  Note: Fall Risk Interventions:                             3/5/2023 0805 by Tim Pino RN  Outcome: Progressing Towards Goal  Note: Fall Risk Interventions:                                Problem: Patient Education: Go to Patient Education Activity  Goal: Patient/Family Education  3/5/2023 1054 by Tim Pino RN  Outcome: Progressing Towards Goal  3/5/2023 0805 by Tim Pino RN  Outcome: Progressing Towards Goal     Problem: General Medical Care Plan  Goal: *Absence of infection signs and symptoms  3/5/2023 1054 by Tim Pino RN  Outcome: Progressing Towards Goal  3/5/2023 0805 by Tim Pino RN  Outcome: Progressing Towards Goal  Goal: *Skin integrity maintained  3/5/2023 1054 by Tim Pino RN  Outcome: Progressing Towards Goal  3/5/2023 0805 by Tim Pino RN  Outcome: Progressing Towards Goal     Problem: Discharge Planning  Goal: *Discharge to safe environment  3/5/2023 1054 by Tim Pino RN  Outcome: Progressing Towards Goal  3/5/2023 0805 by Tim Pino RN  Outcome: Progressing Towards Goal  Goal: *Knowledge of medication management  3/5/2023 1054 by Tim Pino RN  Outcome: Progressing Towards Goal  3/5/2023 0805 by Tim Pino RN  Outcome: Progressing Towards Goal  Goal: *Knowledge of discharge instructions  3/5/2023 1054 by Tim Pino RN  Outcome: Progressing Towards Goal  3/5/2023 0805 by Tim Pino RN  Outcome: Progressing Towards Goal     Problem: Patient Education: Go to Patient Education Activity  Goal: Patient/Family Education  3/5/2023 1054 by Tim Pino RN  Outcome: Progressing Towards Goal  3/5/2023 0805 by Tim Pino RN  Outcome: Progressing Towards Goal

## 2023-03-05 NOTE — PROGRESS NOTES
Discharge order noted. Discharge instructions given to pt w pt verbalizing understanding. Prescriptions e-scribed to pts personal pharmacy. PIV x 1 removed with cath tip intact. Tele monitor removed and sent to 4W. Condition stable at time of discharge.

## 2023-03-05 NOTE — PROGRESS NOTES
Problem: Falls - Risk of  Goal: *Absence of Falls  Description: Document Gurdeep Yan Fall Risk and appropriate interventions in the flowsheet.   Outcome: Progressing Towards Goal  Note: Fall Risk Interventions:                                Problem: Patient Education: Go to Patient Education Activity  Goal: Patient/Family Education  Outcome: Progressing Towards Goal     Problem: General Medical Care Plan  Goal: *Absence of infection signs and symptoms  Outcome: Progressing Towards Goal  Goal: *Skin integrity maintained  Outcome: Progressing Towards Goal     Problem: Discharge Planning  Goal: *Discharge to safe environment  Outcome: Progressing Towards Goal  Goal: *Knowledge of medication management  Outcome: Progressing Towards Goal  Goal: *Knowledge of discharge instructions  Outcome: Progressing Towards Goal     Problem: Patient Education: Go to Patient Education Activity  Goal: Patient/Family Education  Outcome: Progressing Towards Goal

## 2023-03-05 NOTE — PROGRESS NOTES
Problem: Falls - Risk of  Goal: *Absence of Falls  Description: Document eYny Chinchilla Fall Risk and appropriate interventions in the flowsheet.   Outcome: Progressing Towards Goal  Note: Fall Risk Interventions: bed alarm, bed in lowest position, call bell within reach                                 Problem: Patient Education: Go to Patient Education Activity  Goal: Patient/Family Education  Outcome: Progressing Towards Goal     Problem: General Medical Care Plan  Goal: *Absence of infection signs and symptoms  Outcome: Progressing Towards Goal  Goal: *Skin integrity maintained  Outcome: Progressing Towards Goal     Problem: Discharge Planning  Goal: *Discharge to safe environment  Outcome: Progressing Towards Goal  Goal: *Knowledge of medication management  Outcome: Progressing Towards Goal  Goal: *Knowledge of discharge instructions  Outcome: Progressing Towards Goal     Problem: Patient Education: Go to Patient Education Activity  Goal: Patient/Family Education  Outcome: Progressing Towards Goal

## 2023-03-06 NOTE — PROGRESS NOTES
Progress Note    Patient: Claudette Tejada MRN: [id number]  SSN: [id number]    YOB: 1935  Age: 82 y.o. Sex: female      Admit Date: 3/2/2023    LOS: 3 days     Subjective:   Patient followed for possible complicated UTI with pyuria, bacteria and yeasts. Urine culture has grown Klebsiella pneumoniae. Afebrile with normal WBC and CRP. Currently on Meropenem and Fluconazole. Reviewed medical records from [facility] and it appears that she has a neurogenic bladder with intermittent catheterization. Also has a urethral caruncle treated with topical estrogen. Last urine culture on 1/13/23 grew Klebsiella pneumoniae. She apparently  tolerated Levaquin and has been discharged. Objective:     Vitals:    03/04/23 1444 03/04/23 2059 03/05/23 0239 03/05/23 0733   BP: 124/60 (!) 152/65 126/63 (!) 153/63   Pulse: 81 85 94 70   Resp: 20  19 18   Temp: 98 °F (36.7 °C) 98.3 °F (36.8 °C) 98.8 °F (37.1 °C) 98.1 °F (36.7 °C)   SpO2: 95%  94% 95%   Weight:       Height:            Intake and Output:  Current Shift: No intake/output data recorded. Last three shifts: No intake/output data recorded. Physical Exam:   Vitals and nursing note reviewed. Exam conducted with a chaperone present (). Patient unavailable for re-examination  Constitutional:       General: She is not in acute distress. Appearance: She is ill-appearing. HENT: unremarkable   Eyes:      Pupils: Pupils are equal, round, and reactive to light. Cardiovascular:      Rate and Rhythm: Normal rate and regular rhythm. Heart sounds: No murmur heard. Pulmonary:      Effort: Pulmonary effort is normal.      Breath sounds: Normal breath sounds. Abdominal:      General: Bowel sounds are normal. There is no distension. Palpations: Abdomen is soft. Tenderness: There is abdominal tenderness. Genitourinary:     Comments: No Garzon  Musculoskeletal:      Right lower leg: No edema. Left lower leg: No edema. Skin:     Findings: No rash. Neurological:      General: No focal deficit present. Mental Status: She is alert and oriented to person, place, and time. Psychiatric:    normal behavior     Lab/Data Review:     WBC 8,700    CRP 0.41  Procal <0.05    Urine culture (3/2) >100,000 col/ml Klebsiella pneumoniae resistant only to Ampicillin    US Retroperitonium (3/3) No hydronephrosis. Subcentimeter right renal cyst. 6 small nonobstructing left renal calculus. Assessment:     Active Problems:    UTI (urinary tract infection) (3/2/2023)      Pyelonephritis (3/2/2023)      History of stroke (3/2/2023)      History of atrial fibrillation (3/2/2023)       Suspected complicated UTI (left nephrolithiasis, neurogenic bladder , CIC) with marked pyuria, hematuria, bacteria, budding yeasts and positive Nitrites, secondary to Klebsiella pneumoniae, Day #4 IV antibiotics, now Levaquin  2.   Uncontrolled diabetes mellitus       Patient discharged on oral Levaquin 750 mg po daily for 10 days   Follow-up ID Clinic as needed       Signed By: Doyle Newman MD     March 5, 2023

## 2023-03-06 NOTE — PROGRESS NOTES
Physician Progress Note      Jakob Bella  [facility] #:                  [id number]  :                       1935  ADMIT DATE:       3/2/2023 8:15 AM  DISCH DATE:        3/5/2023 10:57 AM  RESPONDING  PROVIDER #:        Wilda GONZALES          QUERY TEXT:    Dear Attending,    Pt admitted with UTI. Pt noted to perform intermittent catheterizations. If possible, please document in the progress notes and discharge summary if you are evaluating and/or treating any of the following: The medical record reflects the following:  Risk Factors: 82year old female, neurogenic bladder, multiple UTI's  Clinical Indicators: 3/5 ID note - Reviewed medical records from [facility] and it appears that she has a neurogenic bladder with intermittent catheterization. Treatment: IV Ceftriaxone, IV Meropenem, IVF NS, PO Diflucan, IV Levaquin      Please email [email or web address] with any questions  Options provided:  -- UTI due to self catheterization  -- UTI not due to self catheterization  -- Other - I will add my own diagnosis  -- Disagree - Not applicable / Not valid  -- Disagree - Clinically unable to determine / Unknown  -- Refer to Clinical Documentation Reviewer    PROVIDER RESPONSE TEXT:    UTI is due to self catheterization. Query created by:  Roseanne Schaffer on 3/6/2023 5:33 AM      Electronically signed by:  Wilda GONZALES 3/6/2023 6:49 PM

## 2023-04-01 PROBLEM — N39.0 UTI (URINARY TRACT INFECTION): Status: RESOLVED | Noted: 2023-03-02 | Resolved: 2023-04-01

## 2023-05-22 RX ORDER — DONEPEZIL HYDROCHLORIDE 10 MG/1
10 TABLET, FILM COATED ORAL NIGHTLY
COMMUNITY

## 2023-05-22 RX ORDER — POTASSIUM CHLORIDE 750 MG/1
10 CAPSULE, EXTENDED RELEASE ORAL DAILY
COMMUNITY
Start: 2011-02-08

## 2023-05-22 RX ORDER — CYCLOBENZAPRINE HCL 10 MG
5 TABLET ORAL 3 TIMES DAILY PRN
COMMUNITY

## 2023-05-22 RX ORDER — FERROUS SULFATE 325(65) MG
325 TABLET ORAL DAILY
COMMUNITY
Start: 2016-12-22

## 2023-05-22 RX ORDER — LOSARTAN POTASSIUM 100 MG/1
1 TABLET ORAL DAILY
COMMUNITY
Start: 2020-10-18

## 2023-05-22 RX ORDER — HYDROCODONE BITARTRATE AND ACETAMINOPHEN 10; 325 MG/1; MG/1
1 TABLET ORAL EVERY 4 HOURS PRN
COMMUNITY
Start: 2019-10-17

## 2023-05-22 RX ORDER — ISOSORBIDE MONONITRATE 30 MG/1
30 TABLET, EXTENDED RELEASE ORAL DAILY
COMMUNITY
Start: 2020-12-14

## 2023-05-22 RX ORDER — ATORVASTATIN CALCIUM 40 MG/1
40 TABLET, FILM COATED ORAL NIGHTLY
COMMUNITY
Start: 2020-10-09

## 2023-05-22 RX ORDER — FOLIC ACID 1 MG/1
1 TABLET ORAL DAILY
COMMUNITY
Start: 2016-12-20

## 2023-05-22 RX ORDER — PANTOPRAZOLE SODIUM 40 MG/1
40 TABLET, DELAYED RELEASE ORAL
COMMUNITY

## 2023-05-22 RX ORDER — POLYETHYLENE GLYCOL 3350 17 G/17G
17 POWDER, FOR SOLUTION ORAL DAILY PRN
COMMUNITY

## 2023-05-22 RX ORDER — BUTALBITAL, ACETAMINOPHEN AND CAFFEINE 50; 325; 40 MG/1; MG/1; MG/1
1 TABLET ORAL EVERY 4 HOURS PRN
COMMUNITY
Start: 2017-01-05

## 2023-05-22 RX ORDER — GABAPENTIN 300 MG/1
300 CAPSULE ORAL 2 TIMES DAILY
COMMUNITY
Start: 2011-04-04

## 2023-05-22 RX ORDER — MELATONIN 10 MG
CAPSULE ORAL
COMMUNITY

## 2023-05-22 RX ORDER — NITROGLYCERIN 0.4 MG/1
0.4 TABLET SUBLINGUAL
COMMUNITY
Start: 2011-03-08

## 2023-05-22 RX ORDER — GLIMEPIRIDE 4 MG/1
4 TABLET ORAL
COMMUNITY

## 2023-05-22 RX ORDER — MONTELUKAST SODIUM 10 MG/1
10 TABLET ORAL DAILY
COMMUNITY

## 2023-05-22 RX ORDER — MECLIZINE HYDROCHLORIDE 25 MG/1
TABLET ORAL
COMMUNITY

## 2023-05-22 RX ORDER — FUROSEMIDE 40 MG/1
40 TABLET ORAL DAILY
COMMUNITY

## 2023-06-27 ENCOUNTER — TRANSCRIBE ORDERS (OUTPATIENT)
Facility: HOSPITAL | Age: 88
End: 2023-06-27

## 2023-06-27 DIAGNOSIS — R10.9 ACUTE ABDOMINAL PAIN: ICD-10-CM

## 2023-06-27 DIAGNOSIS — N39.0 URINARY TRACT INFECTION WITHOUT HEMATURIA, SITE UNSPECIFIED: Primary | ICD-10-CM

## 2023-07-12 ENCOUNTER — HOSPITAL ENCOUNTER (OUTPATIENT)
Facility: HOSPITAL | Age: 88
Discharge: HOME | End: 2023-07-15

## 2023-07-12 DIAGNOSIS — N39.0 URINARY TRACT INFECTION WITHOUT HEMATURIA, SITE UNSPECIFIED: ICD-10-CM

## 2023-07-12 DIAGNOSIS — R10.9 ACUTE ABDOMINAL PAIN: ICD-10-CM

## 2023-07-18 ENCOUNTER — TRANSCRIBE ORDERS (OUTPATIENT)
Facility: HOSPITAL | Age: 88
End: 2023-07-18

## 2023-07-18 DIAGNOSIS — R31.29 MICROSCOPIC HEMATURIA: ICD-10-CM

## 2023-07-18 DIAGNOSIS — N39.0 URINARY TRACT INFECTION WITHOUT HEMATURIA, SITE UNSPECIFIED: Primary | ICD-10-CM

## 2023-08-01 ENCOUNTER — HOSPITAL ENCOUNTER (OUTPATIENT)
Facility: HOSPITAL | Age: 88
Discharge: HOME | End: 2023-08-04
Payer: MEDICARE

## 2023-08-01 DIAGNOSIS — R31.29 MICROSCOPIC HEMATURIA: ICD-10-CM

## 2023-08-01 DIAGNOSIS — N39.0 URINARY TRACT INFECTION WITHOUT HEMATURIA, SITE UNSPECIFIED: ICD-10-CM

## 2023-08-01 PROCEDURE — 74176 CT ABD & PELVIS W/O CONTRAST: CPT

## 2023-09-12 ENCOUNTER — APPOINTMENT (OUTPATIENT)
Facility: HOSPITAL | Age: 88
DRG: 689 | End: 2023-09-12
Payer: MEDICARE

## 2023-09-12 ENCOUNTER — HOSPITAL ENCOUNTER (INPATIENT)
Facility: HOSPITAL | Age: 88
LOS: 1 days | Discharge: HOME HEALTH | DRG: 689 | End: 2023-09-14
Attending: STUDENT IN AN ORGANIZED HEALTH CARE EDUCATION/TRAINING PROGRAM | Admitting: INTERNAL MEDICINE
Payer: MEDICARE

## 2023-09-12 DIAGNOSIS — N30.01 ACUTE CYSTITIS WITH HEMATURIA: Primary | ICD-10-CM

## 2023-09-12 DIAGNOSIS — E87.20 LACTIC ACIDOSIS: ICD-10-CM

## 2023-09-12 DIAGNOSIS — E86.0 DEHYDRATION: ICD-10-CM

## 2023-09-12 DIAGNOSIS — Z86.73 HISTORY OF STROKE: ICD-10-CM

## 2023-09-12 DIAGNOSIS — R52 GENERALIZED PAIN: ICD-10-CM

## 2023-09-12 DIAGNOSIS — N39.0 URINARY TRACT INFECTION IN FEMALE: ICD-10-CM

## 2023-09-12 DIAGNOSIS — Z86.79 HISTORY OF ATRIAL FIBRILLATION: ICD-10-CM

## 2023-09-12 LAB
ALBUMIN SERPL-MCNC: 3.7 G/DL (ref 3.5–5)
ALBUMIN/GLOB SERPL: 1 (ref 1.1–2.2)
ALP SERPL-CCNC: 86 U/L (ref 45–117)
ALT SERPL-CCNC: 25 U/L (ref 12–78)
ANION GAP SERPL CALC-SCNC: 8 MMOL/L (ref 5–15)
APPEARANCE UR: CLEAR
AST SERPL W P-5'-P-CCNC: 21 U/L (ref 15–37)
BACTERIA URNS QL MICRO: ABNORMAL /HPF
BASOPHILS # BLD: 0.1 K/UL (ref 0–0.1)
BASOPHILS NFR BLD: 1 % (ref 0–1)
BILIRUB SERPL-MCNC: 0.3 MG/DL (ref 0.2–1)
BILIRUB UR QL: NEGATIVE
BUN SERPL-MCNC: 15 MG/DL (ref 6–20)
BUN/CREAT SERPL: 16 (ref 12–20)
CA-I BLD-MCNC: 9.8 MG/DL (ref 8.5–10.1)
CHLORIDE SERPL-SCNC: 101 MMOL/L (ref 97–108)
CO2 SERPL-SCNC: 30 MMOL/L (ref 21–32)
COLOR UR: YELLOW
CREAT SERPL-MCNC: 0.94 MG/DL (ref 0.55–1.02)
DIFFERENTIAL METHOD BLD: ABNORMAL
EKG ATRIAL RATE: 58 BPM
EKG DIAGNOSIS: NORMAL
EKG P AXIS: -2 DEGREES
EKG P-R INTERVAL: 150 MS
EKG Q-T INTERVAL: 412 MS
EKG QRS DURATION: 128 MS
EKG QTC CALCULATION (BAZETT): 404 MS
EKG R AXIS: -52 DEGREES
EKG T AXIS: 108 DEGREES
EKG VENTRICULAR RATE: 58 BPM
EOSINOPHIL # BLD: 0.4 K/UL (ref 0–0.4)
EOSINOPHIL NFR BLD: 5 % (ref 0–7)
EPITH CASTS URNS QL MICRO: ABNORMAL /LPF
ERYTHROCYTE [DISTWIDTH] IN BLOOD BY AUTOMATED COUNT: 14.6 % (ref 11.5–14.5)
GLOBULIN SER CALC-MCNC: 3.6 G/DL (ref 2–4)
GLUCOSE BLD STRIP.AUTO-MCNC: 108 MG/DL (ref 65–100)
GLUCOSE SERPL-MCNC: 152 MG/DL (ref 65–100)
GLUCOSE UR STRIP.AUTO-MCNC: >1000 MG/DL
HCT VFR BLD AUTO: 38.5 % (ref 35–47)
HGB BLD-MCNC: 11.9 G/DL (ref 11.5–16)
HGB UR QL STRIP: ABNORMAL
IMM GRANULOCYTES # BLD AUTO: 0 K/UL (ref 0–0.04)
IMM GRANULOCYTES NFR BLD AUTO: 0 % (ref 0–0.5)
KETONES UR QL STRIP.AUTO: NEGATIVE MG/DL
LACTATE BLD-SCNC: 2.15 MMOL/L (ref 0.4–2)
LACTATE BLD-SCNC: 2.79 MMOL/L (ref 0.4–2)
LACTATE BLD-SCNC: 2.98 MMOL/L (ref 0.4–2)
LACTATE SERPL-SCNC: 2.1 MMOL/L (ref 0.4–2)
LEUKOCYTE ESTERASE UR QL STRIP.AUTO: ABNORMAL
LIPASE SERPL-CCNC: 79 U/L (ref 73–393)
LYMPHOCYTES # BLD: 1.6 K/UL (ref 0.8–3.5)
LYMPHOCYTES NFR BLD: 21 % (ref 12–49)
MAGNESIUM SERPL-MCNC: 2 MG/DL (ref 1.6–2.4)
MCH RBC QN AUTO: 28.3 PG (ref 26–34)
MCHC RBC AUTO-ENTMCNC: 30.9 G/DL (ref 30–36.5)
MCV RBC AUTO: 91.7 FL (ref 80–99)
MONOCYTES # BLD: 0.8 K/UL (ref 0–1)
MONOCYTES NFR BLD: 10 % (ref 5–13)
NEUTS SEG # BLD: 4.7 K/UL (ref 1.8–8)
NEUTS SEG NFR BLD: 63 % (ref 32–75)
NITRITE UR QL STRIP.AUTO: NEGATIVE
PERFORMED BY:: ABNORMAL
PH UR STRIP: 5 (ref 5–8)
PLATELET # BLD AUTO: 204 K/UL (ref 150–400)
PMV BLD AUTO: 11.9 FL (ref 8.9–12.9)
POTASSIUM SERPL-SCNC: 4.4 MMOL/L (ref 3.5–5.1)
PROT SERPL-MCNC: 7.3 G/DL (ref 6.4–8.2)
PROT UR STRIP-MCNC: NEGATIVE MG/DL
RBC # BLD AUTO: 4.2 M/UL (ref 3.8–5.2)
RBC #/AREA URNS HPF: ABNORMAL /HPF (ref 0–5)
SODIUM SERPL-SCNC: 139 MMOL/L (ref 136–145)
SP GR UR REFRACTOMETRY: 1.01 (ref 1–1.03)
TROPONIN I SERPL HS-MCNC: 13 NG/L (ref 0–51)
URINE CULTURE IF INDICATED: ABNORMAL
UROBILINOGEN UR QL STRIP.AUTO: 0.1 EU/DL (ref 0.2–1)
WBC # BLD AUTO: 7.7 K/UL (ref 3.6–11)
WBC URNS QL MICRO: >100 /HPF (ref 0–4)

## 2023-09-12 PROCEDURE — 96361 HYDRATE IV INFUSION ADD-ON: CPT

## 2023-09-12 PROCEDURE — 85025 COMPLETE CBC W/AUTO DIFF WBC: CPT

## 2023-09-12 PROCEDURE — 81001 URINALYSIS AUTO W/SCOPE: CPT

## 2023-09-12 PROCEDURE — 96375 TX/PRO/DX INJ NEW DRUG ADDON: CPT

## 2023-09-12 PROCEDURE — 83735 ASSAY OF MAGNESIUM: CPT

## 2023-09-12 PROCEDURE — 70450 CT HEAD/BRAIN W/O DYE: CPT

## 2023-09-12 PROCEDURE — 83036 HEMOGLOBIN GLYCOSYLATED A1C: CPT

## 2023-09-12 PROCEDURE — 71045 X-RAY EXAM CHEST 1 VIEW: CPT

## 2023-09-12 PROCEDURE — G0378 HOSPITAL OBSERVATION PER HR: HCPCS

## 2023-09-12 PROCEDURE — 83605 ASSAY OF LACTIC ACID: CPT

## 2023-09-12 PROCEDURE — 87086 URINE CULTURE/COLONY COUNT: CPT

## 2023-09-12 PROCEDURE — 6370000000 HC RX 637 (ALT 250 FOR IP): Performed by: STUDENT IN AN ORGANIZED HEALTH CARE EDUCATION/TRAINING PROGRAM

## 2023-09-12 PROCEDURE — 96374 THER/PROPH/DIAG INJ IV PUSH: CPT

## 2023-09-12 PROCEDURE — 99285 EMERGENCY DEPT VISIT HI MDM: CPT

## 2023-09-12 PROCEDURE — 6360000002 HC RX W HCPCS: Performed by: STUDENT IN AN ORGANIZED HEALTH CARE EDUCATION/TRAINING PROGRAM

## 2023-09-12 PROCEDURE — 80053 COMPREHEN METABOLIC PANEL: CPT

## 2023-09-12 PROCEDURE — 84484 ASSAY OF TROPONIN QUANT: CPT

## 2023-09-12 PROCEDURE — 74176 CT ABD & PELVIS W/O CONTRAST: CPT

## 2023-09-12 PROCEDURE — 83690 ASSAY OF LIPASE: CPT

## 2023-09-12 PROCEDURE — 82962 GLUCOSE BLOOD TEST: CPT

## 2023-09-12 PROCEDURE — 2580000003 HC RX 258: Performed by: STUDENT IN AN ORGANIZED HEALTH CARE EDUCATION/TRAINING PROGRAM

## 2023-09-12 PROCEDURE — 93005 ELECTROCARDIOGRAM TRACING: CPT | Performed by: STUDENT IN AN ORGANIZED HEALTH CARE EDUCATION/TRAINING PROGRAM

## 2023-09-12 RX ORDER — SODIUM CHLORIDE, SODIUM LACTATE, POTASSIUM CHLORIDE, AND CALCIUM CHLORIDE .6; .31; .03; .02 G/100ML; G/100ML; G/100ML; G/100ML
1000 INJECTION, SOLUTION INTRAVENOUS
Status: COMPLETED | OUTPATIENT
Start: 2023-09-12 | End: 2023-09-12

## 2023-09-12 RX ORDER — SODIUM CHLORIDE 0.9 % (FLUSH) 0.9 %
5-40 SYRINGE (ML) INJECTION EVERY 12 HOURS SCHEDULED
Status: DISCONTINUED | OUTPATIENT
Start: 2023-09-12 | End: 2023-09-14 | Stop reason: HOSPADM

## 2023-09-12 RX ORDER — INSULIN LISPRO 100 [IU]/ML
0-4 INJECTION, SOLUTION INTRAVENOUS; SUBCUTANEOUS
Status: DISCONTINUED | OUTPATIENT
Start: 2023-09-12 | End: 2023-09-14 | Stop reason: HOSPADM

## 2023-09-12 RX ORDER — POLYETHYLENE GLYCOL 3350 17 G/17G
17 POWDER, FOR SOLUTION ORAL DAILY PRN
Status: DISCONTINUED | OUTPATIENT
Start: 2023-09-12 | End: 2023-09-14 | Stop reason: HOSPADM

## 2023-09-12 RX ORDER — ONDANSETRON 2 MG/ML
4 INJECTION INTRAMUSCULAR; INTRAVENOUS ONCE
Status: COMPLETED | OUTPATIENT
Start: 2023-09-12 | End: 2023-09-12

## 2023-09-12 RX ORDER — SODIUM CHLORIDE 9 MG/ML
INJECTION, SOLUTION INTRAVENOUS CONTINUOUS
Status: DISCONTINUED | OUTPATIENT
Start: 2023-09-12 | End: 2023-09-13

## 2023-09-12 RX ORDER — SODIUM CHLORIDE 0.9 % (FLUSH) 0.9 %
5-40 SYRINGE (ML) INJECTION PRN
Status: DISCONTINUED | OUTPATIENT
Start: 2023-09-12 | End: 2023-09-14 | Stop reason: HOSPADM

## 2023-09-12 RX ORDER — ACETAMINOPHEN 325 MG/1
650 TABLET ORAL
Status: COMPLETED | OUTPATIENT
Start: 2023-09-12 | End: 2023-09-12

## 2023-09-12 RX ORDER — ACETAMINOPHEN 650 MG/1
650 SUPPOSITORY RECTAL EVERY 6 HOURS PRN
Status: DISCONTINUED | OUTPATIENT
Start: 2023-09-12 | End: 2023-09-14 | Stop reason: HOSPADM

## 2023-09-12 RX ORDER — INSULIN LISPRO 100 [IU]/ML
0-4 INJECTION, SOLUTION INTRAVENOUS; SUBCUTANEOUS NIGHTLY
Status: DISCONTINUED | OUTPATIENT
Start: 2023-09-12 | End: 2023-09-14 | Stop reason: HOSPADM

## 2023-09-12 RX ORDER — ONDANSETRON 2 MG/ML
4 INJECTION INTRAMUSCULAR; INTRAVENOUS EVERY 6 HOURS PRN
Status: DISCONTINUED | OUTPATIENT
Start: 2023-09-12 | End: 2023-09-14 | Stop reason: HOSPADM

## 2023-09-12 RX ORDER — SODIUM CHLORIDE 9 MG/ML
INJECTION, SOLUTION INTRAVENOUS PRN
Status: DISCONTINUED | OUTPATIENT
Start: 2023-09-12 | End: 2023-09-14 | Stop reason: HOSPADM

## 2023-09-12 RX ORDER — ONDANSETRON 2 MG/ML
4 INJECTION INTRAMUSCULAR; INTRAVENOUS ONCE
Status: COMPLETED | OUTPATIENT
Start: 2023-09-12 | End: 2023-09-13

## 2023-09-12 RX ORDER — ACETAMINOPHEN 325 MG/1
650 TABLET ORAL EVERY 6 HOURS PRN
Status: DISCONTINUED | OUTPATIENT
Start: 2023-09-12 | End: 2023-09-14 | Stop reason: HOSPADM

## 2023-09-12 RX ORDER — ONDANSETRON 4 MG/1
4 TABLET, ORALLY DISINTEGRATING ORAL EVERY 8 HOURS PRN
Status: DISCONTINUED | OUTPATIENT
Start: 2023-09-12 | End: 2023-09-14 | Stop reason: HOSPADM

## 2023-09-12 RX ADMIN — SODIUM CHLORIDE, POTASSIUM CHLORIDE, SODIUM LACTATE AND CALCIUM CHLORIDE 1000 ML: 600; 310; 30; 20 INJECTION, SOLUTION INTRAVENOUS at 12:33

## 2023-09-12 RX ADMIN — WATER 1000 MG: 1 INJECTION INTRAMUSCULAR; INTRAVENOUS; SUBCUTANEOUS at 13:53

## 2023-09-12 RX ADMIN — ACETAMINOPHEN 650 MG: 325 TABLET ORAL at 12:32

## 2023-09-12 RX ADMIN — SODIUM CHLORIDE, POTASSIUM CHLORIDE, SODIUM LACTATE AND CALCIUM CHLORIDE 1000 ML: 600; 310; 30; 20 INJECTION, SOLUTION INTRAVENOUS at 14:54

## 2023-09-12 RX ADMIN — ONDANSETRON 4 MG: 2 INJECTION INTRAMUSCULAR; INTRAVENOUS at 12:03

## 2023-09-12 ASSESSMENT — PAIN - FUNCTIONAL ASSESSMENT: PAIN_FUNCTIONAL_ASSESSMENT: 0-10

## 2023-09-12 ASSESSMENT — LIFESTYLE VARIABLES
HOW OFTEN DO YOU HAVE A DRINK CONTAINING ALCOHOL: NEVER
HOW MANY STANDARD DRINKS CONTAINING ALCOHOL DO YOU HAVE ON A TYPICAL DAY: PATIENT DOES NOT DRINK

## 2023-09-12 ASSESSMENT — PAIN SCALES - GENERAL: PAINLEVEL_OUTOF10: 4

## 2023-09-12 NOTE — ED PROVIDER NOTES
[facility] EMERGENCY DEPT  EMERGENCY DEPARTMENT HISTORY AND PHYSICAL EXAM      Date: 9/12/2023  Patient Name: Federica Duarte  MRN: [id number]  [address] [facility] 1935  Date of evaluation: 9/12/2023  Provider: Mikey Atkins DO   Note Started: 12:58 PM EDT 9/12/23    HISTORY OF PRESENT ILLNESS     Chief Complaint   Patient presents with    Dizziness    Abdominal Pain    Nausea       History Provided By: Patient    HPI: Federica Duarte is a 82 y.o. female with past medical history as reviewed below who presents to the emergency department complaining of periumbilical abdominal pain and urinary frequency that is been ongoing for the past week. Patient had near syncopal episode while at the nail salon today that prompted the visit to the emergency department. States of progressively worsening lightheadedness like she is got a pass out over the past 3 days. Notes of associated lightheadedness and tunnel vision. Denies any prodromal chest pain, shortness of breath or palpitations. Patient states that she has recurrent history of UTIs. Denies any headache, fever, vomiting, diarrhea, constipation, shortness of breath, cough or any other symptoms or complaints.     PAST MEDICAL HISTORY   Past Medical History:  Past Medical History:   Diagnosis Date    Arrhythmia     a. 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     Lung abnormality     SOB (shortness of breath)     on exertion    Stroke ([address]) 12/2017       Past Surgical History:  Past Surgical History:   Procedure Laterality Date    BACK SURGERY      eight surgeries    CATARACT REMOVAL      left eye    COLONOSCOPY Left 9/12/2018    COLONOSCOPY performed by Peace Martinez MD at [facility]      rt middle lobe removed d/t necrosis    PARTIAL HYSTERECTOMY (CERVIX NOT REMOVED)      REFRACTIVE SURGERY      REMOVAL GALLBLADDER         Family glimepiride (AMARYL) 4 MG tablet Take 1 tablet by mouth      HYDROcodone-acetaminophen (NORCO)  MG per tablet Take 1 tablet by mouth every 4 hours as needed. Max Daily Amount: 6 tablets      isosorbide mononitrate (IMDUR) 30 MG extended release tablet Take 1 tablet by mouth daily      losartan (COZAAR) 100 MG tablet Take 1 tablet by mouth daily      meclizine (ANTIVERT) 25 MG tablet meclizine 25 mg tablet      melatonin 10 MG CAPS capsule melatonin 10 mg capsule   Take by oral route. methotrexate (RHEUMATREX) 2.5 MG chemo tablet Take 8 tablets by mouth      montelukast (SINGULAIR) 10 MG tablet Take 1 tablet by mouth daily      nitroGLYCERIN (NITROSTAT) 0.4 MG SL tablet Place 1 tablet under the tongue      pantoprazole (PROTONIX) 40 MG tablet Take 1 tablet by mouth      polyethylene glycol (GLYCOLAX) 17 GM/SCOOP powder Take 17 g by mouth daily as needed      potassium chloride (MICRO-K) 10 MEQ extended release capsule Take 1 capsule by mouth daily         Social Determinants of Health:   Social Determinants of Health     Tobacco Use: Medium Risk (9/12/2023)    Patient History     Smoking Tobacco Use: Former     Smokeless Tobacco Use: Never     Passive Exposure: Not on file   Alcohol Use: Not At Risk (9/12/2023)    AUDIT-C     Frequency of Alcohol Consumption: Never     Average Number of Drinks: Patient does not drink     Frequency of Binge Drinking: Never   Financial Resource Strain: Not on file   Food Insecurity: Not on file   Transportation Needs: Not on file   Physical Activity: Not on file   Stress: Not on file   Social Connections: Not on file   Intimate Partner Violence: Not on file   Depression: Not on file   Housing Stability: Not on file       PHYSICAL EXAM   Physical Exam  Constitutional:       General: She is not in acute distress. Appearance: Normal appearance. HENT:      Head: Normocephalic and atraumatic.       Right Ear: External ear normal.      Left Ear: External ear normal. methotrexate 2.5 MG chemo tablet  Commonly known as: RHEUMATREX     montelukast 10 MG tablet  Commonly known as: SINGULAIR     Nitrostat 0.4 MG SL tablet  Generic drug: nitroGLYCERIN     pantoprazole 40 MG tablet  Commonly known as: PROTONIX     polyethylene glycol 17 GM/SCOOP powder  Commonly known as: GLYCOLAX     potassium chloride 10 MEQ extended release capsule  Commonly known as: MICRO-K                DISCONTINUED MEDICATIONS:  Current Discharge Medication List          I am the Primary Clinician of Record. Mikey Atkins DO (electronically signed)    (Please note that parts of this dictation were completed with voice recognition software. Quite often unanticipated grammatical, syntax, homophones, and other interpretive errors are inadvertently transcribed by the computer software. Please disregards these errors.  Please excuse any errors that have escaped final proofreading.)     Mikey Atkins,   09/12/23 1 Fidencio Luciano DO  09/12/23 8052

## 2023-09-12 NOTE — PROGRESS NOTES
Received patient per stretcher via [facility] from free standing, patient alert and oriented not in any form of distress, vital signs taken and recorded

## 2023-09-12 NOTE — H&P
V2.0  History and Physical      Name:  Cora Sharma /Age/Sex: 1935  (82 y.o. female)   MRN & CSN:  [id number] & [id number]    Location:  [place] [address] PCP: Milad Hdz MD       Hospital Day: 1    Assessment and Plan:     Hospital Problems             Last Modified POA    * (Principal) UTI (urinary tract infection) 2023 Yes     #1 dizziness, blurred vision, imbalance for 3 days, remote history of stroke with no residual, NIH score with the help of the nurse at bedside was 0  get a stat CT head, denied any recent fall, maintain his statin, maintain anticoagulation, maintain the patient on monitored bed, rule out any underlying arrhythmia  Get an echocardiogram, follow troponin   PT OT evaluation before discharge  Adjust Neurontin dose to 100 mg po bid  Hold prn pain med  Hold glipizide        #2 abdominal pain, frequency, dysuria, secondary to UTI, urinalysis positive, maintain the patient on IV fluid and Rocephin, previous urine culture was positive for Klebsiella sensitive to Rocephin, pending final urine culture, Adjust IV antibiotic depending on the urine culture     #3 diabetes mellitus with neuropathy, get A1c, hold oral hypoglycemic agent, maintain the patient on insulin sliding scale, Accu-Chek, low-carb diet    #4 A-fib, current EKG showed sinus bradycardia, maintain the patient on monitored bed, rate controlled, maintain anticoagulation    #5 rheumatoid arthritis, hold methotrexate for now    #7 remote history of stroke maintain his statin and Eliquis, denies any residual    #8, LA secondary to number 2, IVF , follow UC, follow LA, not on medication that could be the culprit for it  No criteria for sepsis at admission , afebrile, normal wbc , blp is stable           History from:     patient, spouse          History of Present Illness:     Chief Complaint:   Cora Sharma is a 82 y.o. female with pmh of diabetes mellitus, neuropathy, rheumatoid arthritis, chronic A-fib on anticoagulation, Concern for colitis vs diverticulitis COMPARISON: CT August 1, 2023 IV CONTRAST: None. ORAL CONTRAST: Not given TECHNIQUE: Thin axial images were obtained through the abdomen and pelvis. Coronal and sagittal reformats were generated. CT dose reduction was achieved through use of a standardized protocol tailored for this examination and automatic exposure control for dose modulation. The absence of intravenous contrast material reduces the sensitivity for evaluation of the vasculature and solid organs. FINDINGS: LOWER THORAX: Lung bases are clear. There is cardiomegaly with coronary artery atherosclerosis and evidence of aortic valve repair. LIVER: Subtle nodular contour, which can be seen with cirrhosis. BILIARY TREE: Status post cholecystectomy. CBD is not dilated. SPLEEN: within normal limits. PANCREAS: No focal abnormality. ADRENALS: Unremarkable. KIDNEYS/URETERS: No calculus or hydronephrosis. STOMACH: Unremarkable. SMALL BOWEL: No dilatation or wall thickening. COLON: Diverticulosis of the colon. No acute diverticulitis. Sigmoid colon anastomosis. APPENDIX: Not visualized PERITONEUM: No ascites or pneumoperitoneum. RETROPERITONEUM: Severe atherosclerosis of the aorta REPRODUCTIVE ORGANS: Status post hysterectomy. URINARY BLADDER: No mass or calculus. BONES: Extensive posterior decompression and fusion of the thoracolumbar spine. Severe diffuse osteopenia ABDOMINAL WALL: No mass or hernia. ADDITIONAL COMMENTS: N/A     No acute abdominal or pelvic pathology. Electronically signed by Angel Collins MD on 9/12/2023 at 2:59 PM        This was a telemedicine encounter that was done with the assistance of the nurse at the bedside. Communication was done with audio/video using a high-resolution camera. Auscultation was done using an electronic stethoscope.   Physician was located in his office in [place]  Patient was located in the emergency department at [facility] ED in

## 2023-09-12 NOTE — ED NOTES
Pt resting comfortably in bed with  at bedside. Updated pt and  on transport ETA of 1800, going to room 501. Report called to Walt Manrique RN on 5E. Nurse stated no questions at this time. Pt being transported BLS to [facility] with left AC 22g IV lock.      [address]  09/12/23 1038

## 2023-09-12 NOTE — ED TRIAGE NOTES
Pt c/o lower abdominal pain and nausea that started Sunday, along with dizziness that has been occurring for greater than 1 month.

## 2023-09-13 ENCOUNTER — APPOINTMENT (OUTPATIENT)
Facility: HOSPITAL | Age: 88
DRG: 689 | End: 2023-09-13
Attending: INTERNAL MEDICINE
Payer: MEDICARE

## 2023-09-13 LAB
ANION GAP SERPL CALC-SCNC: 6 MMOL/L (ref 5–15)
BACTERIA SPEC CULT: NORMAL
BASOPHILS # BLD: 0.1 K/UL (ref 0–0.1)
BASOPHILS # BLD: 0.1 K/UL (ref 0–0.1)
BASOPHILS NFR BLD: 1 % (ref 0–1)
BASOPHILS NFR BLD: 1 % (ref 0–1)
BUN SERPL-MCNC: 10 MG/DL (ref 6–20)
BUN/CREAT SERPL: 13 (ref 12–20)
CA-I BLD-MCNC: 8.7 MG/DL (ref 8.5–10.1)
CHLORIDE SERPL-SCNC: 108 MMOL/L (ref 97–108)
CO2 SERPL-SCNC: 24 MMOL/L (ref 21–32)
CREAT SERPL-MCNC: 0.76 MG/DL (ref 0.55–1.02)
DIFFERENTIAL METHOD BLD: ABNORMAL
DIFFERENTIAL METHOD BLD: NORMAL
EOSINOPHIL # BLD: 0.3 K/UL (ref 0–0.4)
EOSINOPHIL # BLD: 0.4 K/UL (ref 0–0.4)
EOSINOPHIL NFR BLD: 5 % (ref 0–7)
EOSINOPHIL NFR BLD: 6 % (ref 0–7)
ERYTHROCYTE [DISTWIDTH] IN BLOOD BY AUTOMATED COUNT: 14.4 % (ref 11.5–14.5)
ERYTHROCYTE [DISTWIDTH] IN BLOOD BY AUTOMATED COUNT: 14.5 % (ref 11.5–14.5)
EST. AVERAGE GLUCOSE BLD GHB EST-MCNC: 137 MG/DL
GLUCOSE BLD STRIP.AUTO-MCNC: 112 MG/DL (ref 65–100)
GLUCOSE BLD STRIP.AUTO-MCNC: 157 MG/DL (ref 65–100)
GLUCOSE BLD STRIP.AUTO-MCNC: 196 MG/DL (ref 65–100)
GLUCOSE BLD STRIP.AUTO-MCNC: 200 MG/DL (ref 65–100)
GLUCOSE SERPL-MCNC: 202 MG/DL (ref 65–100)
HBA1C MFR BLD: 6.4 % (ref 4–5.6)
HCT VFR BLD AUTO: 35.9 % (ref 35–47)
HCT VFR BLD AUTO: 37.5 % (ref 35–47)
HGB BLD-MCNC: 11.1 G/DL (ref 11.5–16)
HGB BLD-MCNC: 11.6 G/DL (ref 11.5–16)
IMM GRANULOCYTES # BLD AUTO: 0 K/UL (ref 0–0.04)
IMM GRANULOCYTES # BLD AUTO: 0 K/UL (ref 0–0.04)
IMM GRANULOCYTES NFR BLD AUTO: 0 % (ref 0–0.5)
IMM GRANULOCYTES NFR BLD AUTO: 1 % (ref 0–0.5)
LACTATE SERPL-SCNC: 0.8 MMOL/L (ref 0.4–2)
LYMPHOCYTES # BLD: 1.4 K/UL (ref 0.8–3.5)
LYMPHOCYTES # BLD: 1.6 K/UL (ref 0.8–3.5)
LYMPHOCYTES NFR BLD: 21 % (ref 12–49)
LYMPHOCYTES NFR BLD: 23 % (ref 12–49)
Lab: NORMAL
MCH RBC QN AUTO: 28.1 PG (ref 26–34)
MCH RBC QN AUTO: 28.2 PG (ref 26–34)
MCHC RBC AUTO-ENTMCNC: 30.9 G/DL (ref 30–36.5)
MCHC RBC AUTO-ENTMCNC: 30.9 G/DL (ref 30–36.5)
MCV RBC AUTO: 90.9 FL (ref 80–99)
MCV RBC AUTO: 91 FL (ref 80–99)
MONOCYTES # BLD: 0.7 K/UL (ref 0–1)
MONOCYTES # BLD: 0.8 K/UL (ref 0–1)
MONOCYTES NFR BLD: 11 % (ref 5–13)
MONOCYTES NFR BLD: 11 % (ref 5–13)
NEUTS SEG # BLD: 4.1 K/UL (ref 1.8–8)
NEUTS SEG # BLD: 4.3 K/UL (ref 1.8–8)
NEUTS SEG NFR BLD: 59 % (ref 32–75)
NEUTS SEG NFR BLD: 61 % (ref 32–75)
NRBC # BLD: 0 K/UL (ref 0–0.01)
NRBC # BLD: 0 K/UL (ref 0–0.01)
NRBC BLD-RTO: 0 PER 100 WBC
NRBC BLD-RTO: 0 PER 100 WBC
PERFORMED BY:: ABNORMAL
PLATELET # BLD AUTO: 170 K/UL (ref 150–400)
PLATELET # BLD AUTO: 187 K/UL (ref 150–400)
PMV BLD AUTO: 12.4 FL (ref 8.9–12.9)
PMV BLD AUTO: 12.6 FL (ref 8.9–12.9)
POTASSIUM SERPL-SCNC: 4.4 MMOL/L (ref 3.5–5.1)
RBC # BLD AUTO: 3.95 M/UL (ref 3.8–5.2)
RBC # BLD AUTO: 4.12 M/UL (ref 3.8–5.2)
SODIUM SERPL-SCNC: 138 MMOL/L (ref 136–145)
TROPONIN I SERPL HS-MCNC: 17 NG/L (ref 0–51)
WBC # BLD AUTO: 6.6 K/UL (ref 3.6–11)
WBC # BLD AUTO: 7.3 K/UL (ref 3.6–11)

## 2023-09-13 PROCEDURE — 80048 BASIC METABOLIC PNL TOTAL CA: CPT

## 2023-09-13 PROCEDURE — 84484 ASSAY OF TROPONIN QUANT: CPT

## 2023-09-13 PROCEDURE — 6360000002 HC RX W HCPCS: Performed by: STUDENT IN AN ORGANIZED HEALTH CARE EDUCATION/TRAINING PROGRAM

## 2023-09-13 PROCEDURE — 2580000003 HC RX 258: Performed by: INTERNAL MEDICINE

## 2023-09-13 PROCEDURE — 82962 GLUCOSE BLOOD TEST: CPT

## 2023-09-13 PROCEDURE — 87040 BLOOD CULTURE FOR BACTERIA: CPT

## 2023-09-13 PROCEDURE — 85025 COMPLETE CBC W/AUTO DIFF WBC: CPT

## 2023-09-13 PROCEDURE — 6360000002 HC RX W HCPCS: Performed by: INTERNAL MEDICINE

## 2023-09-13 PROCEDURE — 83605 ASSAY OF LACTIC ACID: CPT

## 2023-09-13 PROCEDURE — G0378 HOSPITAL OBSERVATION PER HR: HCPCS

## 2023-09-13 PROCEDURE — 36415 COLL VENOUS BLD VENIPUNCTURE: CPT

## 2023-09-13 PROCEDURE — 80076 HEPATIC FUNCTION PANEL: CPT

## 2023-09-13 PROCEDURE — 93306 TTE W/DOPPLER COMPLETE: CPT

## 2023-09-13 PROCEDURE — 6370000000 HC RX 637 (ALT 250 FOR IP): Performed by: INTERNAL MEDICINE

## 2023-09-13 PROCEDURE — 96376 TX/PRO/DX INJ SAME DRUG ADON: CPT

## 2023-09-13 RX ORDER — PANTOPRAZOLE SODIUM 40 MG/1
40 TABLET, DELAYED RELEASE ORAL
Status: DISCONTINUED | OUTPATIENT
Start: 2023-09-13 | End: 2023-09-14 | Stop reason: HOSPADM

## 2023-09-13 RX ORDER — FOLIC ACID 1 MG/1
1 TABLET ORAL DAILY
Status: DISCONTINUED | OUTPATIENT
Start: 2023-09-13 | End: 2023-09-14 | Stop reason: HOSPADM

## 2023-09-13 RX ORDER — HYDROCODONE BITARTRATE AND ACETAMINOPHEN 10; 325 MG/1; MG/1
1 TABLET ORAL EVERY 4 HOURS PRN
Status: DISCONTINUED | OUTPATIENT
Start: 2023-09-13 | End: 2023-09-14 | Stop reason: HOSPADM

## 2023-09-13 RX ORDER — TRAMADOL HYDROCHLORIDE 50 MG/1
50 TABLET ORAL EVERY 6 HOURS PRN
Status: DISCONTINUED | OUTPATIENT
Start: 2023-09-13 | End: 2023-09-14 | Stop reason: HOSPADM

## 2023-09-13 RX ORDER — POTASSIUM CHLORIDE 750 MG/1
10 TABLET, FILM COATED, EXTENDED RELEASE ORAL DAILY
Status: DISCONTINUED | OUTPATIENT
Start: 2023-09-13 | End: 2023-09-14 | Stop reason: HOSPADM

## 2023-09-13 RX ORDER — ISOSORBIDE MONONITRATE 60 MG/1
30 TABLET, EXTENDED RELEASE ORAL DAILY
Status: DISCONTINUED | OUTPATIENT
Start: 2023-09-13 | End: 2023-09-14 | Stop reason: HOSPADM

## 2023-09-13 RX ORDER — LOSARTAN POTASSIUM 50 MG/1
100 TABLET ORAL DAILY
Status: DISCONTINUED | OUTPATIENT
Start: 2023-09-13 | End: 2023-09-14 | Stop reason: HOSPADM

## 2023-09-13 RX ORDER — ATORVASTATIN CALCIUM 40 MG/1
40 TABLET, FILM COATED ORAL NIGHTLY
Status: DISCONTINUED | OUTPATIENT
Start: 2023-09-13 | End: 2023-09-14 | Stop reason: HOSPADM

## 2023-09-13 RX ORDER — DONEPEZIL HYDROCHLORIDE 5 MG/1
10 TABLET, FILM COATED ORAL NIGHTLY
Status: DISCONTINUED | OUTPATIENT
Start: 2023-09-13 | End: 2023-09-14 | Stop reason: HOSPADM

## 2023-09-13 RX ORDER — LORAZEPAM 0.5 MG/1
2 TABLET ORAL EVERY 4 HOURS PRN
Status: DISCONTINUED | OUTPATIENT
Start: 2023-09-13 | End: 2023-09-14 | Stop reason: HOSPADM

## 2023-09-13 RX ORDER — FUROSEMIDE 40 MG/1
40 TABLET ORAL DAILY
Status: DISCONTINUED | OUTPATIENT
Start: 2023-09-13 | End: 2023-09-14 | Stop reason: HOSPADM

## 2023-09-13 RX ORDER — GABAPENTIN 300 MG/1
300 CAPSULE ORAL 2 TIMES DAILY
Status: DISCONTINUED | OUTPATIENT
Start: 2023-09-13 | End: 2023-09-14 | Stop reason: HOSPADM

## 2023-09-13 RX ORDER — FERROUS SULFATE 325(65) MG
325 TABLET ORAL DAILY
Status: DISCONTINUED | OUTPATIENT
Start: 2023-09-13 | End: 2023-09-14 | Stop reason: HOSPADM

## 2023-09-13 RX ORDER — MONTELUKAST SODIUM 10 MG/1
10 TABLET ORAL DAILY
Status: DISCONTINUED | OUTPATIENT
Start: 2023-09-13 | End: 2023-09-14 | Stop reason: HOSPADM

## 2023-09-13 RX ADMIN — ISOSORBIDE MONONITRATE 30 MG: 60 TABLET, EXTENDED RELEASE ORAL at 09:18

## 2023-09-13 RX ADMIN — FUROSEMIDE 40 MG: 40 TABLET ORAL at 09:18

## 2023-09-13 RX ADMIN — DONEPEZIL HYDROCHLORIDE 10 MG: 5 TABLET, FILM COATED ORAL at 01:09

## 2023-09-13 RX ADMIN — LOSARTAN POTASSIUM 100 MG: 50 TABLET, FILM COATED ORAL at 09:19

## 2023-09-13 RX ADMIN — DONEPEZIL HYDROCHLORIDE 10 MG: 5 TABLET, FILM COATED ORAL at 21:06

## 2023-09-13 RX ADMIN — CEFTRIAXONE SODIUM 1000 MG: 1 INJECTION, POWDER, FOR SOLUTION INTRAMUSCULAR; INTRAVENOUS at 09:17

## 2023-09-13 RX ADMIN — ATORVASTATIN CALCIUM 40 MG: 40 TABLET, FILM COATED ORAL at 21:06

## 2023-09-13 RX ADMIN — POLYETHYLENE GLYCOL 3350 17 G: 17 POWDER, FOR SOLUTION ORAL at 21:10

## 2023-09-13 RX ADMIN — SODIUM CHLORIDE, PRESERVATIVE FREE 10 ML: 5 INJECTION INTRAVENOUS at 00:01

## 2023-09-13 RX ADMIN — APIXABAN 2.5 MG: 2.5 TABLET, FILM COATED ORAL at 21:06

## 2023-09-13 RX ADMIN — SODIUM CHLORIDE, PRESERVATIVE FREE 10 ML: 5 INJECTION INTRAVENOUS at 09:19

## 2023-09-13 RX ADMIN — SODIUM CHLORIDE, PRESERVATIVE FREE 10 ML: 5 INJECTION INTRAVENOUS at 01:09

## 2023-09-13 RX ADMIN — FERROUS SULFATE TAB 325 MG (65 MG ELEMENTAL FE) 325 MG: 325 (65 FE) TAB at 09:18

## 2023-09-13 RX ADMIN — APIXABAN 2.5 MG: 2.5 TABLET, FILM COATED ORAL at 01:09

## 2023-09-13 RX ADMIN — INSULIN LISPRO 1 UNITS: 100 INJECTION, SOLUTION INTRAVENOUS; SUBCUTANEOUS at 12:40

## 2023-09-13 RX ADMIN — GABAPENTIN 300 MG: 300 CAPSULE ORAL at 09:18

## 2023-09-13 RX ADMIN — PANTOPRAZOLE SODIUM 40 MG: 40 TABLET, DELAYED RELEASE ORAL at 06:31

## 2023-09-13 RX ADMIN — SODIUM CHLORIDE: 9 INJECTION, SOLUTION INTRAVENOUS at 00:02

## 2023-09-13 RX ADMIN — FOLIC ACID 1 MG: 1 TABLET ORAL at 09:18

## 2023-09-13 RX ADMIN — GABAPENTIN 300 MG: 300 CAPSULE ORAL at 21:06

## 2023-09-13 RX ADMIN — POTASSIUM CHLORIDE 10 MEQ: 750 TABLET, EXTENDED RELEASE ORAL at 09:19

## 2023-09-13 RX ADMIN — APIXABAN 2.5 MG: 2.5 TABLET, FILM COATED ORAL at 09:17

## 2023-09-13 RX ADMIN — ATORVASTATIN CALCIUM 40 MG: 40 TABLET, FILM COATED ORAL at 01:10

## 2023-09-13 RX ADMIN — ONDANSETRON 4 MG: 2 INJECTION INTRAMUSCULAR; INTRAVENOUS at 01:08

## 2023-09-13 RX ADMIN — MONTELUKAST 10 MG: 10 TABLET, FILM COATED ORAL at 09:19

## 2023-09-13 RX ADMIN — SODIUM CHLORIDE, PRESERVATIVE FREE 10 ML: 5 INJECTION INTRAVENOUS at 21:02

## 2023-09-13 ASSESSMENT — PAIN SCALES - GENERAL: PAINLEVEL_OUTOF10: 0

## 2023-09-13 NOTE — PROGRESS NOTES
Hospitalist Progress Note    NAME: Merry Crabtree   :  1935   MRN:  [id number]            Subjective:     Chief Complaint / Reason for Physician Visit dizziness/abdominal pain  Patient seen and evaluated at bedside, overnight events noted, patient states dizziness has resolved, still complains of intermittent abdominal discomfort. Discussed with RN events overnight. Review of Systems:  Symptom Y/N Comments  Symptom Y/N Comments   Fever/Chills N   Chest Pain N    Poor Appetite Y   Edema N    Cough N   Abdominal Pain Y    Sputum N   Joint Pain N    SOB/YOUNG N   Pruritis/Rash N    Nausea/vomit N   Tolerating PT/OT NA    Diarrhea N   Tolerating Diet Y    Constipation N   Other       Could NOT obtain due to:      Objective:     VITALS:   Last 24hrs VS reviewed since prior progress note. Most recent are:  [unfilled]    Intake/Output Summary (Last 24 hours) at 2023 1213  Last data filed at 2023 4709  Gross per 24 hour   Intake 1175 ml   Output --   Net 1175 ml        PHYSICAL EXAM:  General: Patient appears comfortable  EENT:  EOMI. Anicteric sclerae. MMM  Resp:  CTA bilaterally, no wheezing or rales. No accessory muscle use  CV:  Regular  rhythm, s1/s2 no m/r/g No edema  GI:  Soft, Non distended, Non tender. +Bowel sounds  Neurologic:  Alert and oriented X 3, normal speech,   Psych:   Good insight. Not anxious nor agitated  Skin:  No rashes. No jaundice    Procedures: see electronic medical records for all procedures/Xrays and details which were not copied into this note but were reviewed prior to creation of Plan. LABS:  I reviewed today's most current labs and imaging studies. Pertinent labs include:  Recent Labs     23  1200 23  0939   WBC 7.7 6.6   HGB 11.9 11.1*   HCT 38.5 35.9    170     Recent Labs     23  1200 23  1204     --    K 4.4  --      --    CO2 30  --    BUN 15  --    MG  --  2.0   ALT 25  --        Signed:  [address] [facility],

## 2023-09-13 NOTE — PROGRESS NOTES
Patient informed me that she self cath everyday twice a day in the morning and in the evening before bed, Dr. Morgan Harris notified order placed.

## 2023-09-13 NOTE — CARE COORDINATION
09/13/23 1543   Service Assessment   Patient Orientation Alert and Oriented   Cognition Alert   History Provided By Patient   Primary [address] is: Legal Next of Kin   PCP Verified by CM Yes   Last Visit to PCP Within last 3 months   Prior Functional Level Assistance with the following:;Bathing;Dressing; Toileting;Feeding;Cooking;Housework; Shopping;Mobility   Current Functional Level Assistance with the following:;Bathing;Dressing; Toileting;Feeding;Cooking;Housework; Shopping;Mobility   Can patient return to prior living arrangement Yes   Ability to make needs known: Good   Family able to assist with home care needs: Yes   Would you like for me to discuss the discharge plan with any other family members/significant others, and if so, who? Yes     CM met with patient and spouse at bedside. Patient lives at home with her spouse. She has a walker and cane but does not use it. Patient has had home health in the past, but was referred 2 weeks ago to outpatient therapy but has not started. Patient is open to having home health again if recommended. CM has given patient the Medicare Outpatient Observation Notice.

## 2023-09-13 NOTE — WOUND CARE
[facility] RECORD NUMBER:  [id number]  AGE: 82 y.o. GENDER: female  : 1935  TODAY'S DATE:  2023    GENERAL     [] Follow-up   [x] New Consult    Janece Breaker is a 82 y.o. female referred by:   [x] Physician  [] Nursing  [] Other:         PAST MEDICAL HISTORY    Past Medical History:   Diagnosis Date    Arrhythmia     a. 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     Lung abnormality     SOB (shortness of breath)     on exertion    Stroke ([address]) 2017        PAST SURGICAL HISTORY    Past Surgical History:   Procedure Laterality Date    BACK SURGERY      eight surgeries    CATARACT REMOVAL      left eye    COLONOSCOPY Left 2018    COLONOSCOPY performed by Mary Jane Bey MD at [facility]      rt middle lobe removed d/t necrosis    PARTIAL HYSTERECTOMY (CERVIX NOT REMOVED)      REFRACTIVE SURGERY      REMOVAL GALLBLADDER         FAMILY HISTORY    Family History   Problem Relation Age of Onset    Cancer Maternal Grandmother     Heart Disease Maternal Aunt     Diabetes Maternal Aunt     Cancer Brother     Diabetes Sister     Diabetes Mother     Heart Disease Father     Stroke Father          ALLERGIES    Allergies   Allergen Reactions    Iodinated Contrast Media Other (See Comments)     Iv dye causes heart to stop    Tetanus Toxoids Other (See Comments)     Blisters all over body    Propoxyphene Rash       MEDICATIONS    No current facility-administered medications on file prior to encounter.      Current Outpatient Medications on File Prior to Encounter   Medication Sig Dispense Refill    apixaban (ELIQUIS) 2.5 MG TABS tablet Take 1 tablet by mouth 2 times daily      atorvastatin (LIPITOR) 40 MG tablet Take 1 tablet by mouth nightly      butalbital-acetaminophen-caffeine (FIORICET, ESGIC) -40 MG per tablet Take 1 tablet by heels.  Maintain HOB at 30 degrees or less, if not contraindicated, to reduce pressure to buttocks and sacrum. Raise foot of bed to help prevent friction and shear injury from sliding down in the bed. Re-consult [facility] for other wound/skin care concerns. Discharge Wound Care Needs: For LLA: cleanse gently with soap and water, pat dry, apply 1 mm layer of Therahoney gel, and cover with a gentle lite foam daily and prn for soiling. Follow up with Dr. Leblanc April outpatient. Teaching completed with:   [x] Patient           [] Family member       [] Caregiver          [] Nursing  [] Other    Patient/Caregiver Teaching: turn / reposition in bed every 2 hours to alleviate pressure to buttock and sacrum to prevent pressure related skin injury.    Level of patient/caregiver understanding able to:   [] Indicates understanding       [] Needs reinforcement  [] Unsuccessful      [x] Verbal Understanding  [] Demonstrated understanding       [] No evidence of learning  [] Refused teaching         [] N/A       Electronically signed by Laurent Gonzales RN on 9/13/2023 at 9:24 AM

## 2023-09-13 NOTE — PROGRESS NOTES
4 Eyes Skin Assessment     NAME:  Cora Sharma  YOB: 1935  MEDICAL RECORD NUMBER:  [id number]    The patient is being assessed for  Admission    I agree that at least one RN has performed a thorough Head to Toe Skin Assessment on the patient. ALL assessment sites listed below have been assessed. Areas assessed by both nurses:    Head, Face, Ears, Shoulders, Back, Chest, Arms, Elbows, Hands, Sacrum. Buttock, Coccyx, Ischium, and Legs. Feet and Heels        Does the Patient have a Wound? Yes wound(s) were present on assessment.  LDA wound assessment was Initiated and completed by RN       Cuong Prevention initiated by RN: Yes  Wound Care Orders initiated by RN: No    Pressure Injury (Stage 3,4, Unstageable, DTI, NWPT, and Complex wounds) if present, place Wound referral order by RN under : No    New Ostomies, if present place, Ostomy referral order under : No     Nurse 1 eSignature: Electronically signed by Medina Alba RN on 9/13/23 at 5:08 AM EDT    **SHARE this note so that the co-signing nurse can place an eSignature**    Nurse 2 eSignature: Electronically signed by Larry Aranda RN on 9/13/23 at 5:10 AM EDT

## 2023-09-13 NOTE — PLAN OF CARE
Problem: Discharge Planning  Goal: Discharge to home or other facility with appropriate resources  Outcome: Progressing  Flowsheets (Taken 9/12/2023 1951 by Heidy Messer RN)  Discharge to home or other facility with appropriate resources: Identify barriers to discharge with patient and caregiver     Problem: Pain  Goal: Verbalizes/displays adequate comfort level or baseline comfort level  Outcome: Progressing

## 2023-09-14 ENCOUNTER — APPOINTMENT (OUTPATIENT)
Facility: HOSPITAL | Age: 88
DRG: 689 | End: 2023-09-14
Payer: MEDICARE

## 2023-09-14 VITALS
BODY MASS INDEX: 28.99 KG/M2 | HEIGHT: 65 IN | DIASTOLIC BLOOD PRESSURE: 114 MMHG | WEIGHT: 174 LBS | OXYGEN SATURATION: 97 % | TEMPERATURE: 97.3 F | HEART RATE: 102 BPM | SYSTOLIC BLOOD PRESSURE: 143 MMHG | RESPIRATION RATE: 14 BRPM

## 2023-09-14 PROBLEM — R42 DIZZINESS: Status: ACTIVE | Noted: 2023-09-14

## 2023-09-14 LAB
ANION GAP SERPL CALC-SCNC: 7 MMOL/L (ref 5–15)
BASOPHILS # BLD: 0.1 K/UL (ref 0–0.1)
BASOPHILS NFR BLD: 2 % (ref 0–1)
BUN SERPL-MCNC: 8 MG/DL (ref 6–20)
BUN/CREAT SERPL: 14 (ref 12–20)
CA-I BLD-MCNC: 8.7 MG/DL (ref 8.5–10.1)
CHLORIDE SERPL-SCNC: 107 MMOL/L (ref 97–108)
CO2 SERPL-SCNC: 25 MMOL/L (ref 21–32)
CREAT SERPL-MCNC: 0.56 MG/DL (ref 0.55–1.02)
DIFFERENTIAL METHOD BLD: ABNORMAL
ECHO AV MEAN GRADIENT: 5 MMHG
ECHO AV MEAN VELOCITY: 0.9 M/S
ECHO AV PEAK GRADIENT: 9 MMHG
ECHO AV PEAK VELOCITY: 1.5 M/S
ECHO AV VELOCITY RATIO: 0.73
ECHO AV VTI: 22.5 CM
ECHO BSA: 1.9 M2
ECHO LA AREA 2C: 14.8 CM2
ECHO LA AREA 4C: 16.9 CM2
ECHO LA DIAMETER INDEX: 2.42 CM/M2
ECHO LA DIAMETER: 4.5 CM
ECHO LA MAJOR AXIS: 4.5 CM
ECHO LA MINOR AXIS: 5.4 CM
ECHO LA VOL 2C: 33 ML (ref 22–52)
ECHO LA VOL 4C: 54 ML (ref 22–52)
ECHO LA VOL BP: 46 ML (ref 22–52)
ECHO LA VOL/BSA BIPLANE: 25 ML/M2 (ref 16–34)
ECHO LA VOLUME INDEX A2C: 18 ML/M2 (ref 16–34)
ECHO LA VOLUME INDEX A4C: 29 ML/M2 (ref 16–34)
ECHO LV E' LATERAL VELOCITY: 4 CM/S
ECHO LV E' SEPTAL VELOCITY: 4 CM/S
ECHO LV EDV A2C: 31 ML
ECHO LV EDV A4C: 50 ML
ECHO LV EDV INDEX A4C: 27 ML/M2
ECHO LV EDV NDEX A2C: 17 ML/M2
ECHO LV EJECTION FRACTION A2C: 46 %
ECHO LV ESV A2C: 17 ML
ECHO LV ESV INDEX A2C: 9 ML/M2
ECHO LV FRACTIONAL SHORTENING: 31 % (ref 28–44)
ECHO LV INTERNAL DIMENSION DIASTOLE INDEX: 2.42 CM/M2
ECHO LV INTERNAL DIMENSION DIASTOLIC: 4.5 CM (ref 3.9–5.3)
ECHO LV INTERNAL DIMENSION SYSTOLIC INDEX: 1.67 CM/M2
ECHO LV INTERNAL DIMENSION SYSTOLIC: 3.1 CM
ECHO LV IVSD: 1.4 CM (ref 0.6–0.9)
ECHO LV MASS 2D: 290 G (ref 67–162)
ECHO LV MASS INDEX 2D: 155.9 G/M2 (ref 43–95)
ECHO LV POSTERIOR WALL DIASTOLIC: 1.7 CM (ref 0.6–0.9)
ECHO LV RELATIVE WALL THICKNESS RATIO: 0.76
ECHO LVOT AREA: 2.3 CM2
ECHO LVOT AV VTI INDEX: 0.77
ECHO LVOT DIAM: 1.7 CM
ECHO LVOT MEAN GRADIENT: 2 MMHG
ECHO LVOT PEAK GRADIENT: 4 MMHG
ECHO LVOT PEAK VELOCITY: 1.1 M/S
ECHO LVOT STROKE VOLUME INDEX: 21.2 ML/M2
ECHO LVOT SV: 39.5 ML
ECHO LVOT VTI: 17.4 CM
ECHO MV A VELOCITY: 1.35 M/S
ECHO MV E VELOCITY: 0.62 M/S
ECHO MV E/A RATIO: 0.46
ECHO MV E/E' LATERAL: 15.5
ECHO MV E/E' RATIO (AVERAGED): 15.5
ECHO MV E/E' SEPTAL: 15.5
ECHO MV REGURGITANT PEAK GRADIENT: 4 MMHG
ECHO MV REGURGITANT PEAK VELOCITY: 1 M/S
ECHO MV REGURGITANT VTIA: 15.6 CM
ECHO PV MAX VELOCITY: 0.9 M/S
ECHO PV MEAN GRADIENT: 2 MMHG
ECHO PV MEAN VELOCITY: 0.7 M/S
ECHO PV PEAK GRADIENT: 3 MMHG
ECHO PV VTI: 17.2 CM
ECHO RA AREA 4C: 13.8 CM2
ECHO RA END SYSTOLIC VOLUME APICAL 4 CHAMBER INDEX BSA: 15 ML/M2
ECHO RA VOLUME: 27 ML
ECHO RV BASAL DIMENSION: 2.6 CM
ECHO RV FREE WALL PEAK S': 20 CM/S
ECHO RV MID DIMENSION: 1.8 CM
ECHO RV TAPSE: 1.6 CM (ref 1.7–?)
ECHO TV REGURGITANT MAX VELOCITY: 1.14 M/S
ECHO TV REGURGITANT PEAK GRADIENT: 5 MMHG
EOSINOPHIL # BLD: 0.4 K/UL (ref 0–0.4)
EOSINOPHIL NFR BLD: 7 % (ref 0–7)
ERYTHROCYTE [DISTWIDTH] IN BLOOD BY AUTOMATED COUNT: 14.5 % (ref 11.5–14.5)
GLUCOSE BLD STRIP.AUTO-MCNC: 144 MG/DL (ref 65–100)
GLUCOSE BLD STRIP.AUTO-MCNC: 242 MG/DL (ref 65–100)
GLUCOSE SERPL-MCNC: 125 MG/DL (ref 65–100)
HCT VFR BLD AUTO: 36.4 % (ref 35–47)
HGB BLD-MCNC: 11.4 G/DL (ref 11.5–16)
IMM GRANULOCYTES # BLD AUTO: 0 K/UL (ref 0–0.04)
IMM GRANULOCYTES NFR BLD AUTO: 1 % (ref 0–0.5)
LYMPHOCYTES # BLD: 1.2 K/UL (ref 0.8–3.5)
LYMPHOCYTES NFR BLD: 22 % (ref 12–49)
MCH RBC QN AUTO: 28.3 PG (ref 26–34)
MCHC RBC AUTO-ENTMCNC: 31.3 G/DL (ref 30–36.5)
MCV RBC AUTO: 90.3 FL (ref 80–99)
MONOCYTES # BLD: 0.7 K/UL (ref 0–1)
MONOCYTES NFR BLD: 13 % (ref 5–13)
NEUTS SEG # BLD: 3 K/UL (ref 1.8–8)
NEUTS SEG NFR BLD: 55 % (ref 32–75)
NRBC # BLD: 0 K/UL (ref 0–0.01)
NRBC BLD-RTO: 0 PER 100 WBC
PERFORMED BY:: ABNORMAL
PERFORMED BY:: ABNORMAL
PLATELET # BLD AUTO: 158 K/UL (ref 150–400)
PMV BLD AUTO: 12.9 FL (ref 8.9–12.9)
POTASSIUM SERPL-SCNC: ABNORMAL MMOL/L (ref 3.5–5.1)
RBC # BLD AUTO: 4.03 M/UL (ref 3.8–5.2)
SODIUM SERPL-SCNC: 139 MMOL/L (ref 136–145)
WBC # BLD AUTO: 5.5 K/UL (ref 3.6–11)

## 2023-09-14 PROCEDURE — G0378 HOSPITAL OBSERVATION PER HR: HCPCS

## 2023-09-14 PROCEDURE — 82962 GLUCOSE BLOOD TEST: CPT

## 2023-09-14 PROCEDURE — 6370000000 HC RX 637 (ALT 250 FOR IP): Performed by: INTERNAL MEDICINE

## 2023-09-14 PROCEDURE — 1100000000 HC RM PRIVATE

## 2023-09-14 PROCEDURE — 2580000003 HC RX 258: Performed by: INTERNAL MEDICINE

## 2023-09-14 PROCEDURE — 96376 TX/PRO/DX INJ SAME DRUG ADON: CPT

## 2023-09-14 PROCEDURE — 80048 BASIC METABOLIC PNL TOTAL CA: CPT

## 2023-09-14 PROCEDURE — 85025 COMPLETE CBC W/AUTO DIFF WBC: CPT

## 2023-09-14 PROCEDURE — 97161 PT EVAL LOW COMPLEX 20 MIN: CPT

## 2023-09-14 PROCEDURE — 6360000002 HC RX W HCPCS: Performed by: INTERNAL MEDICINE

## 2023-09-14 PROCEDURE — 70551 MRI BRAIN STEM W/O DYE: CPT

## 2023-09-14 PROCEDURE — 51701 INSERT BLADDER CATHETER: CPT

## 2023-09-14 PROCEDURE — 97530 THERAPEUTIC ACTIVITIES: CPT

## 2023-09-14 PROCEDURE — 36415 COLL VENOUS BLD VENIPUNCTURE: CPT

## 2023-09-14 RX ORDER — LEVOFLOXACIN 500 MG/1
500 TABLET, FILM COATED ORAL DAILY
Qty: 7 TABLET | Refills: 0 | Status: SHIPPED | OUTPATIENT
Start: 2023-09-14 | End: 2023-09-21

## 2023-09-14 RX ORDER — TRAMADOL HYDROCHLORIDE 50 MG/1
50 TABLET ORAL EVERY 6 HOURS PRN
Qty: 12 TABLET | Refills: 0 | Status: SHIPPED | OUTPATIENT
Start: 2023-09-14 | End: 2023-09-17

## 2023-09-14 RX ADMIN — ISOSORBIDE MONONITRATE 30 MG: 60 TABLET, EXTENDED RELEASE ORAL at 08:37

## 2023-09-14 RX ADMIN — FOLIC ACID 1 MG: 1 TABLET ORAL at 08:36

## 2023-09-14 RX ADMIN — FUROSEMIDE 40 MG: 40 TABLET ORAL at 08:37

## 2023-09-14 RX ADMIN — GABAPENTIN 300 MG: 300 CAPSULE ORAL at 08:37

## 2023-09-14 RX ADMIN — SODIUM CHLORIDE, PRESERVATIVE FREE 10 ML: 5 INJECTION INTRAVENOUS at 08:39

## 2023-09-14 RX ADMIN — LOSARTAN POTASSIUM 100 MG: 50 TABLET, FILM COATED ORAL at 08:37

## 2023-09-14 RX ADMIN — CEFTRIAXONE SODIUM 1000 MG: 1 INJECTION, POWDER, FOR SOLUTION INTRAMUSCULAR; INTRAVENOUS at 08:37

## 2023-09-14 RX ADMIN — POTASSIUM CHLORIDE 10 MEQ: 750 TABLET, EXTENDED RELEASE ORAL at 08:36

## 2023-09-14 RX ADMIN — FERROUS SULFATE TAB 325 MG (65 MG ELEMENTAL FE) 325 MG: 325 (65 FE) TAB at 08:36

## 2023-09-14 RX ADMIN — INSULIN LISPRO 1 UNITS: 100 INJECTION, SOLUTION INTRAVENOUS; SUBCUTANEOUS at 14:01

## 2023-09-14 RX ADMIN — MONTELUKAST 10 MG: 10 TABLET, FILM COATED ORAL at 08:37

## 2023-09-14 RX ADMIN — LORAZEPAM 2 MG: 0.5 TABLET ORAL at 11:07

## 2023-09-14 RX ADMIN — APIXABAN 2.5 MG: 2.5 TABLET, FILM COATED ORAL at 08:37

## 2023-09-14 RX ADMIN — PANTOPRAZOLE SODIUM 40 MG: 40 TABLET, DELAYED RELEASE ORAL at 06:16

## 2023-09-14 NOTE — PROGRESS NOTES
..Discharge Summary    Date: 9/14/2023  Patient Name: Justine Taylor    YOB: 1935     Age: 82 y.o. Admit Date: 9/12/2023  Discharge Date:  Discharge Condition:    Admission Diagnosis  Dehydration [E86.0]; Lactic acidosis [E87.20];UTI (urinary tract infection) [N39.0]; History of stroke [Z86.73]; History of atrial fibrillation [Z86.79]; Acute cystitis with hematuria [N30.01]; Urinary tract infection in female [N39.0]; Dizziness [R42]      Discharge Diagnosis  Principal Problem:    UTI (urinary tract infection)  Active Problems:    Dizziness  Resolved Problems:    * No resolved hospital problems. [facility] Stay  Narrative of Hospital Course:      Consultants:  IP WOUND CARE NURSE CONSULT TO EVAL    Surgeries/procedures Performed:      Treatments:            Discharge Plan/Disposition:  Home    Hospital/Incidental Findings Requiring Follow Up:    Patient Instructions:    Diet:    Activity:  For number of days (if applicable): Other Instructions:    Provider Follow-Up:   No follow-ups on file. Significant Diagnostic Studies:    Recent Labs:  Admission on 09/12/2023  No results displayed because visit has over 200 results. ------------    Radiology last 7 days:  MRI BRAIN WO CONTRAST    Result Date: 9/14/2023  1. No acute intracranial abnormality. 2. Mild generalized parenchymal volume loss and mild chronic microvascular ischemic disease. CT HEAD WO CONTRAST    Result Date: 9/12/2023  No acute intracranial process. Imaging findings consistent with mild chronic microvascular ischemic change. There is a mild to moderate degree of cerebral atrophy. XR CHEST PORTABLE    Result Date: 9/12/2023  No evidence of acute cardiopulmonary process. CT ABDOMEN PELVIS WO CONTRAST Additional Contrast? None    Result Date: 9/12/2023  No acute abdominal or pelvic pathology.        Pending Labs     Order Current Status    Hepatic Function Panel In process    Culture, Blood 1 Preliminary result tablet  Take 1 tablet by mouth daily    nitroGLYCERIN (NITROSTAT) 0.4 MG SL tablet  Place 1 tablet under the tongue    pantoprazole (PROTONIX) 40 MG tablet  Take 1 tablet by mouth    polyethylene glycol (GLYCOLAX) 17 GM/SCOOP powder  Take 17 g by mouth daily as needed    potassium chloride (MICRO-K) 10 MEQ extended release capsule  Take 1 capsule by mouth daily          Current Discharge Medication List        Time Spent on Discharge:  minutes were spent in patient examination, evaluation, counseling as well as medication reconciliation, prescriptions for required medications, discharge plan, and follow up.     Electronically signed by Moise Rodriguez LPN on 6/82/61 at 7:09 PM EDT

## 2023-09-14 NOTE — DISCHARGE SUMMARY
Hospitalist Discharge Summary     Patient ID:  Rena Branham  [id number]  82 y.o.  1935 9/12/2023    PCP on record: Renetta King MD    Admit date: 9/12/2023  Discharge date and time: 9/14/2023    DISCHARGE DIAGNOSIS:    Dizziness/blurred vision/severe sepsis secondary to urinary tract infection/abdominal discomfort/history of atrial fibrillation/type 2 diabetes    CONSULTATIONS:  IP WOUND CARE NURSE CONSULT TO NANY    Excerpted HPI from H&P of Melvin Gowers, MD:  Rena Branham is a 82 y.o. female with pmh of diabetes mellitus, neuropathy, rheumatoid arthritis, chronic A-fib on anticoagulation, remote history of stroke, patient came because of 3 days history of imbalance, blurred vision, dizziness, on and off, for the last 3 days, does not use any cane or walker at home, she also complains of some abdominal pain, nausea, frequency, patient came to the ED for further evaluation   in the ED, patient vitals include heart rate 56, oxygen saturation 92-94 on room air, blood pressure 139/65, respiratory rate 17, cardiac monitor showed A-fib, blood work showed elevated lactic acid at 2.15, patient received 1 L of IV fluid, Rocephin IV, repeat lactic acid came 2.98, urine analysis was positive for glucose, large leukocyte esterase, positive for blood in the urine, positive for bacteria,Troponin high-sensitivity of 13, EKG shows sinus bradycardia with left ventricular hypertrophy and QRS widening  Giving all of the above the patient was admitted to [facility] for further evaluation  Patient was seen through a telemetry visit, with the assistance of the nurse at bedside    ______________________________________________________________________  DISCHARGE SUMMARY/HOSPITAL COURSE:  for full details see H&P, daily progress notes, labs, consult notes.    Patient was subsequently admitted to [facility] for evaluation as well as management, patient received IV fluids, started on as: COZAAR     meclizine 25 MG tablet  Commonly known as: ANTIVERT     melatonin 10 MG Caps capsule     methotrexate 2.5 MG chemo tablet  Commonly known as: RHEUMATREX     montelukast 10 MG tablet  Commonly known as: SINGULAIR     Nitrostat 0.4 MG SL tablet  Generic drug: nitroGLYCERIN     pantoprazole 40 MG tablet  Commonly known as: PROTONIX     polyethylene glycol 17 GM/SCOOP powder  Commonly known as: GLYCOLAX     potassium chloride 10 MEQ extended release capsule  Commonly known as: MICRO-K               Where to Get Your Medications        These medications were [facility] to [id number] - [place], [address] - P [phone] - F [phone]  [facility], [address]      Phone: [phone]   levoFLOXacin 500 MG tablet  traMADol 50 MG tablet           Patient Follow Up Instructions: Activity: activity as tolerated  Diet: cardiac diet  Wound Care: as directed    Follow-up with PCP/Urology/Neurology in 2 weeks.   Follow-up tests/labs As per above physicians  Follow-up Information       Follow up With Specialties Details Why Rose Leon MD Internal Medicine Follow up in 2 week(s)  [address],[facility]      Yamileth Trinidad MD Urology Follow up in 2 week(s)  [address] [place] [address]  2015 Callum Gill MD Neurology Follow up in 2 week(s)  [address] [address] [address] [facility] [address] [place] [address]  [phone]            ________________________________________________________________    Risk of deterioration: Low    Condition at Discharge:  Stable  __________________________________________________________________    Disposition  Home with family, no needs    ____________________________________________________________________    Code Status: Full Code  ___________________________________________________________________      Total time in minutes spent coordinating this discharge (includes going over

## 2023-09-14 NOTE — CARE COORDINATION
DC Plan: Home with spouse    Transition of Care Plan:    RUR: N/A  Prior Level of Functioning: needs assistance  Disposition: home  If SNF or IPR: Date FOC offered: N/A  Date FOC received: N/A  Accepting facility: N/A  Date authorization started with reference number: N/A  Date authorization received and expires: N/A  Follow up appointments: Yes  DME needed: None  Transportation at discharge: family  IM/IMM Medicare/ letter given: Yes  Is patient a [place] and connected with [place]? If yes, was [employer] transfer form completed and VA notified? Caregiver Contact: N/A  Discharge Caregiver contacted prior to discharge? N/A  Care Conference needed?  No  Barriers to discharge: None

## 2023-09-14 NOTE — PLAN OF CARE
Problem: Pain  Goal: Verbalizes/displays adequate comfort level or baseline comfort level  9/14/2023 0749 by Babak Prieto LPN  Outcome: Progressing  9/14/2023 0000 by Rolf Bauer RN  Outcome: Progressing     Problem: Safety - Adult  Goal: Free from fall injury  9/14/2023 0000 by Rolf Bauer RN  Outcome: Progressing     Problem: Discharge Planning  Goal: Discharge to home or other facility with appropriate resources  9/14/2023 0749 by Babak Prieto LPN  Outcome: Progressing  9/14/2023 0000 by Rolf Bauer RN  Outcome: Progressing

## 2023-09-17 LAB
BACTERIA SPEC CULT: NORMAL
BACTERIA SPEC CULT: NORMAL
Lab: NORMAL
Lab: NORMAL

## 2023-09-19 LAB
BACTERIA SPEC CULT: NORMAL
BACTERIA SPEC CULT: NORMAL
Lab: NORMAL
Lab: NORMAL

## 2023-09-23 NOTE — PROGRESS NOTES
Physician Progress Note      Paramjit Edmond  CSN #:                  [id number]  :                       1935  ADMIT DATE:       2023 11:36 AM  DISCH DATE:        2023 3:46 PM  RESPONDING  PROVIDER #:        Tyron Ralph MD          QUERY TEXT:    Dear Dr. Dipika Mckeon,    Patient admitted with UTI, dizziness. Noted documentation of sepsis in   Attending progress note on 23 and Discharge Summary. In order to support   the diagnosis of sepsis, please include additional clinical indicators in your   documentation. Or please document if the diagnosis of sepsis has been ruled   out after further study    The medical record reflects the following:  Risk Factors:  -82 F presents with near syncope, dizziness, blurred vision, imbalance, lower   abdominal pain, nausea  -admitted with UTI, lactic acidosis    Clinical Indicators:  -per H&P, \"No criteria for sepsis at admission , afebrile, normal wbc , blp is   stable\"  -per 23 Attending PN, \"Severe sepsis secondary to urinary tract   infection/abdominal discomfort-of note patient presented meeting severe sepsis   criteria, lactic acid elevation, currently lactic acid has normalized after   fluid resuscitation, patient is hemodynamically stable at this time\"  -per DCS, \"Dizziness/blurred vision/severe sepsis secondary to urinary tract   infection/abdominal discomfort/history of atrial fibrillation/type 2 diabetes\"  -WBC 6.6. ..7.3.. Tomie Belt 5.5  -POC lactic acid 2.15. ..2.98. ..2.79  -Plasma lactic acid 2.1...0.8  -afebrile  -heartrates 60. ..59...61...54...63...60...66...83...74... 102  -respiratory rates 18. ..17...16...20...18...18...20...18...16... 14    Treatment:  -labs, imaging, IV fluids, IV Ceftriaxone, discharged home on PO Levaquin    Thank you,  IWONA ReyesN, RN, CCDS, CRCR  [email or web address]   [email or web address] or contact via [employer]  Options provided:  -- Sepsis present on admission as TEXT:    Dear Dr. Linsey Gunn -    Patient admitted with UTI. Patient noted to have chronic atrial fibrillation   and is maintained on Eliquis. If possible, please document in progress notes   and discharge summary if you are evaluating and/or treating any of the   following: The medical record reflects the following:  Risk Factors:  -82 F presents with dizziness, blurred vision, imbalance, abdominal pain,   dysuria with frequency  -admitted with UTI    Clinical Indicators:  -82 F with DM, chronic atrial fibrillation, hx of CVA    Treatment: ***  -Eliquis    Thank you,  IWONA GrahamN, RN, CCDS, CRCR  Clinical   [email or web address] or contact via [employer]  Options provided:  -- Secondary hypercoagulable state in a patient with atrial fibrillation  -- Other - I will add my own diagnosis  -- Disagree - Not applicable / Not valid  -- Disagree - Clinically unable to determine / Unknown  -- Refer to Clinical Documentation Reviewer    PROVIDER RESPONSE TEXT:    This patient has secondary hypercoagulable state in a patient with atrial   fibrillation. Query created by: Alexia De Santiago on 9/18/2023 10:18 AM      Electronically signed by:   Faby Kimble MD 9/23/2023 1:42 PM

## 2023-10-05 PROBLEM — R31.9 HEMATURIA: Status: ACTIVE | Noted: 2023-10-05

## 2023-10-11 ENCOUNTER — APPOINTMENT (OUTPATIENT)
Facility: HOSPITAL | Age: 88
End: 2023-10-11
Payer: MEDICARE

## 2023-10-11 ENCOUNTER — HOSPITAL ENCOUNTER (INPATIENT)
Facility: HOSPITAL | Age: 88
LOS: 3 days | Discharge: HOME HEALTH | End: 2023-10-14
Attending: EMERGENCY MEDICINE | Admitting: INTERNAL MEDICINE
Payer: MEDICARE

## 2023-10-11 DIAGNOSIS — R11.2 NAUSEA AND VOMITING, UNSPECIFIED VOMITING TYPE: ICD-10-CM

## 2023-10-11 DIAGNOSIS — N30.00 ACUTE CYSTITIS WITHOUT HEMATURIA: ICD-10-CM

## 2023-10-11 DIAGNOSIS — N17.9 AKI (ACUTE KIDNEY INJURY) (HCC): ICD-10-CM

## 2023-10-11 DIAGNOSIS — R10.32 LEFT LOWER QUADRANT ABDOMINAL PAIN: Primary | ICD-10-CM

## 2023-10-11 LAB
ALBUMIN SERPL-MCNC: 4.1 G/DL (ref 3.5–5)
ALBUMIN/GLOB SERPL: 1.1 (ref 1.1–2.2)
ALP SERPL-CCNC: 80 U/L (ref 45–117)
ALT SERPL-CCNC: 27 U/L (ref 12–78)
AMORPH CRY URNS QL MICRO: ABNORMAL
ANION GAP SERPL CALC-SCNC: 9 MMOL/L (ref 5–15)
APPEARANCE UR: ABNORMAL
AST SERPL W P-5'-P-CCNC: ABNORMAL U/L (ref 15–37)
BACTERIA URNS QL MICRO: ABNORMAL /HPF
BASOPHILS # BLD: 0.1 K/UL (ref 0–0.1)
BASOPHILS NFR BLD: 1 % (ref 0–1)
BILIRUB SERPL-MCNC: 0.5 MG/DL (ref 0.2–1)
BILIRUB UR QL: NEGATIVE
BUN SERPL-MCNC: 23 MG/DL (ref 6–20)
BUN/CREAT SERPL: 22 (ref 12–20)
CA-I BLD-MCNC: 9.5 MG/DL (ref 8.5–10.1)
CHLORIDE SERPL-SCNC: 100 MMOL/L (ref 97–108)
CO2 SERPL-SCNC: 27 MMOL/L (ref 21–32)
COLOR UR: ABNORMAL
CREAT SERPL-MCNC: 1.06 MG/DL (ref 0.55–1.02)
DIFFERENTIAL METHOD BLD: ABNORMAL
EKG ATRIAL RATE: 77 BPM
EKG DIAGNOSIS: NORMAL
EKG P-R INTERVAL: 144 MS
EKG Q-T INTERVAL: 448 MS
EKG QRS DURATION: 118 MS
EKG QTC CALCULATION (BAZETT): 506 MS
EKG R AXIS: -67 DEGREES
EKG T AXIS: 110 DEGREES
EKG VENTRICULAR RATE: 77 BPM
EOSINOPHIL # BLD: 0.2 K/UL (ref 0–0.4)
EOSINOPHIL NFR BLD: 2 % (ref 0–7)
EPITH CASTS URNS QL MICRO: ABNORMAL /LPF
ERYTHROCYTE [DISTWIDTH] IN BLOOD BY AUTOMATED COUNT: 15.7 % (ref 11.5–14.5)
GLOBULIN SER CALC-MCNC: 3.6 G/DL (ref 2–4)
GLUCOSE BLD STRIP.AUTO-MCNC: 140 MG/DL (ref 65–100)
GLUCOSE SERPL-MCNC: 112 MG/DL (ref 65–100)
GLUCOSE UR STRIP.AUTO-MCNC: >500 MG/DL
HCT VFR BLD AUTO: 40.2 % (ref 35–47)
HGB BLD-MCNC: 13 G/DL (ref 11.5–16)
HGB UR QL STRIP: ABNORMAL
IMM GRANULOCYTES # BLD AUTO: 0.1 K/UL (ref 0–0.04)
IMM GRANULOCYTES NFR BLD AUTO: 1 % (ref 0–0.5)
KETONES UR QL STRIP.AUTO: NEGATIVE MG/DL
LEUKOCYTE ESTERASE UR QL STRIP.AUTO: ABNORMAL
LIPASE SERPL-CCNC: 28 U/L (ref 13–75)
LYMPHOCYTES # BLD: 1.8 K/UL (ref 0.8–3.5)
LYMPHOCYTES NFR BLD: 17 % (ref 12–49)
MCH RBC QN AUTO: 28 PG (ref 26–34)
MCHC RBC AUTO-ENTMCNC: 32.3 G/DL (ref 30–36.5)
MCV RBC AUTO: 86.6 FL (ref 80–99)
MONOCYTES # BLD: 1.4 K/UL (ref 0–1)
MONOCYTES NFR BLD: 13 % (ref 5–13)
MUCOUS THREADS URNS QL MICRO: ABNORMAL /LPF
NEUTS SEG # BLD: 6.8 K/UL (ref 1.8–8)
NEUTS SEG NFR BLD: 66 % (ref 32–75)
NITRITE UR QL STRIP.AUTO: NEGATIVE
NRBC # BLD: 0 K/UL (ref 0–0.01)
NRBC BLD-RTO: 0 PER 100 WBC
PERFORMED BY:: ABNORMAL
PH UR STRIP: 6 (ref 5–8)
PLATELET # BLD AUTO: 233 K/UL (ref 150–400)
PMV BLD AUTO: 12.9 FL (ref 8.9–12.9)
POTASSIUM SERPL-SCNC: ABNORMAL MMOL/L (ref 3.5–5.1)
PROT SERPL-MCNC: 7.7 G/DL (ref 6.4–8.2)
PROT UR STRIP-MCNC: NEGATIVE MG/DL
RBC # BLD AUTO: 4.64 M/UL (ref 3.8–5.2)
RBC #/AREA URNS HPF: ABNORMAL /HPF (ref 0–5)
SODIUM SERPL-SCNC: 136 MMOL/L (ref 136–145)
SP GR UR REFRACTOMETRY: 1.01 (ref 1–1.03)
TROPONIN I SERPL HS-MCNC: 16 NG/L (ref 0–51)
UROBILINOGEN UR QL STRIP.AUTO: 0.1 EU/DL (ref 0.1–1)
WBC # BLD AUTO: 10.4 K/UL (ref 3.6–11)
WBC URNS QL MICRO: >100 /HPF (ref 0–4)

## 2023-10-11 PROCEDURE — 74176 CT ABD & PELVIS W/O CONTRAST: CPT

## 2023-10-11 PROCEDURE — 6370000000 HC RX 637 (ALT 250 FOR IP): Performed by: STUDENT IN AN ORGANIZED HEALTH CARE EDUCATION/TRAINING PROGRAM

## 2023-10-11 PROCEDURE — 2580000003 HC RX 258: Performed by: STUDENT IN AN ORGANIZED HEALTH CARE EDUCATION/TRAINING PROGRAM

## 2023-10-11 PROCEDURE — 1100000000 HC RM PRIVATE

## 2023-10-11 PROCEDURE — 99285 EMERGENCY DEPT VISIT HI MDM: CPT

## 2023-10-11 PROCEDURE — 84484 ASSAY OF TROPONIN QUANT: CPT

## 2023-10-11 PROCEDURE — 82962 GLUCOSE BLOOD TEST: CPT

## 2023-10-11 PROCEDURE — 87040 BLOOD CULTURE FOR BACTERIA: CPT

## 2023-10-11 PROCEDURE — 36415 COLL VENOUS BLD VENIPUNCTURE: CPT

## 2023-10-11 PROCEDURE — 2580000003 HC RX 258: Performed by: EMERGENCY MEDICINE

## 2023-10-11 PROCEDURE — 6360000002 HC RX W HCPCS: Performed by: STUDENT IN AN ORGANIZED HEALTH CARE EDUCATION/TRAINING PROGRAM

## 2023-10-11 PROCEDURE — 96375 TX/PRO/DX INJ NEW DRUG ADDON: CPT

## 2023-10-11 PROCEDURE — 96374 THER/PROPH/DIAG INJ IV PUSH: CPT

## 2023-10-11 PROCEDURE — 85025 COMPLETE CBC W/AUTO DIFF WBC: CPT

## 2023-10-11 PROCEDURE — 87086 URINE CULTURE/COLONY COUNT: CPT

## 2023-10-11 PROCEDURE — 71045 X-RAY EXAM CHEST 1 VIEW: CPT

## 2023-10-11 PROCEDURE — 83690 ASSAY OF LIPASE: CPT

## 2023-10-11 PROCEDURE — 6360000002 HC RX W HCPCS: Performed by: EMERGENCY MEDICINE

## 2023-10-11 PROCEDURE — 93005 ELECTROCARDIOGRAM TRACING: CPT | Performed by: EMERGENCY MEDICINE

## 2023-10-11 PROCEDURE — 81001 URINALYSIS AUTO W/SCOPE: CPT

## 2023-10-11 PROCEDURE — 80053 COMPREHEN METABOLIC PANEL: CPT

## 2023-10-11 RX ORDER — ACETAMINOPHEN 650 MG/1
650 SUPPOSITORY RECTAL EVERY 6 HOURS PRN
Status: DISCONTINUED | OUTPATIENT
Start: 2023-10-11 | End: 2023-10-14 | Stop reason: HOSPADM

## 2023-10-11 RX ORDER — POLYETHYLENE GLYCOL 3350 17 G/17G
17 POWDER, FOR SOLUTION ORAL DAILY PRN
Status: DISCONTINUED | OUTPATIENT
Start: 2023-10-11 | End: 2023-10-14 | Stop reason: HOSPADM

## 2023-10-11 RX ORDER — SODIUM CHLORIDE 9 MG/ML
INJECTION, SOLUTION INTRAVENOUS CONTINUOUS
Status: DISCONTINUED | OUTPATIENT
Start: 2023-10-11 | End: 2023-10-14 | Stop reason: HOSPADM

## 2023-10-11 RX ORDER — SODIUM CHLORIDE 0.9 % (FLUSH) 0.9 %
5-40 SYRINGE (ML) INJECTION PRN
Status: DISCONTINUED | OUTPATIENT
Start: 2023-10-11 | End: 2023-10-14 | Stop reason: HOSPADM

## 2023-10-11 RX ORDER — ISOSORBIDE MONONITRATE 30 MG/1
30 TABLET, EXTENDED RELEASE ORAL DAILY
Status: DISCONTINUED | OUTPATIENT
Start: 2023-10-12 | End: 2023-10-14 | Stop reason: HOSPADM

## 2023-10-11 RX ORDER — FUROSEMIDE 40 MG/1
40 TABLET ORAL DAILY
Status: DISCONTINUED | OUTPATIENT
Start: 2023-10-11 | End: 2023-10-14 | Stop reason: HOSPADM

## 2023-10-11 RX ORDER — INSULIN LISPRO 100 [IU]/ML
0-4 INJECTION, SOLUTION INTRAVENOUS; SUBCUTANEOUS NIGHTLY
Status: DISCONTINUED | OUTPATIENT
Start: 2023-10-11 | End: 2023-10-14 | Stop reason: HOSPADM

## 2023-10-11 RX ORDER — PANTOPRAZOLE SODIUM 40 MG/1
40 TABLET, DELAYED RELEASE ORAL
Status: DISCONTINUED | OUTPATIENT
Start: 2023-10-12 | End: 2023-10-14 | Stop reason: HOSPADM

## 2023-10-11 RX ORDER — GABAPENTIN 300 MG/1
300 CAPSULE ORAL 2 TIMES DAILY
Status: DISCONTINUED | OUTPATIENT
Start: 2023-10-11 | End: 2023-10-14 | Stop reason: HOSPADM

## 2023-10-11 RX ORDER — LOSARTAN POTASSIUM 50 MG/1
100 TABLET ORAL DAILY
Status: DISCONTINUED | OUTPATIENT
Start: 2023-10-12 | End: 2023-10-14 | Stop reason: HOSPADM

## 2023-10-11 RX ORDER — BUTALBITAL, ACETAMINOPHEN AND CAFFEINE 50; 325; 40 MG/1; MG/1; MG/1
1 TABLET ORAL EVERY 4 HOURS PRN
Status: DISCONTINUED | OUTPATIENT
Start: 2023-10-11 | End: 2023-10-14 | Stop reason: HOSPADM

## 2023-10-11 RX ORDER — SODIUM CHLORIDE 0.9 % (FLUSH) 0.9 %
5-40 SYRINGE (ML) INJECTION EVERY 12 HOURS SCHEDULED
Status: DISCONTINUED | OUTPATIENT
Start: 2023-10-11 | End: 2023-10-14 | Stop reason: HOSPADM

## 2023-10-11 RX ORDER — FERROUS SULFATE 325(65) MG
325 TABLET ORAL DAILY
Status: DISCONTINUED | OUTPATIENT
Start: 2023-10-12 | End: 2023-10-14 | Stop reason: HOSPADM

## 2023-10-11 RX ORDER — ACETAMINOPHEN 325 MG/1
650 TABLET ORAL EVERY 6 HOURS PRN
Status: DISCONTINUED | OUTPATIENT
Start: 2023-10-11 | End: 2023-10-14 | Stop reason: HOSPADM

## 2023-10-11 RX ORDER — CHOLECALCIFEROL (VITAMIN D3) 125 MCG
10 CAPSULE ORAL NIGHTLY PRN
Status: DISCONTINUED | OUTPATIENT
Start: 2023-10-11 | End: 2023-10-14 | Stop reason: HOSPADM

## 2023-10-11 RX ORDER — FOLIC ACID 1 MG/1
1 TABLET ORAL DAILY
Status: DISCONTINUED | OUTPATIENT
Start: 2023-10-12 | End: 2023-10-14 | Stop reason: HOSPADM

## 2023-10-11 RX ORDER — HYDROCODONE BITARTRATE AND ACETAMINOPHEN 10; 325 MG/1; MG/1
1 TABLET ORAL EVERY 4 HOURS PRN
Status: DISCONTINUED | OUTPATIENT
Start: 2023-10-11 | End: 2023-10-14 | Stop reason: HOSPADM

## 2023-10-11 RX ORDER — SODIUM CHLORIDE 9 MG/ML
INJECTION, SOLUTION INTRAVENOUS PRN
Status: DISCONTINUED | OUTPATIENT
Start: 2023-10-11 | End: 2023-10-14 | Stop reason: HOSPADM

## 2023-10-11 RX ORDER — 0.9 % SODIUM CHLORIDE 0.9 %
500 INTRAVENOUS SOLUTION INTRAVENOUS ONCE
Status: COMPLETED | OUTPATIENT
Start: 2023-10-11 | End: 2023-10-11

## 2023-10-11 RX ORDER — MONTELUKAST SODIUM 10 MG/1
10 TABLET ORAL DAILY
Status: DISCONTINUED | OUTPATIENT
Start: 2023-10-11 | End: 2023-10-14 | Stop reason: HOSPADM

## 2023-10-11 RX ORDER — GLIPIZIDE 5 MG/1
10 TABLET ORAL
Status: DISCONTINUED | OUTPATIENT
Start: 2023-10-12 | End: 2023-10-14 | Stop reason: HOSPADM

## 2023-10-11 RX ORDER — ONDANSETRON 2 MG/ML
4 INJECTION INTRAMUSCULAR; INTRAVENOUS EVERY 6 HOURS PRN
Status: DISCONTINUED | OUTPATIENT
Start: 2023-10-11 | End: 2023-10-14 | Stop reason: HOSPADM

## 2023-10-11 RX ORDER — ONDANSETRON 2 MG/ML
4 INJECTION INTRAMUSCULAR; INTRAVENOUS ONCE
Status: COMPLETED | OUTPATIENT
Start: 2023-10-11 | End: 2023-10-11

## 2023-10-11 RX ORDER — ONDANSETRON 4 MG/1
4 TABLET, ORALLY DISINTEGRATING ORAL EVERY 8 HOURS PRN
Status: DISCONTINUED | OUTPATIENT
Start: 2023-10-11 | End: 2023-10-14 | Stop reason: HOSPADM

## 2023-10-11 RX ORDER — INSULIN LISPRO 100 [IU]/ML
0-4 INJECTION, SOLUTION INTRAVENOUS; SUBCUTANEOUS
Status: DISCONTINUED | OUTPATIENT
Start: 2023-10-11 | End: 2023-10-14 | Stop reason: HOSPADM

## 2023-10-11 RX ORDER — DONEPEZIL HYDROCHLORIDE 5 MG/1
10 TABLET, FILM COATED ORAL NIGHTLY
Status: DISCONTINUED | OUTPATIENT
Start: 2023-10-11 | End: 2023-10-14 | Stop reason: HOSPADM

## 2023-10-11 RX ORDER — ATORVASTATIN CALCIUM 40 MG/1
40 TABLET, FILM COATED ORAL NIGHTLY
Status: DISCONTINUED | OUTPATIENT
Start: 2023-10-11 | End: 2023-10-14 | Stop reason: HOSPADM

## 2023-10-11 RX ORDER — DEXTROSE MONOHYDRATE 100 MG/ML
INJECTION, SOLUTION INTRAVENOUS CONTINUOUS PRN
Status: DISCONTINUED | OUTPATIENT
Start: 2023-10-11 | End: 2023-10-14 | Stop reason: HOSPADM

## 2023-10-11 RX ADMIN — GABAPENTIN 300 MG: 300 CAPSULE ORAL at 21:20

## 2023-10-11 RX ADMIN — ONDANSETRON 4 MG: 2 INJECTION INTRAMUSCULAR; INTRAVENOUS at 11:53

## 2023-10-11 RX ADMIN — APIXABAN 2.5 MG: 2.5 TABLET, FILM COATED ORAL at 21:20

## 2023-10-11 RX ADMIN — WATER 1000 MG: 1 INJECTION INTRAMUSCULAR; INTRAVENOUS; SUBCUTANEOUS at 13:41

## 2023-10-11 RX ADMIN — ATORVASTATIN CALCIUM 40 MG: 40 TABLET, FILM COATED ORAL at 21:20

## 2023-10-11 RX ADMIN — SODIUM CHLORIDE, PRESERVATIVE FREE 10 ML: 5 INJECTION INTRAVENOUS at 21:33

## 2023-10-11 RX ADMIN — SODIUM CHLORIDE: 9 INJECTION, SOLUTION INTRAVENOUS at 21:29

## 2023-10-11 RX ADMIN — POLYETHYLENE GLYCOL 3350 17 G: 17 POWDER, FOR SOLUTION ORAL at 21:24

## 2023-10-11 RX ADMIN — SODIUM CHLORIDE 500 ML: 9 INJECTION, SOLUTION INTRAVENOUS at 14:45

## 2023-10-11 RX ADMIN — DONEPEZIL HYDROCHLORIDE 10 MG: 5 TABLET, FILM COATED ORAL at 21:20

## 2023-10-11 RX ADMIN — ONDANSETRON 4 MG: 2 INJECTION INTRAMUSCULAR; INTRAVENOUS at 21:20

## 2023-10-11 ASSESSMENT — ENCOUNTER SYMPTOMS
BACK PAIN: 0
VOMITING: 1
ABDOMINAL PAIN: 1
DIARRHEA: 0
SHORTNESS OF BREATH: 0
COUGH: 0
NAUSEA: 1
BLOOD IN STOOL: 0

## 2023-10-11 ASSESSMENT — PAIN - FUNCTIONAL ASSESSMENT: PAIN_FUNCTIONAL_ASSESSMENT: 0-10

## 2023-10-11 ASSESSMENT — PAIN SCALES - GENERAL: PAINLEVEL_OUTOF10: 0

## 2023-10-11 ASSESSMENT — LIFESTYLE VARIABLES
HOW MANY STANDARD DRINKS CONTAINING ALCOHOL DO YOU HAVE ON A TYPICAL DAY: PATIENT DOES NOT DRINK
HOW OFTEN DO YOU HAVE A DRINK CONTAINING ALCOHOL: NEVER

## 2023-10-11 NOTE — ED NOTES
Pt resting quietly on stretcher, wet wash clothes provided for pt to help cool her down. Pts  at bedside at this time.      Geoff De Santiago RN  10/11/23 6254

## 2023-10-11 NOTE — ED TRIAGE NOTES
Patient complains of lower abdominal pain and nausea for the last week, seen previously for the same

## 2023-10-11 NOTE — ED NOTES
Pt placed on 2L NC for O2 saturations in the 80s. Provider notified, orders place at this time.       Shaun Freitas RN  10/11/23 1062

## 2023-10-11 NOTE — ED NOTES
TRANSFER - OUT REPORT:    Verbal report given to Malini Sarkar RN on [address]  being transferred to [facility] for routine progression of patient care       Report consisted of patient's Situation, Background, Assessment and   Recommendations(SBAR). Information from the following report(s) ED Encounter Summary, ED SBAR, Adult Overview, Surgery Report, Intake/Output, MAR, Recent Results, and Neuro Assessment was reviewed with the receiving nurse. Enola Fall Assessment:    Presents to emergency department  because of falls (Syncope, seizure, or loss of consciousness): No  Age > 70: Yes  Altered Mental Status, Intoxication with alcohol or substance confusion (Disorientation, impaired judgment, poor safety awaremess, or inability to follow instructions): No  Impaired Mobility: Ambulates or transfers with assistive devices or assistance; Unable to ambulate or transer.: Yes             Lines:   Peripheral IV 10/11/23 Distal;Left Forearm (Active)        Opportunity for questions and clarification was provided.       Patient transported with:  Monitor, O2 @ 2lpm, and Tech               Og Castaneda RN  10/11/23 4193

## 2023-10-11 NOTE — PROGRESS NOTES
Received patient to Room 528 via wheel chair from the E.R. alert pleasant no  complaints voice will cont to monitor.

## 2023-10-11 NOTE — H&P
Hospitalist Admission Note    NAME: Alfredito Cano   :  1935   MRN:  [id number]     Date/Time:  10/11/2023 3:36 PM    Patient PCP: Beny Aguilar MD    ______________________________________________________________________  Given the patient's current clinical presentation, I have a high level of concern for decompensation if discharged from the emergency department. Complex decision making was performed, which includes reviewing the patient's available past medical records, laboratory results, and x-ray films. My assessment of this patient's clinical condition and my plan of care is as follows. Assessment / Plan:    Suspected recurrent urinary tract infection  Reviewed previous urine culture 3/2023: + for klebsiella pneumoniae, sensitive to Rocephin. IV antibiotics: Rocephin  Urine culture  Blood cultures  Lactic acid    Abdominal pain, nausea/vomiting  Has reportedly been ongoing for months  CT of abdomen pelvis with no acute abnormality  Consider related to UTI but patient also with hx of multiple hernia repairs  General surgery consult for possible hernia defect   Antiemetics as needed  Troponin negative    ONEIL  Creatinine 1.06  IV fluids    CAD  Continue home medications Imdur, atorvastatin    Hypertension  Continue losartan    Diabetes type 2  Accu-Checks  Will hold home glimepiride  SSI    Atrial fibrillation  Continue Eliquis  Telemetry    Medical Decision Making:   I personally reviewed labs: Cr 1.06  I personally reviewed imaging: CT abd/pelvis  Toxic drug monitoring: Monitor for signs of bleeding on eliquis  Discussed case with: ED provider. After discussion I am in agreement that acuity of patient's medical condition necessitates hospital stay.       Code Status: Full code  DVT Prophylaxis: Eliquis  GI Prophylaxis: Protonix      Subjective:   CHIEF COMPLAINT: Abdominal pain    HISTORY OF PRESENT ILLNESS:     Ramana Moran is a 82 y.o.  female with PMHx of type 2 diabetes, CAD, history

## 2023-10-11 NOTE — ED PROVIDER NOTES
[facility] EMERGENCY DEPT  EMERGENCY DEPARTMENT HISTORY AND PHYSICAL EXAM      Date: 10/11/2023  Patient Name: Georgia Skiff  MRN: [id number]  [address] [facility] [place] 1935  Date of evaluation: 10/11/2023  Provider: Juanita Gregorio MD   Note Started: 11:13 AM EDT 10/11/23    HISTORY OF PRESENT ILLNESS     Chief Complaint   Patient presents with    Abdominal Pain    Nausea & Vomiting       History Provided By: Patient and patient's     HPI: Georgia Skiff is a 82 y.o. female presents to the emergency department with complaint of left lower quadrant abdominal pain with nausea and vomiting. Patient reports symptoms have been going on for 2 weeks but worse over the last few days. Patient denies fevers or chills.     PAST MEDICAL HISTORY   Past Medical History:  Past Medical History:   Diagnosis Date    Arrhythmia     a. 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     Lung abnormality     SOB (shortness of breath)     on exertion    Stroke ([address]) 2017       Past Surgical History:  Past Surgical History:   Procedure Laterality Date    BACK SURGERY      eight surgeries    CATARACT REMOVAL      left eye    COLONOSCOPY Left 2018    COLONOSCOPY performed by Irish Avendano MD at [facility]OSCOPY    HERNIA REPAIR      OTHER SURGICAL HISTORY      rt middle lobe removed d/t necrosis    PARTIAL HYSTERECTOMY (CERVIX NOT REMOVED)      REFRACTIVE SURGERY      REMOVAL GALLBLADDER         Family History:  Family History   Problem Relation Age of Onset    Cancer Maternal Grandmother     Heart Disease Maternal Aunt     Diabetes Maternal Aunt     Cancer Brother     Diabetes Sister     Diabetes Mother     Heart Disease Father     Stroke Father        Social History:  Social History     Tobacco Use    Smoking status: Former     Packs/day: 1     Types: Cigarettes     Quit date: 2011     Years since quittin.6    Smokeless tobacco: Never Substance Use Topics    Alcohol use: Yes    Drug use: No       Allergies: Allergies   Allergen Reactions    Iodinated Contrast Media Other (See Comments)     Iv dye causes heart to stop    Tetanus Toxoids Other (See Comments)     Blisters all over body    Propoxyphene Rash       PCP: Erna Nogueira MD    Current Meds:   Current Facility-Administered Medications   Medication Dose Route Frequency Provider Last Rate Last Admin    sodium chloride 0.9 % bolus 500 mL  500 mL IntraVENous Once Juanita Gregorio MD         Current Outpatient Medications   Medication Sig Dispense Refill    apixaban (ELIQUIS) 2.5 MG TABS tablet Take 1 tablet by mouth 2 times daily      atorvastatin (LIPITOR) 40 MG tablet Take 1 tablet by mouth nightly      butalbital-acetaminophen-caffeine (FIORICET, ESGIC) -40 MG per tablet Take 1 tablet by mouth every 4 hours as needed      cyclobenzaprine (FLEXERIL) 10 MG tablet Take 0.5 tablets by mouth 3 times daily as needed      donepezil (ARICEPT) 10 MG tablet Take 1 tablet by mouth nightly      ferrous sulfate (IRON 325) 325 (65 Fe) MG tablet Take 1 tablet by mouth daily      folic acid (FOLVITE) 1 MG tablet Take 1 tablet by mouth daily      furosemide (LASIX) 40 MG tablet Take 1 tablet by mouth daily      gabapentin (NEURONTIN) 300 MG capsule Take 1 capsule by mouth 2 times daily. glimepiride (AMARYL) 4 MG tablet Take 1 tablet by mouth      HYDROcodone-acetaminophen (NORCO)  MG per tablet Take 1 tablet by mouth every 4 hours as needed. isosorbide mononitrate (IMDUR) 30 MG extended release tablet Take 1 tablet by mouth daily      losartan (COZAAR) 100 MG tablet Take 1 tablet by mouth daily      meclizine (ANTIVERT) 25 MG tablet meclizine 25 mg tablet      melatonin 10 MG CAPS capsule melatonin 10 mg capsule   Take by oral route.       methotrexate (RHEUMATREX) 2.5 MG chemo tablet Take 8 tablets by mouth      montelukast (SINGULAIR) 10 MG tablet Take 1 tablet by mouth daily

## 2023-10-12 LAB
ALBUMIN SERPL-MCNC: 3.5 G/DL (ref 3.5–5)
ALBUMIN/GLOB SERPL: 1 (ref 1.1–2.2)
ALP SERPL-CCNC: 68 U/L (ref 45–117)
ALT SERPL-CCNC: 22 U/L (ref 12–78)
ANION GAP SERPL CALC-SCNC: 5 MMOL/L (ref 5–15)
AST SERPL W P-5'-P-CCNC: 17 U/L (ref 15–37)
BACTERIA SPEC CULT: NORMAL
BASOPHILS # BLD: 0.1 K/UL (ref 0–0.1)
BASOPHILS NFR BLD: 1 % (ref 0–1)
BILIRUB SERPL-MCNC: 0.3 MG/DL (ref 0.2–1)
BUN SERPL-MCNC: 18 MG/DL (ref 6–20)
BUN/CREAT SERPL: 22 (ref 12–20)
CA-I BLD-MCNC: 8.9 MG/DL (ref 8.5–10.1)
CHLORIDE SERPL-SCNC: 105 MMOL/L (ref 97–108)
CO2 SERPL-SCNC: 32 MMOL/L (ref 21–32)
COLONY COUNT, CNT: NORMAL
COLONY COUNT, CNT: NORMAL
CREAT SERPL-MCNC: 0.82 MG/DL (ref 0.55–1.02)
DIFFERENTIAL METHOD BLD: ABNORMAL
EOSINOPHIL # BLD: 0.2 K/UL (ref 0–0.4)
EOSINOPHIL NFR BLD: 3 % (ref 0–7)
ERYTHROCYTE [DISTWIDTH] IN BLOOD BY AUTOMATED COUNT: 15.3 % (ref 11.5–14.5)
GLOBULIN SER CALC-MCNC: 3.4 G/DL (ref 2–4)
GLUCOSE BLD STRIP.AUTO-MCNC: 100 MG/DL (ref 65–100)
GLUCOSE BLD STRIP.AUTO-MCNC: 153 MG/DL (ref 65–100)
GLUCOSE BLD STRIP.AUTO-MCNC: 188 MG/DL (ref 65–100)
GLUCOSE BLD STRIP.AUTO-MCNC: 194 MG/DL (ref 65–100)
GLUCOSE SERPL-MCNC: 106 MG/DL (ref 65–100)
HCT VFR BLD AUTO: 38 % (ref 35–47)
HGB BLD-MCNC: 12 G/DL (ref 11.5–16)
IMM GRANULOCYTES # BLD AUTO: 0.1 K/UL (ref 0–0.04)
IMM GRANULOCYTES NFR BLD AUTO: 1 % (ref 0–0.5)
LACTATE SERPL-SCNC: 1.1 MMOL/L (ref 0.4–2)
LYMPHOCYTES # BLD: 1.4 K/UL (ref 0.8–3.5)
LYMPHOCYTES NFR BLD: 21 % (ref 12–49)
Lab: NORMAL
MCH RBC QN AUTO: 27.8 PG (ref 26–34)
MCHC RBC AUTO-ENTMCNC: 31.6 G/DL (ref 30–36.5)
MCV RBC AUTO: 88.2 FL (ref 80–99)
MONOCYTES # BLD: 0.9 K/UL (ref 0–1)
MONOCYTES NFR BLD: 14 % (ref 5–13)
NEUTS SEG # BLD: 4.1 K/UL (ref 1.8–8)
NEUTS SEG NFR BLD: 60 % (ref 32–75)
NRBC # BLD: 0 K/UL (ref 0–0.01)
NRBC BLD-RTO: 0 PER 100 WBC
PERFORMED BY:: ABNORMAL
PERFORMED BY:: NORMAL
PLATELET # BLD AUTO: 176 K/UL (ref 150–400)
PMV BLD AUTO: 12.4 FL (ref 8.9–12.9)
POTASSIUM SERPL-SCNC: 4 MMOL/L (ref 3.5–5.1)
PROT SERPL-MCNC: 6.9 G/DL (ref 6.4–8.2)
RBC # BLD AUTO: 4.31 M/UL (ref 3.8–5.2)
SODIUM SERPL-SCNC: 142 MMOL/L (ref 136–145)
WBC # BLD AUTO: 6.8 K/UL (ref 3.6–11)

## 2023-10-12 PROCEDURE — 85025 COMPLETE CBC W/AUTO DIFF WBC: CPT

## 2023-10-12 PROCEDURE — 6370000000 HC RX 637 (ALT 250 FOR IP): Performed by: STUDENT IN AN ORGANIZED HEALTH CARE EDUCATION/TRAINING PROGRAM

## 2023-10-12 PROCEDURE — 2580000003 HC RX 258: Performed by: STUDENT IN AN ORGANIZED HEALTH CARE EDUCATION/TRAINING PROGRAM

## 2023-10-12 PROCEDURE — 83605 ASSAY OF LACTIC ACID: CPT

## 2023-10-12 PROCEDURE — 80053 COMPREHEN METABOLIC PANEL: CPT

## 2023-10-12 PROCEDURE — 1100000000 HC RM PRIVATE

## 2023-10-12 PROCEDURE — 36415 COLL VENOUS BLD VENIPUNCTURE: CPT

## 2023-10-12 PROCEDURE — 51701 INSERT BLADDER CATHETER: CPT

## 2023-10-12 PROCEDURE — 6360000002 HC RX W HCPCS: Performed by: STUDENT IN AN ORGANIZED HEALTH CARE EDUCATION/TRAINING PROGRAM

## 2023-10-12 PROCEDURE — 82962 GLUCOSE BLOOD TEST: CPT

## 2023-10-12 RX ADMIN — DONEPEZIL HYDROCHLORIDE 10 MG: 5 TABLET, FILM COATED ORAL at 21:33

## 2023-10-12 RX ADMIN — MONTELUKAST 10 MG: 10 TABLET, FILM COATED ORAL at 09:20

## 2023-10-12 RX ADMIN — GABAPENTIN 300 MG: 300 CAPSULE ORAL at 09:21

## 2023-10-12 RX ADMIN — CEFTRIAXONE SODIUM 1000 MG: 1 INJECTION, POWDER, FOR SOLUTION INTRAMUSCULAR; INTRAVENOUS at 14:14

## 2023-10-12 RX ADMIN — LOSARTAN POTASSIUM 100 MG: 50 TABLET, FILM COATED ORAL at 09:21

## 2023-10-12 RX ADMIN — GABAPENTIN 300 MG: 300 CAPSULE ORAL at 21:34

## 2023-10-12 RX ADMIN — FERROUS SULFATE TAB 325 MG (65 MG ELEMENTAL FE) 325 MG: 325 (65 FE) TAB at 09:21

## 2023-10-12 RX ADMIN — SODIUM CHLORIDE, PRESERVATIVE FREE 10 ML: 5 INJECTION INTRAVENOUS at 21:35

## 2023-10-12 RX ADMIN — PANTOPRAZOLE SODIUM 40 MG: 40 TABLET, DELAYED RELEASE ORAL at 05:52

## 2023-10-12 RX ADMIN — SODIUM CHLORIDE, PRESERVATIVE FREE 10 ML: 5 INJECTION INTRAVENOUS at 09:22

## 2023-10-12 RX ADMIN — ATORVASTATIN CALCIUM 40 MG: 40 TABLET, FILM COATED ORAL at 21:34

## 2023-10-12 RX ADMIN — POLYETHYLENE GLYCOL 3350 17 G: 17 POWDER, FOR SOLUTION ORAL at 20:40

## 2023-10-12 RX ADMIN — ONDANSETRON 4 MG: 2 INJECTION INTRAMUSCULAR; INTRAVENOUS at 20:36

## 2023-10-12 RX ADMIN — SODIUM CHLORIDE: 9 INJECTION, SOLUTION INTRAVENOUS at 14:18

## 2023-10-12 RX ADMIN — ISOSORBIDE MONONITRATE 30 MG: 30 TABLET, EXTENDED RELEASE ORAL at 09:21

## 2023-10-12 RX ADMIN — FUROSEMIDE 40 MG: 40 TABLET ORAL at 09:21

## 2023-10-12 RX ADMIN — FOLIC ACID 1 MG: 1 TABLET ORAL at 09:21

## 2023-10-12 ASSESSMENT — ENCOUNTER SYMPTOMS
CHEST TIGHTNESS: 0
ABDOMINAL DISTENTION: 0
SHORTNESS OF BREATH: 0
WHEEZING: 0
ABDOMINAL PAIN: 0
VOMITING: 0

## 2023-10-12 ASSESSMENT — PAIN SCALES - GENERAL: PAINLEVEL_OUTOF10: 0

## 2023-10-12 NOTE — PROGRESS NOTES
Hospitalist Progress Note    NAME:   Dante Ferguson   : 1935   MRN: [id number]     Date/Time: 10/12/2023 4:57 PM  Patient PCP: Rosie Martinez MD    Estimated discharge date:>48 hours  Barriers: General surgery planning a procedure, IV abx, urine culture      Assessment / Plan:  Suspected recurrent urinary tract infection  --Reviewed previous urine culture 3/2023: + for klebsiella pneumoniae, sensitive to Rocephin. --IV antibiotics: Rocephin  --Urine culture  --Blood cultures  --Lactic acid     Abdominal pain, nausea/vomiting  --CT of abdomen pelvis with no acute abnormality  --Consider related to UTI but patient also with hx of multiple hernia repairs  --General surgery consult for possible hernia defect   --Antiemetics as needed  --Troponin negative     ONEIL  --Creatinine 1.06  --IV fluids     CAD  --Continue home medications Imdur, atorvastatin     Hypertension  --Continue losartan     Diabetes type 2  --Accu-Checks  --Will hold home glimepiride  --SSI     Atrial fibrillation  --Continue Eliquis  --Telemetry            Medical Decision Making:   I personally reviewed labs:CBC, CMP,   I personally reviewed imaging: CXR (negative) CT ABD (negative)  Toxic drug monitoring: Furosemide and Losartan via renal panel for signs of decreased kidney function and electrolyte abnormalities. Insulin via point-of-care glucoses for signs of hypoglycemia. Discussed case with: nursing staff and patient regarding urine culture and possible procedure with Dr. Matilda Galarza.          Code Status: Full  DVT Prophylaxis: Eliquis is held due to potential procedure with Dr. Jacob Adame Prophylaxis:protonix    Chief Complaint on Admission   Abdominal pain    Hospital Course:   Rolo Kay is a 82 y.o.  female with PMHx of type 2 diabetes, CAD, history of A-fib on anticoagulation, history of CVA, rheumatoid arthritis who presented to the emergency department for worsening left lower quadrant abdominal pain for the past week with

## 2023-10-12 NOTE — CONSULTS
[address] [place] [address] SURGERY  CONSULT          Chief Complaint: left lower abdominal pain    History of Present Illness:    Ms. Delaney Riley is a 82y.o. year old * female presented to ER with chronic left lower side abdominal pain for the past 8-9 months, as well as nausea without much vomiting. No constipation or altered bowel movement. She was found to have UTI and was admitted under medical service. Due to chronic left lower abdominal pain and with prior history of large ventral hernia repair in remote past Surgical consult requested for evaluation of of a possible left side groin hernia to exclude the reason for left groin pain. Denies any fever or chills or loss of weight or appetite. Past Medical History:   Past Medical History:   Diagnosis Date    Arrhythmia     a. 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     Lung abnormality     SOB (shortness of breath)     on exertion    Stroke ([address]) 12/2017       Past Surgical History:   Past Surgical History:   Procedure Laterality Date    BACK SURGERY      eight surgeries    CATARACT REMOVAL      left eye    COLONOSCOPY Left 9/12/2018    COLONOSCOPY performed by Zachariah Gann MD at [facility]      rt middle lobe removed d/t necrosis    PARTIAL HYSTERECTOMY (CERVIX NOT REMOVED)      REFRACTIVE SURGERY      REMOVAL GALLBLADDER          Allergy:  Allergies   Allergen Reactions    Iodinated Contrast Media Other (See Comments)     Iv dye causes heart to stop    Tetanus Toxoids Other (See Comments)     Blisters all over body    Propoxyphene Rash       Social History:  reports that she quit smoking about 12 years ago. Her smoking use included cigarettes. She smoked an average of 1 pack per day. She has never used smokeless tobacco. She reports current alcohol use. She reports that she does not use drugs.      Family History:  Family History   Problem

## 2023-10-12 NOTE — PROGRESS NOTES
Patient reports that she self catheterizes herself every night and morning at home due to incomplete emptying of bladder and requested to have supplies to do it in the hospital. Called Dr. Shikha Hernández and notified him of patient request and okayed intermittent self catheterization.

## 2023-10-12 NOTE — PLAN OF CARE
Problem: Safety - Adult  Goal: Free from fall injury  10/12/2023 0748 by Blaine Villareal LPN  Outcome: Progressing  10/12/2023 0513 by Declan Mercedes RN  Outcome: Progressing     Problem: ABCDS Injury Assessment  Goal: Absence of physical injury  10/12/2023 0748 by Blaine Villareal LPN  Outcome: Progressing  10/12/2023 0513 by Declan Mercedes RN  Outcome: Progressing  Flowsheets (Taken 10/12/2023 0509)  Absence of Physical Injury: Implement safety measures based on patient assessment

## 2023-10-13 ENCOUNTER — ANESTHESIA EVENT (OUTPATIENT)
Facility: HOSPITAL | Age: 88
End: 2023-10-13
Payer: MEDICARE

## 2023-10-13 ENCOUNTER — ANESTHESIA (OUTPATIENT)
Facility: HOSPITAL | Age: 88
End: 2023-10-13
Payer: MEDICARE

## 2023-10-13 LAB
GLUCOSE BLD STRIP.AUTO-MCNC: 115 MG/DL (ref 65–100)
GLUCOSE BLD STRIP.AUTO-MCNC: 116 MG/DL (ref 65–100)
GLUCOSE BLD STRIP.AUTO-MCNC: 128 MG/DL (ref 65–100)
GLUCOSE BLD STRIP.AUTO-MCNC: 153 MG/DL (ref 65–100)
PERFORMED BY:: ABNORMAL

## 2023-10-13 PROCEDURE — 2580000003 HC RX 258

## 2023-10-13 PROCEDURE — 6370000000 HC RX 637 (ALT 250 FOR IP): Performed by: STUDENT IN AN ORGANIZED HEALTH CARE EDUCATION/TRAINING PROGRAM

## 2023-10-13 PROCEDURE — 82962 GLUCOSE BLOOD TEST: CPT

## 2023-10-13 PROCEDURE — 1100000000 HC RM PRIVATE

## 2023-10-13 PROCEDURE — 2580000003 HC RX 258: Performed by: STUDENT IN AN ORGANIZED HEALTH CARE EDUCATION/TRAINING PROGRAM

## 2023-10-13 PROCEDURE — 6360000002 HC RX W HCPCS

## 2023-10-13 RX ADMIN — ISOSORBIDE MONONITRATE 30 MG: 30 TABLET, EXTENDED RELEASE ORAL at 10:23

## 2023-10-13 RX ADMIN — SODIUM CHLORIDE: 9 INJECTION, SOLUTION INTRAVENOUS at 10:19

## 2023-10-13 RX ADMIN — FUROSEMIDE 40 MG: 40 TABLET ORAL at 10:18

## 2023-10-13 RX ADMIN — DONEPEZIL HYDROCHLORIDE 10 MG: 5 TABLET, FILM COATED ORAL at 21:07

## 2023-10-13 RX ADMIN — ONDANSETRON 4 MG: 4 TABLET, ORALLY DISINTEGRATING ORAL at 23:31

## 2023-10-13 RX ADMIN — ATORVASTATIN CALCIUM 40 MG: 40 TABLET, FILM COATED ORAL at 21:07

## 2023-10-13 RX ADMIN — FERROUS SULFATE TAB 325 MG (65 MG ELEMENTAL FE) 325 MG: 325 (65 FE) TAB at 10:18

## 2023-10-13 RX ADMIN — CEFTRIAXONE SODIUM 1000 MG: 1 INJECTION, POWDER, FOR SOLUTION INTRAMUSCULAR; INTRAVENOUS at 15:29

## 2023-10-13 RX ADMIN — FOLIC ACID 1 MG: 1 TABLET ORAL at 10:18

## 2023-10-13 RX ADMIN — GABAPENTIN 300 MG: 300 CAPSULE ORAL at 21:06

## 2023-10-13 RX ADMIN — Medication 10 MG: at 21:08

## 2023-10-13 RX ADMIN — GABAPENTIN 300 MG: 300 CAPSULE ORAL at 10:18

## 2023-10-13 RX ADMIN — LOSARTAN POTASSIUM 100 MG: 50 TABLET, FILM COATED ORAL at 10:18

## 2023-10-13 RX ADMIN — MONTELUKAST 10 MG: 10 TABLET, FILM COATED ORAL at 10:18

## 2023-10-13 ASSESSMENT — ENCOUNTER SYMPTOMS
ABDOMINAL PAIN: 0
CHEST TIGHTNESS: 0
VOMITING: 0
SHORTNESS OF BREATH: 0
SHORTNESS OF BREATH: 1
WHEEZING: 0
ABDOMINAL DISTENTION: 0

## 2023-10-13 NOTE — PROGRESS NOTES
Hospitalist Progress Note    NAME:   Leyda Landaverde   : 1935   MRN: [id number]     Date/Time: 10/13/2023 8:25 AM  Patient PCP: Loren Dorsey MD    Estimated discharge date:>48 hours  Barriers: General surgery planning a procedure, IV abx, urine culture      Assessment / Plan:  Suspected recurrent urinary tract infection  --Reviewed previous urine culture 3/2023: + for klebsiella pneumoniae, sensitive to Rocephin. --IV antibiotics: Rocephin (day 3/5 doses);  shorten duration of Abx secondary to Urine culture being negative but patient being symptomatic  --Urine culture negative  --Blood cultures  --Lactic acid     Abdominal pain, nausea/vomiting  --CT of abdomen pelvis with no acute abnormality  --Consider related to UTI but patient also with hx of multiple hernia repairs  --General surgery consult for possible hernia defect   --Antiemetics as needed  --Troponin negative     ONEIL, resolved  --Creatinine yesterday within normal limits  --IV fluids     CAD  --Continue home medications Imdur, atorvastatin     Hypertension  --Continue PO losartan 100 mg daily      Diabetes type 2  --Accu-Checks  --Will hold home glimepiride  --SSI     Atrial fibrillation  --Hold Eliquis; typically is held for 48 prior to a procedure. --Telemetry        Medical Decision Making:   I personally reviewed labs:CBC, CMP,   I personally reviewed imaging: CXR (negative) CT ABD (negative)  Toxic drug monitoring: Furosemide and Losartan via renal panel for signs of decreased kidney function and electrolyte abnormalities. Insulin via point-of-care glucoses for signs of hypoglycemia. Discussed case with: IDRs team (Nursing, CM) patient to undergo procedure with Dr. Disha Espinosa today.         Code Status: Full  DVT Prophylaxis: Eliquis is held due to potential procedure with Dr. Shara Reyes Prophylaxis:protonix    Chief Complaint on Admission   Abdominal pain    Hospital Course:   Soraya Bedolla is a 82 y.o.  female with PMHx of type 2 Comments   Patient x    Family      RN x    Care Manager x    Consultant                        Multidiciplinary team rounds were held today with , nursing, pharmacist and clinical coordinator. Patient's plan of care was discussed; medications were reviewed and discharge planning was addressed. ________________________________________________________________________  Total NON critical care TIME:  45 Minutes    Total CRITICAL CARE TIME Spent:   0 Minutes non procedure based      Comments   >50% of visit spent in counseling and coordination of care     ________________________________________________________________________  DAVID Thurston     Procedures: see electronic medical records for all procedures/Xrays and details which were not copied into this note but were reviewed prior to creation of Plan. LABS:  I reviewed today's most current labs and imaging studies.   Pertinent labs include:  Recent Labs     10/11/23  1050 10/12/23  0518   WBC 10.4 6.8   HGB 13.0 12.0   HCT 40.2 38.0    176       Recent Labs     10/11/23  1050 10/12/23  0518    142   K Hemolyzed, Recollection Recommended 4.0    105   CO2 27 32   BUN 23* 18   ALT 27 22         Signed: Swati Thurston

## 2023-10-13 NOTE — ANESTHESIA PRE PROCEDURE
10/30/20 no significant change is noted. LEFT VENTRICLE  Normal left ventricular cavity size. There is concentric left ventricular  hypertrophy. Left ventricular systolic function is normal. LV ejection  fraction = 60-65%. LV Global L Strain =-20.9%. Diastolic function cannot be  assessed due to mitral annular calcification. Left ventricular filling pattern  is impaired relaxation. No regional wall motion abnormalities noted. RIGHT VENTRICLE  The right ventricle is normal in size and function. LEFT ATRIUM  The left atrial size is normal.    RIGHT ATRIUM  Right atrial size is normal. There is no Doppler evidence for a patent foramen  ovale. AORTIC VALVE  The Pittman Ramin valve in the aortic position appears stable and to function  normally with trivial regurgitation. Aortic valve peak pressure gradient is 24  mmHg. Aortic valve mean pressure gradient is 13 mmHg. MITRAL VALVE  Mitral annular calcification. There is trivial mitral regurgitation. There is  no evidence of mitral valve prolapse. TRICUSPID VALVE  Structurally normal tricuspid valve. There is trace tricuspid regurgitation. Tricuspid regurgitation peak velocity is 2.0 m/sec. RV-RA gradient is 16 mmHg. Estimated right atrial pressure is 5 mmHg. Estimated right ventricular  systolic pressure is 21 mmHg. PULMONIC VALVE  Structurally normal pulmonic valve. Trace pulmonic valvular regurgitation. ARTERIES  The aortic root is normal size. The proximal ascending aorta appears normal.  The transverse aorta appears normal. The proximal descending aorta is not well  visualized. The thoracic aorta is not well visualized. The pulmonary artery is  normal size. VENOUS  Pulmonary venous flow pattern is normal. The inferior vena cava is normal in  size. Neuro/Psych:   (+) CVA:, dementia            GI/Hepatic/Renal:            ROS comment: Left groin mass . Endo/Other:    (+) DiabetesType II DM, , : arthritis: rheumatoid. ,

## 2023-10-13 NOTE — PROGRESS NOTES
Physician Progress Note      ABRAMRolo Pa  CSN #:                  [id number]  :                       1935  ADMIT DATE:       10/11/2023 10:31 AM  DISCH DATE:  RESPONDING  PROVIDER #:        Ane Heimlich PA          QUERY TEXT:    Patient was admitted with abdomen pain, noted to have \"atrial fibrillation in   H&P on 10/11 and is maintained on Eliquis\". If possible, please document in   progress notes and discharge summary if you are evaluating and/or treating any   of the following: The medical record reflects the following:  Risk Factors: Age 82 Female, HTN, PMH Stroke and MI    Clinical Indicators: \"atrial fibrillation in H&P on 10/11 and is maintained on   Eliquis\". Treatment: Eliquis    Thank you,  June REYES, RN, CRCR  Please contact me for any questions or concerns regarding this query at   [email or web address]  Options provided:  -- Secondary hypercoagulable state in a patient with atrial fibrillation  -- Other - I will add my own diagnosis  -- Disagree - Not applicable / Not valid  -- Disagree - Clinically unable to determine / Unknown  -- Refer to Clinical Documentation Reviewer    PROVIDER RESPONSE TEXT:    This patient has secondary hypercoagulable state in a patient with atrial   fibrillation.     Query created by: June Camarena on 10/12/2023 9:41 AM      Electronically signed by:  Ane Heimlich PA 10/13/2023 4:40 PM

## 2023-10-13 NOTE — PROGRESS NOTES
[address] [place] [address] SURGERY  PROGRESS NOTE          Chief Complaint: left lower abdominal pain    History of Present Illness:    Ms. Fareed Mobley is a 82y.o. year old * female presented to ER with chronic left lower side abdominal pain for the past 8-9 months, as well as nausea without much vomiting. No constipation or altered bowel movement. She was found to have UTI and was admitted under medical service. Due to chronic left lower abdominal pain and with prior history of large ventral hernia repair in remote past Surgical consult requested for evaluation of of a possible left side groin hernia to exclude the reason for left groin pain. Denies any fever or chills or loss of weight or appetite. Still has nausea but tolerating diet. Left lower quadrant pain+    Past Medical History:   Past Medical History:   Diagnosis Date    Arrhythmia     a. 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     Lung abnormality     SOB (shortness of breath)     on exertion    Stroke ([address]) 12/2017       Past Surgical History:   Past Surgical History:   Procedure Laterality Date    BACK SURGERY      eight surgeries    CATARACT REMOVAL      left eye    COLONOSCOPY Left 9/12/2018    COLONOSCOPY performed by Kayli Hills MD at [facility]      rt middle lobe removed d/t necrosis    PARTIAL HYSTERECTOMY (CERVIX NOT REMOVED)      REFRACTIVE SURGERY      REMOVAL GALLBLADDER          Allergy:  Allergies   Allergen Reactions    Iodinated Contrast Media Other (See Comments)     Iv dye causes heart to stop    Tetanus Toxoids Other (See Comments)     Blisters all over body    Propoxyphene Rash       Social History:  reports that she quit smoking about 12 years ago. Her smoking use included cigarettes. She smoked an average of 1 pack per day. She has never used smokeless tobacco. She reports current alcohol use.  She reports type        ONEIL (acute kidney injury) ([address])             Left groin soft tissue mass ? Small inguinal hernia    Plan:    Admission  Diet: soft   IV fluids  SCD  IS  Pain medications  Antibiotics  Nausea medication  Labs in am.  Will review CT scan with radiologist  Possible left groin exploration for excision of left groin soft tissue mass possible repair of left inguinal hernia. Hold Eliquis     Thank you for the consultation & allowing me to participate in the care of this patient.

## 2023-10-13 NOTE — PROGRESS NOTES
Patient states during shift change over that she doesn't understand why she is NPO and she doesn't understand which procedure she is about to have today. Also the daughter expressed concerns about use of anaesthesia as she says her mum doesn't do well with anaesthesia.

## 2023-10-13 NOTE — CARE COORDINATION
CM reviewed chart. Patient pending possible surgery. Current dc plan is home with family. 1430: patient open with [facility]. Referral sent.

## 2023-10-14 VITALS
RESPIRATION RATE: 16 BRPM | SYSTOLIC BLOOD PRESSURE: 168 MMHG | HEART RATE: 77 BPM | WEIGHT: 170 LBS | OXYGEN SATURATION: 100 % | DIASTOLIC BLOOD PRESSURE: 84 MMHG | BODY MASS INDEX: 28.32 KG/M2 | TEMPERATURE: 98.1 F | HEIGHT: 65 IN

## 2023-10-14 PROBLEM — I10 HTN (HYPERTENSION): Status: ACTIVE | Noted: 2023-10-14

## 2023-10-14 PROBLEM — I25.10 CAD (CORONARY ARTERY DISEASE): Status: ACTIVE | Noted: 2023-10-14

## 2023-10-14 PROBLEM — N39.0 UTI (URINARY TRACT INFECTION): Status: RESOLVED | Noted: 2023-09-12 | Resolved: 2023-10-14

## 2023-10-14 PROBLEM — Z86.73 HISTORY OF STROKE: Status: ACTIVE | Noted: 2023-03-02

## 2023-10-14 PROBLEM — Z86.79 HISTORY OF ATRIAL FIBRILLATION: Status: ACTIVE | Noted: 2023-03-02

## 2023-10-14 LAB
GLUCOSE BLD STRIP.AUTO-MCNC: 134 MG/DL (ref 65–100)
GLUCOSE BLD STRIP.AUTO-MCNC: 154 MG/DL (ref 65–100)
PERFORMED BY:: ABNORMAL
PERFORMED BY:: ABNORMAL

## 2023-10-14 PROCEDURE — 2580000003 HC RX 258: Performed by: STUDENT IN AN ORGANIZED HEALTH CARE EDUCATION/TRAINING PROGRAM

## 2023-10-14 PROCEDURE — 82962 GLUCOSE BLOOD TEST: CPT

## 2023-10-14 PROCEDURE — 6370000000 HC RX 637 (ALT 250 FOR IP): Performed by: STUDENT IN AN ORGANIZED HEALTH CARE EDUCATION/TRAINING PROGRAM

## 2023-10-14 RX ADMIN — SODIUM CHLORIDE: 9 INJECTION, SOLUTION INTRAVENOUS at 06:15

## 2023-10-14 RX ADMIN — GABAPENTIN 300 MG: 300 CAPSULE ORAL at 10:05

## 2023-10-14 RX ADMIN — PANTOPRAZOLE SODIUM 40 MG: 40 TABLET, DELAYED RELEASE ORAL at 06:17

## 2023-10-14 RX ADMIN — FERROUS SULFATE TAB 325 MG (65 MG ELEMENTAL FE) 325 MG: 325 (65 FE) TAB at 10:01

## 2023-10-14 RX ADMIN — LOSARTAN POTASSIUM 100 MG: 50 TABLET, FILM COATED ORAL at 10:01

## 2023-10-14 RX ADMIN — FUROSEMIDE 40 MG: 40 TABLET ORAL at 10:01

## 2023-10-14 RX ADMIN — ISOSORBIDE MONONITRATE 30 MG: 30 TABLET, EXTENDED RELEASE ORAL at 10:00

## 2023-10-14 RX ADMIN — MONTELUKAST 10 MG: 10 TABLET, FILM COATED ORAL at 10:01

## 2023-10-14 RX ADMIN — FOLIC ACID 1 MG: 1 TABLET ORAL at 11:11

## 2023-10-14 NOTE — DISCHARGE INSTRUCTIONS
Discharge instructions  -Per general Surgery it is recommended you wear the abdominal binder to help alleviate symptoms.  -Follow up with PCP in 10-14 days for careful titration of medications and management of comorbid condition

## 2023-10-14 NOTE — CARE COORDINATION
Patient discharging home today with home health. Home health through [facility]. Orders sent via [facility].  to take home.     Transition of Care Plan:    RUR: 14%  Prior Level of Functioning: HH  Disposition: HH  If SNF or IPR: Date FOC offered: na  Date FOC received: na  Accepting facility: na  Date authorization started with reference number: na  Date authorization received and expires: na  Follow up appointments:   DME needed:   Transportation at discharge: family  IM/IMM Medicare/ letter given: 10/12  Is patient a [place] and connected with [facility]? na  If yes, was [employer] transfer form completed and [facility] notified? na  Caregiver Contact: na  Discharge Caregiver contacted prior to discharge? na  Care Conference needed? na  Barriers to discharge: na

## 2023-10-14 NOTE — PROGRESS NOTES
[address] [place] [address] SURGERY  PROGRESS NOTE          Chief Complaint: left lower abdominal pain    History of Present Illness:    Ms. Sheryl Wallace is a 82y.o. year old * female presented to ER with chronic left lower side abdominal pain for the past 8-9 months, as well as nausea without much vomiting. No constipation or altered bowel movement. She was found to have UTI and was admitted under medical service. Due to chronic left lower abdominal pain and with prior history of large ventral hernia repair in remote past Surgical consult requested for evaluation of of a possible left side groin hernia to exclude the reason for left groin pain. Denies any fever or chills or loss of weight or appetite. No nausea &  tolerating diet. Left lower quadrant pain+    Past Medical History:   Past Medical History:   Diagnosis Date    Arrhythmia     a. 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     Lung abnormality     SOB (shortness of breath)     on exertion    Stroke ([address]) 12/2017       Past Surgical History:   Past Surgical History:   Procedure Laterality Date    BACK SURGERY      eight surgeries    CATARACT REMOVAL      left eye    COLONOSCOPY Left 9/12/2018    COLONOSCOPY performed by Claritza Bacon MD at [facility]      rt middle lobe removed d/t necrosis    PARTIAL HYSTERECTOMY (CERVIX NOT REMOVED)      REFRACTIVE SURGERY      REMOVAL GALLBLADDER          Allergy:  Allergies   Allergen Reactions    Iodinated Contrast Media Other (See Comments)     Iv dye causes heart to stop    Tetanus Toxoids Other (See Comments)     Blisters all over body    Propoxyphene Rash       Social History:  reports that she quit smoking about 12 years ago. Her smoking use included cigarettes. She smoked an average of 1 pack per day. She has never used smokeless tobacco. She reports current alcohol use.  She reports that

## 2023-10-14 NOTE — DISCHARGE SUMMARY
folic acid 1 MG tablet  Commonly known as: FOLVITE     furosemide 40 MG tablet  Commonly known as: LASIX     gabapentin 300 MG capsule  Commonly known as: NEURONTIN     glimepiride 4 MG tablet  Commonly known as: AMARYL     HYDROcodone-acetaminophen  MG per tablet  Commonly known as: NORCO     isosorbide mononitrate 30 MG extended release tablet  Commonly known as: IMDUR     LINZESS PO     losartan 100 MG tablet  Commonly known as: COZAAR     meclizine 25 MG tablet  Commonly known as: ANTIVERT     melatonin 10 MG Caps capsule     methotrexate 2.5 MG chemo tablet  Commonly known as: RHEUMATREX     montelukast 10 MG tablet  Commonly known as: SINGULAIR     Nitrostat 0.4 MG SL tablet  Generic drug: nitroGLYCERIN     pantoprazole 40 MG tablet  Commonly known as: PROTONIX     potassium chloride 10 MEQ extended release capsule  Commonly known as: MICRO-K            STOP taking these medications      polyethylene glycol 17 GM/SCOOP powder  Commonly known as: GLYCOLAX                  DISPOSITION:    Home with Family:       Home with HH/PT/OT/RN: X   SNF/LTC:    MAEVE:    OTHER:            Code status: Full  Recommended diet: regular diet  Recommended activity: activity as tolerated  Wound care: None      Follow up with:   PCP : Pal Garcia MD  [facility], [address] [facility]  [address]  [phone]    Follow up  As needed, If symptoms worsen    Pal Garcia MD  [address] [facility]  [phone]    Follow up  As needed, If symptoms worsen          Total time in minutes spent coordinating this discharge (includes going over instructions, follow-up, prescriptions, and preparing report for sign off to her PCP) :  40 minutes

## 2023-10-14 NOTE — PLAN OF CARE
Problem: Pain  Goal: Verbalizes/displays adequate comfort level or baseline comfort level  10/14/2023 0720 by Amena Sales RN  Outcome: Progressing  10/14/2023 0720 by Amena Sales RN  Outcome: Progressing  10/14/2023 0444 by Rut Guzman RN  Outcome: Progressing  10/13/2023 1848 by Amena Sales RN  Outcome: Progressing     Problem: Safety - Adult  Goal: Free from fall injury  10/14/2023 0720 by Amena Sales RN  Outcome: Progressing  10/14/2023 0720 by Amena Sales RN  Outcome: Progressing  10/13/2023 1848 by Amena Sales RN  Outcome: Progressing     Problem: ABCDS Injury Assessment  Goal: Absence of physical injury  10/14/2023 0720 by Amena Sales RN  Outcome: Progressing  10/14/2023 0720 by Amena Sales RN  Outcome: Progressing  10/13/2023 1848 by Amena Sales RN  Outcome: Progressing

## 2023-10-14 NOTE — PLAN OF CARE
Problem: Discharge Planning  Goal: Discharge to home or other facility with appropriate resources  10/14/2023 0444 by Donell Washington RN  Outcome: Progressing  10/13/2023 1848 by Hayden Guzman RN  Outcome: Progressing     Problem: Chronic Conditions and Co-morbidities  Goal: Patient's chronic conditions and co-morbidity symptoms are monitored and maintained or improved  10/14/2023 0444 by Donell Washington RN  Outcome: Progressing  10/13/2023 1848 by Hayden Guzman RN  Outcome: Progressing     Problem: Pain  Goal: Verbalizes/displays adequate comfort level or baseline comfort level  10/14/2023 0444 by Donell Washington RN  Outcome: Progressing  10/13/2023 1848 by Hayden Guzman RN  Outcome: Progressing     Problem: Safety - Adult  Goal: Free from fall injury  10/13/2023 1848 by aHyden Guzman RN  Outcome: Progressing     Problem: ABCDS Injury Assessment  Goal: Absence of physical injury  10/13/2023 1848 by Hayden Guzman RN  Outcome: Progressing

## 2023-10-14 NOTE — PROGRESS NOTES
..Discharge plan of care/case management plan validated with provider discharge order. Instructed patient and  at the bedside with home medications and follow up check up, discharge papers given to , abdominal binder applied to patients abdomen, patient alert x 4, not in any form of distress,  went down first to bring the car at the front of the hospital and wheeled the patient down and picked up by .

## 2023-10-15 LAB
BACTERIA SPEC CULT: NORMAL
BACTERIA SPEC CULT: NORMAL
Lab: NORMAL
Lab: NORMAL

## 2023-10-17 LAB
BACTERIA SPEC CULT: NORMAL
BACTERIA SPEC CULT: NORMAL
Lab: NORMAL
Lab: NORMAL

## 2023-10-25 ENCOUNTER — APPOINTMENT (OUTPATIENT)
Facility: HOSPITAL | Age: 88
DRG: 699 | End: 2023-10-25
Payer: MEDICARE

## 2023-10-25 ENCOUNTER — HOSPITAL ENCOUNTER (INPATIENT)
Facility: HOSPITAL | Age: 88
LOS: 1 days | Discharge: HOME HEALTH | DRG: 699 | End: 2023-10-26
Attending: EMERGENCY MEDICINE | Admitting: INTERNAL MEDICINE
Payer: MEDICARE

## 2023-10-25 DIAGNOSIS — N39.0 URINARY TRACT INFECTION WITHOUT HEMATURIA, SITE UNSPECIFIED: Primary | ICD-10-CM

## 2023-10-25 PROBLEM — R10.9 ABDOMINAL PAIN: Status: ACTIVE | Noted: 2023-10-25

## 2023-10-25 LAB
ABO + RH BLD: NORMAL
ALBUMIN SERPL-MCNC: 3.7 G/DL (ref 3.5–5)
ALBUMIN/GLOB SERPL: 1 (ref 1.1–2.2)
ALP SERPL-CCNC: 89 U/L (ref 45–117)
ALT SERPL-CCNC: 23 U/L (ref 12–78)
ANION GAP SERPL CALC-SCNC: 10 MMOL/L (ref 5–15)
APPEARANCE UR: ABNORMAL
AST SERPL W P-5'-P-CCNC: 19 U/L (ref 15–37)
BACTERIA URNS QL MICRO: ABNORMAL /HPF
BASOPHILS # BLD: 0.1 K/UL (ref 0–0.1)
BASOPHILS NFR BLD: 1 % (ref 0–1)
BILIRUB SERPL-MCNC: 0.3 MG/DL (ref 0.2–1)
BILIRUB UR QL: NEGATIVE
BLOOD GROUP ANTIBODIES SERPL: NEGATIVE
BUN SERPL-MCNC: 12 MG/DL (ref 6–20)
BUN/CREAT SERPL: 14 (ref 12–20)
CA-I BLD-MCNC: 9.5 MG/DL (ref 8.5–10.1)
CHLORIDE SERPL-SCNC: 101 MMOL/L (ref 97–108)
CO2 SERPL-SCNC: 29 MMOL/L (ref 21–32)
COLLECT DATE STL: 10
COLOR UR: ABNORMAL
CREAT SERPL-MCNC: 0.86 MG/DL (ref 0.55–1.02)
DIFFERENTIAL METHOD BLD: ABNORMAL
EOSINOPHIL # BLD: 0.3 K/UL (ref 0–0.4)
EOSINOPHIL NFR BLD: 4 % (ref 0–7)
EPITH CASTS URNS QL MICRO: ABNORMAL /LPF
ERYTHROCYTE [DISTWIDTH] IN BLOOD BY AUTOMATED COUNT: 17.4 % (ref 11.5–14.5)
GLOBULIN SER CALC-MCNC: 3.6 G/DL (ref 2–4)
GLUCOSE BLD STRIP.AUTO-MCNC: 132 MG/DL (ref 65–100)
GLUCOSE SERPL-MCNC: 146 MG/DL (ref 65–100)
GLUCOSE UR STRIP.AUTO-MCNC: >500 MG/DL
HCT VFR BLD AUTO: 38.9 % (ref 35–47)
HEMOCCULT SP1 STL QL: NEGATIVE
HGB BLD-MCNC: 12.4 G/DL (ref 11.5–16)
HGB UR QL STRIP: NEGATIVE
IMM GRANULOCYTES # BLD AUTO: 0.1 K/UL (ref 0–0.04)
IMM GRANULOCYTES NFR BLD AUTO: 1 % (ref 0–0.5)
INR PPP: 1.1 (ref 0.9–1.1)
KETONES UR QL STRIP.AUTO: NEGATIVE MG/DL
LACTATE SERPL-SCNC: 2.9 MMOL/L (ref 0.4–2)
LACTATE SERPL-SCNC: 4 MMOL/L (ref 0.4–2)
LEUKOCYTE ESTERASE UR QL STRIP.AUTO: ABNORMAL
LYMPHOCYTES # BLD: 1.6 K/UL (ref 0.8–3.5)
LYMPHOCYTES NFR BLD: 18 % (ref 12–49)
MCH RBC QN AUTO: 28.6 PG (ref 26–34)
MCHC RBC AUTO-ENTMCNC: 31.9 G/DL (ref 30–36.5)
MCV RBC AUTO: 89.6 FL (ref 80–99)
MONOCYTES # BLD: 0.9 K/UL (ref 0–1)
MONOCYTES NFR BLD: 11 % (ref 5–13)
MUCOUS THREADS URNS QL MICRO: ABNORMAL /LPF
NEUTS SEG # BLD: 5.7 K/UL (ref 1.8–8)
NEUTS SEG NFR BLD: 65 % (ref 32–75)
NITRITE UR QL STRIP.AUTO: NEGATIVE
NRBC # BLD: 0 K/UL (ref 0–0.01)
NRBC BLD-RTO: 0 PER 100 WBC
PERFORMED BY:: ABNORMAL
PH UR STRIP: 7 (ref 5–8)
PLATELET # BLD AUTO: 211 K/UL (ref 150–400)
PMV BLD AUTO: 11.6 FL (ref 8.9–12.9)
POTASSIUM SERPL-SCNC: 3.7 MMOL/L (ref 3.5–5.1)
PROT SERPL-MCNC: 7.3 G/DL (ref 6.4–8.2)
PROT UR STRIP-MCNC: NEGATIVE MG/DL
PROTHROMBIN TIME: 14.1 SEC (ref 11.9–14.6)
RBC # BLD AUTO: 4.34 M/UL (ref 3.8–5.2)
RBC #/AREA URNS HPF: ABNORMAL /HPF (ref 0–5)
SODIUM SERPL-SCNC: 140 MMOL/L (ref 136–145)
SP GR UR REFRACTOMETRY: 1.01 (ref 1–1.03)
SPECIMEN EXP DATE BLD: NORMAL
UROBILINOGEN UR QL STRIP.AUTO: 0.1 EU/DL (ref 0.1–1)
WBC # BLD AUTO: 8.6 K/UL (ref 3.6–11)
WBC URNS QL MICRO: >100 /HPF (ref 0–4)
YEAST URNS QL MICRO: PRESENT

## 2023-10-25 PROCEDURE — 6360000002 HC RX W HCPCS: Performed by: EMERGENCY MEDICINE

## 2023-10-25 PROCEDURE — 2580000003 HC RX 258: Performed by: EMERGENCY MEDICINE

## 2023-10-25 PROCEDURE — 82272 OCCULT BLD FECES 1-3 TESTS: CPT

## 2023-10-25 PROCEDURE — 86900 BLOOD TYPING SEROLOGIC ABO: CPT

## 2023-10-25 PROCEDURE — 81001 URINALYSIS AUTO W/SCOPE: CPT

## 2023-10-25 PROCEDURE — 96375 TX/PRO/DX INJ NEW DRUG ADDON: CPT

## 2023-10-25 PROCEDURE — 87086 URINE CULTURE/COLONY COUNT: CPT

## 2023-10-25 PROCEDURE — 85025 COMPLETE CBC W/AUTO DIFF WBC: CPT

## 2023-10-25 PROCEDURE — 80053 COMPREHEN METABOLIC PANEL: CPT

## 2023-10-25 PROCEDURE — 96374 THER/PROPH/DIAG INJ IV PUSH: CPT

## 2023-10-25 PROCEDURE — 2580000003 HC RX 258: Performed by: STUDENT IN AN ORGANIZED HEALTH CARE EDUCATION/TRAINING PROGRAM

## 2023-10-25 PROCEDURE — 74176 CT ABD & PELVIS W/O CONTRAST: CPT

## 2023-10-25 PROCEDURE — 6370000000 HC RX 637 (ALT 250 FOR IP): Performed by: STUDENT IN AN ORGANIZED HEALTH CARE EDUCATION/TRAINING PROGRAM

## 2023-10-25 PROCEDURE — 83605 ASSAY OF LACTIC ACID: CPT

## 2023-10-25 PROCEDURE — 99285 EMERGENCY DEPT VISIT HI MDM: CPT

## 2023-10-25 PROCEDURE — 86901 BLOOD TYPING SEROLOGIC RH(D): CPT

## 2023-10-25 PROCEDURE — 85610 PROTHROMBIN TIME: CPT

## 2023-10-25 PROCEDURE — 6370000000 HC RX 637 (ALT 250 FOR IP): Performed by: EMERGENCY MEDICINE

## 2023-10-25 PROCEDURE — 86850 RBC ANTIBODY SCREEN: CPT

## 2023-10-25 PROCEDURE — 82962 GLUCOSE BLOOD TEST: CPT

## 2023-10-25 PROCEDURE — 1100000000 HC RM PRIVATE

## 2023-10-25 RX ORDER — 0.9 % SODIUM CHLORIDE 0.9 %
2000 INTRAVENOUS SOLUTION INTRAVENOUS ONCE
Status: COMPLETED | OUTPATIENT
Start: 2023-10-25 | End: 2023-10-26

## 2023-10-25 RX ORDER — LOSARTAN POTASSIUM 50 MG/1
100 TABLET ORAL DAILY
Status: DISCONTINUED | OUTPATIENT
Start: 2023-10-25 | End: 2023-10-26 | Stop reason: HOSPADM

## 2023-10-25 RX ORDER — ONDANSETRON 4 MG/1
4 TABLET, ORALLY DISINTEGRATING ORAL EVERY 8 HOURS PRN
Status: DISCONTINUED | OUTPATIENT
Start: 2023-10-25 | End: 2023-10-26 | Stop reason: HOSPADM

## 2023-10-25 RX ORDER — ONDANSETRON 2 MG/ML
4 INJECTION INTRAMUSCULAR; INTRAVENOUS EVERY 6 HOURS PRN
Status: DISCONTINUED | OUTPATIENT
Start: 2023-10-25 | End: 2023-10-26 | Stop reason: HOSPADM

## 2023-10-25 RX ORDER — DONEPEZIL HYDROCHLORIDE 5 MG/1
10 TABLET, FILM COATED ORAL NIGHTLY
Status: DISCONTINUED | OUTPATIENT
Start: 2023-10-25 | End: 2023-10-26 | Stop reason: HOSPADM

## 2023-10-25 RX ORDER — SODIUM CHLORIDE 9 MG/ML
INJECTION, SOLUTION INTRAVENOUS PRN
Status: DISCONTINUED | OUTPATIENT
Start: 2023-10-25 | End: 2023-10-26 | Stop reason: HOSPADM

## 2023-10-25 RX ORDER — ISOSORBIDE MONONITRATE 60 MG/1
30 TABLET, EXTENDED RELEASE ORAL DAILY
Status: DISCONTINUED | OUTPATIENT
Start: 2023-10-25 | End: 2023-10-26 | Stop reason: HOSPADM

## 2023-10-25 RX ORDER — POLYETHYLENE GLYCOL 3350 17 G/17G
17 POWDER, FOR SOLUTION ORAL DAILY
COMMUNITY

## 2023-10-25 RX ORDER — ACETAMINOPHEN 650 MG/1
650 SUPPOSITORY RECTAL EVERY 6 HOURS PRN
Status: DISCONTINUED | OUTPATIENT
Start: 2023-10-25 | End: 2023-10-26 | Stop reason: HOSPADM

## 2023-10-25 RX ORDER — SULFAMETHOXAZOLE AND TRIMETHOPRIM 800; 160 MG/1; MG/1
1 TABLET ORAL 2 TIMES DAILY
Qty: 20 TABLET | Refills: 0 | Status: SHIPPED | OUTPATIENT
Start: 2023-10-25 | End: 2023-11-04

## 2023-10-25 RX ORDER — 0.9 % SODIUM CHLORIDE 0.9 %
1000 INTRAVENOUS SOLUTION INTRAVENOUS ONCE
Status: COMPLETED | OUTPATIENT
Start: 2023-10-25 | End: 2023-10-26

## 2023-10-25 RX ORDER — FOLIC ACID 1 MG/1
1 TABLET ORAL DAILY
Status: DISCONTINUED | OUTPATIENT
Start: 2023-10-25 | End: 2023-10-26 | Stop reason: HOSPADM

## 2023-10-25 RX ORDER — PANTOPRAZOLE SODIUM 40 MG/1
40 TABLET, DELAYED RELEASE ORAL
Status: DISCONTINUED | OUTPATIENT
Start: 2023-10-26 | End: 2023-10-26 | Stop reason: HOSPADM

## 2023-10-25 RX ORDER — MORPHINE SULFATE 4 MG/ML
4 INJECTION, SOLUTION INTRAMUSCULAR; INTRAVENOUS
Status: COMPLETED | OUTPATIENT
Start: 2023-10-25 | End: 2023-10-25

## 2023-10-25 RX ORDER — ATORVASTATIN CALCIUM 40 MG/1
40 TABLET, FILM COATED ORAL NIGHTLY
Status: DISCONTINUED | OUTPATIENT
Start: 2023-10-25 | End: 2023-10-26 | Stop reason: HOSPADM

## 2023-10-25 RX ORDER — SODIUM CHLORIDE 0.9 % (FLUSH) 0.9 %
5-40 SYRINGE (ML) INJECTION PRN
Status: DISCONTINUED | OUTPATIENT
Start: 2023-10-25 | End: 2023-10-26 | Stop reason: HOSPADM

## 2023-10-25 RX ORDER — ONDANSETRON 2 MG/ML
4 INJECTION INTRAMUSCULAR; INTRAVENOUS ONCE
Status: COMPLETED | OUTPATIENT
Start: 2023-10-25 | End: 2023-10-25

## 2023-10-25 RX ORDER — FLUCONAZOLE 100 MG/1
200 TABLET ORAL
Status: COMPLETED | OUTPATIENT
Start: 2023-10-25 | End: 2023-10-25

## 2023-10-25 RX ORDER — POLYETHYLENE GLYCOL 3350 17 G/17G
17 POWDER, FOR SOLUTION ORAL DAILY PRN
Status: DISCONTINUED | OUTPATIENT
Start: 2023-10-25 | End: 2023-10-26 | Stop reason: HOSPADM

## 2023-10-25 RX ORDER — FERROUS SULFATE 325(65) MG
325 TABLET ORAL DAILY
Status: DISCONTINUED | OUTPATIENT
Start: 2023-10-25 | End: 2023-10-26 | Stop reason: HOSPADM

## 2023-10-25 RX ORDER — DEXTROSE MONOHYDRATE 100 MG/ML
INJECTION, SOLUTION INTRAVENOUS CONTINUOUS PRN
Status: DISCONTINUED | OUTPATIENT
Start: 2023-10-25 | End: 2023-10-26 | Stop reason: HOSPADM

## 2023-10-25 RX ORDER — ACETAMINOPHEN 325 MG/1
650 TABLET ORAL EVERY 6 HOURS PRN
Status: DISCONTINUED | OUTPATIENT
Start: 2023-10-25 | End: 2023-10-26 | Stop reason: HOSPADM

## 2023-10-25 RX ORDER — SODIUM CHLORIDE 0.9 % (FLUSH) 0.9 %
5-40 SYRINGE (ML) INJECTION EVERY 12 HOURS SCHEDULED
Status: DISCONTINUED | OUTPATIENT
Start: 2023-10-25 | End: 2023-10-26 | Stop reason: HOSPADM

## 2023-10-25 RX ADMIN — DEXTROSE MONOHYDRATE: 100 INJECTION, SOLUTION INTRAVENOUS at 19:04

## 2023-10-25 RX ADMIN — CEFTRIAXONE SODIUM 1000 MG: 1 INJECTION, POWDER, FOR SOLUTION INTRAMUSCULAR; INTRAVENOUS at 13:21

## 2023-10-25 RX ADMIN — FLUCONAZOLE 200 MG: 100 TABLET ORAL at 13:22

## 2023-10-25 RX ADMIN — ISOSORBIDE MONONITRATE 30 MG: 60 TABLET, EXTENDED RELEASE ORAL at 19:04

## 2023-10-25 RX ADMIN — SODIUM CHLORIDE 1000 ML: 9 INJECTION, SOLUTION INTRAVENOUS at 14:00

## 2023-10-25 RX ADMIN — ATORVASTATIN CALCIUM 40 MG: 40 TABLET, FILM COATED ORAL at 20:54

## 2023-10-25 RX ADMIN — LOSARTAN POTASSIUM 100 MG: 50 TABLET, FILM COATED ORAL at 19:04

## 2023-10-25 RX ADMIN — SODIUM CHLORIDE 1000 ML: 9 INJECTION, SOLUTION INTRAVENOUS at 14:21

## 2023-10-25 RX ADMIN — ONDANSETRON 4 MG: 2 INJECTION INTRAMUSCULAR; INTRAVENOUS at 11:24

## 2023-10-25 RX ADMIN — SODIUM CHLORIDE 2000 ML: 9 INJECTION, SOLUTION INTRAVENOUS at 11:21

## 2023-10-25 RX ADMIN — APIXABAN 2.5 MG: 2.5 TABLET, FILM COATED ORAL at 20:54

## 2023-10-25 RX ADMIN — MORPHINE SULFATE 4 MG: 4 INJECTION, SOLUTION INTRAMUSCULAR; INTRAVENOUS at 11:24

## 2023-10-25 RX ADMIN — FERROUS SULFATE TAB 325 MG (65 MG ELEMENTAL FE) 325 MG: 325 (65 FE) TAB at 19:04

## 2023-10-25 RX ADMIN — FOLIC ACID 1 MG: 1 TABLET ORAL at 19:04

## 2023-10-25 RX ADMIN — ACETAMINOPHEN 650 MG: 325 TABLET ORAL at 23:42

## 2023-10-25 RX ADMIN — DONEPEZIL HYDROCHLORIDE 10 MG: 5 TABLET, FILM COATED ORAL at 20:54

## 2023-10-25 ASSESSMENT — ENCOUNTER SYMPTOMS
VOMITING: 0
WHEEZING: 0
RHINORRHEA: 0
SHORTNESS OF BREATH: 0
BACK PAIN: 0
TROUBLE SWALLOWING: 0
SORE THROAT: 0
ALLERGIC/IMMUNOLOGIC NEGATIVE: 1
EYES NEGATIVE: 1
COUGH: 0
DIARRHEA: 0
NAUSEA: 0
CONSTIPATION: 1
ABDOMINAL PAIN: 1

## 2023-10-25 ASSESSMENT — PAIN DESCRIPTION - DESCRIPTORS: DESCRIPTORS: ACHING

## 2023-10-25 ASSESSMENT — PAIN SCALES - GENERAL
PAINLEVEL_OUTOF10: 2
PAINLEVEL_OUTOF10: 6

## 2023-10-25 ASSESSMENT — PAIN DESCRIPTION - LOCATION: LOCATION: HEAD

## 2023-10-25 ASSESSMENT — PAIN - FUNCTIONAL ASSESSMENT: PAIN_FUNCTIONAL_ASSESSMENT: 0-10

## 2023-10-25 NOTE — ED PROVIDER NOTES
[facility] EMERGENCY DEPT  EMERGENCY DEPARTMENT HISTORY AND PHYSICAL EXAM      Date: 10/25/2023  Patient Name: Arely Decker  MRN: [id number]  [address] [facility] [place] 1935  Date of evaluation: 10/25/2023  Provider: Nubia Saleem MD   Note Started: 10:51 AM EDT 10/25/23    HISTORY OF PRESENT ILLNESS     Chief Complaint   Patient presents with    Abdominal Pain    Melena       History Provided By: Patient    HPI: Arely Decker is a 82 y.o. female patient is scheduled for hernia surgery with Dr. Colette Tucker. Patient has noticed 2 days of dark possible melanic stool. Is having pain in left lower abdomen where she is constipated. PAST MEDICAL HISTORY   Past Medical History:  Past Medical History:   Diagnosis Date    Arrhythmia     a. 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     Lung abnormality     SOB (shortness of breath)     on exertion    Stroke ([address]) 2017       Past Surgical History:  Past Surgical History:   Procedure Laterality Date    BACK SURGERY      eight surgeries    CATARACT REMOVAL      left eye    COLONOSCOPY Left 2018    COLONOSCOPY performed by Leelee Chaney MD at [facility] ENDOSCOPY    HERNIA REPAIR      OTHER SURGICAL HISTORY      rt middle lobe removed d/t necrosis    PARTIAL HYSTERECTOMY (CERVIX NOT REMOVED)      REFRACTIVE SURGERY      REMOVAL GALLBLADDER         Family History:  Family History   Problem Relation Age of Onset    Cancer Maternal Grandmother     Heart Disease Maternal Aunt     Diabetes Maternal Aunt     Cancer Brother     Diabetes Sister     Diabetes Mother     Heart Disease Father     Stroke Father        Social History:  Social History     Tobacco Use    Smoking status: Former     Packs/day: 1     Types: Cigarettes     Quit date: 2011     Years since quittin.7    Smokeless tobacco: Never   Substance Use Topics    Alcohol use: Yes    Drug use: No       Allergies:   Allergies   Allergen

## 2023-10-25 NOTE — DISCHARGE INSTRUCTIONS
Thank you! Thank you for allowing me to care for you in the emergency department. It is my goal to provide you with excellent care. If you have not received excellent quality care, please ask to speak to the nurse manager. Please fill out the survey that will come to you by mail or email since we listen to your feedback! Below you will find a list of your tests from today's visit. Should you have any questions, please do not hesitate to call the emergency department.     Labs  Recent Results (from the past 12 hour(s))   CBC with Auto Differential    Collection Time: 10/25/23 10:45 AM   Result Value Ref Range    WBC 8.6 3.6 - 11.0 K/uL    RBC 4.34 3.80 - 5.20 M/uL    Hemoglobin 12.4 11.5 - 16.0 g/dL    Hematocrit 38.9 35.0 - 47.0 %    MCV 89.6 80.0 - 99.0 FL    MCH 28.6 26.0 - 34.0 PG    MCHC 31.9 30.0 - 36.5 g/dL    RDW 17.4 (H) 11.5 - 14.5 %    Platelets 215 228 - 960 K/uL    MPV 11.6 8.9 - 12.9 FL    Nucleated RBCs 0.0 0.0  WBC    nRBC 0.00 0.00 - 0.01 K/uL    Neutrophils % 65 32 - 75 %    Lymphocytes % 18 12 - 49 %    Monocytes % 11 5 - 13 %    Eosinophils % 4 0 - 7 %    Basophils % 1 0 - 1 %    Immature Granulocytes 1 (H) 0 - 0.5 %    Neutrophils Absolute 5.7 1.8 - 8.0 K/UL    Lymphocytes Absolute 1.6 0.8 - 3.5 K/UL    Monocytes Absolute 0.9 0.0 - 1.0 K/UL    Eosinophils Absolute 0.3 0.0 - 0.4 K/UL    Basophils Absolute 0.1 0.0 - 0.1 K/UL    Absolute Immature Granulocyte 0.1 (H) 0.00 - 0.04 K/UL    Differential Type AUTOMATED     CMP    Collection Time: 10/25/23 10:45 AM   Result Value Ref Range    Sodium 140 136 - 145 mmol/L    Potassium 3.7 3.5 - 5.1 mmol/L    Chloride 101 97 - 108 mmol/L    CO2 29 21 - 32 mmol/L    Anion Gap 10 5 - 15 mmol/L    Glucose 146 (H) 65 - 100 mg/dL    BUN 12 6 - 20 mg/dL    Creatinine 0.86 0.55 - 1.02 mg/dL    Bun/Cre Ratio 14 12 - 20      Est, Glom Filt Rate >60 >60 ml/min/1.73m2    Calcium 9.5 8.5 - 10.1 mg/dL    Total Bilirubin 0.3 0.2 - 1.0 mg/dL    AST 19 15 -

## 2023-10-25 NOTE — ED TRIAGE NOTES
Tarry stools for the past 2 days, no bright red blood, abdomina pain, and supposed to have hernia repair next Monday

## 2023-10-25 NOTE — H&P
hospital stay. Code Status: Full  DVT Prophylaxis: Eliquis  GI Prophylaxis: Protonix      Subjective:   CHIEF COMPLAINT: Dark stools and abdominal pain    HISTORY OF PRESENT ILLNESS:     Rosetta Haddad is a 82 y.o.  female with PMHx significant for type II DM, CAD, A-fib on anticoagulation, CVA, rheumatoid arthritis who presents to the emerged part with abdominal pain and dark stools. Patient reports her last 2 days she has been experiencing left lower quadrant abdominal pain and dark stools, but after further discussion with the patient she reports she started taking iron supplementation over a week ago which is likely the cause of the dark stools. Also, she reports she has a chronic history of constipation and has not had a bowel movement in a couple days. She denies any chest pain, shortness of breath, nausea, vomiting, urinary symptoms, headache, dizziness, visual changes or any other symptoms at this time. In ED labs obtained. CBC and CMP unremarkable. Lactic acid 4.0 repeat 2.9. FOBT negative. UA revealed large leukoesterase and greater than 100 WBCs, but moderate epithelial cells noted. Also, yeast noted in UA. CT of the abdomen pelvis without contrast revealed no acute process. Patient provided 1 dose of IV Rocephin and Diflucan in ED. We were asked to admit for work up and evaluation of the above problems.      Past Medical History:   Diagnosis Date    Arrhythmia     a. 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

## 2023-10-25 NOTE — CARE COORDINATION
10/25/23 Per Livia pt accepted back to [facility] upon discharge. EST: [facility] 10/27/23.

## 2023-10-25 NOTE — CARE COORDINATION
10/25/23 1528   Service Assessment   Patient Orientation Alert and Oriented   Cognition Alert   History Provided By Patient   Primary Caregiver Self   Accompanied By/Relationship  Leonidas Fischer Spouse/Significant Other   Patient's Healthcare Decision Maker is: Legal Next of [facility]   PCP Verified by CM Yes  Corinna Jay - seen 2 weeks ago.)   Last Visit to PCP Within last 3 months   Prior Functional Level Assistance with the following:;Bathing;Dressing; Toileting;Cooking;Housework; Shopping;Mobility  (Cane/walker/wc as needed.)   Current Functional Level Assistance with the following:;Bathing;Dressing; Toileting;Cooking;Housework; Shopping;Mobility  (Cane/walker/w/c as needed.)   Can patient return to prior living arrangement Yes   Ability to make needs known: Good   Family able to assist with home care needs: Yes   Would you like for me to discuss the discharge plan with any other family members/significant others, and if so, who? Yes  ( Win Momin)   Financial Resources [facility] None   Social/Functional History   Lives With Spouse   Type of [address] [place] [address]  One level   Home Access Stairs to enter without rails   Entrance Stairs - Number of Steps 4 outside steps. Bathroom Shower/Tub Walk-in shower   Bathroom Toilet Standard   [address]; Wheelchair-manual;Walker, standard   Receives Help From Family   ADL Assistance Needs assistance   Toileting Needs assistance   [facility] Needs assistance   Homemaking Responsibilities Yes   Ambulation Assistance Needs assistance  (Cane/walker/w/c.)   Transfer Assistance Needs assistance   Active  No   Occupation Retired   Discharge Planning   Type of [address] [place] [address] Prior To Admission None   Potential Assistance Needed N/A   DME Ordered?  No   Potential Assistance Purchasing Medications No   Type

## 2023-10-26 VITALS
WEIGHT: 170 LBS | DIASTOLIC BLOOD PRESSURE: 66 MMHG | BODY MASS INDEX: 28.32 KG/M2 | TEMPERATURE: 97.5 F | SYSTOLIC BLOOD PRESSURE: 148 MMHG | OXYGEN SATURATION: 98 % | HEART RATE: 54 BPM | HEIGHT: 65 IN | RESPIRATION RATE: 18 BRPM

## 2023-10-26 LAB
ANION GAP SERPL CALC-SCNC: 5 MMOL/L (ref 5–15)
BASOPHILS # BLD: 0.1 K/UL (ref 0–0.1)
BASOPHILS NFR BLD: 1 % (ref 0–1)
BUN SERPL-MCNC: 9 MG/DL (ref 6–20)
BUN/CREAT SERPL: 15 (ref 12–20)
CA-I BLD-MCNC: 8.3 MG/DL (ref 8.5–10.1)
CHLORIDE SERPL-SCNC: 109 MMOL/L (ref 97–108)
CO2 SERPL-SCNC: 28 MMOL/L (ref 21–32)
CREAT SERPL-MCNC: 0.62 MG/DL (ref 0.55–1.02)
DIFFERENTIAL METHOD BLD: ABNORMAL
EOSINOPHIL # BLD: 0.3 K/UL (ref 0–0.4)
EOSINOPHIL NFR BLD: 4 % (ref 0–7)
ERYTHROCYTE [DISTWIDTH] IN BLOOD BY AUTOMATED COUNT: 17.4 % (ref 11.5–14.5)
GLUCOSE SERPL-MCNC: 133 MG/DL (ref 65–100)
HCT VFR BLD AUTO: 33.3 % (ref 35–47)
HGB BLD-MCNC: 10.6 G/DL (ref 11.5–16)
IMM GRANULOCYTES # BLD AUTO: 0 K/UL (ref 0–0.04)
IMM GRANULOCYTES NFR BLD AUTO: 1 % (ref 0–0.5)
LACTATE SERPL-SCNC: 1 MMOL/L (ref 0.4–2)
LYMPHOCYTES # BLD: 1.2 K/UL (ref 0.8–3.5)
LYMPHOCYTES NFR BLD: 20 % (ref 12–49)
MCH RBC QN AUTO: 28.5 PG (ref 26–34)
MCHC RBC AUTO-ENTMCNC: 31.8 G/DL (ref 30–36.5)
MCV RBC AUTO: 89.5 FL (ref 80–99)
MONOCYTES # BLD: 0.7 K/UL (ref 0–1)
MONOCYTES NFR BLD: 13 % (ref 5–13)
NEUTS SEG # BLD: 3.5 K/UL (ref 1.8–8)
NEUTS SEG NFR BLD: 61 % (ref 32–75)
NRBC # BLD: 0 K/UL (ref 0–0.01)
NRBC BLD-RTO: 0 PER 100 WBC
PLATELET # BLD AUTO: 176 K/UL (ref 150–400)
PMV BLD AUTO: 12 FL (ref 8.9–12.9)
POTASSIUM SERPL-SCNC: 3.2 MMOL/L (ref 3.5–5.1)
RBC # BLD AUTO: 3.72 M/UL (ref 3.8–5.2)
SODIUM SERPL-SCNC: 142 MMOL/L (ref 136–145)
WBC # BLD AUTO: 5.7 K/UL (ref 3.6–11)

## 2023-10-26 PROCEDURE — 85025 COMPLETE CBC W/AUTO DIFF WBC: CPT

## 2023-10-26 PROCEDURE — 83605 ASSAY OF LACTIC ACID: CPT

## 2023-10-26 PROCEDURE — 80048 BASIC METABOLIC PNL TOTAL CA: CPT

## 2023-10-26 PROCEDURE — 6370000000 HC RX 637 (ALT 250 FOR IP): Performed by: STUDENT IN AN ORGANIZED HEALTH CARE EDUCATION/TRAINING PROGRAM

## 2023-10-26 PROCEDURE — 36415 COLL VENOUS BLD VENIPUNCTURE: CPT

## 2023-10-26 PROCEDURE — 2580000003 HC RX 258: Performed by: STUDENT IN AN ORGANIZED HEALTH CARE EDUCATION/TRAINING PROGRAM

## 2023-10-26 RX ORDER — POTASSIUM CHLORIDE 20 MEQ/1
40 TABLET, EXTENDED RELEASE ORAL ONCE
Status: COMPLETED | OUTPATIENT
Start: 2023-10-26 | End: 2023-10-26

## 2023-10-26 RX ORDER — DOCUSATE SODIUM 100 MG/1
100 CAPSULE, LIQUID FILLED ORAL 2 TIMES DAILY
Qty: 60 CAPSULE | Refills: 0 | Status: SHIPPED | OUTPATIENT
Start: 2023-10-26 | End: 2023-11-25

## 2023-10-26 RX ADMIN — POLYETHYLENE GLYCOL 3350 17 G: 17 POWDER, FOR SOLUTION ORAL at 09:09

## 2023-10-26 RX ADMIN — POTASSIUM CHLORIDE 40 MEQ: 1500 TABLET, EXTENDED RELEASE ORAL at 09:01

## 2023-10-26 RX ADMIN — FOLIC ACID 1 MG: 1 TABLET ORAL at 09:01

## 2023-10-26 RX ADMIN — LOSARTAN POTASSIUM 100 MG: 50 TABLET, FILM COATED ORAL at 09:01

## 2023-10-26 RX ADMIN — ISOSORBIDE MONONITRATE 30 MG: 60 TABLET, EXTENDED RELEASE ORAL at 09:01

## 2023-10-26 RX ADMIN — FERROUS SULFATE TAB 325 MG (65 MG ELEMENTAL FE) 325 MG: 325 (65 FE) TAB at 09:02

## 2023-10-26 RX ADMIN — APIXABAN 2.5 MG: 2.5 TABLET, FILM COATED ORAL at 09:02

## 2023-10-26 RX ADMIN — SODIUM CHLORIDE, PRESERVATIVE FREE 10 ML: 5 INJECTION INTRAVENOUS at 09:02

## 2023-10-26 ASSESSMENT — PAIN SCALES - GENERAL: PAINLEVEL_OUTOF10: 0

## 2023-10-26 ASSESSMENT — PAIN SCALES - WONG BAKER: WONGBAKER_NUMERICALRESPONSE: 0

## 2023-10-26 NOTE — CARE COORDINATION
DCP: Home with  and Clementine Martin Transport:  to transport    IMM given        Transition of Care Plan:    RUR: 19%  Prior Level of Functioning: HH  Disposition: HH  Follow up appointments: primary nurse to setup as needed  DME needed: none  Transportation at discharge:   IM/IMM Medicare/ letter given: yes  Is patient a [place] and connected with [facility]? no   If yes, was [employer] transfer form completed and VA notified? N/a  Caregiver Contact:  at bedside  Discharge Caregiver contacted prior to discharge?  yes  Care Conference needed? no  Barriers to discharge: none

## 2023-10-26 NOTE — PROGRESS NOTES
Discharge plan of care/case management plan validated with provider discharge order. Educated patient on discharge medications and treatments. No questions/concerns voiced. Patient to d/c home via private car with family and [facility] services.

## 2023-10-26 NOTE — PLAN OF CARE
Problem: Pain  Goal: Verbalizes/displays adequate comfort level or baseline comfort level  Outcome: Progressing     Problem: Safety - Adult  Goal: Free from fall injury  Outcome: Progressing     Problem: Safety - Adult  Goal: Free from fall injury  Outcome: Progressing

## 2023-10-27 LAB
BACTERIA SPEC CULT: NORMAL
Lab: NORMAL

## 2023-10-27 NOTE — PERIOP NOTE
Mrs. Hauser Both called states MD is aware of patient recent hospital stay - ok to proceed  with planned surgery

## 2023-10-27 NOTE — PERIOP NOTE
Patient stated she was instructed by MD to hold Eliquis x2 days prior to planned surgery    Faxed Eliquis hold note to [facility] cardiology

## 2023-10-29 ENCOUNTER — ANESTHESIA EVENT (OUTPATIENT)
Facility: HOSPITAL | Age: 88
End: 2023-10-29
Payer: MEDICARE

## 2023-10-30 ENCOUNTER — HOSPITAL ENCOUNTER (OUTPATIENT)
Facility: HOSPITAL | Age: 88
Discharge: HOME | End: 2023-10-30
Attending: SURGERY | Admitting: SURGERY
Payer: MEDICARE

## 2023-10-30 ENCOUNTER — ANESTHESIA (OUTPATIENT)
Facility: HOSPITAL | Age: 88
End: 2023-10-30
Payer: MEDICARE

## 2023-10-30 VITALS
TEMPERATURE: 97.6 F | DIASTOLIC BLOOD PRESSURE: 58 MMHG | HEART RATE: 54 BPM | SYSTOLIC BLOOD PRESSURE: 117 MMHG | OXYGEN SATURATION: 94 % | RESPIRATION RATE: 20 BRPM

## 2023-10-30 DIAGNOSIS — R19.09 INGUINAL MASS: ICD-10-CM

## 2023-10-30 PROBLEM — R22.9 SKIN MASS: Status: ACTIVE | Noted: 2023-10-30

## 2023-10-30 PROBLEM — R22.9 SKIN MASS: Status: RESOLVED | Noted: 2023-10-30 | Resolved: 2023-10-30

## 2023-10-30 LAB
GLUCOSE BLD STRIP.AUTO-MCNC: 112 MG/DL (ref 65–100)
GLUCOSE BLD STRIP.AUTO-MCNC: 94 MG/DL (ref 65–100)
PERFORMED BY:: ABNORMAL
PERFORMED BY:: NORMAL

## 2023-10-30 PROCEDURE — 3600000002 HC SURGERY LEVEL 2 BASE: Performed by: SURGERY

## 2023-10-30 PROCEDURE — 2709999900 HC NON-CHARGEABLE SUPPLY: Performed by: SURGERY

## 2023-10-30 PROCEDURE — 6360000002 HC RX W HCPCS: Performed by: SURGERY

## 2023-10-30 PROCEDURE — 88304 TISSUE EXAM BY PATHOLOGIST: CPT

## 2023-10-30 PROCEDURE — 3600000012 HC SURGERY LEVEL 2 ADDTL 15MIN: Performed by: SURGERY

## 2023-10-30 PROCEDURE — 3700000001 HC ADD 15 MINUTES (ANESTHESIA): Performed by: SURGERY

## 2023-10-30 PROCEDURE — 2580000003 HC RX 258: Performed by: SURGERY

## 2023-10-30 PROCEDURE — 6360000002 HC RX W HCPCS

## 2023-10-30 PROCEDURE — 7100000010 HC PHASE II RECOVERY - FIRST 15 MIN: Performed by: SURGERY

## 2023-10-30 PROCEDURE — 6370000000 HC RX 637 (ALT 250 FOR IP)

## 2023-10-30 PROCEDURE — 2500000003 HC RX 250 WO HCPCS

## 2023-10-30 PROCEDURE — 7100000011 HC PHASE II RECOVERY - ADDTL 15 MIN: Performed by: SURGERY

## 2023-10-30 PROCEDURE — 7100000001 HC PACU RECOVERY - ADDTL 15 MIN: Performed by: SURGERY

## 2023-10-30 PROCEDURE — 2580000003 HC RX 258

## 2023-10-30 PROCEDURE — 82962 GLUCOSE BLOOD TEST: CPT

## 2023-10-30 PROCEDURE — 7100000000 HC PACU RECOVERY - FIRST 15 MIN: Performed by: SURGERY

## 2023-10-30 PROCEDURE — 3700000000 HC ANESTHESIA ATTENDED CARE: Performed by: SURGERY

## 2023-10-30 RX ORDER — DEXAMETHASONE SODIUM PHOSPHATE 4 MG/ML
INJECTION, SOLUTION INTRA-ARTICULAR; INTRALESIONAL; INTRAMUSCULAR; INTRAVENOUS; SOFT TISSUE PRN
Status: DISCONTINUED | OUTPATIENT
Start: 2023-10-30 | End: 2023-10-30 | Stop reason: SDUPTHER

## 2023-10-30 RX ORDER — HYDROMORPHONE HYDROCHLORIDE 1 MG/ML
0.5 INJECTION, SOLUTION INTRAMUSCULAR; INTRAVENOUS; SUBCUTANEOUS EVERY 5 MIN PRN
Status: DISCONTINUED | OUTPATIENT
Start: 2023-10-30 | End: 2023-10-30 | Stop reason: HOSPADM

## 2023-10-30 RX ORDER — IPRATROPIUM BROMIDE AND ALBUTEROL SULFATE 2.5; .5 MG/3ML; MG/3ML
1 SOLUTION RESPIRATORY (INHALATION)
Status: DISCONTINUED | OUTPATIENT
Start: 2023-10-30 | End: 2023-10-30 | Stop reason: HOSPADM

## 2023-10-30 RX ORDER — ONDANSETRON 2 MG/ML
4 INJECTION INTRAMUSCULAR; INTRAVENOUS
Status: DISCONTINUED | OUTPATIENT
Start: 2023-10-30 | End: 2023-10-30 | Stop reason: HOSPADM

## 2023-10-30 RX ORDER — LIDOCAINE HYDROCHLORIDE 20 MG/ML
INJECTION, SOLUTION EPIDURAL; INFILTRATION; INTRACAUDAL; PERINEURAL PRN
Status: DISCONTINUED | OUTPATIENT
Start: 2023-10-30 | End: 2023-10-30 | Stop reason: SDUPTHER

## 2023-10-30 RX ORDER — EPHEDRINE SULFATE 50 MG/ML
INJECTION INTRAVENOUS PRN
Status: DISCONTINUED | OUTPATIENT
Start: 2023-10-30 | End: 2023-10-30 | Stop reason: SDUPTHER

## 2023-10-30 RX ORDER — ESMOLOL HYDROCHLORIDE 10 MG/ML
INJECTION INTRAVENOUS PRN
Status: DISCONTINUED | OUTPATIENT
Start: 2023-10-30 | End: 2023-10-30 | Stop reason: SDUPTHER

## 2023-10-30 RX ORDER — ESMOLOL HYDROCHLORIDE 10 MG/ML
INJECTION INTRAVENOUS
Status: DISCONTINUED
Start: 2023-10-30 | End: 2023-10-30 | Stop reason: HOSPADM

## 2023-10-30 RX ORDER — METOCLOPRAMIDE HYDROCHLORIDE 5 MG/ML
10 INJECTION INTRAMUSCULAR; INTRAVENOUS
Status: DISCONTINUED | OUTPATIENT
Start: 2023-10-30 | End: 2023-10-30 | Stop reason: HOSPADM

## 2023-10-30 RX ORDER — LORAZEPAM 2 MG/ML
0.5 INJECTION INTRAMUSCULAR
Status: DISCONTINUED | OUTPATIENT
Start: 2023-10-30 | End: 2023-10-30 | Stop reason: HOSPADM

## 2023-10-30 RX ORDER — DEXMEDETOMIDINE HYDROCHLORIDE 100 UG/ML
INJECTION, SOLUTION INTRAVENOUS PRN
Status: DISCONTINUED | OUTPATIENT
Start: 2023-10-30 | End: 2023-10-30 | Stop reason: SDUPTHER

## 2023-10-30 RX ORDER — OXYCODONE HYDROCHLORIDE 5 MG/1
10 TABLET ORAL PRN
Status: DISCONTINUED | OUTPATIENT
Start: 2023-10-30 | End: 2023-10-30 | Stop reason: HOSPADM

## 2023-10-30 RX ORDER — DEXTROSE MONOHYDRATE 100 MG/ML
INJECTION, SOLUTION INTRAVENOUS CONTINUOUS PRN
Status: DISCONTINUED | OUTPATIENT
Start: 2023-10-30 | End: 2023-10-30 | Stop reason: HOSPADM

## 2023-10-30 RX ORDER — LIDOCAINE 4 G/G
1 PATCH TOPICAL AS NEEDED
Status: DISCONTINUED | OUTPATIENT
Start: 2023-10-30 | End: 2023-10-30 | Stop reason: HOSPADM

## 2023-10-30 RX ORDER — HYDRALAZINE HYDROCHLORIDE 20 MG/ML
10 INJECTION INTRAMUSCULAR; INTRAVENOUS
Status: DISCONTINUED | OUTPATIENT
Start: 2023-10-30 | End: 2023-10-30 | Stop reason: HOSPADM

## 2023-10-30 RX ORDER — PHENYLEPHRINE HCL IN 0.9% NACL 1 MG/10 ML
SYRINGE (ML) INTRAVENOUS PRN
Status: DISCONTINUED | OUTPATIENT
Start: 2023-10-30 | End: 2023-10-30 | Stop reason: SDUPTHER

## 2023-10-30 RX ORDER — SODIUM CHLORIDE 0.9 % (FLUSH) 0.9 %
5-40 SYRINGE (ML) INJECTION EVERY 12 HOURS SCHEDULED
Status: DISCONTINUED | OUTPATIENT
Start: 2023-10-30 | End: 2023-10-30 | Stop reason: HOSPADM

## 2023-10-30 RX ORDER — GABAPENTIN 300 MG/1
300 CAPSULE ORAL ONCE
Status: COMPLETED | OUTPATIENT
Start: 2023-10-30 | End: 2023-10-30

## 2023-10-30 RX ORDER — DIPHENHYDRAMINE HYDROCHLORIDE 50 MG/ML
12.5 INJECTION INTRAMUSCULAR; INTRAVENOUS
Status: DISCONTINUED | OUTPATIENT
Start: 2023-10-30 | End: 2023-10-30 | Stop reason: HOSPADM

## 2023-10-30 RX ORDER — METOCLOPRAMIDE HYDROCHLORIDE 5 MG/ML
INJECTION INTRAMUSCULAR; INTRAVENOUS PRN
Status: DISCONTINUED | OUTPATIENT
Start: 2023-10-30 | End: 2023-10-30 | Stop reason: SDUPTHER

## 2023-10-30 RX ORDER — SODIUM CHLORIDE, SODIUM LACTATE, POTASSIUM CHLORIDE, CALCIUM CHLORIDE 600; 310; 30; 20 MG/100ML; MG/100ML; MG/100ML; MG/100ML
INJECTION, SOLUTION INTRAVENOUS CONTINUOUS PRN
Status: DISCONTINUED | OUTPATIENT
Start: 2023-10-30 | End: 2023-10-30 | Stop reason: SDUPTHER

## 2023-10-30 RX ORDER — SODIUM CHLORIDE, SODIUM LACTATE, POTASSIUM CHLORIDE, CALCIUM CHLORIDE 600; 310; 30; 20 MG/100ML; MG/100ML; MG/100ML; MG/100ML
INJECTION, SOLUTION INTRAVENOUS ONCE
Status: DISCONTINUED | OUTPATIENT
Start: 2023-10-30 | End: 2023-10-30 | Stop reason: HOSPADM

## 2023-10-30 RX ORDER — KETAMINE HCL IN NACL, ISO-OSM 100MG/10ML
SYRINGE (ML) INJECTION PRN
Status: DISCONTINUED | OUTPATIENT
Start: 2023-10-30 | End: 2023-10-30 | Stop reason: SDUPTHER

## 2023-10-30 RX ORDER — BUPIVACAINE HYDROCHLORIDE 2.5 MG/ML
INJECTION, SOLUTION EPIDURAL; INFILTRATION; INTRACAUDAL PRN
Status: DISCONTINUED | OUTPATIENT
Start: 2023-10-30 | End: 2023-10-30 | Stop reason: ALTCHOICE

## 2023-10-30 RX ORDER — OXYCODONE HYDROCHLORIDE 5 MG/1
5 TABLET ORAL PRN
Status: DISCONTINUED | OUTPATIENT
Start: 2023-10-30 | End: 2023-10-30 | Stop reason: HOSPADM

## 2023-10-30 RX ORDER — PROPOFOL 10 MG/ML
INJECTION, EMULSION INTRAVENOUS PRN
Status: DISCONTINUED | OUTPATIENT
Start: 2023-10-30 | End: 2023-10-30 | Stop reason: SDUPTHER

## 2023-10-30 RX ORDER — SODIUM CHLORIDE 9 MG/ML
INJECTION, SOLUTION INTRAVENOUS PRN
Status: DISCONTINUED | OUTPATIENT
Start: 2023-10-30 | End: 2023-10-30 | Stop reason: HOSPADM

## 2023-10-30 RX ORDER — MEPERIDINE HYDROCHLORIDE 25 MG/ML
12.5 INJECTION INTRAMUSCULAR; INTRAVENOUS; SUBCUTANEOUS EVERY 5 MIN PRN
Status: DISCONTINUED | OUTPATIENT
Start: 2023-10-30 | End: 2023-10-30 | Stop reason: HOSPADM

## 2023-10-30 RX ORDER — FENTANYL CITRATE 50 UG/ML
50 INJECTION, SOLUTION INTRAMUSCULAR; INTRAVENOUS EVERY 5 MIN PRN
Status: DISCONTINUED | OUTPATIENT
Start: 2023-10-30 | End: 2023-10-30 | Stop reason: HOSPADM

## 2023-10-30 RX ORDER — MAGNESIUM SULFATE IN WATER 40 MG/ML
INJECTION, SOLUTION INTRAVENOUS PRN
Status: DISCONTINUED | OUTPATIENT
Start: 2023-10-30 | End: 2023-10-30 | Stop reason: SDUPTHER

## 2023-10-30 RX ORDER — LABETALOL HYDROCHLORIDE 5 MG/ML
10 INJECTION, SOLUTION INTRAVENOUS
Status: DISCONTINUED | OUTPATIENT
Start: 2023-10-30 | End: 2023-10-30 | Stop reason: HOSPADM

## 2023-10-30 RX ORDER — SODIUM CHLORIDE 0.9 % (FLUSH) 0.9 %
5-40 SYRINGE (ML) INJECTION PRN
Status: DISCONTINUED | OUTPATIENT
Start: 2023-10-30 | End: 2023-10-30 | Stop reason: HOSPADM

## 2023-10-30 RX ORDER — ONDANSETRON 2 MG/ML
INJECTION INTRAMUSCULAR; INTRAVENOUS PRN
Status: DISCONTINUED | OUTPATIENT
Start: 2023-10-30 | End: 2023-10-30 | Stop reason: SDUPTHER

## 2023-10-30 RX ORDER — CARVEDILOL 3.12 MG/1
3.12 TABLET ORAL 2 TIMES DAILY WITH MEALS
COMMUNITY
Start: 2023-03-05

## 2023-10-30 RX ADMIN — DEXMEDETOMIDINE 8 MCG: 100 INJECTION, SOLUTION INTRAVENOUS at 07:50

## 2023-10-30 RX ADMIN — EPHEDRINE SULFATE 10 MG: 50 INJECTION INTRAVENOUS at 08:31

## 2023-10-30 RX ADMIN — MAGNESIUM SULFATE HEPTAHYDRATE 2000 MG: 40 INJECTION, SOLUTION INTRAVENOUS at 08:12

## 2023-10-30 RX ADMIN — CEFAZOLIN SODIUM 2000 MG: 1 INJECTION, POWDER, FOR SOLUTION INTRAMUSCULAR; INTRAVENOUS at 08:05

## 2023-10-30 RX ADMIN — Medication 10 MG: at 08:33

## 2023-10-30 RX ADMIN — GABAPENTIN 300 MG: 300 CAPSULE ORAL at 07:22

## 2023-10-30 RX ADMIN — PROPOFOL 130 MG: 10 INJECTION, EMULSION INTRAVENOUS at 08:09

## 2023-10-30 RX ADMIN — DEXMEDETOMIDINE 4 MCG: 100 INJECTION, SOLUTION INTRAVENOUS at 08:09

## 2023-10-30 RX ADMIN — ESMOLOL HYDROCHLORIDE 30 MG: 10 INJECTION, SOLUTION INTRAVENOUS at 08:09

## 2023-10-30 RX ADMIN — Medication 30 MG: at 08:09

## 2023-10-30 RX ADMIN — LIDOCAINE HYDROCHLORIDE 100 MG: 20 INJECTION, SOLUTION EPIDURAL; INFILTRATION; INTRACAUDAL; PERINEURAL at 08:09

## 2023-10-30 RX ADMIN — DEXAMETHASONE SODIUM PHOSPHATE 8 MG: 4 INJECTION, SOLUTION INTRA-ARTICULAR; INTRALESIONAL; INTRAMUSCULAR; INTRAVENOUS; SOFT TISSUE at 08:10

## 2023-10-30 RX ADMIN — METOCLOPRAMIDE HYDROCHLORIDE 10 MG: 5 INJECTION INTRAMUSCULAR; INTRAVENOUS at 08:33

## 2023-10-30 RX ADMIN — Medication 50 MCG: at 08:31

## 2023-10-30 RX ADMIN — ONDANSETRON 4 MG: 2 INJECTION INTRAMUSCULAR; INTRAVENOUS at 08:33

## 2023-10-30 RX ADMIN — SODIUM CHLORIDE, POTASSIUM CHLORIDE, SODIUM LACTATE AND CALCIUM CHLORIDE: 600; 310; 30; 20 INJECTION, SOLUTION INTRAVENOUS at 07:50

## 2023-10-30 RX ADMIN — METOCLOPRAMIDE HYDROCHLORIDE 10 MG: 5 INJECTION INTRAMUSCULAR; INTRAVENOUS at 08:57

## 2023-10-30 ASSESSMENT — PAIN - FUNCTIONAL ASSESSMENT: PAIN_FUNCTIONAL_ASSESSMENT: 0-10

## 2023-10-30 ASSESSMENT — PAIN SCALES - GENERAL
PAINLEVEL_OUTOF10: 0

## 2023-10-30 ASSESSMENT — ENCOUNTER SYMPTOMS: SHORTNESS OF BREATH: 1

## 2023-10-30 NOTE — PROGRESS NOTES
[facility] in [facility] condition. Bedside report given, SBAR reviewed, sites viewed. Family and belongings to bedside.

## 2023-10-30 NOTE — OP NOTE
Operative Note      Patient: Dc Boyer  YOB: 1935  MRN: [id number]    Date of Procedure: 10/30/2023    Pre-Op Diagnosis Codes:     * Inguinal mass [R19.09]    Post-Op Diagnosis: Same/Lipoma       Procedure(s):  EXCISION LEFT INGUINAL MASS/LIPOMA    Surgeon(s):  Ermias Patricia MD    Assistant:   Jodie Valdivia    Anesthesia: General/local    Anesthesiologist: Dr. Gay Quezada    CRNA: Mr. Sabas Muhmamad    Indication: Left inguinal area mass with pain    Procedure:  Patient was taken to the operative room. Patient was laid supine patient received prophylactic dose of antibiotic patient had SCD applied to both lower extremities. After intubation the left groin area was prepped and draped usual sterile fashion. Timeout was completed. Local anesthesia was infiltrated. A transverse incision was placed over the site of the swelling. Incision was deepened through subcutaneous tissue. There was a lot of fatty tissue consistent with lipoma was excised. This was approximately 5 cm in size. Complete hemostasis achieved. The subcutaneous tissues were approximated using 3-0 Vicryl simple interrupted stitches. Skin was approximated using 4-0 Monocryl as continuous subcuticular stitches. Dermabond was applied. Local anesthesia was infiltrated. Patient tolerated procedure very well. There were no complication. Estimated loss was minimal.  Patient was extubated and transferred to recovery room in stable condition. All counts were correct.     Estimated Blood Loss (mL): Minimal    Complications: None    Specimens:   ID Type Source Tests Collected by Time Destination   1 : Lipoma Tissue Tissue SURGICAL PATHOLOGY Ermias Patricia MD 10/30/2023 7383        Implants:  * No implants in log *      Drains: * No LDAs found *    Findings: as above        Electronically signed by Scott Feliciano MD on 10/30/2023 at 9:33 AM

## 2023-10-30 NOTE — PROGRESS NOTES
IV removed-- tip intact, no redness, swelling or bleeding noted. VSS. Patient in no acute distress. DC instructions reviewed with   Carmen Brie     -- verbalized understanding. Patient wheeled out to private vehicle-- no distress noted.

## 2023-10-30 NOTE — ANESTHESIA PRE PROCEDURE
Department of Anesthesiology  Preprocedure Note       Name:  Delaney Riley   Age:  82 y.o.  :  1935                                          MRN:  [id number]         Date:  10/30/2023      Surgeon: Nora Cheney):  Nolan Greenwood MD    Procedure: Procedure(s):  EXCISION LEFT INGUINAL MASS    Medications prior to admission:   Prior to Admission medications    Medication Sig Start Date End Date Taking?  Authorizing Provider   carvedilol (COREG) 3.125 MG tablet Take 1 tablet by mouth 2 times daily (with meals) 3/5/23   Megha Woods MD   docusate sodium (COLACE) 100 MG capsule Take 1 capsule by mouth 2 times daily 10/26/23 11/25/23  Joseph Vasquez PA-C   sulfamethoxazole-trimethoprim (BACTRIM DS;SEPTRA DS) 800-160 MG per tablet Take 1 tablet by mouth 2 times daily for 10 days 10/25/23 11/4/23  Roxanne Campbell MD   polyethylene glycol [facility]) 17 GM/SCOOP powder Take 17 g by mouth daily  Patient not taking: Reported on 10/30/2023    Megha Woods MD   linaCLOtide (LINZESS PO) Take 72 mg by mouth once Patient takes it at night  Patient not taking: Reported on 10/27/2023    Megha Woods MD   apixaban (ELIQUIS) 2.5 MG TABS tablet Take 1 tablet by mouth 2 times daily    Automatic Reconciliation, Ar   atorvastatin (LIPITOR) 40 MG tablet Take 1 tablet by mouth nightly 10/9/20   Automatic Reconciliation, Ar   butalbital-acetaminophen-caffeine (FIORICET, ESGIC) -40 MG per tablet Take 1 tablet by mouth every 4 hours as needed 17   Automatic Reconciliation, Ar   cyclobenzaprine (FLEXERIL) 10 MG tablet Take 0.5 tablets by mouth 3 times daily as needed  Patient not taking: Reported on 10/27/2023    Automatic Reconciliation, Ar   donepezil (ARICEPT) 10 MG tablet Take 1 tablet by mouth nightly    Automatic Reconciliation, Ar   ferrous sulfate (IRON 325) 325 (65 Fe) MG tablet Take 1 tablet by mouth daily 16   Automatic Reconciliation, Ar   folic acid (FOLVITE) 1 MG tablet

## 2023-10-31 NOTE — ANESTHESIA POSTPROCEDURE EVALUATION
Department of Anesthesiology  Postprocedure Note    Patient: Karrin Dubin  MRN: [id number]  YOB: 1935    Procedure Summary     Date: 10/30/23 Room / Location: [facility] MAIN OR 06 / [facility] MAIN OR    Anesthesia Start: 5557 Anesthesia Stop: 4029    Procedure: EXCISION LEFT INGUINAL MASS/LIPOMA (Left: Abdomen) Diagnosis:       Inguinal mass      (Inguinal mass [R19.09])    Surgeons: Bay Biggs MD Responsible Provider: Jack Simpson MD    Anesthesia Type: General ASA Status: 3          Anesthesia Type: General    Jaret Phase I: Jaret Score: 10    Jaret Phase II: Jaret Score: 10      Anesthesia Post Evaluation    Patient location during evaluation: PACU  Patient participation: complete - patient cannot participate  Level of consciousness: awake  Pain score: 0  Airway patency: patent  Nausea & Vomiting: no nausea and no vomiting  Complications: no  Cardiovascular status: hemodynamically stable  Respiratory status: acceptable  Hydration status: stable  Multimodal analgesia pain management approach

## 2023-11-07 ENCOUNTER — APPOINTMENT (OUTPATIENT)
Facility: HOSPITAL | Age: 88
End: 2023-11-07
Payer: MEDICARE

## 2023-11-07 ENCOUNTER — HOSPITAL ENCOUNTER (EMERGENCY)
Facility: HOSPITAL | Age: 88
Discharge: HOME | End: 2023-11-07
Attending: STUDENT IN AN ORGANIZED HEALTH CARE EDUCATION/TRAINING PROGRAM
Payer: MEDICARE

## 2023-11-07 VITALS
RESPIRATION RATE: 13 BRPM | SYSTOLIC BLOOD PRESSURE: 119 MMHG | HEIGHT: 65 IN | DIASTOLIC BLOOD PRESSURE: 61 MMHG | WEIGHT: 170 LBS | TEMPERATURE: 97.8 F | OXYGEN SATURATION: 96 % | BODY MASS INDEX: 28.32 KG/M2 | HEART RATE: 65 BPM

## 2023-11-07 DIAGNOSIS — S30.1XXA ABDOMINAL WALL SEROMA, INITIAL ENCOUNTER: Primary | ICD-10-CM

## 2023-11-07 DIAGNOSIS — R10.32 LEFT LOWER QUADRANT ABDOMINAL PAIN: ICD-10-CM

## 2023-11-07 LAB
ANION GAP SERPL CALC-SCNC: 7 MMOL/L (ref 5–15)
APPEARANCE UR: ABNORMAL
BACTERIA URNS QL MICRO: ABNORMAL /HPF
BASOPHILS # BLD: 0.1 K/UL (ref 0–0.1)
BASOPHILS NFR BLD: 1 % (ref 0–1)
BILIRUB UR QL: NEGATIVE
BUN SERPL-MCNC: 27 MG/DL (ref 6–20)
BUN/CREAT SERPL: 19 (ref 12–20)
CA-I BLD-MCNC: 9.4 MG/DL (ref 8.5–10.1)
CHLORIDE SERPL-SCNC: 95 MMOL/L (ref 97–108)
CO2 SERPL-SCNC: 28 MMOL/L (ref 21–32)
COLOR UR: ABNORMAL
CREAT SERPL-MCNC: 1.42 MG/DL (ref 0.55–1.02)
DIFFERENTIAL METHOD BLD: ABNORMAL
EOSINOPHIL # BLD: 0.2 K/UL (ref 0–0.4)
EOSINOPHIL NFR BLD: 1 % (ref 0–7)
EPITH CASTS URNS QL MICRO: ABNORMAL /LPF
ERYTHROCYTE [DISTWIDTH] IN BLOOD BY AUTOMATED COUNT: 17.9 % (ref 11.5–14.5)
GLUCOSE SERPL-MCNC: 157 MG/DL (ref 65–100)
GLUCOSE UR STRIP.AUTO-MCNC: >500 MG/DL
HCT VFR BLD AUTO: 43.4 % (ref 35–47)
HGB BLD-MCNC: 14.2 G/DL (ref 11.5–16)
HGB UR QL STRIP: NEGATIVE
HYALINE CASTS URNS QL MICRO: ABNORMAL /LPF (ref 0–5)
IMM GRANULOCYTES # BLD AUTO: 0.1 K/UL (ref 0–0.04)
IMM GRANULOCYTES NFR BLD AUTO: 1 % (ref 0–0.5)
KETONES UR QL STRIP.AUTO: NEGATIVE MG/DL
LEUKOCYTE ESTERASE UR QL STRIP.AUTO: ABNORMAL
LYMPHOCYTES # BLD: 1.5 K/UL (ref 0.8–3.5)
LYMPHOCYTES NFR BLD: 13 % (ref 12–49)
MCH RBC QN AUTO: 29.1 PG (ref 26–34)
MCHC RBC AUTO-ENTMCNC: 32.7 G/DL (ref 30–36.5)
MCV RBC AUTO: 88.9 FL (ref 80–99)
MONOCYTES # BLD: 1.2 K/UL (ref 0–1)
MONOCYTES NFR BLD: 10 % (ref 5–13)
MUCOUS THREADS URNS QL MICRO: ABNORMAL /LPF
NEUTS SEG # BLD: 8.2 K/UL (ref 1.8–8)
NEUTS SEG NFR BLD: 74 % (ref 32–75)
NITRITE UR QL STRIP.AUTO: NEGATIVE
NRBC # BLD: 0 K/UL (ref 0–0.01)
NRBC BLD-RTO: 0 PER 100 WBC
PH UR STRIP: 5 (ref 5–8)
PLATELET # BLD AUTO: 257 K/UL (ref 150–400)
PMV BLD AUTO: 12.4 FL (ref 8.9–12.9)
POTASSIUM SERPL-SCNC: 3.9 MMOL/L (ref 3.5–5.1)
PROT UR STRIP-MCNC: NEGATIVE MG/DL
RBC # BLD AUTO: 4.88 M/UL (ref 3.8–5.2)
RBC #/AREA URNS HPF: ABNORMAL /HPF (ref 0–5)
SODIUM SERPL-SCNC: 130 MMOL/L (ref 136–145)
SP GR UR REFRACTOMETRY: 1.01 (ref 1–1.03)
UROBILINOGEN UR QL STRIP.AUTO: 0.1 EU/DL (ref 0.1–1)
WBC # BLD AUTO: 11.1 K/UL (ref 3.6–11)
WBC URNS QL MICRO: >100 /HPF (ref 0–4)

## 2023-11-07 PROCEDURE — 96374 THER/PROPH/DIAG INJ IV PUSH: CPT

## 2023-11-07 PROCEDURE — 80048 BASIC METABOLIC PNL TOTAL CA: CPT

## 2023-11-07 PROCEDURE — 96361 HYDRATE IV INFUSION ADD-ON: CPT

## 2023-11-07 PROCEDURE — 74176 CT ABD & PELVIS W/O CONTRAST: CPT

## 2023-11-07 PROCEDURE — 81001 URINALYSIS AUTO W/SCOPE: CPT

## 2023-11-07 PROCEDURE — 2580000003 HC RX 258: Performed by: STUDENT IN AN ORGANIZED HEALTH CARE EDUCATION/TRAINING PROGRAM

## 2023-11-07 PROCEDURE — 96375 TX/PRO/DX INJ NEW DRUG ADDON: CPT

## 2023-11-07 PROCEDURE — 93005 ELECTROCARDIOGRAM TRACING: CPT | Performed by: STUDENT IN AN ORGANIZED HEALTH CARE EDUCATION/TRAINING PROGRAM

## 2023-11-07 PROCEDURE — 99284 EMERGENCY DEPT VISIT MOD MDM: CPT

## 2023-11-07 PROCEDURE — 6360000002 HC RX W HCPCS: Performed by: STUDENT IN AN ORGANIZED HEALTH CARE EDUCATION/TRAINING PROGRAM

## 2023-11-07 PROCEDURE — 85025 COMPLETE CBC W/AUTO DIFF WBC: CPT

## 2023-11-07 PROCEDURE — 36415 COLL VENOUS BLD VENIPUNCTURE: CPT

## 2023-11-07 RX ORDER — ONDANSETRON 2 MG/ML
4 INJECTION INTRAMUSCULAR; INTRAVENOUS ONCE
Status: COMPLETED | OUTPATIENT
Start: 2023-11-07 | End: 2023-11-07

## 2023-11-07 RX ORDER — 0.9 % SODIUM CHLORIDE 0.9 %
1000 INTRAVENOUS SOLUTION INTRAVENOUS ONCE
Status: COMPLETED | OUTPATIENT
Start: 2023-11-07 | End: 2023-11-07

## 2023-11-07 RX ORDER — MORPHINE SULFATE 4 MG/ML
4 INJECTION, SOLUTION INTRAMUSCULAR; INTRAVENOUS
Status: COMPLETED | OUTPATIENT
Start: 2023-11-07 | End: 2023-11-07

## 2023-11-07 RX ORDER — OXYCODONE HYDROCHLORIDE 5 MG/1
5 TABLET ORAL EVERY 6 HOURS PRN
Qty: 10 TABLET | Refills: 0 | Status: SHIPPED | OUTPATIENT
Start: 2023-11-07 | End: 2023-11-10

## 2023-11-07 RX ADMIN — ONDANSETRON 4 MG: 2 INJECTION INTRAMUSCULAR; INTRAVENOUS at 11:31

## 2023-11-07 RX ADMIN — MORPHINE SULFATE 4 MG: 4 INJECTION, SOLUTION INTRAMUSCULAR; INTRAVENOUS at 11:30

## 2023-11-07 RX ADMIN — SODIUM CHLORIDE 1000 ML: 9 INJECTION, SOLUTION INTRAVENOUS at 11:29

## 2023-11-07 ASSESSMENT — PAIN SCALES - GENERAL
PAINLEVEL_OUTOF10: 8
PAINLEVEL_OUTOF10: 8

## 2023-11-07 ASSESSMENT — PAIN - FUNCTIONAL ASSESSMENT
PAIN_FUNCTIONAL_ASSESSMENT: ACTIVITIES ARE NOT PREVENTED
PAIN_FUNCTIONAL_ASSESSMENT: NONE - DENIES PAIN
PAIN_FUNCTIONAL_ASSESSMENT: 0-10

## 2023-11-07 ASSESSMENT — PAIN DESCRIPTION - LOCATION
LOCATION: ABDOMEN
LOCATION: ABDOMEN

## 2023-11-07 NOTE — ED PROVIDER NOTES
furosemide 40 MG tablet  Commonly known as: LASIX     gabapentin 300 MG capsule  Commonly known as: NEURONTIN     glimepiride 4 MG tablet  Commonly known as: AMARYL     HYDROcodone-acetaminophen  MG per tablet  Commonly known as: NORCO     isosorbide mononitrate 30 MG extended release tablet  Commonly known as: IMDUR     losartan 100 MG tablet  Commonly known as: COZAAR     meclizine 25 MG tablet  Commonly known as: ANTIVERT     melatonin 10 MG Caps capsule     methotrexate 2.5 MG chemo tablet  Commonly known as: RHEUMATREX     montelukast 10 MG tablet  Commonly known as: SINGULAIR     Nitrostat 0.4 MG SL tablet  Generic drug: nitroGLYCERIN     pantoprazole 40 MG tablet  Commonly known as: PROTONIX     potassium chloride 10 MEQ extended release capsule  Commonly known as: MICRO-K               Where to Get Your Medications        These medications were sent to [address] [place] [address] [id number] [address] [facility] [phone] Alan\[facility]\"" Isaiah [phone]  [facility], [address] [facility]      Phone: [phone]   oxyCODONE 5 MG immediate release tablet           DISCONTINUED MEDICATIONS:  Discharge Medication List as of 11/7/2023  2:51 PM          I am the Primary Clinician of Record: sIh Bejarano MD (electronically signed)    (Please note that parts of this dictation were completed with voice recognition software. Quite often unanticipated grammatical, syntax, homophones, and other interpretive errors are inadvertently transcribed by the computer software. Please disregards these errors.  Please excuse any errors that have escaped final proofreading.)     Fadumo Chan MD  11/08/23 5675

## 2023-11-07 NOTE — ED TRIAGE NOTES
Presents with abdominal pain, nausea and left side pain that started 2 hours ago, has been having these intermittent stomach pain for approx 2 months per .

## 2023-11-09 LAB
EKG ATRIAL RATE: 63 BPM
EKG DIAGNOSIS: NORMAL
EKG P AXIS: -2 DEGREES
EKG P-R INTERVAL: 130 MS
EKG Q-T INTERVAL: 464 MS
EKG QRS DURATION: 126 MS
EKG QTC CALCULATION (BAZETT): 474 MS
EKG R AXIS: -60 DEGREES
EKG T AXIS: 123 DEGREES
EKG VENTRICULAR RATE: 63 BPM

## 2024-01-23 NOTE — PRE-PROCEDURE INSTRUCTIONS
Pat completed with pt, pt informed to arrive at 1000, Nothing by mouth after midnight. Pt stated her  with be taking her home after the colon.   Pre op order placed.

## 2024-01-26 ENCOUNTER — ANESTHESIA EVENT (OUTPATIENT)
Facility: HOSPITAL | Age: 89
End: 2024-01-26
Payer: MEDICARE

## 2024-01-26 ENCOUNTER — ANESTHESIA (OUTPATIENT)
Facility: HOSPITAL | Age: 89
End: 2024-01-26
Payer: MEDICARE

## 2024-01-26 ENCOUNTER — HOSPITAL ENCOUNTER (OUTPATIENT)
Facility: HOSPITAL | Age: 89
Setting detail: OUTPATIENT SURGERY
Discharge: HOME | End: 2024-01-26
Attending: INTERNAL MEDICINE | Admitting: INTERNAL MEDICINE
Payer: MEDICARE

## 2024-01-26 VITALS
HEART RATE: 68 BPM | OXYGEN SATURATION: 96 % | RESPIRATION RATE: 16 BRPM | SYSTOLIC BLOOD PRESSURE: 152 MMHG | DIASTOLIC BLOOD PRESSURE: 68 MMHG | TEMPERATURE: 97.7 F

## 2024-01-26 LAB
GLUCOSE BLD STRIP.AUTO-MCNC: 108 MG/DL (ref 65–100)
PERFORMED BY:: ABNORMAL

## 2024-01-26 PROCEDURE — 2709999900 HC NON-CHARGEABLE SUPPLY: Performed by: INTERNAL MEDICINE

## 2024-01-26 PROCEDURE — 3600007502: Performed by: INTERNAL MEDICINE

## 2024-01-26 PROCEDURE — 2500000003 HC RX 250 WO HCPCS: Performed by: NURSE ANESTHETIST, CERTIFIED REGISTERED

## 2024-01-26 PROCEDURE — 7100000010 HC PHASE II RECOVERY - FIRST 15 MIN: Performed by: INTERNAL MEDICINE

## 2024-01-26 PROCEDURE — 7100000011 HC PHASE II RECOVERY - ADDTL 15 MIN: Performed by: INTERNAL MEDICINE

## 2024-01-26 PROCEDURE — 2580000003 HC RX 258: Performed by: NURSE ANESTHETIST, CERTIFIED REGISTERED

## 2024-01-26 PROCEDURE — 6360000002 HC RX W HCPCS: Performed by: NURSE ANESTHETIST, CERTIFIED REGISTERED

## 2024-01-26 PROCEDURE — 82962 GLUCOSE BLOOD TEST: CPT

## 2024-01-26 PROCEDURE — 3700000001 HC ADD 15 MINUTES (ANESTHESIA): Performed by: INTERNAL MEDICINE

## 2024-01-26 PROCEDURE — 3600007512: Performed by: INTERNAL MEDICINE

## 2024-01-26 PROCEDURE — 3700000000 HC ANESTHESIA ATTENDED CARE: Performed by: INTERNAL MEDICINE

## 2024-01-26 RX ORDER — SODIUM CHLORIDE 9 MG/ML
INJECTION, SOLUTION INTRAVENOUS CONTINUOUS
Status: DISCONTINUED | OUTPATIENT
Start: 2024-01-26 | End: 2024-01-26 | Stop reason: HOSPADM

## 2024-01-26 RX ORDER — SODIUM CHLORIDE, SODIUM LACTATE, POTASSIUM CHLORIDE, CALCIUM CHLORIDE 600; 310; 30; 20 MG/100ML; MG/100ML; MG/100ML; MG/100ML
INJECTION, SOLUTION INTRAVENOUS CONTINUOUS PRN
Status: DISCONTINUED | OUTPATIENT
Start: 2024-01-26 | End: 2024-01-26 | Stop reason: SDUPTHER

## 2024-01-26 RX ADMIN — LIDOCAINE HYDROCHLORIDE 80 MG: 20 INJECTION, SOLUTION EPIDURAL; INFILTRATION; INTRACAUDAL; PERINEURAL at 13:11

## 2024-01-26 RX ADMIN — SODIUM CHLORIDE, POTASSIUM CHLORIDE, SODIUM LACTATE AND CALCIUM CHLORIDE: 600; 310; 30; 20 INJECTION, SOLUTION INTRAVENOUS at 13:09

## 2024-01-26 RX ADMIN — PROPOFOL 20 MG: 10 INJECTION, EMULSION INTRAVENOUS at 13:16

## 2024-01-26 RX ADMIN — PROPOFOL 80 MG: 10 INJECTION, EMULSION INTRAVENOUS at 13:11

## 2024-01-26 ASSESSMENT — ENCOUNTER SYMPTOMS: SHORTNESS OF BREATH: 1

## 2024-01-26 ASSESSMENT — PAIN - FUNCTIONAL ASSESSMENT
PAIN_FUNCTIONAL_ASSESSMENT: 0-10

## 2024-01-26 NOTE — DISCHARGE INSTR - DIET
10/29/18 0848   Quick Adds   Type of Visit Initial Occupational Therapy Evaluation   Living Environment   Lives With child(marcin), dependent;spouse  (children ages 3 and 5 months)   Living Arrangements house   Home Accessibility stairs to enter home;stairs within home   Number of Stairs Within Home 24  (2 flights (second story with bedrooms, basement))   Transportation Available car;family or friend will provide   Living Environment Comment Pt lives with family in single family home. Pt and spouse both working full time, pt is a , spouse is a pharmacist   Self-Care   Dominant Hand right   Usual Activity Tolerance excellent   Current Activity Tolerance excellent   Regular Exercise yes   Equipment Currently Used at Home none   Functional Level Prior   Ambulation 0-->independent   Transferring 0-->independent   Toileting 0-->independent   Bathing 0-->independent   Dressing 0-->independent   Eating 0-->independent   Communication 0-->understands/communicates without difficulty   Swallowing 0-->swallows foods/liquids without difficulty   Cognition 0 - no cognition issues reported   Fall history within last six months no   Which of the above functional risks had a recent onset or change? none   Prior Functional Level Comment Ind with all ADLs/IADLs   General Information   Onset of Illness/Injury or Date of Surgery - Date 10/27/18   Referring Physician Benny Tapia MD   Patient/Family Goals Statement To discharge home, return to work   Additional Occupational Profile Info/Pertinent History of Current Problem Per chart: Mr. Sarmiento is a previously healthy 45-year-old gentleman currently admitted to the neurology service for evaluation and management of acute onset myalgia and weakness, most notable in the distal upper extremities.  Workup thus far has shown mild increase in CK.  There are no sensory symptoms or exam findings suggestive of myelopathy.  Low suspicion of GBS-like syndrome.  Inflammatory versus immune  Good nutrition is important when healing from an illness, injury, or surgery.  Follow any nutrition recommendations given to you during your hospital stay.   If you were given an oral nutrition supplement while in the hospital, continue to take this supplement at home.  You can take it with meals, in-between meals, and/or before bedtime. These supplements can be purchased at most local grocery stores, pharmacies, and chain [employer]-stores.   If you have any questions about your diet or nutrition, call the hospital and ask for the dietitian.     myositis is in the working differential.  Plan for forearm MRI today.  Exam is stable, therefore will defer empiric treatment pending further workup.  Will likely need EMG in a week or so   Precautions/Limitations no known precautions/limitations   Weight-Bearing Status - LUE full weight-bearing   Weight-Bearing Status - RUE full weight-bearing   Weight-Bearing Status - LLE full weight-bearing   Weight-Bearing Status - RLE full weight-bearing   General Info Comments Activity: Up ad sol   Cognitive Status Examination   Orientation orientation to person, place and time   Level of Consciousness alert   Cognitive Comment WNL   Visual Perception   Visual Perception Comments Reports no changes   Sensory Examination   Sensory Comments Reports no changes, denies n/t in BUEs   Strength   Strength Comments BUE 5/5 throughout   Hand Strength   Left hand  (pounds) 45 pounds   Right hand  (pounds) 50 pounds   Hand Strength Comments Well below norms given sex/age   Muscle Tone Assessment   Muscle Tone Quick Adds No deficits were identified   Coordination   Left hand, nine hole peg test (seconds) 26.76   Right hand, nine hole peg test (seconds) 21.25   Coordination Comments Below norms given age/sex   Mobility   Bed Mobility Comments IND   Transfer Skill: Bed to Chair/Chair to Bed   Level of [place]: Bed to Chair independent   Transfer Skill: Sit to Stand   Level of [place]: Sit/Stand independent   Transfer Skill: Toilet Transfer   Level of [place]: Toilet independent   Upper Body Dressing   Level of [place]: Dress Upper Body independent   Lower Body Dressing   Level of [place]: Dress Lower Body independent   Toileting   Level of [place]: Toilet independent   Grooming   Level of [place]: Grooming independent   Eating/Self Feeding   Level of [place]: Eating independent   Instrumental Activities of Daily Living (IADL)   Previous Responsibilities meal  "prep;housekeeping;laundry;shopping;yardwork;medication management;finances;driving;work;   Activities of Daily Living Analysis   Impairments Contributing to Impaired Activities of Daily Living coordination impaired;strength decreased   General Therapy Interventions   Planned Therapy Interventions fine motor coordination training;strengthening;home program guidelines   Clinical Impression   Criteria for Skilled Therapeutic Interventions Met yes, treatment indicated   OT Diagnosis Decreased ability to participate IADLs   Influenced by the following impairments  strength, fine motor coordination   Assessment of Occupational Performance 1-3 Performance Deficits   Identified Performance Deficits work   Clinical Decision Making (Complexity) Moderate complexity   Therapy Frequency daily   Predicted Duration of Therapy Intervention (days/wks) one time only   Anticipated Equipment Needs at Discharge (none)   Anticipated Discharge Disposition Home;Home with Outpatient Therapy   Risks and Benefits of Treatment have been explained. Yes   Patient, Family & other staff in agreement with plan of care Yes   Clinical Impression Comments Pt with decreased  and fine motor coordination, not impacting self cares however, given pts work as  and significant use of hands, work may be impacted by current deficits. Pt will benefit from one time eval and treatment to assess hand function and provide HEP for indep completion for improved  and FMC   [facility] AM-PAC TM \"6 Clicks\"   2016, Trustees of [facility], under license to [employer]  All rights reserved.   6 Clicks Short Forms Daily Activity Inpatient Short Form   [facility] AM-PAC  \"6 Clicks\" Daily Activity Inpatient Short Form   1. Putting on and taking off regular lower body clothing? 4 - None   2. Bathing (including washing, rinsing, drying)? 4 - None   3. Toileting, which includes using toilet, bedpan or urinal? 4 - None   4. " Putting on and taking off regular upper body clothing? 4 - None   5. Taking care of personal grooming such as brushing teeth? 4 - None   6. Eating meals? 4 - None   Daily Activity Raw Score (Score out of 24.Lower scores equate to lower levels of function) 24   Total Evaluation Time   Total Evaluation Time (Minutes) 10

## 2024-01-26 NOTE — ANESTHESIA POSTPROCEDURE EVALUATION
Department of Anesthesiology  Postprocedure Note    Patient: Julita Metzger  MRN: [id number]  YOB: 1935  Date of evaluation: 1/26/2024    Procedure Summary     Date: 01/26/24 Room / Location: [facility] ENDO 04 / [facility] ENDOSCOPY    Anesthesia Start: 1309 Anesthesia Stop: 1326    Procedure: COLONOSCOPY DIAGNOSTIC (Lower GI Region) Diagnosis:       Other constipation      Diverticulitis of large intestine, unspecified bleeding status, unspecified complication status      Rectum bleeding      (Other constipation [K59.09])      (Diverticulitis of large intestine, unspecified bleeding status, unspecified complication status [K57.32])      (Rectum bleeding [K62.5])    Surgeons: Cheryl Sy MD Responsible Provider: Hill De La Vega Jr., MD    Anesthesia Type: MAC ASA Status: 3          Anesthesia Type: MAC    Jaret Phase I: Jaret Score: 10    Jaret Phase II:      Anesthesia Post Evaluation    Patient location during evaluation: bedside  Patient participation: complete - patient participated  Level of consciousness: lethargic  Pain score: 0  Airway patency: patent  Nausea & Vomiting: no nausea and no vomiting  Cardiovascular status: hemodynamically stable  Respiratory status: acceptable  Hydration status: stable  Comments: VSS. Report to RN. Remains on stretcher  Pain management: adequate        No notable events documented.

## 2024-01-26 NOTE — ANESTHESIA PRE PROCEDURE
is normal (16-34 mL/m2).  ·  Right Atrium: Right atrium is mildly dilated.       Neuro/Psych:   (+) CVA:dementia            GI/Hepatic/Renal:            ROS comment:  .   Endo/Other:    (+) DiabetesType II DM, : arthritis: rheumatoid..                 Abdominal:              PE comment: Deferred.   Vascular:          Other Findings:             Anesthesia Plan      MAC and TIVA     ASA 3     (Standard ASA monitors: continuous EKG, BP, HR, pulse oximeter, temperature, and capnography.)  Induction: intravenous.  continuous noninvasive hemodynamic monitor  MIPS: Postoperative opioids intended and Prophylactic antiemetics administered.  Anesthetic plan and risks discussed with patient (and family, if present.).      Plan discussed with CRNA.    Attending anesthesiologist reviewed and agrees with Preprocedure content                Hill De La Vega Jr., MD   1/26/2024

## 2024-03-14 ENCOUNTER — HOSPITAL ENCOUNTER (INPATIENT)
Facility: HOSPITAL | Age: 89
LOS: 2 days | Discharge: HOME | End: 2024-03-16
Attending: STUDENT IN AN ORGANIZED HEALTH CARE EDUCATION/TRAINING PROGRAM | Admitting: INTERNAL MEDICINE
Payer: MEDICARE

## 2024-03-14 ENCOUNTER — APPOINTMENT (OUTPATIENT)
Facility: HOSPITAL | Age: 89
End: 2024-03-14
Payer: MEDICARE

## 2024-03-14 DIAGNOSIS — R79.89 ELEVATED TROPONIN: ICD-10-CM

## 2024-03-14 DIAGNOSIS — N30.00 ACUTE CYSTITIS WITHOUT HEMATURIA: ICD-10-CM

## 2024-03-14 DIAGNOSIS — R10.32 LEFT LOWER QUADRANT ABDOMINAL PAIN: Primary | ICD-10-CM

## 2024-03-14 PROBLEM — A41.9 SEPSIS (HCC): Status: ACTIVE | Noted: 2024-03-14

## 2024-03-14 LAB
ALBUMIN SERPL-MCNC: 3.4 G/DL (ref 3.5–5)
ALBUMIN/GLOB SERPL: 1.2 (ref 1.1–2.2)
ALP SERPL-CCNC: 91 U/L (ref 45–117)
ALT SERPL-CCNC: 18 U/L (ref 12–78)
ANION GAP SERPL CALC-SCNC: 6 MMOL/L (ref 5–15)
APPEARANCE UR: ABNORMAL
AST SERPL W P-5'-P-CCNC: 11 U/L (ref 15–37)
BACTERIA URNS QL MICRO: NEGATIVE /HPF
BASOPHILS # BLD: 0.1 K/UL (ref 0–0.1)
BASOPHILS NFR BLD: 1 % (ref 0–1)
BILIRUB SERPL-MCNC: 0.3 MG/DL (ref 0.2–1)
BILIRUB UR QL: NEGATIVE
BUN SERPL-MCNC: 21 MG/DL (ref 6–20)
BUN/CREAT SERPL: 21 (ref 12–20)
CA-I BLD-MCNC: 9.1 MG/DL (ref 8.5–10.1)
CHLORIDE SERPL-SCNC: 104 MMOL/L (ref 97–108)
CO2 SERPL-SCNC: 28 MMOL/L (ref 21–32)
COLLECT DATE STL: NORMAL
COLOR UR: ABNORMAL
CREAT SERPL-MCNC: 1.02 MG/DL (ref 0.55–1.02)
DIFFERENTIAL METHOD BLD: ABNORMAL
EKG ATRIAL RATE: 50 BPM
EKG DIAGNOSIS: NORMAL
EKG P AXIS: 62 DEGREES
EKG P-R INTERVAL: 200 MS
EKG Q-T INTERVAL: 538 MS
EKG QRS DURATION: 126 MS
EKG QTC CALCULATION (BAZETT): 490 MS
EKG R AXIS: -50 DEGREES
EKG T AXIS: 264 DEGREES
EKG VENTRICULAR RATE: 50 BPM
EOSINOPHIL # BLD: 0.7 K/UL (ref 0–0.4)
EOSINOPHIL NFR BLD: 6 % (ref 0–7)
EPITH CASTS URNS QL MICRO: ABNORMAL /LPF
ERYTHROCYTE [DISTWIDTH] IN BLOOD BY AUTOMATED COUNT: 15.7 % (ref 11.5–14.5)
EST. AVERAGE GLUCOSE BLD GHB EST-MCNC: 126 MG/DL
GLOBULIN SER CALC-MCNC: 2.9 G/DL (ref 2–4)
GLUCOSE BLD STRIP.AUTO-MCNC: 217 MG/DL (ref 65–100)
GLUCOSE BLD STRIP.AUTO-MCNC: 247 MG/DL (ref 65–100)
GLUCOSE SERPL-MCNC: 216 MG/DL (ref 65–100)
GLUCOSE UR STRIP.AUTO-MCNC: >500 MG/DL
HBA1C MFR BLD: 6 % (ref 4–5.6)
HCT VFR BLD AUTO: 29 % (ref 35–47)
HEMOCCULT SP1 STL QL: NEGATIVE
HGB BLD-MCNC: 8.7 G/DL (ref 11.5–16)
HGB UR QL STRIP: ABNORMAL
HYALINE CASTS URNS QL MICRO: ABNORMAL /LPF (ref 0–5)
IMM GRANULOCYTES # BLD AUTO: 0 K/UL
IMM GRANULOCYTES NFR BLD AUTO: 0 %
KETONES UR QL STRIP.AUTO: NEGATIVE MG/DL
LACTATE SERPL-SCNC: 1.1 MMOL/L (ref 0.4–2)
LACTATE SERPL-SCNC: 2.9 MMOL/L (ref 0.4–2)
LACTATE SERPL-SCNC: 3.1 MMOL/L (ref 0.4–2)
LACTATE SERPL-SCNC: 3.1 MMOL/L (ref 0.4–2)
LEUKOCYTE ESTERASE UR QL STRIP.AUTO: ABNORMAL
LIPASE SERPL-CCNC: 22 U/L (ref 13–75)
LYMPHOCYTES # BLD: 1.6 K/UL (ref 0.8–3.5)
LYMPHOCYTES NFR BLD: 14 % (ref 12–49)
MCH RBC QN AUTO: 25.1 PG (ref 26–34)
MCHC RBC AUTO-ENTMCNC: 30 G/DL (ref 30–36.5)
MCV RBC AUTO: 83.6 FL (ref 80–99)
MONOCYTES # BLD: 1.2 K/UL (ref 0–1)
MONOCYTES NFR BLD: 10 % (ref 5–13)
MUCOUS THREADS URNS QL MICRO: ABNORMAL /LPF
NEUTS SEG # BLD: 8 K/UL (ref 1.8–8)
NEUTS SEG NFR BLD: 69 % (ref 32–75)
NITRITE UR QL STRIP.AUTO: NEGATIVE
NRBC # BLD: 0 K/UL (ref 0–0.01)
NRBC BLD-RTO: 0 PER 100 WBC
PERFORMED BY:: ABNORMAL
PERFORMED BY:: ABNORMAL
PH UR STRIP: 6 (ref 5–8)
PLATELET # BLD AUTO: 295 K/UL (ref 150–400)
PMV BLD AUTO: 12.7 FL (ref 8.9–12.9)
POTASSIUM SERPL-SCNC: 3.8 MMOL/L (ref 3.5–5.1)
PROT SERPL-MCNC: 6.3 G/DL (ref 6.4–8.2)
PROT UR STRIP-MCNC: 30 MG/DL
RBC # BLD AUTO: 3.47 M/UL (ref 3.8–5.2)
RBC #/AREA URNS HPF: ABNORMAL /HPF (ref 0–5)
RBC MORPH BLD: ABNORMAL
SODIUM SERPL-SCNC: 138 MMOL/L (ref 136–145)
SP GR UR REFRACTOMETRY: 1.01 (ref 1–1.03)
TROPONIN I SERPL HS-MCNC: 85 NG/L (ref 0–51)
TROPONIN I SERPL HS-MCNC: 93 NG/L (ref 0–51)
TROPONIN I SERPL HS-MCNC: 97 NG/L (ref 0–51)
URINE CULTURE IF INDICATED: ABNORMAL
UROBILINOGEN UR QL STRIP.AUTO: 0.1 EU/DL (ref 0.1–1)
WBC # BLD AUTO: 11.6 K/UL (ref 3.6–11)
WBC URNS QL MICRO: >100 /HPF (ref 0–4)

## 2024-03-14 PROCEDURE — 96376 TX/PRO/DX INJ SAME DRUG ADON: CPT

## 2024-03-14 PROCEDURE — 81001 URINALYSIS AUTO W/SCOPE: CPT

## 2024-03-14 PROCEDURE — 99285 EMERGENCY DEPT VISIT HI MDM: CPT

## 2024-03-14 PROCEDURE — 80053 COMPREHEN METABOLIC PANEL: CPT

## 2024-03-14 PROCEDURE — 96375 TX/PRO/DX INJ NEW DRUG ADDON: CPT

## 2024-03-14 PROCEDURE — 82272 OCCULT BLD FECES 1-3 TESTS: CPT

## 2024-03-14 PROCEDURE — 83605 ASSAY OF LACTIC ACID: CPT

## 2024-03-14 PROCEDURE — 6360000004 HC RX CONTRAST MEDICATION: Performed by: STUDENT IN AN ORGANIZED HEALTH CARE EDUCATION/TRAINING PROGRAM

## 2024-03-14 PROCEDURE — 6360000002 HC RX W HCPCS: Performed by: STUDENT IN AN ORGANIZED HEALTH CARE EDUCATION/TRAINING PROGRAM

## 2024-03-14 PROCEDURE — 2580000003 HC RX 258: Performed by: STUDENT IN AN ORGANIZED HEALTH CARE EDUCATION/TRAINING PROGRAM

## 2024-03-14 PROCEDURE — 96374 THER/PROPH/DIAG INJ IV PUSH: CPT

## 2024-03-14 PROCEDURE — 85025 COMPLETE CBC W/AUTO DIFF WBC: CPT

## 2024-03-14 PROCEDURE — 83690 ASSAY OF LIPASE: CPT

## 2024-03-14 PROCEDURE — 74174 CTA ABD&PLVS W/CONTRAST: CPT

## 2024-03-14 PROCEDURE — 36415 COLL VENOUS BLD VENIPUNCTURE: CPT

## 2024-03-14 PROCEDURE — 83036 HEMOGLOBIN GLYCOSYLATED A1C: CPT

## 2024-03-14 PROCEDURE — 87086 URINE CULTURE/COLONY COUNT: CPT

## 2024-03-14 PROCEDURE — 6370000000 HC RX 637 (ALT 250 FOR IP): Performed by: STUDENT IN AN ORGANIZED HEALTH CARE EDUCATION/TRAINING PROGRAM

## 2024-03-14 PROCEDURE — 1100000000 HC RM PRIVATE

## 2024-03-14 PROCEDURE — 93005 ELECTROCARDIOGRAM TRACING: CPT | Performed by: STUDENT IN AN ORGANIZED HEALTH CARE EDUCATION/TRAINING PROGRAM

## 2024-03-14 PROCEDURE — 87040 BLOOD CULTURE FOR BACTERIA: CPT

## 2024-03-14 PROCEDURE — 84484 ASSAY OF TROPONIN QUANT: CPT

## 2024-03-14 PROCEDURE — 82962 GLUCOSE BLOOD TEST: CPT

## 2024-03-14 RX ORDER — ISOSORBIDE MONONITRATE 30 MG/1
30 TABLET, EXTENDED RELEASE ORAL DAILY
Status: DISCONTINUED | OUTPATIENT
Start: 2024-03-15 | End: 2024-03-16 | Stop reason: HOSPADM

## 2024-03-14 RX ORDER — ONDANSETRON 2 MG/ML
4 INJECTION INTRAMUSCULAR; INTRAVENOUS ONCE
Status: COMPLETED | OUTPATIENT
Start: 2024-03-14 | End: 2024-03-14

## 2024-03-14 RX ORDER — GLUCAGON 1 MG/ML
1 KIT INJECTION PRN
Status: DISCONTINUED | OUTPATIENT
Start: 2024-03-14 | End: 2024-03-16 | Stop reason: HOSPADM

## 2024-03-14 RX ORDER — DONEPEZIL HYDROCHLORIDE 5 MG/1
10 TABLET, FILM COATED ORAL NIGHTLY
Status: CANCELLED | OUTPATIENT
Start: 2024-03-14

## 2024-03-14 RX ORDER — FERROUS SULFATE 325(65) MG
325 TABLET ORAL
Status: DISCONTINUED | OUTPATIENT
Start: 2024-03-15 | End: 2024-03-16 | Stop reason: HOSPADM

## 2024-03-14 RX ORDER — ONDANSETRON 2 MG/ML
4 INJECTION INTRAMUSCULAR; INTRAVENOUS EVERY 6 HOURS PRN
Status: DISCONTINUED | OUTPATIENT
Start: 2024-03-14 | End: 2024-03-14

## 2024-03-14 RX ORDER — CHOLECALCIFEROL (VITAMIN D3) 125 MCG
10 CAPSULE ORAL NIGHTLY
Status: DISCONTINUED | OUTPATIENT
Start: 2024-03-14 | End: 2024-03-16 | Stop reason: HOSPADM

## 2024-03-14 RX ORDER — METHYLPREDNISOLONE SODIUM SUCCINATE 125 MG/2ML
125 INJECTION, POWDER, LYOPHILIZED, FOR SOLUTION INTRAMUSCULAR; INTRAVENOUS ONCE
Status: COMPLETED | OUTPATIENT
Start: 2024-03-14 | End: 2024-03-14

## 2024-03-14 RX ORDER — 0.9 % SODIUM CHLORIDE 0.9 %
1000 INTRAVENOUS SOLUTION INTRAVENOUS ONCE
Status: COMPLETED | OUTPATIENT
Start: 2024-03-14 | End: 2024-03-14

## 2024-03-14 RX ORDER — SODIUM CHLORIDE 0.9 % (FLUSH) 0.9 %
5-40 SYRINGE (ML) INJECTION PRN
Status: DISCONTINUED | OUTPATIENT
Start: 2024-03-14 | End: 2024-03-16 | Stop reason: HOSPADM

## 2024-03-14 RX ORDER — MECLIZINE HYDROCHLORIDE 25 MG/1
25 TABLET ORAL 3 TIMES DAILY PRN
Status: DISCONTINUED | OUTPATIENT
Start: 2024-03-14 | End: 2024-03-16 | Stop reason: HOSPADM

## 2024-03-14 RX ORDER — DIPHENHYDRAMINE HYDROCHLORIDE 50 MG/ML
25 INJECTION INTRAMUSCULAR; INTRAVENOUS
Status: COMPLETED | OUTPATIENT
Start: 2024-03-14 | End: 2024-03-14

## 2024-03-14 RX ORDER — FUROSEMIDE 40 MG/1
40 TABLET ORAL DAILY
Status: DISCONTINUED | OUTPATIENT
Start: 2024-03-14 | End: 2024-03-16 | Stop reason: HOSPADM

## 2024-03-14 RX ORDER — ACETAMINOPHEN 325 MG/1
650 TABLET ORAL EVERY 6 HOURS PRN
Status: DISCONTINUED | OUTPATIENT
Start: 2024-03-14 | End: 2024-03-16 | Stop reason: HOSPADM

## 2024-03-14 RX ORDER — ONDANSETRON 2 MG/ML
4 INJECTION INTRAMUSCULAR; INTRAVENOUS EVERY 6 HOURS PRN
Status: DISCONTINUED | OUTPATIENT
Start: 2024-03-14 | End: 2024-03-16 | Stop reason: HOSPADM

## 2024-03-14 RX ORDER — FOLIC ACID 1 MG/1
1 TABLET ORAL DAILY
Status: DISCONTINUED | OUTPATIENT
Start: 2024-03-15 | End: 2024-03-16 | Stop reason: HOSPADM

## 2024-03-14 RX ORDER — FERROUS SULFATE 325(65) MG
325 TABLET ORAL
COMMUNITY

## 2024-03-14 RX ORDER — MONTELUKAST SODIUM 10 MG/1
10 TABLET ORAL DAILY
Status: DISCONTINUED | OUTPATIENT
Start: 2024-03-14 | End: 2024-03-16 | Stop reason: HOSPADM

## 2024-03-14 RX ORDER — INSULIN LISPRO 100 [IU]/ML
0-4 INJECTION, SOLUTION INTRAVENOUS; SUBCUTANEOUS
Status: DISCONTINUED | OUTPATIENT
Start: 2024-03-14 | End: 2024-03-16 | Stop reason: HOSPADM

## 2024-03-14 RX ORDER — MAGNESIUM SULFATE IN WATER 40 MG/ML
2000 INJECTION, SOLUTION INTRAVENOUS PRN
Status: DISCONTINUED | OUTPATIENT
Start: 2024-03-14 | End: 2024-03-16 | Stop reason: HOSPADM

## 2024-03-14 RX ORDER — POLYETHYLENE GLYCOL 3350 17 G/17G
17 POWDER, FOR SOLUTION ORAL DAILY PRN
Status: DISCONTINUED | OUTPATIENT
Start: 2024-03-14 | End: 2024-03-16 | Stop reason: HOSPADM

## 2024-03-14 RX ORDER — DEXTROSE MONOHYDRATE 100 MG/ML
INJECTION, SOLUTION INTRAVENOUS CONTINUOUS PRN
Status: DISCONTINUED | OUTPATIENT
Start: 2024-03-14 | End: 2024-03-16 | Stop reason: HOSPADM

## 2024-03-14 RX ORDER — CARVEDILOL 3.12 MG/1
3.12 TABLET ORAL 2 TIMES DAILY WITH MEALS
Status: DISCONTINUED | OUTPATIENT
Start: 2024-03-14 | End: 2024-03-16 | Stop reason: HOSPADM

## 2024-03-14 RX ORDER — LOSARTAN POTASSIUM 50 MG/1
100 TABLET ORAL DAILY
Status: DISCONTINUED | OUTPATIENT
Start: 2024-03-14 | End: 2024-03-16 | Stop reason: HOSPADM

## 2024-03-14 RX ORDER — ACETAMINOPHEN 650 MG/1
650 SUPPOSITORY RECTAL EVERY 6 HOURS PRN
Status: DISCONTINUED | OUTPATIENT
Start: 2024-03-14 | End: 2024-03-16 | Stop reason: HOSPADM

## 2024-03-14 RX ORDER — DOCUSATE SODIUM 100 MG/1
100 CAPSULE, LIQUID FILLED ORAL 2 TIMES DAILY
Status: DISCONTINUED | OUTPATIENT
Start: 2024-03-14 | End: 2024-03-16 | Stop reason: HOSPADM

## 2024-03-14 RX ORDER — SODIUM CHLORIDE 0.9 % (FLUSH) 0.9 %
5-40 SYRINGE (ML) INJECTION EVERY 12 HOURS SCHEDULED
Status: DISCONTINUED | OUTPATIENT
Start: 2024-03-14 | End: 2024-03-16 | Stop reason: HOSPADM

## 2024-03-14 RX ORDER — ONDANSETRON 4 MG/1
4 TABLET, ORALLY DISINTEGRATING ORAL EVERY 8 HOURS PRN
Status: DISCONTINUED | OUTPATIENT
Start: 2024-03-14 | End: 2024-03-16 | Stop reason: HOSPADM

## 2024-03-14 RX ORDER — GABAPENTIN 300 MG/1
300 CAPSULE ORAL 2 TIMES DAILY
Status: DISCONTINUED | OUTPATIENT
Start: 2024-03-14 | End: 2024-03-16 | Stop reason: HOSPADM

## 2024-03-14 RX ORDER — SODIUM CHLORIDE 9 MG/ML
INJECTION, SOLUTION INTRAVENOUS PRN
Status: DISCONTINUED | OUTPATIENT
Start: 2024-03-14 | End: 2024-03-16 | Stop reason: HOSPADM

## 2024-03-14 RX ORDER — INSULIN LISPRO 100 [IU]/ML
0-4 INJECTION, SOLUTION INTRAVENOUS; SUBCUTANEOUS NIGHTLY
Status: DISCONTINUED | OUTPATIENT
Start: 2024-03-14 | End: 2024-03-16 | Stop reason: HOSPADM

## 2024-03-14 RX ORDER — PANTOPRAZOLE SODIUM 40 MG/1
40 TABLET, DELAYED RELEASE ORAL
Status: DISCONTINUED | OUTPATIENT
Start: 2024-03-15 | End: 2024-03-16 | Stop reason: HOSPADM

## 2024-03-14 RX ORDER — ATORVASTATIN CALCIUM 40 MG/1
40 TABLET, FILM COATED ORAL NIGHTLY
Status: DISCONTINUED | OUTPATIENT
Start: 2024-03-14 | End: 2024-03-16 | Stop reason: HOSPADM

## 2024-03-14 RX ADMIN — IOPAMIDOL 100 ML: 755 INJECTION, SOLUTION INTRAVENOUS at 14:46

## 2024-03-14 RX ADMIN — DIPHENHYDRAMINE HYDROCHLORIDE 25 MG: 50 INJECTION INTRAMUSCULAR; INTRAVENOUS at 13:17

## 2024-03-14 RX ADMIN — Medication 10 MG: at 21:14

## 2024-03-14 RX ADMIN — APIXABAN 2.5 MG: 2.5 TABLET, FILM COATED ORAL at 21:14

## 2024-03-14 RX ADMIN — DOCUSATE SODIUM 100 MG: 100 CAPSULE, LIQUID FILLED ORAL at 21:14

## 2024-03-14 RX ADMIN — GABAPENTIN 300 MG: 300 CAPSULE ORAL at 21:14

## 2024-03-14 RX ADMIN — INSULIN LISPRO 1 UNITS: 100 INJECTION, SOLUTION INTRAVENOUS; SUBCUTANEOUS at 18:20

## 2024-03-14 RX ADMIN — ATORVASTATIN CALCIUM 40 MG: 40 TABLET, FILM COATED ORAL at 21:14

## 2024-03-14 RX ADMIN — SODIUM CHLORIDE 1000 ML: 9 INJECTION, SOLUTION INTRAVENOUS at 09:38

## 2024-03-14 RX ADMIN — ONDANSETRON 4 MG: 2 INJECTION INTRAMUSCULAR; INTRAVENOUS at 09:44

## 2024-03-14 RX ADMIN — DIPHENHYDRAMINE HYDROCHLORIDE 25 MG: 50 INJECTION INTRAMUSCULAR; INTRAVENOUS at 11:55

## 2024-03-14 RX ADMIN — METHYLPREDNISOLONE SODIUM SUCCINATE 125 MG: 125 INJECTION INTRAMUSCULAR; INTRAVENOUS at 11:50

## 2024-03-14 RX ADMIN — CEFTRIAXONE SODIUM 1000 MG: 1 INJECTION, POWDER, FOR SOLUTION INTRAMUSCULAR; INTRAVENOUS at 17:31

## 2024-03-14 RX ADMIN — SODIUM CHLORIDE 1000 ML: 9 INJECTION, SOLUTION INTRAVENOUS at 13:18

## 2024-03-14 RX ADMIN — SODIUM CHLORIDE, PRESERVATIVE FREE 10 ML: 5 INJECTION INTRAVENOUS at 21:14

## 2024-03-14 ASSESSMENT — ENCOUNTER SYMPTOMS
COUGH: 0
SORE THROAT: 0
VOMITING: 1
EYES NEGATIVE: 1
WHEEZING: 0
RHINORRHEA: 0
ALLERGIC/IMMUNOLOGIC NEGATIVE: 1
ABDOMINAL PAIN: 1
NAUSEA: 1
DIARRHEA: 0
SHORTNESS OF BREATH: 0
TROUBLE SWALLOWING: 0
CONSTIPATION: 1
BACK PAIN: 0

## 2024-03-14 ASSESSMENT — PAIN - FUNCTIONAL ASSESSMENT: PAIN_FUNCTIONAL_ASSESSMENT: 0-10

## 2024-03-14 ASSESSMENT — PAIN SCALES - GENERAL: PAINLEVEL_OUTOF10: 9

## 2024-03-14 NOTE — ED PROVIDER NOTES
was immediately available to the patient.  Warren Benoit MD    ED IMPRESSION     1. Left lower quadrant abdominal pain    2. Acute cystitis without hematuria    3. Elevated troponin          DISPOSITION/PLAN   DISPOSITION Admitted 03/14/2024 05:09:30 PM    Admit Note: Pt is being admitted by hospitalist team. The results of their tests and reason(s) for their admission have been discussed with pt and/or available family. They convey agreement and understanding for the need to be admitted and for the admission diagnosis.     PATIENT REFERRED TO:  No follow-up provider specified.      DISCHARGE MEDICATIONS:     Medication List        ASK your doctor about these medications      apixaban 5 MG Tabs tablet  Commonly known as: ELIQUIS     atorvastatin 40 MG tablet  Commonly known as: LIPITOR     carvedilol 3.125 MG tablet  Commonly known as: COREG     folic acid 1 MG tablet  Commonly known as: FOLVITE     furosemide 40 MG tablet  Commonly known as: LASIX     gabapentin 300 MG capsule  Commonly known as: NEURONTIN     HYDROcodone-acetaminophen  MG per tablet  Commonly known as: NORCO     isosorbide mononitrate 30 MG extended release tablet  Commonly known as: IMDUR     losartan 100 MG tablet  Commonly known as: COZAAR     meclizine 25 MG tablet  Commonly known as: ANTIVERT     melatonin 10 MG Caps capsule     methotrexate 2.5 MG chemo tablet  Commonly known as: RHEUMATREX     montelukast 10 MG tablet  Commonly known as: SINGULAIR     Nitrostat 0.4 MG SL tablet  Generic drug: nitroGLYCERIN     pantoprazole 40 MG tablet  Commonly known as: PROTONIX     potassium chloride 10 MEQ extended release capsule  Commonly known as: MICRO-K                DISCONTINUED MEDICATIONS:  Current Discharge Medication List        STOP taking these medications       donepezil (ARICEPT) 10 MG tablet Comments:   Reason for Stopping:         glimepiride (AMARYL) 4 MG tablet Comments:   Reason for Stopping:               I am the Primary  Clinician of Record: Warren Benoit MD (electronically signed)    (Please note that parts of this dictation were completed with voice recognition software. Quite often unanticipated grammatical, syntax, homophones, and other interpretive errors are inadvertently transcribed by the computer software. Please disregards these errors. Please excuse any errors that have escaped final proofreading.)     Warren Benoit MD  03/14/24 6065

## 2024-03-14 NOTE — ED TRIAGE NOTES
C/o left sided lower abdominal pain that has been coming and going for about a week. Endorsing nausea and vomiting

## 2024-03-14 NOTE — ED NOTES
in time range)   sodium chloride flush 0.9 % injection 5-40 mL (has no administration in time range)   sodium chloride flush 0.9 % injection 5-40 mL (has no administration in time range)   0.9 % sodium chloride infusion (has no administration in time range)   magnesium sulfate 2000 mg in 50 mL IVPB premix (has no administration in time range)   ondansetron (ZOFRAN-ODT) disintegrating tablet 4 mg (has no administration in time range)     Or   ondansetron (ZOFRAN) injection 4 mg (has no administration in time range)   polyethylene glycol (GLYCOLAX) packet 17 g (has no administration in time range)   acetaminophen (TYLENOL) tablet 650 mg (has no administration in time range)     Or   acetaminophen (TYLENOL) suppository 650 mg (has no administration in time range)   cefTRIAXone (ROCEPHIN) 1,000 mg in sterile water 10 mL IV syringe (has no administration in time range)   sodium chloride 0.9 % bolus 1,000 mL (0 mLs IntraVENous Stopped 3/14/24 1158)   ondansetron (ZOFRAN) injection 4 mg (4 mg IntraVENous Given 3/14/24 0944)   diphenhydrAMINE (BENADRYL) injection 25 mg (25 mg IntraVENous Given 3/14/24 1155)   methylPREDNISolone sodium succ (SOLU-MEDROL) injection 125 mg (125 mg IntraVENous Given 3/14/24 1150)   diphenhydrAMINE (BENADRYL) injection 25 mg (25 mg IntraVENous Given 3/14/24 1317)   sodium chloride 0.9 % bolus 1,000 mL (0 mLs IntraVENous Stopped 3/14/24 1349)   iopamidol (ISOVUE-370) 76 % injection 100 mL (100 mLs IntraVENous Given 3/14/24 1446)   cefTRIAXone (ROCEPHIN) 1,000 mg in sterile water 10 mL IV syringe (1,000 mg IntraVENous Given 3/14/24 1731)     Last documented pain medication administration: n/a  Pertinent or High Risk Medications/Drips: no   If Yes, please provide details: n/a  Blood Product Administration: no  If Yes, please provide details: n/a  Process Protocols/Bundles:     Recommendation  Incomplete STAT orders: none  Overdue Medications: coreg not yet verified by pharmacy  Patient Belongings:  pt clothes in pt belongings bag  Additional Comments: none  If any further questions, please call Sending RN at [phone]      Admitting Unit Notification  Name of person notified and time: 1902 chaitanya      Electronically signed by: Electronically signed by Tiffanie Mcintyre RN on 3/14/2024 at 6:58 PM

## 2024-03-14 NOTE — H&P
Hospitalist Admission Note    NAME: Julita Metzger   :  1935   MRN:  [id number]     Date/Time:  3/14/2024 5:20 PM    Patient PCP: Royer Carreno MD    ______________________________________________________________________  Given the patient's current clinical presentation, I have a high level of concern for decompensation if discharged from the emergency department.  Complex decision making was performed, which includes reviewing the patient's available past medical records, laboratory results, and x-ray films.       My assessment of this patient's clinical condition and my plan of care is as follows.    Assessment / Plan:  Sepsis secondary to UTI with history of recurrent UTIs  Lactic acidosis -resolved  -WBC 11.6  -CT of the abdomen pelvis with contrast revealed atherosclerotic change without evidence of bowel ischemia  -UA positive for leukoesterase and greater than 100 WBCs, urine culture pending  -Previous UTI positive for Klebsiella pneumoniae sensitive to Rocephin, will continue  -Lactic acid 1.1  -Continue IV fluids    Abdominal pain  Constipation  -CT of the abdomen pelvis with contrast revealed atherosclerotic change without evidence of bowel ischemia  -Continue Colace  -MiraLAX as needed    Normocytic anemia  -Hemoglobin 8.7  -Continue folic acid and iron supplementation  -Continue to monitor and transfuse if hemoglobin less than 7  -Check iron panel, B12 and folate     CAD  HTN  -Continue carvedilol, Lipitor, losartan and Lasix     Type II DM  Neuropathy  -Takes metformin daily, hold  -Continue gabapentin  -Insulin sliding scale with Accu-Cheks  -Check A1c     A-fib  Secondary hypercoagulable state in a patient with atrial fibrillation  -Rate currently controlled  -Continue Eliquis    Allergies  -Continue Singulair    Medical Decision Making:   I personally reviewed labs: CBC CMP lactic acid troponin UA  I personally reviewed imaging: CT of the abdomen pelvis with contrast  Toxic drug  11/7/2023    TECHNIQUE:  Following the uneventful intravenous administration of 100 cc Isovue-370, thin  axial images were obtained through the abdomen and pelvis. Coronal and sagittal  reconstructions were generated. Oral contrast was not administered. CT dose  reduction was achieved through use of a standardized protocol tailored for this  examination and automatic exposure control for dose modulation. 3D  reconstructions were performed by the technologist    FINDINGS:  LUNG BASES: Clear.  INCIDENTALLY IMAGED HEART AND MEDIASTINUM: Patient has an aortic valve stent in  position. Moderate coronary artery calcifications  LIVER: No mass or fatty infiltration.  GALLBLADDER: Unremarkable.  SPLEEN: Not enlarged.  PANCREAS: No mass or inflammation  ADRENALS: Unremarkable.  KIDNEYS: No mass, calculus, or hydronephrosis.  BOWEL: No dilatation or wall thickening. Few scattered diverticula. No  pneumatosis.  APPENDIX: Not seen  PERITONEUM: No ascites or pneumoperitoneum.  RETROPERITONEUM: Aorta is atherosclerotic. No aneurysm. Mild atherosclerosis of  the celiac and SMA and REFUGIO but these vessels are patent no retroperitoneal  adenopathy.  BLADDER: No wall thickening or stone  REPRODUCTIVE ORGANS: Not enlarged  BONES: Bones are osteopenic. Chronic kyphosis at the thoracolumbar junction  ADDITIONAL COMMENTS: N/A    Impression  1. Atherosclerotic change without evidence of bowel ischemia        Xray Result (most recent):  XR CHEST PORTABLE 10/11/2023    Narrative  EXAM:  XR CHEST PORTABLE    INDICATION: Hypoxemia    COMPARISON: 9/12/2023    TECHNIQUE: Portable AP upright chest view at 1544 hours    FINDINGS: The cardiomediastinal contours are stable. The pulmonary vasculature  is within normal limits.    There are low lung volumes without focal opacity, pleural effusion, or  pneumothorax. The bones and upper abdomen are stable.    Impression  No acute

## 2024-03-14 NOTE — ED NOTES
Pt transferred to hospital bed to promote comfort, pillow placed underneath legs, verbalizes increased comfort. Is aware of care plan

## 2024-03-15 LAB
ALBUMIN SERPL-MCNC: 2.9 G/DL (ref 3.5–5)
ALBUMIN/GLOB SERPL: 0.9 (ref 1.1–2.2)
ALP SERPL-CCNC: 81 U/L (ref 45–117)
ALT SERPL-CCNC: 16 U/L (ref 12–78)
ANION GAP SERPL CALC-SCNC: 3 MMOL/L (ref 5–15)
AST SERPL W P-5'-P-CCNC: 11 U/L (ref 15–37)
BASOPHILS # BLD: 0.1 K/UL (ref 0–0.1)
BASOPHILS NFR BLD: 1 % (ref 0–1)
BILIRUB SERPL-MCNC: 0.2 MG/DL (ref 0.2–1)
BUN SERPL-MCNC: 18 MG/DL (ref 6–20)
BUN/CREAT SERPL: 21 (ref 12–20)
CA-I BLD-MCNC: 8.6 MG/DL (ref 8.5–10.1)
CHLORIDE SERPL-SCNC: 108 MMOL/L (ref 97–108)
CO2 SERPL-SCNC: 29 MMOL/L (ref 21–32)
CREAT SERPL-MCNC: 0.84 MG/DL (ref 0.55–1.02)
DIFFERENTIAL METHOD BLD: ABNORMAL
EKG ATRIAL RATE: 57 BPM
EKG DIAGNOSIS: NORMAL
EKG P AXIS: 46 DEGREES
EKG P-R INTERVAL: 220 MS
EKG Q-T INTERVAL: 488 MS
EKG QRS DURATION: 130 MS
EKG QTC CALCULATION (BAZETT): 474 MS
EKG R AXIS: -46 DEGREES
EKG T AXIS: 211 DEGREES
EKG VENTRICULAR RATE: 57 BPM
EOSINOPHIL # BLD: 0.1 K/UL (ref 0–0.4)
EOSINOPHIL NFR BLD: 1 % (ref 0–7)
ERYTHROCYTE [DISTWIDTH] IN BLOOD BY AUTOMATED COUNT: 15.7 % (ref 11.5–14.5)
FERRITIN SERPL-MCNC: 11 NG/ML (ref 8–252)
GLOBULIN SER CALC-MCNC: 3.4 G/DL (ref 2–4)
GLUCOSE BLD STRIP.AUTO-MCNC: 137 MG/DL (ref 65–100)
GLUCOSE BLD STRIP.AUTO-MCNC: 183 MG/DL (ref 65–100)
GLUCOSE BLD STRIP.AUTO-MCNC: 188 MG/DL (ref 65–100)
GLUCOSE BLD STRIP.AUTO-MCNC: 290 MG/DL (ref 65–100)
GLUCOSE SERPL-MCNC: 142 MG/DL (ref 65–100)
HCT VFR BLD AUTO: 27.2 % (ref 35–47)
HGB BLD-MCNC: 8 G/DL (ref 11.5–16)
IMM GRANULOCYTES # BLD AUTO: 0.1 K/UL (ref 0–0.04)
IMM GRANULOCYTES NFR BLD AUTO: 1 % (ref 0–0.5)
IRON SATN MFR SERPL: 5 % (ref 20–50)
IRON SERPL-MCNC: 17 UG/DL (ref 35–150)
LACTATE SERPL-SCNC: 2.3 MMOL/L (ref 0.4–2)
LYMPHOCYTES # BLD: 2 K/UL (ref 0.8–3.5)
LYMPHOCYTES NFR BLD: 13 % (ref 12–49)
MCH RBC QN AUTO: 25 PG (ref 26–34)
MCHC RBC AUTO-ENTMCNC: 29.4 G/DL (ref 30–36.5)
MCV RBC AUTO: 85 FL (ref 80–99)
MONOCYTES # BLD: 1.7 K/UL (ref 0–1)
MONOCYTES NFR BLD: 12 % (ref 5–13)
NEUTS SEG # BLD: 10.8 K/UL (ref 1.8–8)
NEUTS SEG NFR BLD: 72 % (ref 32–75)
NRBC # BLD: 0 K/UL (ref 0–0.01)
NRBC BLD-RTO: 0 PER 100 WBC
PERFORMED BY:: ABNORMAL
PLATELET # BLD AUTO: 245 K/UL (ref 150–400)
POTASSIUM SERPL-SCNC: 3.8 MMOL/L (ref 3.5–5.1)
PROT SERPL-MCNC: 6.3 G/DL (ref 6.4–8.2)
RBC # BLD AUTO: 3.2 M/UL (ref 3.8–5.2)
SODIUM SERPL-SCNC: 140 MMOL/L (ref 136–145)
TIBC SERPL-MCNC: 339 UG/DL (ref 250–450)
VIT B12 SERPL-MCNC: 1009 PG/ML (ref 193–986)
WBC # BLD AUTO: 14.8 K/UL (ref 3.6–11)

## 2024-03-15 PROCEDURE — 6370000000 HC RX 637 (ALT 250 FOR IP): Performed by: STUDENT IN AN ORGANIZED HEALTH CARE EDUCATION/TRAINING PROGRAM

## 2024-03-15 PROCEDURE — 82607 VITAMIN B-12: CPT

## 2024-03-15 PROCEDURE — 36415 COLL VENOUS BLD VENIPUNCTURE: CPT

## 2024-03-15 PROCEDURE — 82962 GLUCOSE BLOOD TEST: CPT

## 2024-03-15 PROCEDURE — 83605 ASSAY OF LACTIC ACID: CPT

## 2024-03-15 PROCEDURE — 2580000003 HC RX 258: Performed by: STUDENT IN AN ORGANIZED HEALTH CARE EDUCATION/TRAINING PROGRAM

## 2024-03-15 PROCEDURE — 82746 ASSAY OF FOLIC ACID SERUM: CPT

## 2024-03-15 PROCEDURE — 80053 COMPREHEN METABOLIC PANEL: CPT

## 2024-03-15 PROCEDURE — 85025 COMPLETE CBC W/AUTO DIFF WBC: CPT

## 2024-03-15 PROCEDURE — 82728 ASSAY OF FERRITIN: CPT

## 2024-03-15 PROCEDURE — 6360000002 HC RX W HCPCS: Performed by: STUDENT IN AN ORGANIZED HEALTH CARE EDUCATION/TRAINING PROGRAM

## 2024-03-15 PROCEDURE — 1100000000 HC RM PRIVATE

## 2024-03-15 PROCEDURE — 83540 ASSAY OF IRON: CPT

## 2024-03-15 RX ORDER — BISACODYL 10 MG
10 SUPPOSITORY, RECTAL RECTAL DAILY PRN
Status: DISCONTINUED | OUTPATIENT
Start: 2024-03-15 | End: 2024-03-16 | Stop reason: HOSPADM

## 2024-03-15 RX ORDER — SODIUM CHLORIDE 450 MG/100ML
INJECTION, SOLUTION INTRAVENOUS CONTINUOUS
Status: DISCONTINUED | OUTPATIENT
Start: 2024-03-15 | End: 2024-03-16 | Stop reason: HOSPADM

## 2024-03-15 RX ADMIN — CEFTRIAXONE SODIUM 1000 MG: 1 INJECTION, POWDER, FOR SOLUTION INTRAMUSCULAR; INTRAVENOUS at 17:09

## 2024-03-15 RX ADMIN — FUROSEMIDE 40 MG: 40 TABLET ORAL at 09:00

## 2024-03-15 RX ADMIN — Medication 10 MG: at 20:24

## 2024-03-15 RX ADMIN — PANTOPRAZOLE SODIUM 40 MG: 40 TABLET, DELAYED RELEASE ORAL at 05:49

## 2024-03-15 RX ADMIN — CARVEDILOL 3.12 MG: 3.12 TABLET, FILM COATED ORAL at 09:00

## 2024-03-15 RX ADMIN — BISACODYL 10 MG: 10 SUPPOSITORY RECTAL at 16:04

## 2024-03-15 RX ADMIN — APIXABAN 2.5 MG: 2.5 TABLET, FILM COATED ORAL at 09:00

## 2024-03-15 RX ADMIN — SODIUM CHLORIDE, PRESERVATIVE FREE 10 ML: 5 INJECTION INTRAVENOUS at 20:30

## 2024-03-15 RX ADMIN — INSULIN LISPRO 2 UNITS: 100 INJECTION, SOLUTION INTRAVENOUS; SUBCUTANEOUS at 12:10

## 2024-03-15 RX ADMIN — GABAPENTIN 300 MG: 300 CAPSULE ORAL at 20:24

## 2024-03-15 RX ADMIN — APIXABAN 2.5 MG: 2.5 TABLET, FILM COATED ORAL at 20:24

## 2024-03-15 RX ADMIN — GABAPENTIN 300 MG: 300 CAPSULE ORAL at 09:00

## 2024-03-15 RX ADMIN — DOCUSATE SODIUM 100 MG: 100 CAPSULE, LIQUID FILLED ORAL at 09:00

## 2024-03-15 RX ADMIN — FERROUS SULFATE TAB 325 MG (65 MG ELEMENTAL FE) 325 MG: 325 (65 FE) TAB at 08:59

## 2024-03-15 RX ADMIN — MONTELUKAST 10 MG: 10 TABLET, FILM COATED ORAL at 09:00

## 2024-03-15 RX ADMIN — SODIUM CHLORIDE: 4.5 INJECTION, SOLUTION INTRAVENOUS at 08:05

## 2024-03-15 RX ADMIN — ATORVASTATIN CALCIUM 40 MG: 40 TABLET, FILM COATED ORAL at 20:24

## 2024-03-15 RX ADMIN — CARVEDILOL 3.12 MG: 3.12 TABLET, FILM COATED ORAL at 17:09

## 2024-03-15 RX ADMIN — ISOSORBIDE MONONITRATE 30 MG: 30 TABLET, EXTENDED RELEASE ORAL at 08:59

## 2024-03-15 RX ADMIN — FOLIC ACID 1 MG: 1 TABLET ORAL at 08:59

## 2024-03-15 RX ADMIN — POLYETHYLENE GLYCOL 3350 17 G: 17 POWDER, FOR SOLUTION ORAL at 13:34

## 2024-03-15 RX ADMIN — LOSARTAN POTASSIUM 100 MG: 50 TABLET, FILM COATED ORAL at 08:59

## 2024-03-15 RX ADMIN — SODIUM CHLORIDE, PRESERVATIVE FREE 10 ML: 5 INJECTION INTRAVENOUS at 09:00

## 2024-03-15 RX ADMIN — DOCUSATE SODIUM 100 MG: 100 CAPSULE, LIQUID FILLED ORAL at 20:24

## 2024-03-15 ASSESSMENT — PAIN DESCRIPTION - LOCATION: LOCATION: ABDOMEN

## 2024-03-15 ASSESSMENT — ENCOUNTER SYMPTOMS
BACK PAIN: 0
ABDOMINAL PAIN: 0
NAUSEA: 0
ALLERGIC/IMMUNOLOGIC NEGATIVE: 1
TROUBLE SWALLOWING: 0
EYES NEGATIVE: 1
WHEEZING: 0
DIARRHEA: 0
SORE THROAT: 0
VOMITING: 0
COUGH: 0
RHINORRHEA: 0
SHORTNESS OF BREATH: 0

## 2024-03-15 ASSESSMENT — PAIN SCALES - GENERAL: PAINLEVEL_OUTOF10: 4

## 2024-03-15 NOTE — PROGRESS NOTES
Hospitalist Progress Note    NAME:   Julita Metzger   : 1935   MRN: [id number]     Date/Time: 3/15/2024 2:47 PM  Patient PCP: Royer Carreno MD    Estimated discharge date: 24 to 48 hours  Barriers: Urine culture results      Assessment / Plan:  Sepsis secondary to UTI with history of recurrent UTIs  Lactic acidosis  -WBC 14.8  -Lactic acid 2.3, continue IV fluids  -Blood cultures collected and pending  -CT of the abdomen pelvis with contrast revealed atherosclerotic change without evidence of bowel ischemia  -UA positive for leukoesterase and greater than 100 WBCs, urine culture pending  -Previous UTI positive for Klebsiella pneumoniae sensitive to Rocephin, will continue  -Continue IV fluids     Abdominal pain  Constipation  -CT of the abdomen pelvis with contrast revealed atherosclerotic change without evidence of bowel ischemia  -Continue Colace  -MiraLAX as needed     Normocytic anemia  -Hemoglobin 8.7  -Continue folic acid and iron supplementation  -Continue to monitor and transfuse if hemoglobin less than 7  -B12 1009, iron panel revealed ferritin 11, iron 17, TIBC 339 and iron saturation 5%     CAD  HTN  -Continue carvedilol, Lipitor, losartan and Lasix     Type II DM  Neuropathy  -Takes metformin daily, hold  -Continue gabapentin  -Insulin sliding scale with Accu-Cheks  -Check A1c     A-fib  Secondary hypercoagulable state in a patient with atrial fibrillation  -Rate currently controlled  -Continue Eliquis     Allergies  -Continue Singulair    Medical Decision Making:   I personally reviewed labs: CBC CMP iron panel B12 lactic acid  I personally reviewed imaging: None  Toxic drug monitoring: Eliquis for any signs bleeding monitor H&H  Discussed case with: Patient,  and RN    Code Status: Full  DVT Prophylaxis: Eliquis  GI Prophylaxis: Protonix    Subjective:     Chief Complaint / Reason for Physician Visit  Patient evaluated at bedside resting comfortably with  at side this morning.

## 2024-03-15 NOTE — PROGRESS NOTES
4 Eyes Skin Assessment     NAME:  Julita Metzger  YOB: 1935  MEDICAL RECORD NUMBER:  [id number]    The patient is being assessed for  Admission    I agree that at least one RN has performed a thorough Head to Toe Skin Assessment on the patient. ALL assessment sites listed below have been assessed.      Areas assessed by both nurses:    Head, Face, Ears, Shoulders, Back, Chest, Arms, Elbows, Hands, Sacrum. Buttock, Coccyx, Ischium, Legs. Feet and Heels, and Under Medical Devices         Does the Patient have a Wound? No noted wound(s)       Cuong Prevention initiated by RN: No  Wound Care Orders initiated by RN: No    Pressure Injury (Stage 3,4, Unstageable, DTI, NWPT, and Complex wounds) if present, place Wound referral order by RN under : No    New Ostomies, if present place, Ostomy referral order under : No     Nurse 1 eSignature: Electronically signed by Kathy Galo RN on 3/14/24 at 10:06 PM EDT    **SHARE this note so that the co-signing nurse can place an eSignature**    Nurse 2 eSignature: Electronically signed by Eldon Blackwell RN on 3/15/24 at 1:01 AM EDT

## 2024-03-15 NOTE — CARE COORDINATION
DC Plan: Home with spouse and [facility] (current)    Cm will need the following home health orders at the time of discharge: resume home health

## 2024-03-15 NOTE — CARE COORDINATION
03/15/24 1617   Service Assessment   Patient Orientation Alert and Oriented   Cognition Alert   History Provided By Patient;Spouse   Primary Caregiver Self   Accompanied By/Relationship spouse   Support Systems Spouse/Significant Other;Family Members   Patient's Healthcare Decision Maker is: Legal Next of Kin   PCP Verified by CM Yes  (Last week)   Prior Functional Level Independent in ADLs/IADLs   Current Functional Level Independent in ADLs/IADLs   Can patient return to prior living arrangement Yes   Ability to make needs known: Good   Family able to assist with home care needs: Yes   Would you like for me to discuss the discharge plan with any other family members/significant others, and if so, who? Yes   Financial Resources None   Community Resources None   CM/SW Referral Other (see comment)   Social/Functional History   Lives With Spouse   Home Equipment Cane;halley Kennedy     Pt [facility]. Referral sent via [facility].     Pharmacy - [facility] [place]    Advance Care Planning   Healthcare Decision Maker:    Primary Decision Maker: Jasmeet Metzger - Spouse - [phone]

## 2024-03-16 VITALS
TEMPERATURE: 97.9 F | HEIGHT: 65 IN | SYSTOLIC BLOOD PRESSURE: 148 MMHG | HEART RATE: 80 BPM | BODY MASS INDEX: 29.16 KG/M2 | RESPIRATION RATE: 18 BRPM | WEIGHT: 175 LBS | DIASTOLIC BLOOD PRESSURE: 64 MMHG | OXYGEN SATURATION: 92 %

## 2024-03-16 PROBLEM — D50.9 IRON (FE) DEFICIENCY ANEMIA: Status: ACTIVE | Noted: 2024-03-16

## 2024-03-16 PROBLEM — G62.9 NEUROPATHY: Status: ACTIVE | Noted: 2024-03-16

## 2024-03-16 PROBLEM — K59.00 CONSTIPATION: Status: ACTIVE | Noted: 2024-03-16

## 2024-03-16 LAB
ANION GAP SERPL CALC-SCNC: 3 MMOL/L (ref 5–15)
BACTERIA SPEC CULT: NORMAL
BASOPHILS # BLD: 0.1 K/UL (ref 0–0.1)
BASOPHILS NFR BLD: 1 % (ref 0–1)
BUN SERPL-MCNC: 12 MG/DL (ref 6–20)
BUN/CREAT SERPL: 20 (ref 12–20)
CA-I BLD-MCNC: 8.8 MG/DL (ref 8.5–10.1)
CHLORIDE SERPL-SCNC: 108 MMOL/L (ref 97–108)
CO2 SERPL-SCNC: 29 MMOL/L (ref 21–32)
CREAT SERPL-MCNC: 0.6 MG/DL (ref 0.55–1.02)
DIFFERENTIAL METHOD BLD: ABNORMAL
EOSINOPHIL # BLD: 0.4 K/UL (ref 0–0.4)
EOSINOPHIL NFR BLD: 4 % (ref 0–7)
ERYTHROCYTE [DISTWIDTH] IN BLOOD BY AUTOMATED COUNT: 15.7 % (ref 11.5–14.5)
GLUCOSE BLD STRIP.AUTO-MCNC: 142 MG/DL (ref 65–100)
GLUCOSE BLD STRIP.AUTO-MCNC: 146 MG/DL (ref 65–100)
GLUCOSE BLD STRIP.AUTO-MCNC: 180 MG/DL (ref 65–100)
GLUCOSE SERPL-MCNC: 150 MG/DL (ref 65–100)
HCT VFR BLD AUTO: 27.1 % (ref 35–47)
HGB BLD-MCNC: 8.2 G/DL (ref 11.5–16)
IMM GRANULOCYTES # BLD AUTO: 0.2 K/UL (ref 0–0.04)
IMM GRANULOCYTES NFR BLD AUTO: 2 % (ref 0–0.5)
LYMPHOCYTES # BLD: 1.5 K/UL (ref 0.8–3.5)
LYMPHOCYTES NFR BLD: 16 % (ref 12–49)
Lab: NORMAL
MCH RBC QN AUTO: 24.9 PG (ref 26–34)
MCHC RBC AUTO-ENTMCNC: 30.3 G/DL (ref 30–36.5)
MCV RBC AUTO: 82.4 FL (ref 80–99)
MONOCYTES # BLD: 1 K/UL (ref 0–1)
MONOCYTES NFR BLD: 11 % (ref 5–13)
NEUTS SEG # BLD: 6.1 K/UL (ref 1.8–8)
NEUTS SEG NFR BLD: 66 % (ref 32–75)
NRBC # BLD: 0 K/UL (ref 0–0.01)
NRBC BLD-RTO: 0 PER 100 WBC
PERFORMED BY:: ABNORMAL
PLATELET # BLD AUTO: 226 K/UL (ref 150–400)
PMV BLD AUTO: 12.8 FL (ref 8.9–12.9)
POTASSIUM SERPL-SCNC: 3.8 MMOL/L (ref 3.5–5.1)
RBC # BLD AUTO: 3.29 M/UL (ref 3.8–5.2)
SODIUM SERPL-SCNC: 140 MMOL/L (ref 136–145)
WBC # BLD AUTO: 9.3 K/UL (ref 3.6–11)

## 2024-03-16 PROCEDURE — 2580000003 HC RX 258: Performed by: STUDENT IN AN ORGANIZED HEALTH CARE EDUCATION/TRAINING PROGRAM

## 2024-03-16 PROCEDURE — 80048 BASIC METABOLIC PNL TOTAL CA: CPT

## 2024-03-16 PROCEDURE — 6370000000 HC RX 637 (ALT 250 FOR IP): Performed by: STUDENT IN AN ORGANIZED HEALTH CARE EDUCATION/TRAINING PROGRAM

## 2024-03-16 PROCEDURE — 82962 GLUCOSE BLOOD TEST: CPT

## 2024-03-16 PROCEDURE — 36415 COLL VENOUS BLD VENIPUNCTURE: CPT

## 2024-03-16 PROCEDURE — 85025 COMPLETE CBC W/AUTO DIFF WBC: CPT

## 2024-03-16 RX ORDER — CIPROFLOXACIN 500 MG/1
500 TABLET, FILM COATED ORAL 2 TIMES DAILY
Qty: 6 TABLET | Refills: 0 | Status: SHIPPED | OUTPATIENT
Start: 2024-03-16 | End: 2024-03-19

## 2024-03-16 RX ADMIN — FUROSEMIDE 40 MG: 40 TABLET ORAL at 09:25

## 2024-03-16 RX ADMIN — CARVEDILOL 3.12 MG: 3.12 TABLET, FILM COATED ORAL at 09:25

## 2024-03-16 RX ADMIN — ONDANSETRON 4 MG: 4 TABLET, ORALLY DISINTEGRATING ORAL at 09:34

## 2024-03-16 RX ADMIN — MONTELUKAST 10 MG: 10 TABLET, FILM COATED ORAL at 09:25

## 2024-03-16 RX ADMIN — LOSARTAN POTASSIUM 100 MG: 50 TABLET, FILM COATED ORAL at 09:25

## 2024-03-16 RX ADMIN — FOLIC ACID 1 MG: 1 TABLET ORAL at 09:24

## 2024-03-16 RX ADMIN — ISOSORBIDE MONONITRATE 30 MG: 30 TABLET, EXTENDED RELEASE ORAL at 09:24

## 2024-03-16 RX ADMIN — PANTOPRAZOLE SODIUM 40 MG: 40 TABLET, DELAYED RELEASE ORAL at 06:00

## 2024-03-16 RX ADMIN — DOCUSATE SODIUM 100 MG: 100 CAPSULE, LIQUID FILLED ORAL at 09:25

## 2024-03-16 RX ADMIN — SODIUM CHLORIDE, PRESERVATIVE FREE 10 ML: 5 INJECTION INTRAVENOUS at 09:24

## 2024-03-16 RX ADMIN — FERROUS SULFATE TAB 325 MG (65 MG ELEMENTAL FE) 325 MG: 325 (65 FE) TAB at 09:24

## 2024-03-16 RX ADMIN — APIXABAN 2.5 MG: 2.5 TABLET, FILM COATED ORAL at 09:25

## 2024-03-16 RX ADMIN — MAGNESIUM CITRATE 296 ML: 1.75 LIQUID ORAL at 09:53

## 2024-03-16 RX ADMIN — GABAPENTIN 300 MG: 300 CAPSULE ORAL at 09:24

## 2024-03-16 ASSESSMENT — ENCOUNTER SYMPTOMS
COUGH: 0
ALLERGIC/IMMUNOLOGIC NEGATIVE: 1
TROUBLE SWALLOWING: 0
VOMITING: 0
CONSTIPATION: 1
EYES NEGATIVE: 1
WHEEZING: 0
ABDOMINAL PAIN: 0
DIARRHEA: 0
BACK PAIN: 0
NAUSEA: 0
SHORTNESS OF BREATH: 0
RHINORRHEA: 0
SORE THROAT: 0

## 2024-03-16 ASSESSMENT — PAIN SCALES - GENERAL: PAINLEVEL_OUTOF10: 0

## 2024-03-16 NOTE — DISCHARGE SUMMARY
2.5 MG chemo tablet  Commonly known as: RHEUMATREX     montelukast 10 MG tablet  Commonly known as: SINGULAIR     Nitrostat 0.4 MG SL tablet  Generic drug: nitroGLYCERIN     pantoprazole 40 MG tablet  Commonly known as: PROTONIX     potassium chloride 10 MEQ extended release capsule  Commonly known as: MICRO-K               Where to Get Your Medications        These medications were sent to [id number] - [place], [place] - [facility] - P [phone] - F [phone]  [facility] [address]      Phone: [phone]   apixaban 2.5 MG Tabs tablet  ciprofloxacin 500 MG tablet             DISPOSITION:    Home with Family:    X   Home with HH/PT/OT/RN:    SNF/LTC:    MAEVE:    OTHER:            Code status:   Recommended diet: cardiac diet and diabetic diet  Recommended activity: activity as tolerated  Wound care: None      Follow up with:   PCP : Royer Carreno MD Ende, Mark, MD  [address] [place] [id number] [phone]    Follow up  Follow up in 2 weeks with recent admission for continued monitoring and management of medications and other comorbidities          Total time in minutes spent coordinating this discharge (includes going over instructions, follow-up, prescriptions, and preparing report for sign off to her PCP) :  35 minutes

## 2024-03-16 NOTE — PROGRESS NOTES
Hospitalist Progress Note    NAME:   Julita Metzger   : 1935   MRN: [id number]     Date/Time: 3/16/2024 3:10 PM  Patient PCP: Royer Carreno MD    Estimated discharge date: 24 hours  Barriers: Bowel movement      Assessment / Plan:  Sepsis secondary to UTI with history of recurrent UTIs  Lactic acidosis  -WBC 9.3  -Lactic acid 2.3, continue IV fluids  -Blood cultures no growth to date  -Urine culture negative  -CT of the abdomen pelvis with contrast revealed atherosclerotic change without evidence of bowel ischemia  -UA positive for leukoesterase and greater than 100 WBCs, urine culture pending  -Previous UTI positive for Klebsiella pneumoniae sensitive to Rocephin, will continue  -Continue IV fluids     Abdominal pain  Constipation  -CT of the abdomen pelvis with contrast revealed atherosclerotic change without evidence of bowel ischemia  -Continue Colace  -MiraLAX as needed  -Provided Dulcolax possible yesterday and 1 dose of mag citrate today.  No success at this time the patient requesting for enema to be complete, will order soapsuds enema     Normocytic anemia  -Hemoglobin 8.2  -Continue folic acid and iron supplementation  -Continue to monitor and transfuse if hemoglobin less than 7  -B12 1009, iron panel revealed ferritin 11, iron 17, TIBC 339 and iron saturation 5%     CAD  HTN  -Continue carvedilol, Lipitor, losartan and Lasix     Type II DM  Neuropathy  -Takes metformin daily, hold  -Continue gabapentin  -Insulin sliding scale with Accu-Cheks  -A1c 6.0     A-fib  Secondary hypercoagulable state in a patient with atrial fibrillation  -Rate currently controlled  -Continue Eliquis     Allergies  -Continue Singulair    Medical Decision Making:   I personally reviewed labs: CBC BMP A1c urine culture  I personally reviewed imaging: None  Toxic drug monitoring: Eliquis for any signs bleeding monitor H&H  Discussed case with: Patient,  and RN    Code Status: Full  DVT Prophylaxis: Eliquis  GI  movements intact.   Cardiovascular:      Rate and Rhythm: Normal rate and regular rhythm.      Heart sounds: Normal heart sounds.   Pulmonary:      Effort: Pulmonary effort is normal. No respiratory distress.      Breath sounds: Normal breath sounds.   Abdominal:      General: Abdomen is flat.      Palpations: Abdomen is soft.      Tenderness: There is no abdominal tenderness.   Musculoskeletal:         General: No tenderness. Normal range of motion.      Cervical back: Normal range of motion. No tenderness.      Right lower leg: No edema.      Left lower leg: No edema.   Neurological:      Mental Status: She is alert and oriented to person, place, and time.          Reviewed most current lab test results and cultures  YES  Reviewed most current radiology test results   YES  Review and summation of old records today    NO  Reviewed patient's current orders and MAR    YES  PMH/SH reviewed - no change compared to H&P  ________________________________________________________________________  Care Plan discussed with:    Comments   Patient x    Family      RN x    Care Manager     Consultant                        Multidiciplinary team rounds were held today with , nursing, pharmacist and clinical coordinator.  Patient's plan of care was discussed; medications were reviewed and discharge planning was addressed.     ________________________________________________________________________  Total NON critical care TIME:  35  Minutes    Total CRITICAL CARE TIME Spent:   Minutes non procedure based      Comments   >50% of visit spent in counseling and coordination of care     ________________________________________________________________________  Rony Nur PA-C     Procedures: see electronic medical records for all procedures/Xrays and details which were not copied into this note but were reviewed prior to creation of Plan.      LABS:  I reviewed today's most current labs and imaging studies.  Pertinent

## 2024-03-16 NOTE — PROCEDURES
Patient had a large Bowel Movement with no complications. Patient verbalized improvement and thinks she is ready to go home.     Provider made aware.

## 2024-03-17 LAB
BACTERIA SPEC CULT: NORMAL
BACTERIA SPEC CULT: NORMAL
Lab: NORMAL
Lab: NORMAL

## 2024-03-19 NOTE — CARE COORDINATION
Entry for 3/18/24    Pt discharged home self on Saturday. Pt was current with [facility] and they need orders.    Cm received permission from Dr. Coyne to place in home health order.

## 2024-03-20 LAB
BACTERIA SPEC CULT: NORMAL
BACTERIA SPEC CULT: NORMAL
Lab: NORMAL
Lab: NORMAL

## 2024-04-02 ENCOUNTER — APPOINTMENT (OUTPATIENT)
Facility: HOSPITAL | Age: 89
DRG: 699 | End: 2024-04-02
Payer: MEDICARE

## 2024-04-02 ENCOUNTER — HOSPITAL ENCOUNTER (INPATIENT)
Facility: HOSPITAL | Age: 89
LOS: 2 days | Discharge: HOME HEALTH | DRG: 699 | End: 2024-04-04
Attending: STUDENT IN AN ORGANIZED HEALTH CARE EDUCATION/TRAINING PROGRAM | Admitting: INTERNAL MEDICINE
Payer: MEDICARE

## 2024-04-02 DIAGNOSIS — N39.0 URINARY TRACT INFECTION WITH HEMATURIA, SITE UNSPECIFIED: Primary | ICD-10-CM

## 2024-04-02 DIAGNOSIS — R31.9 URINARY TRACT INFECTION WITH HEMATURIA, SITE UNSPECIFIED: Primary | ICD-10-CM

## 2024-04-02 DIAGNOSIS — A41.9 SEPSIS, DUE TO UNSPECIFIED ORGANISM, UNSPECIFIED WHETHER ACUTE ORGAN DYSFUNCTION PRESENT (HCC): ICD-10-CM

## 2024-04-02 LAB
ALBUMIN SERPL-MCNC: 3.4 G/DL (ref 3.5–5)
ALBUMIN/GLOB SERPL: 1.1 (ref 1.1–2.2)
ALP SERPL-CCNC: 108 U/L (ref 45–117)
ALT SERPL-CCNC: 19 U/L (ref 12–78)
ANION GAP SERPL CALC-SCNC: 6 MMOL/L (ref 5–15)
APPEARANCE UR: ABNORMAL
AST SERPL W P-5'-P-CCNC: 22 U/L (ref 15–37)
BACTERIA URNS QL MICRO: ABNORMAL /HPF
BASOPHILS # BLD: 0.1 K/UL (ref 0–0.1)
BASOPHILS NFR BLD: 1 % (ref 0–1)
BILIRUB SERPL-MCNC: 0.4 MG/DL (ref 0.2–1)
BILIRUB UR QL: NEGATIVE
BUN SERPL-MCNC: 13 MG/DL (ref 6–20)
BUN/CREAT SERPL: 13 (ref 12–20)
CA-I BLD-MCNC: 8.7 MG/DL (ref 8.5–10.1)
CHLORIDE SERPL-SCNC: 102 MMOL/L (ref 97–108)
CO2 SERPL-SCNC: 30 MMOL/L (ref 21–32)
COLOR UR: YELLOW
CREAT SERPL-MCNC: 0.97 MG/DL (ref 0.55–1.02)
CRP SERPL-MCNC: 1.66 MG/DL (ref 0–0.3)
DIFFERENTIAL METHOD BLD: ABNORMAL
EKG ATRIAL RATE: 108 BPM
EKG DIAGNOSIS: NORMAL
EKG P AXIS: 36 DEGREES
EKG P-R INTERVAL: 180 MS
EKG Q-T INTERVAL: 348 MS
EKG QRS DURATION: 114 MS
EKG QTC CALCULATION (BAZETT): 466 MS
EKG R AXIS: -55 DEGREES
EKG T AXIS: 115 DEGREES
EKG VENTRICULAR RATE: 108 BPM
EOSINOPHIL # BLD: 0.5 K/UL (ref 0–0.4)
EOSINOPHIL NFR BLD: 3 % (ref 0–7)
ERYTHROCYTE [DISTWIDTH] IN BLOOD BY AUTOMATED COUNT: 17.2 % (ref 11.5–14.5)
EST. AVERAGE GLUCOSE BLD GHB EST-MCNC: 137 MG/DL
GLOBULIN SER CALC-MCNC: 3.1 G/DL (ref 2–4)
GLUCOSE BLD STRIP.AUTO-MCNC: 100 MG/DL (ref 65–100)
GLUCOSE BLD STRIP.AUTO-MCNC: 121 MG/DL (ref 65–100)
GLUCOSE SERPL-MCNC: 275 MG/DL (ref 65–100)
GLUCOSE UR STRIP.AUTO-MCNC: >500 MG/DL
HBA1C MFR BLD: 6.4 % (ref 4–5.6)
HCT VFR BLD AUTO: 27.6 % (ref 35–47)
HGB BLD-MCNC: 8.3 G/DL (ref 11.5–16)
HGB UR QL STRIP: ABNORMAL
IMM GRANULOCYTES # BLD AUTO: 0.2 K/UL (ref 0–0.04)
IMM GRANULOCYTES NFR BLD AUTO: 1 % (ref 0–0.5)
KETONES UR QL STRIP.AUTO: NEGATIVE MG/DL
LACTATE BLD-SCNC: 2.04 MMOL/L (ref 0.4–2)
LACTATE BLD-SCNC: 4.03 MMOL/L (ref 0.4–2)
LACTATE SERPL-SCNC: 2.2 MMOL/L (ref 0.4–2)
LACTATE SERPL-SCNC: 2.5 MMOL/L (ref 0.4–2)
LEUKOCYTE ESTERASE UR QL STRIP.AUTO: ABNORMAL
LYMPHOCYTES # BLD: 1.2 K/UL (ref 0.8–3.5)
LYMPHOCYTES NFR BLD: 7 % (ref 12–49)
MCH RBC QN AUTO: 24.8 PG (ref 26–34)
MCHC RBC AUTO-ENTMCNC: 30.1 G/DL (ref 30–36.5)
MCV RBC AUTO: 82.4 FL (ref 80–99)
MONOCYTES # BLD: 1.2 K/UL (ref 0–1)
MONOCYTES NFR BLD: 8 % (ref 5–13)
NEUTS SEG # BLD: 13 K/UL (ref 1.8–8)
NEUTS SEG NFR BLD: 80 % (ref 32–75)
NITRITE UR QL STRIP.AUTO: POSITIVE
NRBC # BLD: 0 K/UL (ref 0–0.01)
NRBC BLD-RTO: 0 PER 100 WBC
PERFORMED BY:: ABNORMAL
PERFORMED BY:: NORMAL
PH UR STRIP: 6 (ref 5–8)
PLATELET # BLD AUTO: 304 K/UL (ref 150–400)
PMV BLD AUTO: 12.5 FL (ref 8.9–12.9)
POTASSIUM SERPL-SCNC: 4.3 MMOL/L (ref 3.5–5.1)
PROCALCITONIN SERPL-MCNC: <0.05 NG/ML
PROCALCITONIN SERPL-MCNC: <0.05 NG/ML
PROT SERPL-MCNC: 6.5 G/DL (ref 6.4–8.2)
PROT UR STRIP-MCNC: 30 MG/DL
RBC # BLD AUTO: 3.35 M/UL (ref 3.8–5.2)
RBC #/AREA URNS HPF: >100 /HPF (ref 0–5)
SODIUM SERPL-SCNC: 138 MMOL/L (ref 136–145)
SP GR UR REFRACTOMETRY: 1.01 (ref 1–1.03)
TROPONIN I SERPL HS-MCNC: 20 NG/L (ref 0–51)
URINE CULTURE IF INDICATED: ABNORMAL
UROBILINOGEN UR QL STRIP.AUTO: 4 EU/DL (ref 0.1–1)
WBC # BLD AUTO: 16.2 K/UL (ref 3.6–11)
WBC URNS QL MICRO: >100 /HPF (ref 0–4)

## 2024-04-02 PROCEDURE — 94761 N-INVAS EAR/PLS OXIMETRY MLT: CPT

## 2024-04-02 PROCEDURE — 2580000003 HC RX 258: Performed by: STUDENT IN AN ORGANIZED HEALTH CARE EDUCATION/TRAINING PROGRAM

## 2024-04-02 PROCEDURE — 81001 URINALYSIS AUTO W/SCOPE: CPT

## 2024-04-02 PROCEDURE — 36415 COLL VENOUS BLD VENIPUNCTURE: CPT

## 2024-04-02 PROCEDURE — 84145 PROCALCITONIN (PCT): CPT

## 2024-04-02 PROCEDURE — 1100000000 HC RM PRIVATE

## 2024-04-02 PROCEDURE — 96375 TX/PRO/DX INJ NEW DRUG ADDON: CPT

## 2024-04-02 PROCEDURE — 86140 C-REACTIVE PROTEIN: CPT

## 2024-04-02 PROCEDURE — 87040 BLOOD CULTURE FOR BACTERIA: CPT

## 2024-04-02 PROCEDURE — 85025 COMPLETE CBC W/AUTO DIFF WBC: CPT

## 2024-04-02 PROCEDURE — 84484 ASSAY OF TROPONIN QUANT: CPT

## 2024-04-02 PROCEDURE — 6360000002 HC RX W HCPCS: Performed by: STUDENT IN AN ORGANIZED HEALTH CARE EDUCATION/TRAINING PROGRAM

## 2024-04-02 PROCEDURE — 83036 HEMOGLOBIN GLYCOSYLATED A1C: CPT

## 2024-04-02 PROCEDURE — 74176 CT ABD & PELVIS W/O CONTRAST: CPT

## 2024-04-02 PROCEDURE — 93005 ELECTROCARDIOGRAM TRACING: CPT | Performed by: STUDENT IN AN ORGANIZED HEALTH CARE EDUCATION/TRAINING PROGRAM

## 2024-04-02 PROCEDURE — 99285 EMERGENCY DEPT VISIT HI MDM: CPT

## 2024-04-02 PROCEDURE — 83605 ASSAY OF LACTIC ACID: CPT

## 2024-04-02 PROCEDURE — 87086 URINE CULTURE/COLONY COUNT: CPT

## 2024-04-02 PROCEDURE — 96374 THER/PROPH/DIAG INJ IV PUSH: CPT

## 2024-04-02 PROCEDURE — 82962 GLUCOSE BLOOD TEST: CPT

## 2024-04-02 PROCEDURE — 71045 X-RAY EXAM CHEST 1 VIEW: CPT

## 2024-04-02 PROCEDURE — 80053 COMPREHEN METABOLIC PANEL: CPT

## 2024-04-02 PROCEDURE — 6370000000 HC RX 637 (ALT 250 FOR IP): Performed by: STUDENT IN AN ORGANIZED HEALTH CARE EDUCATION/TRAINING PROGRAM

## 2024-04-02 RX ORDER — POTASSIUM CHLORIDE 20 MEQ/1
40 TABLET, EXTENDED RELEASE ORAL PRN
Status: DISCONTINUED | OUTPATIENT
Start: 2024-04-02 | End: 2024-04-04 | Stop reason: HOSPADM

## 2024-04-02 RX ORDER — MAGNESIUM SULFATE IN WATER 40 MG/ML
2000 INJECTION, SOLUTION INTRAVENOUS PRN
Status: DISCONTINUED | OUTPATIENT
Start: 2024-04-02 | End: 2024-04-04 | Stop reason: HOSPADM

## 2024-04-02 RX ORDER — INSULIN LISPRO 100 [IU]/ML
0-4 INJECTION, SOLUTION INTRAVENOUS; SUBCUTANEOUS NIGHTLY
Status: DISCONTINUED | OUTPATIENT
Start: 2024-04-02 | End: 2024-04-04 | Stop reason: HOSPADM

## 2024-04-02 RX ORDER — SODIUM CHLORIDE 9 MG/ML
INJECTION, SOLUTION INTRAVENOUS PRN
Status: DISCONTINUED | OUTPATIENT
Start: 2024-04-02 | End: 2024-04-04 | Stop reason: HOSPADM

## 2024-04-02 RX ORDER — HYDROCODONE BITARTRATE AND ACETAMINOPHEN 10; 325 MG/1; MG/1
1 TABLET ORAL EVERY 6 HOURS PRN
Status: DISCONTINUED | OUTPATIENT
Start: 2024-04-02 | End: 2024-04-04 | Stop reason: HOSPADM

## 2024-04-02 RX ORDER — CHOLECALCIFEROL (VITAMIN D3) 125 MCG
10 CAPSULE ORAL NIGHTLY
Status: DISCONTINUED | OUTPATIENT
Start: 2024-04-02 | End: 2024-04-04 | Stop reason: HOSPADM

## 2024-04-02 RX ORDER — SODIUM CHLORIDE 9 MG/ML
INJECTION, SOLUTION INTRAVENOUS CONTINUOUS
Status: DISCONTINUED | OUTPATIENT
Start: 2024-04-02 | End: 2024-04-02

## 2024-04-02 RX ORDER — GABAPENTIN 300 MG/1
300 CAPSULE ORAL 2 TIMES DAILY
Status: DISCONTINUED | OUTPATIENT
Start: 2024-04-02 | End: 2024-04-04 | Stop reason: HOSPADM

## 2024-04-02 RX ORDER — PLECANATIDE 3 MG/1
1 TABLET ORAL DAILY
COMMUNITY
Start: 2024-03-26

## 2024-04-02 RX ORDER — PANTOPRAZOLE SODIUM 40 MG/1
40 TABLET, DELAYED RELEASE ORAL
Status: DISCONTINUED | OUTPATIENT
Start: 2024-04-03 | End: 2024-04-04 | Stop reason: HOSPADM

## 2024-04-02 RX ORDER — LOSARTAN POTASSIUM 50 MG/1
100 TABLET ORAL DAILY
Status: DISCONTINUED | OUTPATIENT
Start: 2024-04-03 | End: 2024-04-04 | Stop reason: HOSPADM

## 2024-04-02 RX ORDER — FERROUS SULFATE 325(65) MG
325 TABLET ORAL
Status: DISCONTINUED | OUTPATIENT
Start: 2024-04-03 | End: 2024-04-04 | Stop reason: HOSPADM

## 2024-04-02 RX ORDER — FUROSEMIDE 40 MG/1
40 TABLET ORAL DAILY
Status: DISCONTINUED | OUTPATIENT
Start: 2024-04-03 | End: 2024-04-04 | Stop reason: HOSPADM

## 2024-04-02 RX ORDER — 0.9 % SODIUM CHLORIDE 0.9 %
30 INTRAVENOUS SOLUTION INTRAVENOUS ONCE
Status: COMPLETED | OUTPATIENT
Start: 2024-04-02 | End: 2024-04-02

## 2024-04-02 RX ORDER — SODIUM CHLORIDE 0.9 % (FLUSH) 0.9 %
5-40 SYRINGE (ML) INJECTION EVERY 12 HOURS SCHEDULED
Status: DISCONTINUED | OUTPATIENT
Start: 2024-04-02 | End: 2024-04-04 | Stop reason: HOSPADM

## 2024-04-02 RX ORDER — DAPAGLIFLOZIN 10 MG/1
10 TABLET, FILM COATED ORAL DAILY
COMMUNITY

## 2024-04-02 RX ORDER — POTASSIUM CHLORIDE 7.45 MG/ML
10 INJECTION INTRAVENOUS PRN
Status: DISCONTINUED | OUTPATIENT
Start: 2024-04-02 | End: 2024-04-04 | Stop reason: HOSPADM

## 2024-04-02 RX ORDER — ACETAMINOPHEN 325 MG/1
650 TABLET ORAL EVERY 6 HOURS PRN
Status: DISCONTINUED | OUTPATIENT
Start: 2024-04-02 | End: 2024-04-04 | Stop reason: HOSPADM

## 2024-04-02 RX ORDER — SULFASALAZINE 500 MG/1
500 TABLET ORAL 4 TIMES DAILY
COMMUNITY
Start: 2022-03-31

## 2024-04-02 RX ORDER — ONDANSETRON 2 MG/ML
4 INJECTION INTRAMUSCULAR; INTRAVENOUS ONCE
Status: COMPLETED | OUTPATIENT
Start: 2024-04-02 | End: 2024-04-02

## 2024-04-02 RX ORDER — TRAZODONE HYDROCHLORIDE 50 MG/1
50 TABLET ORAL NIGHTLY PRN
COMMUNITY
Start: 2023-05-08

## 2024-04-02 RX ORDER — MONTELUKAST SODIUM 10 MG/1
10 TABLET ORAL DAILY
Status: DISCONTINUED | OUTPATIENT
Start: 2024-04-03 | End: 2024-04-04 | Stop reason: HOSPADM

## 2024-04-02 RX ORDER — SODIUM CHLORIDE 9 MG/ML
INJECTION, SOLUTION INTRAVENOUS CONTINUOUS
Status: DISPENSED | OUTPATIENT
Start: 2024-04-02 | End: 2024-04-03

## 2024-04-02 RX ORDER — POTASSIUM CHLORIDE 750 MG/1
10 CAPSULE, EXTENDED RELEASE ORAL DAILY
Status: DISCONTINUED | OUTPATIENT
Start: 2024-04-03 | End: 2024-04-03 | Stop reason: CLARIF

## 2024-04-02 RX ORDER — ACETAMINOPHEN 650 MG/1
650 SUPPOSITORY RECTAL EVERY 6 HOURS PRN
Status: DISCONTINUED | OUTPATIENT
Start: 2024-04-02 | End: 2024-04-04 | Stop reason: HOSPADM

## 2024-04-02 RX ORDER — ISOSORBIDE MONONITRATE 30 MG/1
30 TABLET, EXTENDED RELEASE ORAL DAILY
Status: DISCONTINUED | OUTPATIENT
Start: 2024-04-03 | End: 2024-04-04 | Stop reason: HOSPADM

## 2024-04-02 RX ORDER — POLYETHYLENE GLYCOL 3350 17 G/17G
17 POWDER, FOR SOLUTION ORAL DAILY PRN
Status: DISCONTINUED | OUTPATIENT
Start: 2024-04-02 | End: 2024-04-04 | Stop reason: HOSPADM

## 2024-04-02 RX ORDER — CYCLOBENZAPRINE HCL 10 MG
10 TABLET ORAL 2 TIMES DAILY PRN
COMMUNITY
Start: 2024-03-12

## 2024-04-02 RX ORDER — ESTRADIOL 0.1 MG/G
2 CREAM VAGINAL DAILY
COMMUNITY

## 2024-04-02 RX ORDER — ONDANSETRON 4 MG/1
4 TABLET, ORALLY DISINTEGRATING ORAL ONCE
COMMUNITY

## 2024-04-02 RX ORDER — DONEPEZIL HYDROCHLORIDE 5 MG/1
5 TABLET, FILM COATED ORAL NIGHTLY
COMMUNITY

## 2024-04-02 RX ORDER — SODIUM CHLORIDE 0.9 % (FLUSH) 0.9 %
5-40 SYRINGE (ML) INJECTION PRN
Status: DISCONTINUED | OUTPATIENT
Start: 2024-04-02 | End: 2024-04-04 | Stop reason: HOSPADM

## 2024-04-02 RX ORDER — INSULIN LISPRO 100 [IU]/ML
0-8 INJECTION, SOLUTION INTRAVENOUS; SUBCUTANEOUS
Status: DISCONTINUED | OUTPATIENT
Start: 2024-04-02 | End: 2024-04-04 | Stop reason: HOSPADM

## 2024-04-02 RX ORDER — ONDANSETRON 2 MG/ML
4 INJECTION INTRAMUSCULAR; INTRAVENOUS EVERY 6 HOURS PRN
Status: DISCONTINUED | OUTPATIENT
Start: 2024-04-02 | End: 2024-04-04 | Stop reason: HOSPADM

## 2024-04-02 RX ORDER — MECLIZINE HYDROCHLORIDE 25 MG/1
25 TABLET ORAL 3 TIMES DAILY PRN
Status: DISCONTINUED | OUTPATIENT
Start: 2024-04-02 | End: 2024-04-04 | Stop reason: HOSPADM

## 2024-04-02 RX ORDER — PROMETHAZINE HYDROCHLORIDE 25 MG/1
1 TABLET ORAL DAILY
COMMUNITY
Start: 2011-10-18

## 2024-04-02 RX ORDER — ATORVASTATIN CALCIUM 40 MG/1
40 TABLET, FILM COATED ORAL NIGHTLY
Status: DISCONTINUED | OUTPATIENT
Start: 2024-04-02 | End: 2024-04-04 | Stop reason: HOSPADM

## 2024-04-02 RX ORDER — CARVEDILOL 3.12 MG/1
3.12 TABLET ORAL 2 TIMES DAILY WITH MEALS
Status: DISCONTINUED | OUTPATIENT
Start: 2024-04-02 | End: 2024-04-04 | Stop reason: HOSPADM

## 2024-04-02 RX ORDER — MORPHINE SULFATE 4 MG/ML
4 INJECTION, SOLUTION INTRAMUSCULAR; INTRAVENOUS
Status: COMPLETED | OUTPATIENT
Start: 2024-04-02 | End: 2024-04-02

## 2024-04-02 RX ORDER — ASCORBIC ACID 500 MG
500 TABLET ORAL DAILY
COMMUNITY

## 2024-04-02 RX ORDER — ENOXAPARIN SODIUM 100 MG/ML
40 INJECTION SUBCUTANEOUS DAILY
Status: DISCONTINUED | OUTPATIENT
Start: 2024-04-02 | End: 2024-04-02

## 2024-04-02 RX ORDER — ONDANSETRON 4 MG/1
4 TABLET, ORALLY DISINTEGRATING ORAL EVERY 8 HOURS PRN
Status: DISCONTINUED | OUTPATIENT
Start: 2024-04-02 | End: 2024-04-04 | Stop reason: HOSPADM

## 2024-04-02 RX ORDER — CHLORAL HYDRATE 500 MG
1000 CAPSULE ORAL DAILY
COMMUNITY

## 2024-04-02 RX ORDER — ICOSAPENT ETHYL 1000 MG/1
2 CAPSULE ORAL 2 TIMES DAILY
COMMUNITY

## 2024-04-02 RX ORDER — FOLIC ACID 1 MG/1
1 TABLET ORAL DAILY
Status: DISCONTINUED | OUTPATIENT
Start: 2024-04-02 | End: 2024-04-04 | Stop reason: HOSPADM

## 2024-04-02 RX ORDER — LACTULOSE 10 G/15ML
30 SOLUTION ORAL ONCE
Status: COMPLETED | OUTPATIENT
Start: 2024-04-02 | End: 2024-04-02

## 2024-04-02 RX ORDER — AMLODIPINE BESYLATE 5 MG/1
5 TABLET ORAL DAILY
COMMUNITY

## 2024-04-02 RX ADMIN — CEFTRIAXONE SODIUM 1000 MG: 1 INJECTION, POWDER, FOR SOLUTION INTRAMUSCULAR; INTRAVENOUS at 09:49

## 2024-04-02 RX ADMIN — ONDANSETRON 4 MG: 2 INJECTION INTRAMUSCULAR; INTRAVENOUS at 22:03

## 2024-04-02 RX ADMIN — GABAPENTIN 300 MG: 300 CAPSULE ORAL at 17:54

## 2024-04-02 RX ADMIN — MORPHINE SULFATE 4 MG: 4 INJECTION, SOLUTION INTRAMUSCULAR; INTRAVENOUS at 09:54

## 2024-04-02 RX ADMIN — DOCUSATE SODIUM 100 MG: 50 LIQUID ORAL at 17:53

## 2024-04-02 RX ADMIN — MEROPENEM 1000 MG: 1 INJECTION, POWDER, FOR SOLUTION INTRAVENOUS at 13:50

## 2024-04-02 RX ADMIN — Medication 10 MG: at 20:33

## 2024-04-02 RX ADMIN — MEROPENEM 1000 MG: 1 INJECTION, POWDER, FOR SOLUTION INTRAVENOUS at 22:02

## 2024-04-02 RX ADMIN — HYDROCODONE BITARTRATE AND ACETAMINOPHEN 1 TABLET: 10; 325 TABLET ORAL at 21:59

## 2024-04-02 RX ADMIN — SODIUM CHLORIDE 2313 ML: 9 INJECTION, SOLUTION INTRAVENOUS at 09:55

## 2024-04-02 RX ADMIN — ONDANSETRON 4 MG: 2 INJECTION INTRAMUSCULAR; INTRAVENOUS at 09:54

## 2024-04-02 RX ADMIN — SODIUM CHLORIDE, PRESERVATIVE FREE 10 ML: 5 INJECTION INTRAVENOUS at 20:30

## 2024-04-02 RX ADMIN — LACTULOSE 30 G: 20 SOLUTION ORAL at 15:22

## 2024-04-02 RX ADMIN — HYDROCODONE BITARTRATE AND ACETAMINOPHEN 1 TABLET: 10; 325 TABLET ORAL at 15:23

## 2024-04-02 RX ADMIN — CARVEDILOL 3.12 MG: 3.12 TABLET, FILM COATED ORAL at 17:54

## 2024-04-02 RX ADMIN — APIXABAN 2.5 MG: 2.5 TABLET, FILM COATED ORAL at 17:58

## 2024-04-02 RX ADMIN — SODIUM CHLORIDE: 900 INJECTION INTRAVENOUS at 13:49

## 2024-04-02 RX ADMIN — SODIUM CHLORIDE, PRESERVATIVE FREE 10 ML: 5 INJECTION INTRAVENOUS at 13:50

## 2024-04-02 RX ADMIN — ATORVASTATIN CALCIUM 40 MG: 40 TABLET, FILM COATED ORAL at 20:33

## 2024-04-02 ASSESSMENT — ENCOUNTER SYMPTOMS
COUGH: 0
NAUSEA: 1
CONSTIPATION: 1
VOMITING: 0
BACK PAIN: 0
SHORTNESS OF BREATH: 0
ABDOMINAL PAIN: 1
DIARRHEA: 0

## 2024-04-02 ASSESSMENT — PAIN DESCRIPTION - ORIENTATION
ORIENTATION: RIGHT;LEFT;MID;LOWER
ORIENTATION: ANTERIOR;POSTERIOR

## 2024-04-02 ASSESSMENT — PAIN - FUNCTIONAL ASSESSMENT: PAIN_FUNCTIONAL_ASSESSMENT: NONE - DENIES PAIN

## 2024-04-02 ASSESSMENT — PAIN DESCRIPTION - DESCRIPTORS: DESCRIPTORS: ACHING;SORE

## 2024-04-02 ASSESSMENT — PAIN SCALES - GENERAL
PAINLEVEL_OUTOF10: 6
PAINLEVEL_OUTOF10: 6
PAINLEVEL_OUTOF10: 0
PAINLEVEL_OUTOF10: 2
PAINLEVEL_OUTOF10: 0
PAINLEVEL_OUTOF10: 4
PAINLEVEL_OUTOF10: 0
PAINLEVEL_OUTOF10: 2
PAINLEVEL_OUTOF10: 6

## 2024-04-02 ASSESSMENT — PAIN DESCRIPTION - LOCATION
LOCATION: ABDOMEN
LOCATION: HEAD
LOCATION: ABDOMEN
LOCATION: HEAD;GENERALIZED

## 2024-04-02 NOTE — PROGRESS NOTES
Patient arrives to [facility] from ED with family member at bedside. Patient A&O x4. VS stable. Fall precautions in place, call bell within reach.

## 2024-04-02 NOTE — CARE COORDINATION
04/02/24 1501   Readmission Assessment   Number of Days since last admission? 8-30 days   Previous Disposition Home with Home Health   Who is being Interviewed Patient   What was the patient's/caregiver's perception as to why they think they needed to return back to the hospital? Other (Comment)  (Pt reports urologist recommend she come into hospital)   Did you visit your Primary Care Physician after you left the hospital, before you returned this time? Yes   Did you see a specialist, such as Cardiac, Pulmonary, Orthopedic Physician, etc. after you left the hospital? Yes  (Urology 4/1/2024)   Who advised the patient to return to the hospital? Physician   Does the patient report anything that got in the way of taking their medications? No   In our efforts to provide the best possible care to you and others like you, can you think of anything that we could have done to help you after you left the hospital the first time, so that you might not have needed to return so soon? Other (Comment)  (none)

## 2024-04-02 NOTE — PLAN OF CARE
Problem: Discharge Planning  Goal: Discharge to home or other facility with appropriate resources  Outcome: Progressing  Flowsheets (Taken 4/2/2024 9946)  Discharge to home or other facility with appropriate resources:   Identify barriers to discharge with patient and caregiver   Arrange for needed discharge resources and transportation as appropriate   Identify discharge learning needs (meds, wound care, etc)     Problem: Safety - Adult  Goal: Free from fall injury  Outcome: Progressing     Problem: ABCDS Injury Assessment  Goal: Absence of physical injury  Outcome: Progressing     Problem: Pain  Goal: Verbalizes/displays adequate comfort level or baseline comfort level  Outcome: Progressing

## 2024-04-02 NOTE — ED PROVIDER NOTES
[address] MED/SURG  EMERGENCY DEPARTMENT HISTORY AND PHYSICAL EXAM      Date: 4/2/2024  Patient Name: Julita Metzger  MRN: [id number]  Birthdate 1935  Date of evaluation: 4/2/2024  Provider: Evelio Lau MD   Note Started: 9:36 AM EDT 4/2/24    HISTORY OF PRESENT ILLNESS     Chief Complaint   Patient presents with    Urinary Tract Infection    Abdominal Pain    Dizziness       History Provided By: Patient    HPI: Julita Metzger is a 88 y.o. female with PMH of A-fib, DM, CAD, chronic shortness of breath, CVA, chronic urinary retention requiring self catheter comes to the ED from urologist office with concerns for UTI.  Patient reports has been having abdominal pain lower abdomen without specific review relieving factors associate with nausea and difficulty micturating.  She reports feeling tired and not right.  She also noted that she has been feeling lightheaded and dizzy.  No chest pain.  She noted over the last several days she started experiencing coughing without any shortness of breath more than usual.    PAST MEDICAL HISTORY   Past Medical History:  Past Medical History:   Diagnosis Date    Arrhythmia     a. 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     Lung abnormality     middle lobe syndrome right lung    SOB (shortness of breath)     on exertion    Stroke (HCC) 12/2017       Past Surgical History:  Past Surgical History:   Procedure Laterality Date    BACK SURGERY      eight surgeries    CATARACT REMOVAL      left eye    COLONOSCOPY Left 9/12/2018    COLONOSCOPY performed by Shashank Tate MD at [facility] ENDOSCOPY    COLONOSCOPY N/A 1/26/2024    COLONOSCOPY DIAGNOSTIC performed by Cheryl Sy MD at [facility] ENDOSCOPY    HERNIA REPAIR      OTHER SURGICAL HISTORY      rt middle lobe removed d/t necrosis    PARTIAL HYSTERECTOMY (CERVIX NOT REMOVED)      REFRACTIVE SURGERY      REMOVAL

## 2024-04-02 NOTE — ED TRIAGE NOTES
C/o UTI, dizziness, vomiting, and abdominal pain. Urologist, Dr Monae Rollins, sent here for severe UTI.   Hx self cath, frequent UTI

## 2024-04-02 NOTE — ED NOTES
ED TO INPATIENT SBAR HANDOFF    Patient Name: Julita Metzger   Preferred Name: Kathy  : 1935  88 y.o.   Family/Caregiver Present: no   Code Status Order: Full Code  PO Status: clear liquid  Telemetry Order:   C-SSRS: Risk of Suicide: No Risk  Sitter no   Restraints: n/a  Sepsis Risk Score Sepsis Risk Score: 9.01    Situation  Chief Complaint   Patient presents with    Urinary Tract Infection    Abdominal Pain    Dizziness     Brief Description of Patient's Condition: pt arrives from home with complaints of frequent urination and LLQ pain, has hx of UTI and reports that she self caths bid daily   Mental Status: oriented, alert, coherent, logical, thought processes intact, and able to concentrate and follow conversation  Arrived from:Home  Imaging:   CT ABDOMEN PELVIS WO CONTRAST Additional Contrast? None   Final Result   1.  Mildly distended small bowel in left abdomen without discrete transition   point. Mild ileus can be considered. No evidence of obstruction.   2.  Other stable findings as above.         XR CHEST PORTABLE   Final Result      No acute process on portable chest.         XR ABDOMEN (KUB) (SINGLE AP VIEW)    (Results Pending)     Abnormal labs:   Abnormal Labs Reviewed   CBC WITH AUTO DIFFERENTIAL - Abnormal; Notable for the following components:       Result Value    WBC 16.2 (*)     RBC 3.35 (*)     Hemoglobin 8.3 (*)     Hematocrit 27.6 (*)     MCH 24.8 (*)     RDW 17.2 (*)     Neutrophils % 80 (*)     Lymphocytes % 7 (*)     Immature Granulocytes 1 (*)     Neutrophils Absolute 13.0 (*)     Monocytes Absolute 1.2 (*)     Eosinophils Absolute 0.5 (*)     Absolute Immature Granulocyte 0.2 (*)     All other components within normal limits   COMPREHENSIVE METABOLIC PANEL - Abnormal; Notable for the following components:    Glucose 275 (*)     Est, Glom Filt Rate 57 (*)     Albumin 3.4 (*)     All other components within normal limits   URINALYSIS WITH REFLEX TO CULTURE - Abnormal; Notable for

## 2024-04-02 NOTE — PLAN OF CARE
Problem: Discharge Planning  Goal: Discharge to home or other facility with appropriate resources  4/2/2024 1931 by Alexandra Payan RN  Outcome: Progressing  Flowsheets (Taken 4/2/2024 1931)  Discharge to home or other facility with appropriate resources:   Identify barriers to discharge with patient and caregiver   Arrange for needed discharge resources and transportation as appropriate  4/2/2024 1401 by Lucia Licea RN  Outcome: Progressing  Flowsheets (Taken 4/2/2024 1357)  Discharge to home or other facility with appropriate resources:   Identify barriers to discharge with patient and caregiver   Arrange for needed discharge resources and transportation as appropriate   Identify discharge learning needs (meds, wound care, etc)     Problem: Safety - Adult  Goal: Free from fall injury  4/2/2024 1931 by Alexandra Payan RN  Outcome: Progressing  Flowsheets (Taken 4/2/2024 1931)  Free From Fall Injury:   Instruct family/caregiver on patient safety   Based on caregiver fall risk screen, instruct family/caregiver to ask for assistance with transferring infant if caregiver noted to have fall risk factors  4/2/2024 1401 by Lucia Licea RN  Outcome: Progressing     Problem: ABCDS Injury Assessment  Goal: Absence of physical injury  4/2/2024 1931 by Alexandra Payan RN  Outcome: Progressing  Flowsheets (Taken 4/2/2024 1931)  Absence of Physical Injury: Implement safety measures based on patient assessment  4/2/2024 1401 by Lucia Licea RN  Outcome: Progressing     Problem: Pain  Goal: Verbalizes/displays adequate comfort level or baseline comfort level  4/2/2024 1931 by Alexandra Payan RN  Outcome: Progressing  Flowsheets (Taken 4/2/2024 1931)  Verbalizes/displays adequate comfort level or baseline comfort level:   Encourage patient to monitor pain and request assistance   Assess pain using appropriate pain scale  4/2/2024 1401 by Lucia Licea RN  Outcome: Progressing

## 2024-04-02 NOTE — CARE COORDINATION
04/02/24 1458   Service Assessment   Patient Orientation Alert and Oriented   Cognition Alert   History Provided By Patient   Primary Caregiver Self   Accompanied By/Relationship    Support Systems Spouse/Significant Other   Patient's Healthcare Decision Maker is: Named in Scanned ACP Document   PCP Verified by CM Yes  (Dr. Carreno last seen last week)   Last Visit to PCP Within last 3 months   Prior Functional Level Independent in ADLs/IADLs;Mobility  (uses wc, cane, rw, rollator)   Current Functional Level Independent in ADLs/IADLs;Mobility  (uses wc, cane, rw, rollator)   Can patient return to prior living arrangement Yes   Ability to make needs known: Good   Family able to assist with home care needs: Yes   Would you like for me to discuss the discharge plan with any other family members/significant others, and if so, who? Yes  ()   Financial Resources Medicare   Community Resources None   CM/SW Referral Other (see comment)  (discharge planning)   Social/Functional History   Lives With Spouse   Type of Home House   Discharge Planning   Patient expects to be discharged to: House     CM met with pt and  at bedside to discuss dispo and verified demographics. Pt is from home with  and currently on service with [facility]. Pt reports independent with ADLs and iADLs at baseline. DME: rollator, rw, wc, cane. Pt denies hx of IPR/SNF, and reports they are not interested in SNF/IPR at this time.  to transport when cleared for dc.     Rx: [facility] at the [place]   Meds to Bed declined      Advance Care Planning     General Advance Care Planning (ACP) Conversation    Date of Conversation: 4/2/2024  Conducted with: Patient with Decision Making Capacity    Healthcare Decision Maker:    Primary Decision Maker: Jasmeet Metzger - Spouse - [phone]  Click here to complete Healthcare Decision Makers including selection of the Healthcare Decision Maker Relationship (ie \"Primary\").

## 2024-04-02 NOTE — PROGRESS NOTES
4 Eyes Skin Assessment     NAME:  Julita Metzger  YOB: 1935  MEDICAL RECORD NUMBER:  [id number]    The patient is being assessed for  Admission    I agree that at least one RN has performed a thorough Head to Toe Skin Assessment on the patient. ALL assessment sites listed below have been assessed.      Areas assessed by both nurses:    Head, Face, Ears, Shoulders, Back, Chest, Arms, Elbows, Hands, Sacrum. Buttock, Coccyx, Ischium, Legs. Feet and Heels, and Under Medical Devices         Does the Patient have a Wound? No noted wound(s)       Cuong Prevention initiated by RN: Yes  Wound Care Orders initiated by RN: No    Pressure Injury (Stage 3,4, Unstageable, DTI, NWPT, and Complex wounds) if present, place Wound referral order by RN under : No    New Ostomies, if present place, Ostomy referral order under : No     Nurse 1 eSignature: Electronically signed by Lucia Licea RN on 4/2/24 at 2:02 PM EDT    **SHARE this note so that the co-signing nurse can place an eSignature**    Nurse 2 eSignature: Electronically signed by ANJELICA COELHO RN on 4/2/24 at 2:45 PM EDT

## 2024-04-02 NOTE — H&P
PRESENT ILLNESS:     Julita Metzger is a 88 y.o.  female with PMHx significant for atrial fibrillation on Eliquis, MI, CAD, stroke, diabetes mellitus who presented to the ED with a primary complaint of urinary frequency and \"brain fog\" over the last several days. She reports associated nausea and LLQ abdominal pain. Patient reports that she self caths typically twice a day. She follows with urologist, Dr. Monae Rollins, for hx frequent urinary tract infections. She was just recently discharged from the hospital on 03/16/2024 for sepsis secondary to UTI. Urine cx at the time was negative, but her prior cx grew Klebsiella pneumoniae sensitive to ceftriaxone. Patient denies any fever, chills, cough, SOB, vomiting, constipation, diarrhea, or other complaints at this time.  In the ED, vital stable.  Significant initial labs showed WBC 16.2, hemoglobin 8.3, and elevated lactic acid of 4.03.  Patient received IV fluids in the ED and repeat lactic acid 2.04.  Urinalysis with nitrites, leukocyte esterase, >100 WBC, and 1+ bacteria. Patient received IV cefriaxone x1 in the ED. We were asked to admit for work up and evaluation of the above problems.     Past Medical History:   Diagnosis Date    Arrhythmia     a. 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     Lung abnormality     middle lobe syndrome right lung    SOB (shortness of breath)     on exertion    Stroke (HCC) 12/2017        Past Surgical History:   Procedure Laterality Date    BACK SURGERY      eight surgeries    CATARACT REMOVAL      left eye    COLONOSCOPY Left 9/12/2018    COLONOSCOPY performed by Shashank Tate MD at [facility] ENDOSCOPY    COLONOSCOPY N/A 1/26/2024    COLONOSCOPY DIAGNOSTIC performed by Cheryl Sy MD at [facility] ENDOSCOPY    HERNIA REPAIR      OTHER SURGICAL HISTORY      rt middle lobe removed d/t necrosis    PARTIAL HYSTERECTOMY (CERVIX NOT

## 2024-04-03 ENCOUNTER — APPOINTMENT (OUTPATIENT)
Facility: HOSPITAL | Age: 89
DRG: 699 | End: 2024-04-03
Payer: MEDICARE

## 2024-04-03 LAB
ALBUMIN SERPL-MCNC: 2.9 G/DL (ref 3.5–5)
ALBUMIN/GLOB SERPL: 0.9 (ref 1.1–2.2)
ALP SERPL-CCNC: 92 U/L (ref 45–117)
ALT SERPL-CCNC: 18 U/L (ref 12–78)
ANION GAP SERPL CALC-SCNC: 1 MMOL/L (ref 5–15)
AST SERPL W P-5'-P-CCNC: 20 U/L (ref 15–37)
BACTERIA SPEC CULT: NORMAL
BASOPHILS # BLD: 0.1 K/UL (ref 0–0.1)
BASOPHILS NFR BLD: 1 % (ref 0–1)
BILIRUB SERPL-MCNC: 0.2 MG/DL (ref 0.2–1)
BUN SERPL-MCNC: 9 MG/DL (ref 6–20)
BUN/CREAT SERPL: 14 (ref 12–20)
CA-I BLD-MCNC: 8.4 MG/DL (ref 8.5–10.1)
CHLORIDE SERPL-SCNC: 109 MMOL/L (ref 97–108)
CO2 SERPL-SCNC: 30 MMOL/L (ref 21–32)
CREAT SERPL-MCNC: 0.66 MG/DL (ref 0.55–1.02)
CRP SERPL-MCNC: 3.12 MG/DL (ref 0–0.3)
DIFFERENTIAL METHOD BLD: ABNORMAL
EOSINOPHIL # BLD: 0.7 K/UL (ref 0–0.4)
EOSINOPHIL NFR BLD: 6 % (ref 0–7)
ERYTHROCYTE [DISTWIDTH] IN BLOOD BY AUTOMATED COUNT: 17.2 % (ref 11.5–14.5)
GLOBULIN SER CALC-MCNC: 3.2 G/DL (ref 2–4)
GLUCOSE BLD STRIP.AUTO-MCNC: 103 MG/DL (ref 65–100)
GLUCOSE BLD STRIP.AUTO-MCNC: 165 MG/DL (ref 65–100)
GLUCOSE BLD STRIP.AUTO-MCNC: 187 MG/DL (ref 65–100)
GLUCOSE BLD STRIP.AUTO-MCNC: 89 MG/DL (ref 65–100)
GLUCOSE SERPL-MCNC: 185 MG/DL (ref 65–100)
HCT VFR BLD AUTO: 26.4 % (ref 35–47)
HGB BLD-MCNC: 7.5 G/DL (ref 11.5–16)
IMM GRANULOCYTES # BLD AUTO: 0.1 K/UL (ref 0–0.04)
IMM GRANULOCYTES NFR BLD AUTO: 1 % (ref 0–0.5)
LACTATE SERPL-SCNC: 1.8 MMOL/L (ref 0.4–2)
LACTATE SERPL-SCNC: 1.8 MMOL/L (ref 0.4–2)
LACTATE SERPL-SCNC: 2.4 MMOL/L (ref 0.4–2)
LACTATE SERPL-SCNC: 2.4 MMOL/L (ref 0.4–2)
LYMPHOCYTES # BLD: 0.9 K/UL (ref 0.8–3.5)
LYMPHOCYTES NFR BLD: 8 % (ref 12–49)
Lab: NORMAL
MCH RBC QN AUTO: 24.1 PG (ref 26–34)
MCHC RBC AUTO-ENTMCNC: 28.4 G/DL (ref 30–36.5)
MCV RBC AUTO: 84.9 FL (ref 80–99)
MONOCYTES # BLD: 1.3 K/UL (ref 0–1)
MONOCYTES NFR BLD: 11 % (ref 5–13)
NEUTS SEG # BLD: 8.5 K/UL (ref 1.8–8)
NEUTS SEG NFR BLD: 73 % (ref 32–75)
NRBC # BLD: 0 K/UL (ref 0–0.01)
NRBC BLD-RTO: 0 PER 100 WBC
PERFORMED BY:: ABNORMAL
PERFORMED BY:: NORMAL
PLATELET # BLD AUTO: 254 K/UL (ref 150–400)
PMV BLD AUTO: 11.8 FL (ref 8.9–12.9)
POTASSIUM SERPL-SCNC: 4.3 MMOL/L (ref 3.5–5.1)
PROT SERPL-MCNC: 6.1 G/DL (ref 6.4–8.2)
RBC # BLD AUTO: 3.11 M/UL (ref 3.8–5.2)
RBC MORPH BLD: ABNORMAL
RBC MORPH BLD: ABNORMAL
SODIUM SERPL-SCNC: 140 MMOL/L (ref 136–145)
WBC # BLD AUTO: 11.6 K/UL (ref 3.6–11)

## 2024-04-03 PROCEDURE — 2580000003 HC RX 258: Performed by: STUDENT IN AN ORGANIZED HEALTH CARE EDUCATION/TRAINING PROGRAM

## 2024-04-03 PROCEDURE — 74018 RADEX ABDOMEN 1 VIEW: CPT

## 2024-04-03 PROCEDURE — 6370000000 HC RX 637 (ALT 250 FOR IP): Performed by: STUDENT IN AN ORGANIZED HEALTH CARE EDUCATION/TRAINING PROGRAM

## 2024-04-03 PROCEDURE — 86140 C-REACTIVE PROTEIN: CPT

## 2024-04-03 PROCEDURE — 80053 COMPREHEN METABOLIC PANEL: CPT

## 2024-04-03 PROCEDURE — 83605 ASSAY OF LACTIC ACID: CPT

## 2024-04-03 PROCEDURE — 1100000000 HC RM PRIVATE

## 2024-04-03 PROCEDURE — 85025 COMPLETE CBC W/AUTO DIFF WBC: CPT

## 2024-04-03 PROCEDURE — 36415 COLL VENOUS BLD VENIPUNCTURE: CPT

## 2024-04-03 PROCEDURE — 6360000002 HC RX W HCPCS: Performed by: STUDENT IN AN ORGANIZED HEALTH CARE EDUCATION/TRAINING PROGRAM

## 2024-04-03 RX ORDER — POTASSIUM CHLORIDE 750 MG/1
10 TABLET, FILM COATED, EXTENDED RELEASE ORAL DAILY
Status: DISCONTINUED | OUTPATIENT
Start: 2024-04-03 | End: 2024-04-04 | Stop reason: HOSPADM

## 2024-04-03 RX ADMIN — SODIUM CHLORIDE, PRESERVATIVE FREE 10 ML: 5 INJECTION INTRAVENOUS at 21:50

## 2024-04-03 RX ADMIN — CARVEDILOL 3.12 MG: 3.12 TABLET, FILM COATED ORAL at 17:22

## 2024-04-03 RX ADMIN — APIXABAN 2.5 MG: 2.5 TABLET, FILM COATED ORAL at 09:07

## 2024-04-03 RX ADMIN — FUROSEMIDE 40 MG: 40 TABLET ORAL at 09:07

## 2024-04-03 RX ADMIN — ATORVASTATIN CALCIUM 40 MG: 40 TABLET, FILM COATED ORAL at 21:50

## 2024-04-03 RX ADMIN — PANTOPRAZOLE SODIUM 40 MG: 40 TABLET, DELAYED RELEASE ORAL at 05:33

## 2024-04-03 RX ADMIN — MONTELUKAST 10 MG: 10 TABLET, FILM COATED ORAL at 09:07

## 2024-04-03 RX ADMIN — GABAPENTIN 300 MG: 300 CAPSULE ORAL at 21:50

## 2024-04-03 RX ADMIN — SODIUM CHLORIDE, PRESERVATIVE FREE 10 ML: 5 INJECTION INTRAVENOUS at 09:07

## 2024-04-03 RX ADMIN — MEROPENEM 1000 MG: 1 INJECTION, POWDER, FOR SOLUTION INTRAVENOUS at 23:18

## 2024-04-03 RX ADMIN — SODIUM CHLORIDE: 900 INJECTION INTRAVENOUS at 11:59

## 2024-04-03 RX ADMIN — HYDROCODONE BITARTRATE AND ACETAMINOPHEN 1 TABLET: 10; 325 TABLET ORAL at 23:18

## 2024-04-03 RX ADMIN — APIXABAN 2.5 MG: 2.5 TABLET, FILM COATED ORAL at 21:50

## 2024-04-03 RX ADMIN — ISOSORBIDE MONONITRATE 30 MG: 30 TABLET, EXTENDED RELEASE ORAL at 09:07

## 2024-04-03 RX ADMIN — GABAPENTIN 300 MG: 300 CAPSULE ORAL at 09:07

## 2024-04-03 RX ADMIN — MEROPENEM 1000 MG: 1 INJECTION, POWDER, FOR SOLUTION INTRAVENOUS at 11:59

## 2024-04-03 RX ADMIN — FOLIC ACID 1 MG: 1 TABLET ORAL at 09:08

## 2024-04-03 RX ADMIN — CARVEDILOL 3.12 MG: 3.12 TABLET, FILM COATED ORAL at 09:07

## 2024-04-03 RX ADMIN — POTASSIUM CHLORIDE 10 MEQ: 750 TABLET, EXTENDED RELEASE ORAL at 09:07

## 2024-04-03 RX ADMIN — LOSARTAN POTASSIUM 100 MG: 50 TABLET, FILM COATED ORAL at 09:07

## 2024-04-03 RX ADMIN — Medication 10 MG: at 21:50

## 2024-04-03 RX ADMIN — FERROUS SULFATE TAB 325 MG (65 MG ELEMENTAL FE) 325 MG: 325 (65 FE) TAB at 09:06

## 2024-04-03 ASSESSMENT — PAIN SCALES - GENERAL
PAINLEVEL_OUTOF10: 0
PAINLEVEL_OUTOF10: 0
PAINLEVEL_OUTOF10: 6

## 2024-04-03 ASSESSMENT — ENCOUNTER SYMPTOMS
CONSTIPATION: 1
SHORTNESS OF BREATH: 0
VOMITING: 0
DIARRHEA: 0
BACK PAIN: 0
ABDOMINAL PAIN: 0
NAUSEA: 0
COUGH: 0

## 2024-04-03 ASSESSMENT — PAIN DESCRIPTION - DESCRIPTORS: DESCRIPTORS: ACHING

## 2024-04-03 ASSESSMENT — PAIN DESCRIPTION - LOCATION: LOCATION: GENERALIZED

## 2024-04-03 ASSESSMENT — PAIN DESCRIPTION - ORIENTATION: ORIENTATION: ANTERIOR;POSTERIOR

## 2024-04-03 NOTE — PLAN OF CARE
Problem: Discharge Planning  Goal: Discharge to home or other facility with appropriate resources  4/2/2024 2042 by Alexandra Payan RN  Outcome: Progressing  Flowsheets (Taken 4/2/2024 2042)  Discharge to home or other facility with appropriate resources:   Identify barriers to discharge with patient and caregiver   Arrange for needed discharge resources and transportation as appropriate  4/2/2024 1931 by Alexandra Payan RN  Outcome: Progressing  Flowsheets (Taken 4/2/2024 1931)  Discharge to home or other facility with appropriate resources:   Identify barriers to discharge with patient and caregiver   Arrange for needed discharge resources and transportation as appropriate  4/2/2024 1401 by Lucia Licea RN  Outcome: Progressing  Flowsheets (Taken 4/2/2024 1357)  Discharge to home or other facility with appropriate resources:   Identify barriers to discharge with patient and caregiver   Arrange for needed discharge resources and transportation as appropriate   Identify discharge learning needs (meds, wound care, etc)     Problem: Safety - Adult  Goal: Free from fall injury  4/2/2024 2042 by Alexandra Payan RN  Outcome: Progressing  Flowsheets (Taken 4/2/2024 2042)  Free From Fall Injury:   Instruct family/caregiver on patient safety   Based on caregiver fall risk screen, instruct family/caregiver to ask for assistance with transferring infant if caregiver noted to have fall risk factors  4/2/2024 1931 by Alexandra Payan RN  Outcome: Progressing  Flowsheets (Taken 4/2/2024 1931)  Free From Fall Injury:   Instruct family/caregiver on patient safety   Based on caregiver fall risk screen, instruct family/caregiver to ask for assistance with transferring infant if caregiver noted to have fall risk factors  4/2/2024 1401 by Lucia Licea, RN  Outcome: Progressing     Problem: ABCDS Injury Assessment  Goal: Absence of physical injury  4/2/2024 2042 by Alexandra Payan RN  Outcome:

## 2024-04-03 NOTE — PROGRESS NOTES
Hospitalist Progress Note    NAME:   Julita Metzger   : 1935   MRN: [id number]     Date/Time: 4/3/2024 2:16 PM  Patient PCP: Royer Carreno MD    Estimated discharge date: 24-48 hrs  Barriers: Urine cx, Urology consult       HOSPITAL COURSE:    Julita Metzger is a 88 y.o.  female with PMHx significant for atrial fibrillation on Eliquis, MI, CAD, stroke, diabetes mellitus who presented to the ED with a primary complaint of urinary frequency and \"brain fog\" over the last several days. She reports associated nausea and LLQ abdominal pain. Patient reports that she self caths typically twice a day. She follows with urologist, Dr. Monae Rollins, for hx frequent urinary tract infections. She was just recently discharged from the hospital on 2024 for sepsis secondary to UTI. Urine cx at the time was negative, but her prior cx grew Klebsiella pneumoniae sensitive to ceftriaxone. Patient denies any fever, chills, cough, SOB, vomiting, constipation, diarrhea, or other complaints at this time.  In the ED, vital stable.  Significant initial labs showed WBC 16.2, hemoglobin 8.3, and elevated lactic acid of 4.03.  Patient received IV fluids in the ED and repeat lactic acid 2.04.  Urinalysis with nitrites, leukocyte esterase, >100 WBC, and 1+ bacteria. Patient received IV cefriaxone x1 in the ED. We were asked to admit for work up and evaluation of the above problems.    Assessment / Plan:    Recurrent, complicated UTI  - Complicated w/ hx self caths  - Urinalysis with nitrites, leukocyte esterase, >100 WBC, and 1+ bacteria. Although patient's prior UAs have shown obvious signs of infection, cultures have been negative.   - Consider ID consult for further input  - Urology consult  - Empiric coverage with IV merrem  - Renal functions stable  - Lactic acidosis improving, leukocytosis improving   - Urine cx negative for growth     Ileus?  - CT abd/pelvis showed mildly distended small bowel in left abdomen without discrete

## 2024-04-03 NOTE — PLAN OF CARE
Problem: Discharge Planning  Goal: Discharge to home or other facility with appropriate resources  Outcome: Progressing  Flowsheets (Taken 4/2/2024 2042)  Discharge to home or other facility with appropriate resources:   Identify barriers to discharge with patient and caregiver   Arrange for needed discharge resources and transportation as appropriate     Problem: Safety - Adult  Goal: Free from fall injury  4/3/2024 1938 by Alexandra Payan, RN  Outcome: Progressing  Flowsheets (Taken 4/2/2024 2042)  Free From Fall Injury:   Instruct family/caregiver on patient safety   Based on caregiver fall risk screen, instruct family/caregiver to ask for assistance with transferring infant if caregiver noted to have fall risk factors  4/3/2024 1205 by Vernell Webster RN  Outcome: Progressing     Problem: ABCDS Injury Assessment  Goal: Absence of physical injury  4/3/2024 1938 by Alexandra Payan RN  Outcome: Progressing  Flowsheets (Taken 4/2/2024 2042)  Absence of Physical Injury: Implement safety measures based on patient assessment  4/3/2024 1205 by Vernell Webster RN  Outcome: Progressing     Problem: Pain  Goal: Verbalizes/displays adequate comfort level or baseline comfort level  Outcome: Progressing  Flowsheets (Taken 4/2/2024 2042)  Verbalizes/displays adequate comfort level or baseline comfort level:   Encourage patient to monitor pain and request assistance   Assess pain using appropriate pain scale     Problem: Chronic Conditions and Co-morbidities  Goal: Patient's chronic conditions and co-morbidity symptoms are monitored and maintained or improved  4/3/2024 1938 by Alexandra Payan RN  Outcome: Progressing  Flowsheets (Taken 4/3/2024 1938)  Care Plan - Patient's Chronic Conditions and Co-Morbidity Symptoms are Monitored and Maintained or Improved: Monitor and assess patient's chronic conditions and comorbid symptoms for stability, deterioration, or improvement  4/3/2024 1205 by Vernell Webster

## 2024-04-03 NOTE — PROGRESS NOTES
4 Eyes Skin Assessment     NAME:  Julita Metzger  YOB: 1935  MEDICAL RECORD NUMBER:  [id number]    The patient is being assessed for  Other weekly    I agree that at least one RN has performed a thorough Head to Toe Skin Assessment on the patient. ALL assessment sites listed below have been assessed.      Areas assessed by both nurses:    Head, Face, Ears, Shoulders, Back, Chest, Arms, Elbows, Hands, Sacrum. Buttock, Coccyx, Ischium, Legs. Feet and Heels, and Under Medical Devices         Does the Patient have a Wound? No noted wound(s)       Cuong Prevention initiated by RN: No  Wound Care Orders initiated by RN: No    Pressure Injury (Stage 3,4, Unstageable, DTI, NWPT, and Complex wounds) if present, place Wound referral order by RN under : No    New Ostomies, if present place, Ostomy referral order under : No     Nurse 1 eSignature: Electronically signed by ANJELICA COELHO RN on 4/3/24 at 12:21 PM EDT    **SHARE this note so that the co-signing nurse can place an eSignature**    Nurse 2 eSignature: Electronically signed by Yogesh Villegas RN on 4/3/24 at 1:34 PM EDT

## 2024-04-03 NOTE — PROGRESS NOTES
Physician Progress Note      PATIENT:               TIFAFNIE PHILLIPS  CSN #:                  [id number]  :                       1935  ADMIT DATE:       2024 9:06 AM  DISCH DATE:  RESPONDING  PROVIDER #:        Garfield Escalante PA-C          QUERY TEXT:    Pt admitted with UTI.  Pt noted to self cath. If possible, please document in   the progress notes and discharge summary if you are evaluating and/or treating   any of the following:    The medical record reflects the following:  Risk Factors: UTI, DM II, Self cath.    Clinical Indicators: H/P: \"Recurrent, complicated UTI w/ hx self caths.\"    Treatment: U/A and Cx, IV Abx, Lab monitoring.  .  Thank you,  ERIC Alcantar, RN, CRCR, CDI Specialist  [email or web address]  Can also be reached on [employer].  .  Options provided:  -- UTI due to self catheterization  -- UTI not due to self catheterization  -- Other - I will add my own diagnosis  -- Disagree - Not applicable / Not valid  -- Disagree - Clinically unable to determine / Unknown  -- Refer to Clinical Documentation Reviewer    PROVIDER RESPONSE TEXT:    UTI is due to self catheterization.    Query created by: Susan Morales on 2024 2:58 PM      Electronically signed by:  Garfield Escalante PA-C 4/3/2024 6:52 AM

## 2024-04-04 VITALS
TEMPERATURE: 98 F | SYSTOLIC BLOOD PRESSURE: 149 MMHG | HEIGHT: 69 IN | WEIGHT: 170 LBS | RESPIRATION RATE: 18 BRPM | HEART RATE: 74 BPM | DIASTOLIC BLOOD PRESSURE: 68 MMHG | OXYGEN SATURATION: 95 % | BODY MASS INDEX: 25.18 KG/M2

## 2024-04-04 LAB
ANION GAP SERPL CALC-SCNC: 2 MMOL/L (ref 5–15)
BASOPHILS # BLD: 0 K/UL (ref 0–0.1)
BASOPHILS NFR BLD: 0 % (ref 0–1)
BUN SERPL-MCNC: 6 MG/DL (ref 6–20)
BUN/CREAT SERPL: 11 (ref 12–20)
CA-I BLD-MCNC: 8.5 MG/DL (ref 8.5–10.1)
CHLORIDE SERPL-SCNC: 108 MMOL/L (ref 97–108)
CO2 SERPL-SCNC: 30 MMOL/L (ref 21–32)
CREAT SERPL-MCNC: 0.56 MG/DL (ref 0.55–1.02)
CRP SERPL-MCNC: 2.85 MG/DL (ref 0–0.3)
DIFFERENTIAL METHOD BLD: ABNORMAL
EOSINOPHIL # BLD: 1.2 K/UL (ref 0–0.4)
EOSINOPHIL NFR BLD: 11 % (ref 0–7)
ERYTHROCYTE [DISTWIDTH] IN BLOOD BY AUTOMATED COUNT: 17.4 % (ref 11.5–14.5)
GLUCOSE BLD STRIP.AUTO-MCNC: 134 MG/DL (ref 65–100)
GLUCOSE SERPL-MCNC: 134 MG/DL (ref 65–100)
HCT VFR BLD AUTO: 26.3 % (ref 35–47)
HGB BLD-MCNC: 7.5 G/DL (ref 11.5–16)
IMM GRANULOCYTES # BLD AUTO: 0 K/UL
IMM GRANULOCYTES NFR BLD AUTO: 0 %
LACTATE SERPL-SCNC: 1.7 MMOL/L (ref 0.4–2)
LACTATE SERPL-SCNC: 1.7 MMOL/L (ref 0.4–2)
LYMPHOCYTES # BLD: 1.7 K/UL (ref 0.8–3.5)
LYMPHOCYTES NFR BLD: 16 % (ref 12–49)
MCH RBC QN AUTO: 23.8 PG (ref 26–34)
MCHC RBC AUTO-ENTMCNC: 28.5 G/DL (ref 30–36.5)
MCV RBC AUTO: 83.5 FL (ref 80–99)
MONOCYTES # BLD: 0.9 K/UL (ref 0–1)
MONOCYTES NFR BLD: 8 % (ref 5–13)
MYELOCYTES NFR BLD MANUAL: 3 %
NEUTS BAND NFR BLD MANUAL: 4 % (ref 0–6)
NEUTS SEG # BLD: 6.6 K/UL (ref 1.8–8)
NEUTS SEG NFR BLD: 58 % (ref 32–75)
NRBC # BLD: 0 K/UL (ref 0–0.01)
NRBC BLD-RTO: 0 PER 100 WBC
PERFORMED BY:: ABNORMAL
PLATELET # BLD AUTO: 298 K/UL (ref 150–400)
PMV BLD AUTO: 12 FL (ref 8.9–12.9)
POTASSIUM SERPL-SCNC: 4.4 MMOL/L (ref 3.5–5.1)
RBC # BLD AUTO: 3.15 M/UL (ref 3.8–5.2)
RBC MORPH BLD: ABNORMAL
RBC MORPH BLD: ABNORMAL
SODIUM SERPL-SCNC: 140 MMOL/L (ref 136–145)
WBC # BLD AUTO: 10.7 K/UL (ref 3.6–11)

## 2024-04-04 PROCEDURE — 99222 1ST HOSP IP/OBS MODERATE 55: CPT | Performed by: UROLOGY

## 2024-04-04 PROCEDURE — 2580000003 HC RX 258: Performed by: STUDENT IN AN ORGANIZED HEALTH CARE EDUCATION/TRAINING PROGRAM

## 2024-04-04 PROCEDURE — 85025 COMPLETE CBC W/AUTO DIFF WBC: CPT

## 2024-04-04 PROCEDURE — 86140 C-REACTIVE PROTEIN: CPT

## 2024-04-04 PROCEDURE — 80048 BASIC METABOLIC PNL TOTAL CA: CPT

## 2024-04-04 PROCEDURE — 6370000000 HC RX 637 (ALT 250 FOR IP): Performed by: STUDENT IN AN ORGANIZED HEALTH CARE EDUCATION/TRAINING PROGRAM

## 2024-04-04 PROCEDURE — 82962 GLUCOSE BLOOD TEST: CPT

## 2024-04-04 PROCEDURE — 83605 ASSAY OF LACTIC ACID: CPT

## 2024-04-04 RX ORDER — FLUCONAZOLE 150 MG/1
150 TABLET ORAL DAILY
Qty: 14 TABLET | Refills: 1 | Status: SHIPPED | OUTPATIENT
Start: 2024-04-04 | End: 2024-05-02

## 2024-04-04 RX ORDER — CEPHALEXIN 500 MG/1
500 CAPSULE ORAL 4 TIMES DAILY
Qty: 48 CAPSULE | Refills: 0 | Status: SHIPPED | OUTPATIENT
Start: 2024-04-04 | End: 2024-04-16

## 2024-04-04 RX ORDER — FLUCONAZOLE 100 MG/1
200 TABLET ORAL DAILY
Status: DISCONTINUED | OUTPATIENT
Start: 2024-04-04 | End: 2024-04-04 | Stop reason: HOSPADM

## 2024-04-04 RX ADMIN — FUROSEMIDE 40 MG: 40 TABLET ORAL at 08:17

## 2024-04-04 RX ADMIN — MONTELUKAST 10 MG: 10 TABLET, FILM COATED ORAL at 08:17

## 2024-04-04 RX ADMIN — POTASSIUM CHLORIDE 10 MEQ: 750 TABLET, EXTENDED RELEASE ORAL at 08:17

## 2024-04-04 RX ADMIN — HYDROCODONE BITARTRATE AND ACETAMINOPHEN 1 TABLET: 10; 325 TABLET ORAL at 08:17

## 2024-04-04 RX ADMIN — LOSARTAN POTASSIUM 100 MG: 50 TABLET, FILM COATED ORAL at 08:16

## 2024-04-04 RX ADMIN — SODIUM CHLORIDE, PRESERVATIVE FREE 10 ML: 5 INJECTION INTRAVENOUS at 09:23

## 2024-04-04 RX ADMIN — CARVEDILOL 3.12 MG: 3.12 TABLET, FILM COATED ORAL at 08:17

## 2024-04-04 RX ADMIN — FERROUS SULFATE TAB 325 MG (65 MG ELEMENTAL FE) 325 MG: 325 (65 FE) TAB at 08:17

## 2024-04-04 RX ADMIN — ISOSORBIDE MONONITRATE 30 MG: 30 TABLET, EXTENDED RELEASE ORAL at 08:18

## 2024-04-04 RX ADMIN — APIXABAN 2.5 MG: 2.5 TABLET, FILM COATED ORAL at 08:17

## 2024-04-04 RX ADMIN — GABAPENTIN 300 MG: 300 CAPSULE ORAL at 08:17

## 2024-04-04 RX ADMIN — FOLIC ACID 1 MG: 1 TABLET ORAL at 08:17

## 2024-04-04 RX ADMIN — PANTOPRAZOLE SODIUM 40 MG: 40 TABLET, DELAYED RELEASE ORAL at 06:01

## 2024-04-04 RX ADMIN — DOCUSATE SODIUM 100 MG: 50 LIQUID ORAL at 09:23

## 2024-04-04 ASSESSMENT — PAIN SCALES - GENERAL
PAINLEVEL_OUTOF10: 4
PAINLEVEL_OUTOF10: 0
PAINLEVEL_OUTOF10: 0

## 2024-04-04 ASSESSMENT — PAIN DESCRIPTION - LOCATION: LOCATION: HEAD

## 2024-04-04 NOTE — PROGRESS NOTES
Patient being discharged home with HH. VS stable. PIV removed. Tele box discontinued. Discharge instructions reviewed with patient and  at bedside. Patient verbalized understanding and has no further questions at this time.  at bedside to transport patient home

## 2024-04-04 NOTE — PLAN OF CARE
Problem: Discharge Planning  Goal: Discharge to home or other facility with appropriate resources  4/4/2024 0928 by Lucia Licea RN  Outcome: Progressing  Flowsheets (Taken 4/4/2024 0820)  Discharge to home or other facility with appropriate resources:   Arrange for needed discharge resources and transportation as appropriate   Identify barriers to discharge with patient and caregiver  4/3/2024 1938 by Alexandra Payan RN  Outcome: Progressing  Flowsheets (Taken 4/2/2024 2042)  Discharge to home or other facility with appropriate resources:   Identify barriers to discharge with patient and caregiver   Arrange for needed discharge resources and transportation as appropriate     Problem: Safety - Adult  Goal: Free from fall injury  4/4/2024 0928 by Lucia Licea RN  Outcome: Progressing  4/3/2024 1938 by Alexandra Payan RN  Outcome: Progressing  Flowsheets (Taken 4/2/2024 2042)  Free From Fall Injury:   Instruct family/caregiver on patient safety   Based on caregiver fall risk screen, instruct family/caregiver to ask for assistance with transferring infant if caregiver noted to have fall risk factors     Problem: ABCDS Injury Assessment  Goal: Absence of physical injury  4/4/2024 0928 by Lucia Licea RN  Outcome: Progressing  4/3/2024 1938 by Alexandra Payan RN  Outcome: Progressing  Flowsheets (Taken 4/2/2024 2042)  Absence of Physical Injury: Implement safety measures based on patient assessment     Problem: Pain  Goal: Verbalizes/displays adequate comfort level or baseline comfort level  4/4/2024 0928 by Lucia Licea RN  Outcome: Progressing  4/3/2024 1938 by Alexandra Payan RN  Outcome: Progressing  Flowsheets (Taken 4/2/2024 2042)  Verbalizes/displays adequate comfort level or baseline comfort level:   Encourage patient to monitor pain and request assistance   Assess pain using appropriate pain scale     Problem: Chronic Conditions and Co-morbidities  Goal: Patient's chronic conditions

## 2024-04-04 NOTE — CARE COORDINATION
DCP: home with , continue services with [facility]    Transition of Care Plan:    RUR: 26%  Prior Level of Functioning: home with HH  Disposition: home with [facility]  If SNF or IPR: Date FOC offered: n/a  Date FOC received: n/a  Accepting facility: n/a  Date authorization started with reference number: n/a  Date authorization received and expires: n/a  Follow up appointments: unit secretary to setup as needed  DME needed: n/a  Transportation at discharge:    IM/IMM Medicare/ letter given: 1st IMM given 4/2  Is patient a  and connected with [facility]? no  If yes, was  transfer form completed and [facility] notified? N/a  Caregiver Contact:  at bedside  Discharge Caregiver contacted prior to discharge? yes  Care Conference needed? no  Barriers to discharge: none

## 2024-04-04 NOTE — CONSULTS
Facility-Administered Medications   Medication Dose Route Frequency    potassium chloride (KLOR-CON) extended release tablet 10 mEq  10 mEq Oral Daily    sodium chloride flush 0.9 % injection 5-40 mL  5-40 mL IntraVENous 2 times per day    sodium chloride flush 0.9 % injection 5-40 mL  5-40 mL IntraVENous PRN    0.9 % sodium chloride infusion   IntraVENous PRN    potassium chloride (KLOR-CON M) extended release tablet 40 mEq  40 mEq Oral PRN    Or    potassium bicarb-citric acid (EFFER-K) effervescent tablet 40 mEq  40 mEq Oral PRN    Or    potassium chloride 10 mEq/100 mL IVPB (Peripheral Line)  10 mEq IntraVENous PRN    magnesium sulfate 2000 mg in 50 mL IVPB premix  2,000 mg IntraVENous PRN    ondansetron (ZOFRAN-ODT) disintegrating tablet 4 mg  4 mg Oral Q8H PRN    Or    ondansetron (ZOFRAN) injection 4 mg  4 mg IntraVENous Q6H PRN    polyethylene glycol (GLYCOLAX) packet 17 g  17 g Oral Daily PRN    acetaminophen (TYLENOL) tablet 650 mg  650 mg Oral Q6H PRN    Or    acetaminophen (TYLENOL) suppository 650 mg  650 mg Rectal Q6H PRN    apixaban (ELIQUIS) tablet 2.5 mg  2.5 mg Oral BID    atorvastatin (LIPITOR) tablet 40 mg  40 mg Oral Nightly    carvedilol (COREG) tablet 3.125 mg  3.125 mg Oral BID WC    docusate (COLACE) 50 MG/5ML liquid 100 mg  100 mg Oral Daily    ferrous sulfate (IRON 325) tablet 325 mg  325 mg Oral Daily with breakfast    folic acid (FOLVITE) tablet 1 mg  1 mg Oral Daily    furosemide (LASIX) tablet 40 mg  40 mg Oral Daily    gabapentin (NEURONTIN) capsule 300 mg  300 mg Oral BID    HYDROcodone-acetaminophen (NORCO)  MG per tablet 1 tablet  1 tablet Oral Q6H PRN    isosorbide mononitrate (IMDUR) extended release tablet 30 mg  30 mg Oral Daily    losartan (COZAAR) tablet 100 mg  100 mg Oral Daily    meclizine (ANTIVERT) tablet 25 mg  25 mg Oral TID PRN    melatonin tablet 10 mg  10 mg Oral Nightly    insulin lispro (HUMALOG) injection vial 0-8 Units  0-8 Units SubCUTAneous TID

## 2024-04-04 NOTE — DISCHARGE SUMMARY
Discharge Summary    Name: Julita Metzger  [id number]  YOB: 1935 (Age: 88 y.o.)   Date of Admission: 4/2/2024  Date of Discharge: 4/4/2024  Attending Physician: Carlos Vora MD    Discharge Diagnosis:   Principal Problem:    Complicated UTI (urinary tract infection)  Resolved Problems:    * No resolved hospital problems. *       Consultations:  IP CONSULT TO UROLOGY  IP CONSULT TO CASE MANAGEMENT      Brief Admission History/Reason for Admission Per Dallas Coyne MD:       Brief Hospital Course by Main Problems:     Julita Metzger is a 88 y.o.  female with PMHx significant for atrial fibrillation on Eliquis, MI, CAD, stroke, diabetes mellitus who presented to the ED with a primary complaint of urinary frequency and \"brain fog\" over the last several days. She reports associated nausea and LLQ abdominal pain. Patient reports that she self caths typically twice a day. She follows with urologist, Dr. Monae Rollins, for hx frequent urinary tract infections. She was just recently discharged from the hospital on 03/16/2024 for sepsis secondary to UTI. Urine cx at the time was negative, but her prior cx grew Klebsiella pneumoniae sensitive to ceftriaxone. Patient denies any fever, chills, cough, SOB, vomiting, constipation, diarrhea, or other complaints at this time.  In the ED, vital stable.  Significant initial labs showed WBC 16.2, hemoglobin 8.3, and elevated lactic acid of 4.03.  Patient received IV fluids in the ED and repeat lactic acid 2.04.  Urinalysis with nitrites, leukocyte esterase, >100 WBC, and 1+ bacteria. Patient received IV cefriaxone x1 in the ED. We were asked to admit for work up and evaluation of the above problems. Started on IV merrem for empiric coverage. Urine culture negative for growth. Post-void bladder scan 04/03 showed 74 mL retained urine. Post-void bladder scan 04/04 showed 0 mL retained urine. Urology, Dr. Donnelly, stating patient likely

## 2024-04-04 NOTE — PROGRESS NOTES
0844. Verbal orders received from provider to post void bladder scan patient    0941. Post void bladder scan showed 0mL. Provider notified.

## 2024-04-07 LAB
BACTERIA SPEC CULT: NORMAL
BACTERIA SPEC CULT: NORMAL
Lab: NORMAL
Lab: NORMAL

## 2024-04-08 LAB
BACTERIA SPEC CULT: NORMAL
Lab: NORMAL

## 2024-04-09 LAB
BACTERIA SPEC CULT: NORMAL
Lab: NORMAL

## 2024-05-04 PROBLEM — N39.0 UTI (URINARY TRACT INFECTION): Status: RESOLVED | Noted: 2023-09-12 | Resolved: 2024-05-04

## 2024-05-06 ENCOUNTER — APPOINTMENT (OUTPATIENT)
Facility: HOSPITAL | Age: 89
DRG: 689 | End: 2024-05-06
Payer: MEDICARE

## 2024-05-06 ENCOUNTER — HOSPITAL ENCOUNTER (INPATIENT)
Facility: HOSPITAL | Age: 89
LOS: 2 days | Discharge: HOME HEALTH | DRG: 689 | End: 2024-05-08
Attending: STUDENT IN AN ORGANIZED HEALTH CARE EDUCATION/TRAINING PROGRAM | Admitting: INTERNAL MEDICINE
Payer: MEDICARE

## 2024-05-06 DIAGNOSIS — R31.9 URINARY TRACT INFECTION WITH HEMATURIA, SITE UNSPECIFIED: ICD-10-CM

## 2024-05-06 DIAGNOSIS — R41.82 ALTERED MENTAL STATUS, UNSPECIFIED ALTERED MENTAL STATUS TYPE: Primary | ICD-10-CM

## 2024-05-06 DIAGNOSIS — N39.0 URINARY TRACT INFECTION WITH HEMATURIA, SITE UNSPECIFIED: ICD-10-CM

## 2024-05-06 LAB
ALBUMIN SERPL-MCNC: 3.5 G/DL (ref 3.5–5)
ALBUMIN/GLOB SERPL: 1 (ref 1.1–2.2)
ALP SERPL-CCNC: 101 U/L (ref 45–117)
ALT SERPL-CCNC: 23 U/L (ref 12–78)
AMPHET UR QL SCN: NEGATIVE
ANION GAP SERPL CALC-SCNC: 6 MMOL/L (ref 5–15)
APPEARANCE UR: ABNORMAL
AST SERPL W P-5'-P-CCNC: 21 U/L (ref 15–37)
BACTERIA URNS QL MICRO: NEGATIVE /HPF
BARBITURATES UR QL SCN: POSITIVE
BASOPHILS # BLD: 0.1 K/UL (ref 0–0.1)
BASOPHILS NFR BLD: 1 % (ref 0–1)
BENZODIAZ UR QL: POSITIVE
BILIRUB SERPL-MCNC: 0.2 MG/DL (ref 0.2–1)
BILIRUB UR QL: NEGATIVE
BUN SERPL-MCNC: 8 MG/DL (ref 6–20)
BUN/CREAT SERPL: 12 (ref 12–20)
CA-I BLD-MCNC: 9.3 MG/DL (ref 8.5–10.1)
CANNABINOIDS UR QL SCN: NEGATIVE
CHLORIDE SERPL-SCNC: 105 MMOL/L (ref 97–108)
CO2 SERPL-SCNC: 28 MMOL/L (ref 21–32)
COCAINE UR QL SCN: NEGATIVE
COLOR UR: ABNORMAL
CREAT SERPL-MCNC: 0.69 MG/DL (ref 0.55–1.02)
DIFFERENTIAL METHOD BLD: ABNORMAL
EKG ATRIAL RATE: 76 BPM
EKG DIAGNOSIS: NORMAL
EKG P AXIS: 37 DEGREES
EKG P-R INTERVAL: 168 MS
EKG Q-T INTERVAL: 460 MS
EKG QRS DURATION: 120 MS
EKG QTC CALCULATION (BAZETT): 517 MS
EKG R AXIS: -44 DEGREES
EKG T AXIS: 40 DEGREES
EKG VENTRICULAR RATE: 76 BPM
EOSINOPHIL # BLD: 0.3 K/UL (ref 0–0.4)
EOSINOPHIL NFR BLD: 4 % (ref 0–7)
EPITH CASTS URNS QL MICRO: ABNORMAL /LPF
ERYTHROCYTE [DISTWIDTH] IN BLOOD BY AUTOMATED COUNT: 23.9 % (ref 11.5–14.5)
ETHANOL SERPL-MCNC: <10 MG/DL (ref 0–0.08)
GLOBULIN SER CALC-MCNC: 3.6 G/DL (ref 2–4)
GLUCOSE BLD STRIP.AUTO-MCNC: 135 MG/DL (ref 65–100)
GLUCOSE BLD STRIP.AUTO-MCNC: 174 MG/DL (ref 65–100)
GLUCOSE BLD STRIP.AUTO-MCNC: 177 MG/DL (ref 65–100)
GLUCOSE BLD STRIP.AUTO-MCNC: 228 MG/DL (ref 65–100)
GLUCOSE SERPL-MCNC: 241 MG/DL (ref 65–100)
GLUCOSE UR STRIP.AUTO-MCNC: 50 MG/DL
HCT VFR BLD AUTO: 32.4 % (ref 35–47)
HGB BLD-MCNC: 9.7 G/DL (ref 11.5–16)
HGB UR QL STRIP: ABNORMAL
IMM GRANULOCYTES # BLD AUTO: 0 K/UL (ref 0–0.04)
IMM GRANULOCYTES NFR BLD AUTO: 0 % (ref 0–0.5)
KETONES UR QL STRIP.AUTO: NEGATIVE MG/DL
LEUKOCYTE ESTERASE UR QL STRIP.AUTO: ABNORMAL
LYMPHOCYTES # BLD: 0.9 K/UL (ref 0.8–3.5)
LYMPHOCYTES NFR BLD: 10 % (ref 12–49)
Lab: ABNORMAL
MCH RBC QN AUTO: 24 PG (ref 26–34)
MCHC RBC AUTO-ENTMCNC: 29.9 G/DL (ref 30–36.5)
MCV RBC AUTO: 80.2 FL (ref 80–99)
METHADONE UR QL: NEGATIVE
MONOCYTES # BLD: 0.9 K/UL (ref 0–1)
MONOCYTES NFR BLD: 10 % (ref 5–13)
NEUTS SEG # BLD: 7.2 K/UL (ref 1.8–8)
NEUTS SEG NFR BLD: 75 % (ref 32–75)
NITRITE UR QL STRIP.AUTO: NEGATIVE
NRBC # BLD: 0 K/UL (ref 0–0.01)
NRBC BLD-RTO: 0 PER 100 WBC
OPIATES UR QL: POSITIVE
PCP UR QL: NEGATIVE
PERFORMED BY:: ABNORMAL
PH UR STRIP: 6 (ref 5–8)
PLATELET # BLD AUTO: 293 K/UL (ref 150–400)
PMV BLD AUTO: 12 FL (ref 8.9–12.9)
POTASSIUM SERPL-SCNC: 3.9 MMOL/L (ref 3.5–5.1)
PROT SERPL-MCNC: 7.1 G/DL (ref 6.4–8.2)
PROT UR STRIP-MCNC: >300 MG/DL
RBC # BLD AUTO: 4.04 M/UL (ref 3.8–5.2)
RBC #/AREA URNS HPF: >100 /HPF (ref 0–5)
SODIUM SERPL-SCNC: 139 MMOL/L (ref 136–145)
SP GR UR REFRACTOMETRY: 1.02 (ref 1–1.03)
TSH SERPL DL<=0.05 MIU/L-ACNC: 3.5 UIU/ML (ref 0.36–3.74)
UROBILINOGEN UR QL STRIP.AUTO: 0.1 EU/DL (ref 0.1–1)
WBC # BLD AUTO: 9.5 K/UL (ref 3.6–11)
WBC URNS QL MICRO: >100 /HPF (ref 0–4)

## 2024-05-06 PROCEDURE — 80307 DRUG TEST PRSMV CHEM ANLYZR: CPT

## 2024-05-06 PROCEDURE — 6360000002 HC RX W HCPCS: Performed by: STUDENT IN AN ORGANIZED HEALTH CARE EDUCATION/TRAINING PROGRAM

## 2024-05-06 PROCEDURE — 81001 URINALYSIS AUTO W/SCOPE: CPT

## 2024-05-06 PROCEDURE — 82077 ASSAY SPEC XCP UR&BREATH IA: CPT

## 2024-05-06 PROCEDURE — 6370000000 HC RX 637 (ALT 250 FOR IP): Performed by: INTERNAL MEDICINE

## 2024-05-06 PROCEDURE — 6360000002 HC RX W HCPCS: Performed by: INTERNAL MEDICINE

## 2024-05-06 PROCEDURE — 74176 CT ABD & PELVIS W/O CONTRAST: CPT

## 2024-05-06 PROCEDURE — 99285 EMERGENCY DEPT VISIT HI MDM: CPT

## 2024-05-06 PROCEDURE — 93005 ELECTROCARDIOGRAM TRACING: CPT | Performed by: STUDENT IN AN ORGANIZED HEALTH CARE EDUCATION/TRAINING PROGRAM

## 2024-05-06 PROCEDURE — 87086 URINE CULTURE/COLONY COUNT: CPT

## 2024-05-06 PROCEDURE — 85025 COMPLETE CBC W/AUTO DIFF WBC: CPT

## 2024-05-06 PROCEDURE — 82962 GLUCOSE BLOOD TEST: CPT

## 2024-05-06 PROCEDURE — 71045 X-RAY EXAM CHEST 1 VIEW: CPT

## 2024-05-06 PROCEDURE — 80053 COMPREHEN METABOLIC PANEL: CPT

## 2024-05-06 PROCEDURE — 1100000000 HC RM PRIVATE

## 2024-05-06 PROCEDURE — 2580000003 HC RX 258: Performed by: STUDENT IN AN ORGANIZED HEALTH CARE EDUCATION/TRAINING PROGRAM

## 2024-05-06 PROCEDURE — 96374 THER/PROPH/DIAG INJ IV PUSH: CPT

## 2024-05-06 PROCEDURE — 70450 CT HEAD/BRAIN W/O DYE: CPT

## 2024-05-06 PROCEDURE — 2580000003 HC RX 258: Performed by: INTERNAL MEDICINE

## 2024-05-06 PROCEDURE — 84443 ASSAY THYROID STIM HORMONE: CPT

## 2024-05-06 RX ORDER — ISOSORBIDE MONONITRATE 30 MG/1
30 TABLET, EXTENDED RELEASE ORAL DAILY
Status: DISCONTINUED | OUTPATIENT
Start: 2024-05-06 | End: 2024-05-08 | Stop reason: HOSPADM

## 2024-05-06 RX ORDER — POLYETHYLENE GLYCOL 3350 17 G/17G
17 POWDER, FOR SOLUTION ORAL DAILY PRN
Status: DISCONTINUED | OUTPATIENT
Start: 2024-05-06 | End: 2024-05-08 | Stop reason: HOSPADM

## 2024-05-06 RX ORDER — FUROSEMIDE 40 MG/1
40 TABLET ORAL DAILY
Status: DISCONTINUED | OUTPATIENT
Start: 2024-05-06 | End: 2024-05-08 | Stop reason: HOSPADM

## 2024-05-06 RX ORDER — TRAZODONE HYDROCHLORIDE 50 MG/1
50 TABLET ORAL NIGHTLY PRN
Status: DISCONTINUED | OUTPATIENT
Start: 2024-05-06 | End: 2024-05-08 | Stop reason: HOSPADM

## 2024-05-06 RX ORDER — 0.9 % SODIUM CHLORIDE 0.9 %
500 INTRAVENOUS SOLUTION INTRAVENOUS ONCE
Status: COMPLETED | OUTPATIENT
Start: 2024-05-06 | End: 2024-05-06

## 2024-05-06 RX ORDER — DONEPEZIL HYDROCHLORIDE 5 MG/1
5 TABLET, FILM COATED ORAL NIGHTLY
Status: DISCONTINUED | OUTPATIENT
Start: 2024-05-06 | End: 2024-05-08 | Stop reason: HOSPADM

## 2024-05-06 RX ORDER — VITAMIN B COMPLEX
1000 TABLET ORAL DAILY
Status: DISCONTINUED | OUTPATIENT
Start: 2024-05-06 | End: 2024-05-08 | Stop reason: HOSPADM

## 2024-05-06 RX ORDER — CHOLECALCIFEROL (VITAMIN D3) 125 MCG
10 CAPSULE ORAL NIGHTLY
Status: DISCONTINUED | OUTPATIENT
Start: 2024-05-06 | End: 2024-05-08 | Stop reason: HOSPADM

## 2024-05-06 RX ORDER — CARVEDILOL 3.12 MG/1
3.12 TABLET ORAL 2 TIMES DAILY WITH MEALS
Status: DISCONTINUED | OUTPATIENT
Start: 2024-05-06 | End: 2024-05-08 | Stop reason: HOSPADM

## 2024-05-06 RX ORDER — SODIUM CHLORIDE 0.9 % (FLUSH) 0.9 %
5-40 SYRINGE (ML) INJECTION PRN
Status: DISCONTINUED | OUTPATIENT
Start: 2024-05-06 | End: 2024-05-08 | Stop reason: HOSPADM

## 2024-05-06 RX ORDER — GABAPENTIN 300 MG/1
300 CAPSULE ORAL 2 TIMES DAILY
Status: DISCONTINUED | OUTPATIENT
Start: 2024-05-06 | End: 2024-05-08 | Stop reason: HOSPADM

## 2024-05-06 RX ORDER — POTASSIUM CHLORIDE 7.45 MG/ML
10 INJECTION INTRAVENOUS PRN
Status: DISCONTINUED | OUTPATIENT
Start: 2024-05-06 | End: 2024-05-08 | Stop reason: HOSPADM

## 2024-05-06 RX ORDER — POTASSIUM CHLORIDE 750 MG/1
10 TABLET, FILM COATED, EXTENDED RELEASE ORAL DAILY
Status: DISCONTINUED | OUTPATIENT
Start: 2024-05-06 | End: 2024-05-08 | Stop reason: HOSPADM

## 2024-05-06 RX ORDER — ATORVASTATIN CALCIUM 40 MG/1
40 TABLET, FILM COATED ORAL NIGHTLY
Status: DISCONTINUED | OUTPATIENT
Start: 2024-05-06 | End: 2024-05-08 | Stop reason: HOSPADM

## 2024-05-06 RX ORDER — POTASSIUM CHLORIDE 20 MEQ/1
40 TABLET, EXTENDED RELEASE ORAL PRN
Status: DISCONTINUED | OUTPATIENT
Start: 2024-05-06 | End: 2024-05-08 | Stop reason: HOSPADM

## 2024-05-06 RX ORDER — SODIUM CHLORIDE 0.9 % (FLUSH) 0.9 %
5-40 SYRINGE (ML) INJECTION EVERY 12 HOURS SCHEDULED
Status: DISCONTINUED | OUTPATIENT
Start: 2024-05-06 | End: 2024-05-08 | Stop reason: HOSPADM

## 2024-05-06 RX ORDER — ACETAMINOPHEN 650 MG/1
650 SUPPOSITORY RECTAL EVERY 6 HOURS PRN
Status: DISCONTINUED | OUTPATIENT
Start: 2024-05-06 | End: 2024-05-08 | Stop reason: HOSPADM

## 2024-05-06 RX ORDER — ONDANSETRON 2 MG/ML
4 INJECTION INTRAMUSCULAR; INTRAVENOUS EVERY 6 HOURS PRN
Status: DISCONTINUED | OUTPATIENT
Start: 2024-05-06 | End: 2024-05-08 | Stop reason: HOSPADM

## 2024-05-06 RX ORDER — LABETALOL HYDROCHLORIDE 5 MG/ML
10 INJECTION, SOLUTION INTRAVENOUS EVERY 4 HOURS PRN
Status: DISCONTINUED | OUTPATIENT
Start: 2024-05-06 | End: 2024-05-08 | Stop reason: HOSPADM

## 2024-05-06 RX ORDER — INSULIN LISPRO 100 [IU]/ML
0-4 INJECTION, SOLUTION INTRAVENOUS; SUBCUTANEOUS
Status: DISCONTINUED | OUTPATIENT
Start: 2024-05-06 | End: 2024-05-08 | Stop reason: HOSPADM

## 2024-05-06 RX ORDER — ESTRADIOL 0.1 MG/G
2 CREAM VAGINAL DAILY
Status: DISCONTINUED | OUTPATIENT
Start: 2024-05-06 | End: 2024-05-06

## 2024-05-06 RX ORDER — AMLODIPINE BESYLATE 5 MG/1
5 TABLET ORAL DAILY
Status: DISCONTINUED | OUTPATIENT
Start: 2024-05-06 | End: 2024-05-06

## 2024-05-06 RX ORDER — LOSARTAN POTASSIUM 50 MG/1
100 TABLET ORAL DAILY
Status: DISCONTINUED | OUTPATIENT
Start: 2024-05-06 | End: 2024-05-08 | Stop reason: HOSPADM

## 2024-05-06 RX ORDER — FOLIC ACID 1 MG/1
1 TABLET ORAL DAILY
Status: DISCONTINUED | OUTPATIENT
Start: 2024-05-06 | End: 2024-05-08 | Stop reason: HOSPADM

## 2024-05-06 RX ORDER — CYCLOBENZAPRINE HCL 10 MG
10 TABLET ORAL 2 TIMES DAILY PRN
Status: DISCONTINUED | OUTPATIENT
Start: 2024-05-06 | End: 2024-05-08 | Stop reason: HOSPADM

## 2024-05-06 RX ORDER — ASCORBIC ACID 500 MG
500 TABLET ORAL DAILY
Status: DISCONTINUED | OUTPATIENT
Start: 2024-05-06 | End: 2024-05-08 | Stop reason: HOSPADM

## 2024-05-06 RX ORDER — MAGNESIUM SULFATE IN WATER 40 MG/ML
2000 INJECTION, SOLUTION INTRAVENOUS PRN
Status: DISCONTINUED | OUTPATIENT
Start: 2024-05-06 | End: 2024-05-08 | Stop reason: HOSPADM

## 2024-05-06 RX ORDER — SULFASALAZINE 500 MG/1
500 TABLET ORAL 4 TIMES DAILY
Status: DISCONTINUED | OUTPATIENT
Start: 2024-05-06 | End: 2024-05-06

## 2024-05-06 RX ORDER — FERROUS SULFATE 325(65) MG
325 TABLET ORAL
Status: DISCONTINUED | OUTPATIENT
Start: 2024-05-06 | End: 2024-05-08 | Stop reason: HOSPADM

## 2024-05-06 RX ORDER — SODIUM CHLORIDE 9 MG/ML
INJECTION, SOLUTION INTRAVENOUS PRN
Status: DISCONTINUED | OUTPATIENT
Start: 2024-05-06 | End: 2024-05-08 | Stop reason: HOSPADM

## 2024-05-06 RX ORDER — CHLORAL HYDRATE 500 MG
1 CAPSULE ORAL DAILY
Status: DISCONTINUED | OUTPATIENT
Start: 2024-05-06 | End: 2024-05-06 | Stop reason: RX

## 2024-05-06 RX ORDER — INSULIN LISPRO 100 [IU]/ML
0-4 INJECTION, SOLUTION INTRAVENOUS; SUBCUTANEOUS NIGHTLY
Status: DISCONTINUED | OUTPATIENT
Start: 2024-05-06 | End: 2024-05-08 | Stop reason: HOSPADM

## 2024-05-06 RX ORDER — MONTELUKAST SODIUM 10 MG/1
10 TABLET ORAL DAILY
Status: DISCONTINUED | OUTPATIENT
Start: 2024-05-06 | End: 2024-05-08 | Stop reason: HOSPADM

## 2024-05-06 RX ORDER — ONDANSETRON 4 MG/1
4 TABLET, ORALLY DISINTEGRATING ORAL EVERY 8 HOURS PRN
Status: DISCONTINUED | OUTPATIENT
Start: 2024-05-06 | End: 2024-05-08 | Stop reason: HOSPADM

## 2024-05-06 RX ORDER — PANTOPRAZOLE SODIUM 40 MG/1
40 TABLET, DELAYED RELEASE ORAL
Status: DISCONTINUED | OUTPATIENT
Start: 2024-05-06 | End: 2024-05-08 | Stop reason: HOSPADM

## 2024-05-06 RX ORDER — ACETAMINOPHEN 325 MG/1
650 TABLET ORAL EVERY 6 HOURS PRN
Status: DISCONTINUED | OUTPATIENT
Start: 2024-05-06 | End: 2024-05-08 | Stop reason: HOSPADM

## 2024-05-06 RX ORDER — HYDROCODONE BITARTRATE AND ACETAMINOPHEN 10; 325 MG/1; MG/1
1 TABLET ORAL EVERY 4 HOURS PRN
Status: DISCONTINUED | OUTPATIENT
Start: 2024-05-06 | End: 2024-05-06

## 2024-05-06 RX ADMIN — DONEPEZIL HYDROCHLORIDE 5 MG: 5 TABLET, FILM COATED ORAL at 20:30

## 2024-05-06 RX ADMIN — SODIUM CHLORIDE, PRESERVATIVE FREE 10 ML: 5 INJECTION INTRAVENOUS at 20:33

## 2024-05-06 RX ADMIN — GABAPENTIN 300 MG: 300 CAPSULE ORAL at 20:30

## 2024-05-06 RX ADMIN — FUROSEMIDE 40 MG: 40 TABLET ORAL at 11:01

## 2024-05-06 RX ADMIN — APIXABAN 2.5 MG: 2.5 TABLET, FILM COATED ORAL at 20:30

## 2024-05-06 RX ADMIN — ACETAMINOPHEN 650 MG: 325 TABLET ORAL at 12:21

## 2024-05-06 RX ADMIN — OXYCODONE HYDROCHLORIDE AND ACETAMINOPHEN 500 MG: 500 TABLET ORAL at 11:01

## 2024-05-06 RX ADMIN — MONTELUKAST 10 MG: 10 TABLET, FILM COATED ORAL at 11:00

## 2024-05-06 RX ADMIN — ISOSORBIDE MONONITRATE 30 MG: 30 TABLET, EXTENDED RELEASE ORAL at 11:01

## 2024-05-06 RX ADMIN — SODIUM CHLORIDE 500 ML: 9 INJECTION, SOLUTION INTRAVENOUS at 03:49

## 2024-05-06 RX ADMIN — FOLIC ACID 1 MG: 1 TABLET ORAL at 11:00

## 2024-05-06 RX ADMIN — CEFTRIAXONE SODIUM 1000 MG: 1 INJECTION, POWDER, FOR SOLUTION INTRAMUSCULAR; INTRAVENOUS at 06:05

## 2024-05-06 RX ADMIN — ATORVASTATIN CALCIUM 40 MG: 40 TABLET, FILM COATED ORAL at 20:30

## 2024-05-06 RX ADMIN — LABETALOL HYDROCHLORIDE 10 MG: 5 INJECTION, SOLUTION INTRAVENOUS at 20:30

## 2024-05-06 RX ADMIN — LOSARTAN POTASSIUM 100 MG: 50 TABLET, FILM COATED ORAL at 11:00

## 2024-05-06 RX ADMIN — PANTOPRAZOLE SODIUM 40 MG: 40 TABLET, DELAYED RELEASE ORAL at 12:21

## 2024-05-06 RX ADMIN — CARVEDILOL 3.12 MG: 3.12 TABLET, FILM COATED ORAL at 17:32

## 2024-05-06 RX ADMIN — Medication 10 MG: at 20:30

## 2024-05-06 RX ADMIN — POTASSIUM CHLORIDE 10 MEQ: 750 TABLET, EXTENDED RELEASE ORAL at 11:01

## 2024-05-06 RX ADMIN — Medication 1000 UNITS: at 11:13

## 2024-05-06 RX ADMIN — CYCLOBENZAPRINE 10 MG: 10 TABLET, FILM COATED ORAL at 17:32

## 2024-05-06 RX ADMIN — APIXABAN 2.5 MG: 2.5 TABLET, FILM COATED ORAL at 11:00

## 2024-05-06 RX ADMIN — CARVEDILOL 3.12 MG: 3.12 TABLET, FILM COATED ORAL at 11:11

## 2024-05-06 ASSESSMENT — PAIN - FUNCTIONAL ASSESSMENT
PAIN_FUNCTIONAL_ASSESSMENT: PREVENTS OR INTERFERES SOME ACTIVE ACTIVITIES AND ADLS
PAIN_FUNCTIONAL_ASSESSMENT: PREVENTS OR INTERFERES SOME ACTIVE ACTIVITIES AND ADLS
PAIN_FUNCTIONAL_ASSESSMENT: NONE - DENIES PAIN

## 2024-05-06 ASSESSMENT — PAIN SCALES - GENERAL
PAINLEVEL_OUTOF10: 6
PAINLEVEL_OUTOF10: 0
PAINLEVEL_OUTOF10: 7
PAINLEVEL_OUTOF10: 5
PAINLEVEL_OUTOF10: 0

## 2024-05-06 ASSESSMENT — PAIN DESCRIPTION - DESCRIPTORS
DESCRIPTORS: ACHING
DESCRIPTORS: ACHING;SHOOTING;SHARP

## 2024-05-06 ASSESSMENT — PAIN DESCRIPTION - PAIN TYPE: TYPE: ACUTE PAIN

## 2024-05-06 ASSESSMENT — PAIN DESCRIPTION - ONSET
ONSET: PROGRESSIVE
ONSET: GRADUAL

## 2024-05-06 ASSESSMENT — PAIN DESCRIPTION - DIRECTION: RADIATING_TOWARDS: R GROIN

## 2024-05-06 ASSESSMENT — PAIN DESCRIPTION - LOCATION
LOCATION: ABDOMEN
LOCATION: ABDOMEN;GROIN

## 2024-05-06 ASSESSMENT — PAIN DESCRIPTION - FREQUENCY
FREQUENCY: INTERMITTENT
FREQUENCY: INTERMITTENT

## 2024-05-06 ASSESSMENT — PAIN DESCRIPTION - ORIENTATION
ORIENTATION: RIGHT
ORIENTATION: LOWER;MID

## 2024-05-06 NOTE — ED TRIAGE NOTES
Per pt  pt started not feeling well with vomiting 2 days ago. Confusion started last night. Pt orieneted to self and place. Confused to time. Pt repeating \"please help me\"

## 2024-05-06 NOTE — ED NOTES
ED TO INPATIENT SBAR HANDOFF    Patient Name: Julita Metzger   Preferred Name: Kathy  : 1935  88 y.o.   Family/Caregiver Present: no   Code Status Order: Prior  PO Status: NPO:Yes  Telemetry Order:   C-SSRS: Risk of Suicide: No Risk  Sitter no   Restraints:     Sepsis Risk Score Sepsis Risk Score: 1.49    Situation  Chief Complaint   Patient presents with    Altered Mental Status     Brief Description of Patient's Condition: Patient presents to ED from home. Patients  brought patient in due to AMS & blood in urine. Patient has had multiple UTIs over the last month. [facility] (unsure of what procedure was) - Patient is alert to self & place, but keeps repeating self \"help me, help me\" or \"I need to get up, get me up\" - patient easily redirected    Mental Status: disoriented and alert  Arrived from:Home  Imaging:   CT HEAD WO CONTRAST   Final Result   No acute intracranial process.      Imaging findings consistent with mild to moderate chronic microvascular ischemic   change. There is a moderate degree of cerebral atrophy.            CT ABDOMEN PELVIS WO CONTRAST Additional Contrast? None   Final Result   No acute intraperitoneal process is identified.          Incidental and/or nonemergent findings are as described in detail above.         XR CHEST PORTABLE   Final Result        Abnormal labs:   Abnormal Labs Reviewed   CBC WITH AUTO DIFFERENTIAL - Abnormal; Notable for the following components:       Result Value    Hemoglobin 9.7 (*)     Hematocrit 32.4 (*)     MCH 24.0 (*)     MCHC 29.9 (*)     RDW 23.9 (*)     Lymphocytes % 10 (*)     All other components within normal limits   COMPREHENSIVE METABOLIC PANEL - Abnormal; Notable for the following components:    Glucose 241 (*)     Albumin/Globulin Ratio 1.0 (*)     All other components within normal limits   URINALYSIS - Abnormal; Notable for the following components:    Appearance Turbid (*)

## 2024-05-06 NOTE — ED NOTES
Attempted to perform straight urinary catheter. Catheterization unsuccessful. Bladder scan performed revealing 3mL of urine. Kishore Diaz (attending) aware. 500mL bolus to be administered.

## 2024-05-06 NOTE — ED PROVIDER NOTES
(i.e., they are unconfirmed results and as such must not be used for non-medical purposes, e.g.,employment testing, legal testing). Due to its inherent nature, false positive (FP) and false negative (FN) results may be obtained. Therefore, if necessary for medical care, recommend confirmation of positive findings by GC/MS.     Urinalysis, Micro    Collection Time: 05/06/24  3:23 AM   Result Value Ref Range    WBC, UA >100 (H) 0 - 4 /hpf    RBC, UA >100 (H) 0 - 5 /hpf    Epithelial Cells UA Many (A) Few /lpf    BACTERIA, URINE Negative Negative /hpf       EKG:.See ED Course Below    Radiologic Studies:  Non-plain film images such as CT, Ultrasound and MRI are read by the radiologist. Plain radiographic images are visualized and preliminarily interpreted by the ED Provider with the following findings: See ED Course Below    Interpretation per the Radiologist below, if available at the time of this note:  CT HEAD WO CONTRAST   Final Result   No acute intracranial process.      Imaging findings consistent with mild to moderate chronic microvascular ischemic   change. There is a moderate degree of cerebral atrophy.            CT ABDOMEN PELVIS WO CONTRAST Additional Contrast? None   Final Result   No acute intraperitoneal process is identified.          Incidental and/or nonemergent findings are as described in detail above.         XR CHEST PORTABLE   Final Result           ED COURSE and DIFFERENTIAL DIAGNOSIS/MDM   7:42 AM Differential and Considerations:     Records Reviewed (source and summary of external notes): Prior medical records and Nursing notes.    Vitals:    Vitals:    05/06/24 0430 05/06/24 0445 05/06/24 0545 05/06/24 0600   BP: (!) 201/95 (!) 198/107 (!) 152/96 (!) 182/78   Pulse: 80 80 82 82   Resp: 16 11 17 17   Temp:    99 °F (37.2 °C)   TempSrc:    Oral   SpO2:    97%   Weight:       Height:         ED Course as of 05/06/24 0742   Mon May 06, 2024   0330 This 88-year-old female with PMH of DM, HLD,

## 2024-05-06 NOTE — H&P
health: None    # Discussed with patient/family/POA. FULL CODE    # Medication reconciliation through patient.            Signed By: Russel Sanchez Cha, MD     May 6, 2024

## 2024-05-07 LAB
BACTERIA SPEC CULT: NORMAL
BASOPHILS # BLD: 0.1 K/UL (ref 0–0.1)
BASOPHILS NFR BLD: 2 % (ref 0–1)
BUN SERPL-MCNC: 7 MG/DL (ref 6–20)
BUN/CREAT SERPL: 10 (ref 12–20)
CA-I BLD-MCNC: 9.2 MG/DL (ref 8.5–10.1)
CHLORIDE SERPL-SCNC: 104 MMOL/L (ref 97–108)
CO2 SERPL-SCNC: 29 MMOL/L (ref 21–32)
CREAT SERPL-MCNC: 0.71 MG/DL (ref 0.55–1.02)
DIFFERENTIAL METHOD BLD: ABNORMAL
EOSINOPHIL # BLD: 0.2 K/UL (ref 0–0.4)
EOSINOPHIL NFR BLD: 4 % (ref 0–7)
ERYTHROCYTE [DISTWIDTH] IN BLOOD BY AUTOMATED COUNT: 24.5 % (ref 11.5–14.5)
GLUCOSE BLD STRIP.AUTO-MCNC: 166 MG/DL (ref 65–100)
GLUCOSE BLD STRIP.AUTO-MCNC: 167 MG/DL (ref 65–100)
GLUCOSE BLD STRIP.AUTO-MCNC: 214 MG/DL (ref 65–100)
GLUCOSE SERPL-MCNC: 179 MG/DL (ref 65–100)
HCT VFR BLD AUTO: 32.6 % (ref 35–47)
HGB BLD-MCNC: 9.7 G/DL (ref 11.5–16)
IMM GRANULOCYTES # BLD AUTO: 0 K/UL (ref 0–0.04)
IMM GRANULOCYTES NFR BLD AUTO: 1 % (ref 0–0.5)
LYMPHOCYTES # BLD: 1.1 K/UL (ref 0.8–3.5)
LYMPHOCYTES NFR BLD: 18 % (ref 12–49)
Lab: NORMAL
MCH RBC QN AUTO: 24.2 PG (ref 26–34)
MCHC RBC AUTO-ENTMCNC: 29.8 G/DL (ref 30–36.5)
MCV RBC AUTO: 81.3 FL (ref 80–99)
MONOCYTES # BLD: 0.8 K/UL (ref 0–1)
MONOCYTES NFR BLD: 13 % (ref 5–13)
NEUTS SEG # BLD: 3.9 K/UL (ref 1.8–8)
NEUTS SEG NFR BLD: 62 % (ref 32–75)
NRBC # BLD: 0 K/UL (ref 0–0.01)
NRBC BLD-RTO: 0 PER 100 WBC
PERFORMED BY:: ABNORMAL
PLATELET # BLD AUTO: 287 K/UL (ref 150–400)
PMV BLD AUTO: 11.9 FL (ref 8.9–12.9)
POTASSIUM SERPL-SCNC: 3.4 MMOL/L (ref 3.5–5.1)
RBC # BLD AUTO: 4.01 M/UL (ref 3.8–5.2)
SODIUM SERPL-SCNC: 138 MMOL/L (ref 136–145)
WBC # BLD AUTO: 6.1 K/UL (ref 3.6–11)

## 2024-05-07 PROCEDURE — 85025 COMPLETE CBC W/AUTO DIFF WBC: CPT

## 2024-05-07 PROCEDURE — 36415 COLL VENOUS BLD VENIPUNCTURE: CPT

## 2024-05-07 PROCEDURE — 6370000000 HC RX 637 (ALT 250 FOR IP): Performed by: INTERNAL MEDICINE

## 2024-05-07 PROCEDURE — 80048 BASIC METABOLIC PNL TOTAL CA: CPT

## 2024-05-07 PROCEDURE — 82962 GLUCOSE BLOOD TEST: CPT

## 2024-05-07 PROCEDURE — 1100000000 HC RM PRIVATE

## 2024-05-07 PROCEDURE — 6360000002 HC RX W HCPCS: Performed by: INTERNAL MEDICINE

## 2024-05-07 PROCEDURE — 2580000003 HC RX 258: Performed by: INTERNAL MEDICINE

## 2024-05-07 RX ADMIN — FERROUS SULFATE TAB 325 MG (65 MG ELEMENTAL FE) 325 MG: 325 (65 FE) TAB at 09:46

## 2024-05-07 RX ADMIN — APIXABAN 2.5 MG: 2.5 TABLET, FILM COATED ORAL at 09:46

## 2024-05-07 RX ADMIN — MONTELUKAST 10 MG: 10 TABLET, FILM COATED ORAL at 09:46

## 2024-05-07 RX ADMIN — SODIUM CHLORIDE, PRESERVATIVE FREE 10 ML: 5 INJECTION INTRAVENOUS at 09:47

## 2024-05-07 RX ADMIN — OXYCODONE HYDROCHLORIDE AND ACETAMINOPHEN 500 MG: 500 TABLET ORAL at 09:46

## 2024-05-07 RX ADMIN — CARVEDILOL 3.12 MG: 3.12 TABLET, FILM COATED ORAL at 09:46

## 2024-05-07 RX ADMIN — ACETAMINOPHEN 650 MG: 325 TABLET ORAL at 12:12

## 2024-05-07 RX ADMIN — ATORVASTATIN CALCIUM 40 MG: 40 TABLET, FILM COATED ORAL at 21:32

## 2024-05-07 RX ADMIN — POTASSIUM CHLORIDE 10 MEQ: 750 TABLET, EXTENDED RELEASE ORAL at 09:46

## 2024-05-07 RX ADMIN — ACETAMINOPHEN 650 MG: 325 TABLET ORAL at 18:18

## 2024-05-07 RX ADMIN — PANTOPRAZOLE SODIUM 40 MG: 40 TABLET, DELAYED RELEASE ORAL at 05:50

## 2024-05-07 RX ADMIN — CEFTRIAXONE SODIUM 1000 MG: 1 INJECTION, POWDER, FOR SOLUTION INTRAMUSCULAR; INTRAVENOUS at 05:49

## 2024-05-07 RX ADMIN — Medication 1000 UNITS: at 09:46

## 2024-05-07 RX ADMIN — GABAPENTIN 300 MG: 300 CAPSULE ORAL at 21:31

## 2024-05-07 RX ADMIN — Medication 10 MG: at 21:31

## 2024-05-07 RX ADMIN — SODIUM CHLORIDE, PRESERVATIVE FREE 10 ML: 5 INJECTION INTRAVENOUS at 21:34

## 2024-05-07 RX ADMIN — GABAPENTIN 300 MG: 300 CAPSULE ORAL at 09:46

## 2024-05-07 RX ADMIN — APIXABAN 2.5 MG: 2.5 TABLET, FILM COATED ORAL at 21:31

## 2024-05-07 RX ADMIN — DONEPEZIL HYDROCHLORIDE 5 MG: 5 TABLET, FILM COATED ORAL at 21:31

## 2024-05-07 RX ADMIN — ISOSORBIDE MONONITRATE 30 MG: 30 TABLET, EXTENDED RELEASE ORAL at 09:46

## 2024-05-07 RX ADMIN — CARVEDILOL 3.12 MG: 3.12 TABLET, FILM COATED ORAL at 18:18

## 2024-05-07 RX ADMIN — LOSARTAN POTASSIUM 100 MG: 50 TABLET, FILM COATED ORAL at 09:46

## 2024-05-07 RX ADMIN — FOLIC ACID 1 MG: 1 TABLET ORAL at 09:46

## 2024-05-07 RX ADMIN — FUROSEMIDE 40 MG: 40 TABLET ORAL at 09:46

## 2024-05-07 ASSESSMENT — PAIN SCALES - GENERAL
PAINLEVEL_OUTOF10: 0
PAINLEVEL_OUTOF10: 0
PAINLEVEL_OUTOF10: 3
PAINLEVEL_OUTOF10: 3

## 2024-05-07 ASSESSMENT — PAIN DESCRIPTION - LOCATION
LOCATION: HEAD
LOCATION: HEAD

## 2024-05-07 ASSESSMENT — PAIN DESCRIPTION - DESCRIPTORS: DESCRIPTORS: ACHING

## 2024-05-07 NOTE — PROGRESS NOTES
4 Eyes Skin Assessment     NAME:  Julita Metzger  YOB: 1935  MEDICAL RECORD NUMBER:  [id number]    The patient is being assessed for  Shift Handoff    I agree that at least one RN has performed a thorough Head to Toe Skin Assessment on the patient. ALL assessment sites listed below have been assessed.      Areas assessed by both nurses:    Head, Face, Ears, Shoulders, Back, Chest, Arms, Elbows, Hands, Sacrum. Buttock, Coccyx, Ischium, Legs. Feet and Heels, and Under Medical Devices         Does the Patient have a Wound? No noted wound(s)       Cuong Prevention initiated by RN: No  Wound Care Orders initiated by RN: No    Pressure Injury (Stage 3,4, Unstageable, DTI, NWPT, and Complex wounds) if present, place Wound referral order by RN under : No    New Ostomies, if present place, Ostomy referral order under : No     Nurse 1 eSignature: Electronically signed by Marielle Staley RN on 5/7/24 at 5:02 AM EDT    **SHARE this note so that the co-signing nurse can place an eSignature**    Nurse 2 eSignature: {Esignature:[id number]}

## 2024-05-07 NOTE — PROGRESS NOTES
Hospitalist Progress Note    NAME:   Julita Metzger   : 1935   MRN: [id number]     Date/Time: 2024 8:40 AM  Patient PCP: Royer Carreno MD    Estimated discharge date:  Barriers:       Assessment / Plan:    UTI  IV ceftriaxone empirically  Urine culture pending. Prior urine culture showed klesiella sensitive to ceftriaxone.      Metabolic encephalopathy  Likely secondary to UTI  management as above.  Per  patient seems to have improved and likely back to baseline.     Atrial fibrillation  NSR  currently.  Continue home PO apixaban     Diabetes  Hold home medications   POC + correctional insulin      Essential hypertension   Continue home Coreg, Lasix, Imdur, Cozaar .  IV labetalol PRN      HLP   Continue home medications.        Dementia  Continue Aricept.     Discussed with patient/family/POA. FULL CODE      Medical Decision Making:   I personally reviewed labs: CBC, BMP, Accu-Cheks.  I personally reviewed imaging:  I personally reviewed EKG:  Toxic drug monitoring:   Discussed case with: Patient, , nursing      Subjective:     Chief Complaint / Reason for Physician Visit  \" Altered mentation\".  Discussed with RN events overnight.      - Admission H&P   88 y.o. female with PMH of atrial fibrillation, diabetes, CAD,hypertension, dementia and other medical problems as below. Presented to the ED with chief complaint of altered mental status. Patient reports having suprapubic pain, but otherwise limited history due to poor mentation.  at bedside reports patient A&Ox3 at baseline although has dementia. However in the past 2 days has been having lethargy and confusion. Also endorses vomiting. No fever or chills. Reports symptoms similar to prior UTI. She has been having frequent UTI in the past few months and has urology appointment for urine studies.      In the ED, noted hypertensive. Afebrile, no leukocytosis, no ONEIL. Urinalysis indicative of UTI. CT head showed chronic

## 2024-05-07 NOTE — PROGRESS NOTES
Patient was admitted at 0800 of 5/6/24 on the floor and this RN noticed that admission checklist was never completed by the day shift RN. This RN attempted to complete the admission checklist but patient is not coherent and have somewhat of confusion.     Will notify day shift nurse about checlklist that is unable to complete and verify infromation when family comes to visit.

## 2024-05-08 VITALS
HEART RATE: 91 BPM | DIASTOLIC BLOOD PRESSURE: 104 MMHG | WEIGHT: 75 LBS | BODY MASS INDEX: 11.37 KG/M2 | SYSTOLIC BLOOD PRESSURE: 118 MMHG | RESPIRATION RATE: 16 BRPM | TEMPERATURE: 97.7 F | HEIGHT: 68 IN | OXYGEN SATURATION: 93 %

## 2024-05-08 LAB
ANION GAP SERPL CALC-SCNC: 7 MMOL/L (ref 5–15)
BASOPHILS # BLD: 0.1 K/UL (ref 0–0.1)
BASOPHILS NFR BLD: 2 % (ref 0–1)
BUN SERPL-MCNC: 10 MG/DL (ref 6–20)
BUN/CREAT SERPL: 14 (ref 12–20)
CA-I BLD-MCNC: 9.5 MG/DL (ref 8.5–10.1)
CHLORIDE SERPL-SCNC: 105 MMOL/L (ref 97–108)
CO2 SERPL-SCNC: 27 MMOL/L (ref 21–32)
CREAT SERPL-MCNC: 0.7 MG/DL (ref 0.55–1.02)
DIFFERENTIAL METHOD BLD: ABNORMAL
EOSINOPHIL # BLD: 0.3 K/UL (ref 0–0.4)
EOSINOPHIL NFR BLD: 4 % (ref 0–7)
ERYTHROCYTE [DISTWIDTH] IN BLOOD BY AUTOMATED COUNT: 24.5 % (ref 11.5–14.5)
GLUCOSE BLD STRIP.AUTO-MCNC: 254 MG/DL (ref 65–100)
GLUCOSE BLD STRIP.AUTO-MCNC: 279 MG/DL (ref 65–100)
GLUCOSE SERPL-MCNC: 181 MG/DL (ref 65–100)
HCT VFR BLD AUTO: 31.9 % (ref 35–47)
HGB BLD-MCNC: 9.5 G/DL (ref 11.5–16)
IMM GRANULOCYTES # BLD AUTO: 0.1 K/UL (ref 0–0.04)
IMM GRANULOCYTES NFR BLD AUTO: 1 % (ref 0–0.5)
LYMPHOCYTES # BLD: 1.7 K/UL (ref 0.8–3.5)
LYMPHOCYTES NFR BLD: 25 % (ref 12–49)
MCH RBC QN AUTO: 24.2 PG (ref 26–34)
MCHC RBC AUTO-ENTMCNC: 29.8 G/DL (ref 30–36.5)
MCV RBC AUTO: 81.2 FL (ref 80–99)
MONOCYTES # BLD: 0.9 K/UL (ref 0–1)
MONOCYTES NFR BLD: 14 % (ref 5–13)
NEUTS SEG # BLD: 3.5 K/UL (ref 1.8–8)
NEUTS SEG NFR BLD: 54 % (ref 32–75)
NRBC # BLD: 0 K/UL (ref 0–0.01)
NRBC BLD-RTO: 0 PER 100 WBC
PERFORMED BY:: ABNORMAL
PERFORMED BY:: ABNORMAL
PLATELET # BLD AUTO: 272 K/UL (ref 150–400)
PMV BLD AUTO: 12.1 FL (ref 8.9–12.9)
POTASSIUM SERPL-SCNC: 3.5 MMOL/L (ref 3.5–5.1)
RBC # BLD AUTO: 3.93 M/UL (ref 3.8–5.2)
RBC MORPH BLD: ABNORMAL
RBC MORPH BLD: ABNORMAL
SODIUM SERPL-SCNC: 139 MMOL/L (ref 136–145)
WBC # BLD AUTO: 6.6 K/UL (ref 3.6–11)

## 2024-05-08 PROCEDURE — 6360000002 HC RX W HCPCS: Performed by: INTERNAL MEDICINE

## 2024-05-08 PROCEDURE — 85025 COMPLETE CBC W/AUTO DIFF WBC: CPT

## 2024-05-08 PROCEDURE — 36415 COLL VENOUS BLD VENIPUNCTURE: CPT

## 2024-05-08 PROCEDURE — 6370000000 HC RX 637 (ALT 250 FOR IP): Performed by: INTERNAL MEDICINE

## 2024-05-08 PROCEDURE — 2580000003 HC RX 258: Performed by: INTERNAL MEDICINE

## 2024-05-08 PROCEDURE — 80048 BASIC METABOLIC PNL TOTAL CA: CPT

## 2024-05-08 PROCEDURE — 82962 GLUCOSE BLOOD TEST: CPT

## 2024-05-08 PROCEDURE — 97161 PT EVAL LOW COMPLEX 20 MIN: CPT

## 2024-05-08 PROCEDURE — 97530 THERAPEUTIC ACTIVITIES: CPT

## 2024-05-08 RX ORDER — CIPROFLOXACIN 500 MG/1
250 TABLET, FILM COATED ORAL EVERY 12 HOURS SCHEDULED
Status: DISCONTINUED | OUTPATIENT
Start: 2024-05-09 | End: 2024-05-08 | Stop reason: HOSPADM

## 2024-05-08 RX ORDER — CIPROFLOXACIN 250 MG/1
250 TABLET, FILM COATED ORAL EVERY 12 HOURS SCHEDULED
Qty: 20 TABLET | Refills: 0 | Status: SHIPPED | OUTPATIENT
Start: 2024-05-08 | End: 2024-05-18

## 2024-05-08 RX ADMIN — FOLIC ACID 1 MG: 1 TABLET ORAL at 08:25

## 2024-05-08 RX ADMIN — CEFTRIAXONE SODIUM 1000 MG: 1 INJECTION, POWDER, FOR SOLUTION INTRAMUSCULAR; INTRAVENOUS at 06:15

## 2024-05-08 RX ADMIN — POTASSIUM CHLORIDE 10 MEQ: 750 TABLET, EXTENDED RELEASE ORAL at 08:25

## 2024-05-08 RX ADMIN — INSULIN LISPRO 2 UNITS: 100 INJECTION, SOLUTION INTRAVENOUS; SUBCUTANEOUS at 12:10

## 2024-05-08 RX ADMIN — APIXABAN 2.5 MG: 2.5 TABLET, FILM COATED ORAL at 08:25

## 2024-05-08 RX ADMIN — GABAPENTIN 300 MG: 300 CAPSULE ORAL at 08:26

## 2024-05-08 RX ADMIN — LOSARTAN POTASSIUM 100 MG: 50 TABLET, FILM COATED ORAL at 08:25

## 2024-05-08 RX ADMIN — OXYCODONE HYDROCHLORIDE AND ACETAMINOPHEN 500 MG: 500 TABLET ORAL at 08:26

## 2024-05-08 RX ADMIN — FUROSEMIDE 40 MG: 40 TABLET ORAL at 08:26

## 2024-05-08 RX ADMIN — INSULIN LISPRO 2 UNITS: 100 INJECTION, SOLUTION INTRAVENOUS; SUBCUTANEOUS at 08:25

## 2024-05-08 RX ADMIN — MONTELUKAST 10 MG: 10 TABLET, FILM COATED ORAL at 08:26

## 2024-05-08 RX ADMIN — CARVEDILOL 3.12 MG: 3.12 TABLET, FILM COATED ORAL at 08:25

## 2024-05-08 RX ADMIN — SODIUM CHLORIDE, PRESERVATIVE FREE 10 ML: 5 INJECTION INTRAVENOUS at 08:26

## 2024-05-08 RX ADMIN — ISOSORBIDE MONONITRATE 30 MG: 30 TABLET, EXTENDED RELEASE ORAL at 08:25

## 2024-05-08 RX ADMIN — PANTOPRAZOLE SODIUM 40 MG: 40 TABLET, DELAYED RELEASE ORAL at 06:00

## 2024-05-08 RX ADMIN — Medication 1000 UNITS: at 08:25

## 2024-05-08 RX ADMIN — FERROUS SULFATE TAB 325 MG (65 MG ELEMENTAL FE) 325 MG: 325 (65 FE) TAB at 08:26

## 2024-05-08 ASSESSMENT — PAIN SCALES - GENERAL: PAINLEVEL_OUTOF10: 0

## 2024-05-08 NOTE — PROGRESS NOTES
Discharge plan of care/case management plan validated with provider discharge order. Discharge order noted and discharge summary in. Patient in stable condition for discharge at this time    Discharge instructions and medications reviewed with patient and . Both verbalized understanding and expressed no concerns.     IV access and telemetry box removed. Patient taken to front entrance in wheel chair with all belongings and transported home with home health by .

## 2024-05-08 NOTE — CARE COORDINATION
Transition of Care Plan:    RUR: 25%  Prior Level of Functioning: Needed assistance   Disposition: HH  If SNF or IPR: Date FOC offered: N/A  Date FOC received: N/A  Accepting facility: [facility]  Date authorization started with reference number: N/A  Date authorization received and expires: N/A  Follow up appointments:   DME needed: none  Transportation at discharge:   IM/IMM Medicare/ letter given: Yes on 5/6/2024  Is patient a [place] and [facility] with [facility]? no   If yes, was  transfer form completed and [facility] notified? N/A  Caregiver Contact: Jasmeet Metzger [phone]  Discharge Caregiver contacted prior to discharge? Yes   Care Conference needed? no  Barriers to discharge: None    Patient has been accepted with [facility].  Cm called spouse Jasmeet Metzger at [phone], he is agreeable with discharge today.  He will  patient once the discharge is completed.     
    General Advance Care Planning (ACP) Conversation    Date of Conversation: 5/6/2024  Conducted with: Patient with Decision Making Capacity  Other persons present: Spouse Jasmeet Metzger    Healthcare Decision Maker:   Primary Decision Maker: YassineJasmeet - Spouse - [phone]  Click here to complete Healthcare Decision Makers including selection of the Healthcare Decision Maker Relationship (ie \"Primary\").       Content/Action Overview:  Has NO ACP documents-Information provided  Reviewed DNR/DNI and patient elects Full Code (Attempt Resuscitation)        Length of Voluntary ACP Conversation in minutes:  <16 minutes (Non-Billable)    ALEXANDREA ZAMBRANO

## 2024-05-08 NOTE — DISCHARGE SUMMARY
Discharge Summary    Name: Julita Metzger  [id number]  YOB: 1935 (Age: 88 y.o.)   Date of Admission: 5/6/2024  Date of Discharge: 5/8/2024  Attending Physician: Maurizio Nuñez MD    Discharge Diagnosis:     Consultations:  IP CONSULT HOME HEALTH      Brief Admission History/Reason for Admission Per Russel Sanchez Cha, MD:     Chief Complaint / Reason for Physician Visit  \" Altered mentation\".  Discussed with RN events overnight.      5/6 - Admission H&P   88 y.o. female with PMH of atrial fibrillation, diabetes, CAD,hypertension, dementia and other medical problems as below. Presented to the ED with chief complaint of altered mental status. Patient reports having suprapubic pain, but otherwise limited history due to poor mentation.  at bedside reports patient A&Ox3 at baseline although has dementia. However in the past 2 days has been having lethargy and confusion. Also endorses vomiting. No fever or chills. Reports symptoms similar to prior UTI. She has been having frequent UTI in the past few months and has urology appointment for urine studies.      In the ED, noted hypertensive. Afebrile, no leukocytosis, no ONEIL. Urinalysis indicative of UTI. CT head showed chronic microvascular ischemic changes, no acute finding.. CT abdomen no acute finding.      5/7  Patient is alert and oriented x 1 sitting up in bedside with  at bedside as well.   states that patient's orientation is transient and patient is not always lucid.     No overnight fever/chills, chest pain, shortness of breath, abdominal pain noted.  Also denies any current dysuria.     Counseled on UTI with urine culture currently pending and continue IV antibiotics for now.      5/8  Patient evaluated with  at bedside.  Patient is back to baseline mentally per .  Has longstanding dementia and currently on Aricept for    Counseled on discharge home with urine culture not growing

## 2024-05-08 NOTE — PLAN OF CARE
PHYSICAL THERAPY EVALUATION AND DISCHARGE  Patient: Julita Metzger (88 y.o. female)  Date: 5/8/2024  Primary Diagnosis: Altered mental status [R41.82]       Precautions: Fall Risk                      Recommendations for nursing mobility: Amb to bathroom with AD and gait belt    In place during session: Peripheral IV and EKG/telemetry     ASSESSMENT  Pt is a 88 y.o. female admitted on 5/6/2024 for AMS; pt currently being treated for UTI . Pt sitting at eob upon PT arrival, agreeable to evaluation. Pt A&O x 3.     Based on the objective data described below pt currently present at baseline indep for transfers and mobility at this time.Patient required vcs for safety  for sit to stand and use the RW.Ambulated to hallways and back with RW and than reported she needed to go to bathroom so ambulated to bathroom with RW and sba and able toperform jhon care indep.Required vcs for going back to commode and not sit prematurely.Patient return to bed with RW. (See below for objective details and assist levels).     Overall pt tolerated session good today with PT.  Pt has no skilled acute PT needs at this time noted by PT or reported by pt, will DC skilled PT following evaluation; pt verbalized understanding and agreement. Potential barriers for safe discharge: . Current PT DC recommendation Home with Home Health Therapy and family assist once medically appropriate.         PLAN :  Recommendations and Planned Interventions: DC from skilled PT services following evaluation.     Rationale for discharge: Patient currently at functional baseline for transfers/mobility, no further skilled therapy required at this time    Frequency/Duration: DC from services following evaluation due to pt being at functional baseline; no skilled therapy required during admission, please reorder if needed     Recommendation for discharge: (in order for the patient to meet his/her long term goals)  Home with Home Health Therapy and family

## 2024-05-08 NOTE — PROGRESS NOTES
Physician Progress Note      PATIENT:               TIFFANIE PHILLIPS  CSN #:                  [id number]  :                       1935  ADMIT DATE:       2024 3:10 AM  DISCH DATE:  RESPONDING  PROVIDER #:        Maurizio Nuñez MD          QUERY TEXT:    Patient admitted with UTI, noted to have atrial fibrillation and is maintained   on Eliquis. If possible, please document in progress notes and discharge   summary if you are evaluating and/or treating any of the following:    The medical record reflects the following:  Risk Factors: 88 year old female, HTN, DM, CAD  Clinical Indicators:  H&P - Atrial fibrillation  Treatment: PO Eliquis 2.5mg continued from home medications        Please email [email or web address] with any questions  Options provided:  -- Secondary hypercoagulable state in a patient with atrial fibrillation  -- Other - I will add my own diagnosis  -- Disagree - Not applicable / Not valid  -- Disagree - Clinically unable to determine / Unknown  -- Refer to Clinical Documentation Reviewer    PROVIDER RESPONSE TEXT:    This patient has secondary hypercoagulable state in a patient with atrial   fibrillation.    Query created by: Litzy Lake on 2024 12:31 PM      Electronically signed by:  Maurizio Nuñez MD 2024 8:18 AM

## 2024-10-04 ENCOUNTER — APPOINTMENT (OUTPATIENT)
Facility: HOSPITAL | Age: 89
End: 2024-10-04
Payer: MEDICARE

## 2024-10-04 ENCOUNTER — HOSPITAL ENCOUNTER (EMERGENCY)
Facility: HOSPITAL | Age: 89
Discharge: HOME | End: 2024-10-04
Attending: EMERGENCY MEDICINE
Payer: MEDICARE

## 2024-10-04 VITALS
BODY MASS INDEX: 28.93 KG/M2 | OXYGEN SATURATION: 97 % | RESPIRATION RATE: 18 BRPM | SYSTOLIC BLOOD PRESSURE: 138 MMHG | WEIGHT: 180 LBS | HEART RATE: 72 BPM | HEIGHT: 66 IN | TEMPERATURE: 98.5 F | DIASTOLIC BLOOD PRESSURE: 68 MMHG

## 2024-10-04 DIAGNOSIS — E87.20 LACTIC ACIDOSIS: ICD-10-CM

## 2024-10-04 DIAGNOSIS — N39.0 URINARY TRACT INFECTION WITHOUT HEMATURIA, SITE UNSPECIFIED: ICD-10-CM

## 2024-10-04 DIAGNOSIS — R42 DIZZINESS: Primary | ICD-10-CM

## 2024-10-04 LAB
ALBUMIN SERPL-MCNC: 3.5 G/DL (ref 3.5–5)
ALBUMIN/GLOB SERPL: 1.2 (ref 1.1–2.2)
ALP SERPL-CCNC: 75 U/L (ref 45–117)
ALT SERPL-CCNC: 24 U/L (ref 12–78)
ANION GAP SERPL CALC-SCNC: 11 MMOL/L (ref 2–12)
APPEARANCE UR: ABNORMAL
AST SERPL W P-5'-P-CCNC: 17 U/L (ref 15–37)
BACTERIA URNS QL MICRO: NEGATIVE /HPF
BASOPHILS # BLD: 0.1 K/UL (ref 0–0.1)
BASOPHILS NFR BLD: 1 % (ref 0–1)
BILIRUB DIRECT SERPL-MCNC: 0.1 MG/DL (ref 0–0.2)
BILIRUB SERPL-MCNC: 0.4 MG/DL (ref 0.2–1)
BILIRUB UR QL: NEGATIVE
BUN SERPL-MCNC: 9 MG/DL (ref 6–20)
BUN/CREAT SERPL: 9 (ref 12–20)
CA-I BLD-MCNC: 9.2 MG/DL (ref 8.5–10.1)
CHLORIDE SERPL-SCNC: 103 MMOL/L (ref 97–108)
CO2 SERPL-SCNC: 26 MMOL/L (ref 21–32)
COLOR UR: ABNORMAL
CREAT SERPL-MCNC: 1.05 MG/DL (ref 0.55–1.02)
DIFFERENTIAL METHOD BLD: ABNORMAL
EOSINOPHIL # BLD: 0.2 K/UL (ref 0–0.4)
EOSINOPHIL NFR BLD: 2 % (ref 0–7)
EPITH CASTS URNS QL MICRO: ABNORMAL /LPF
ERYTHROCYTE [DISTWIDTH] IN BLOOD BY AUTOMATED COUNT: 15.6 % (ref 11.5–14.5)
GLOBULIN SER CALC-MCNC: 2.9 G/DL (ref 2–4)
GLUCOSE SERPL-MCNC: 148 MG/DL (ref 65–100)
GLUCOSE UR STRIP.AUTO-MCNC: >500 MG/DL
HCT VFR BLD AUTO: 34.6 % (ref 35–47)
HGB BLD-MCNC: 11.3 G/DL (ref 11.5–16)
HGB UR QL STRIP: NEGATIVE
IMM GRANULOCYTES # BLD AUTO: 0.1 K/UL (ref 0–0.04)
IMM GRANULOCYTES NFR BLD AUTO: 1 % (ref 0–0.5)
KETONES UR QL STRIP.AUTO: NEGATIVE MG/DL
LACTATE BLD-SCNC: 2.51 MMOL/L (ref 0.4–2)
LACTATE BLD-SCNC: 5.15 MMOL/L (ref 0.4–2)
LEUKOCYTE ESTERASE UR QL STRIP.AUTO: ABNORMAL
LYMPHOCYTES # BLD: 1.4 K/UL (ref 0.8–3.5)
LYMPHOCYTES NFR BLD: 15 % (ref 12–49)
MAGNESIUM SERPL-MCNC: 1.6 MG/DL (ref 1.6–2.4)
MCH RBC QN AUTO: 30.5 PG (ref 26–34)
MCHC RBC AUTO-ENTMCNC: 32.7 G/DL (ref 30–36.5)
MCV RBC AUTO: 93.3 FL (ref 80–99)
MONOCYTES # BLD: 0.9 K/UL (ref 0–1)
MONOCYTES NFR BLD: 9 % (ref 5–13)
MUCOUS THREADS URNS QL MICRO: ABNORMAL /LPF
NEUTS SEG # BLD: 6.9 K/UL (ref 1.8–8)
NEUTS SEG NFR BLD: 72 % (ref 32–75)
NITRITE UR QL STRIP.AUTO: NEGATIVE
NRBC # BLD: 0 K/UL (ref 0–0.01)
NRBC BLD-RTO: 0 PER 100 WBC
OTHER: ABNORMAL
PERFORMED BY:: ABNORMAL
PERFORMED BY:: ABNORMAL
PH UR STRIP: 6 (ref 5–8)
PLATELET # BLD AUTO: 203 K/UL (ref 150–400)
PMV BLD AUTO: 12.2 FL (ref 8.9–12.9)
POTASSIUM SERPL-SCNC: 3.2 MMOL/L (ref 3.5–5.1)
PROCALCITONIN SERPL-MCNC: <0.05 NG/ML
PROT SERPL-MCNC: 6.4 G/DL (ref 6.4–8.2)
PROT UR STRIP-MCNC: 30 MG/DL
RBC # BLD AUTO: 3.71 M/UL (ref 3.8–5.2)
RBC #/AREA URNS HPF: ABNORMAL /HPF (ref 0–5)
SODIUM SERPL-SCNC: 140 MMOL/L (ref 136–145)
SP GR UR REFRACTOMETRY: 1.02 (ref 1–1.03)
TROPONIN I SERPL HS-MCNC: 18 NG/L (ref 0–51)
UROBILINOGEN UR QL STRIP.AUTO: 0.1 EU/DL (ref 0.1–1)
WBC # BLD AUTO: 9.6 K/UL (ref 3.6–11)
WBC CASTS URNS QL MICRO: PRESENT
WBC URNS QL MICRO: >100 /HPF (ref 0–4)

## 2024-10-04 PROCEDURE — 81001 URINALYSIS AUTO W/SCOPE: CPT

## 2024-10-04 PROCEDURE — 2580000003 HC RX 258: Performed by: EMERGENCY MEDICINE

## 2024-10-04 PROCEDURE — 84145 PROCALCITONIN (PCT): CPT

## 2024-10-04 PROCEDURE — 83735 ASSAY OF MAGNESIUM: CPT

## 2024-10-04 PROCEDURE — 96375 TX/PRO/DX INJ NEW DRUG ADDON: CPT

## 2024-10-04 PROCEDURE — 93005 ELECTROCARDIOGRAM TRACING: CPT | Performed by: EMERGENCY MEDICINE

## 2024-10-04 PROCEDURE — 74176 CT ABD & PELVIS W/O CONTRAST: CPT

## 2024-10-04 PROCEDURE — 85025 COMPLETE CBC W/AUTO DIFF WBC: CPT

## 2024-10-04 PROCEDURE — 96361 HYDRATE IV INFUSION ADD-ON: CPT

## 2024-10-04 PROCEDURE — 6370000000 HC RX 637 (ALT 250 FOR IP): Performed by: EMERGENCY MEDICINE

## 2024-10-04 PROCEDURE — 83605 ASSAY OF LACTIC ACID: CPT

## 2024-10-04 PROCEDURE — 99284 EMERGENCY DEPT VISIT MOD MDM: CPT

## 2024-10-04 PROCEDURE — 96374 THER/PROPH/DIAG INJ IV PUSH: CPT

## 2024-10-04 PROCEDURE — 6360000002 HC RX W HCPCS: Performed by: EMERGENCY MEDICINE

## 2024-10-04 PROCEDURE — 80076 HEPATIC FUNCTION PANEL: CPT

## 2024-10-04 PROCEDURE — 36415 COLL VENOUS BLD VENIPUNCTURE: CPT

## 2024-10-04 PROCEDURE — 84484 ASSAY OF TROPONIN QUANT: CPT

## 2024-10-04 PROCEDURE — 80048 BASIC METABOLIC PNL TOTAL CA: CPT

## 2024-10-04 PROCEDURE — 87040 BLOOD CULTURE FOR BACTERIA: CPT

## 2024-10-04 RX ORDER — 0.9 % SODIUM CHLORIDE 0.9 %
1000 INTRAVENOUS SOLUTION INTRAVENOUS ONCE
Status: COMPLETED | OUTPATIENT
Start: 2024-10-04 | End: 2024-10-04

## 2024-10-04 RX ORDER — FLUCONAZOLE 150 MG/1
150 TABLET ORAL DAILY
Qty: 3 TABLET | Refills: 0 | Status: SHIPPED | OUTPATIENT
Start: 2024-10-04 | End: 2024-10-07

## 2024-10-04 RX ORDER — ONDANSETRON 2 MG/ML
4 INJECTION INTRAMUSCULAR; INTRAVENOUS ONCE
Status: COMPLETED | OUTPATIENT
Start: 2024-10-04 | End: 2024-10-04

## 2024-10-04 RX ORDER — POTASSIUM CHLORIDE 750 MG/1
40 TABLET, EXTENDED RELEASE ORAL ONCE
Status: COMPLETED | OUTPATIENT
Start: 2024-10-04 | End: 2024-10-04

## 2024-10-04 RX ORDER — FLUCONAZOLE 100 MG/1
200 TABLET ORAL
Status: DISCONTINUED | OUTPATIENT
Start: 2024-10-04 | End: 2024-10-04 | Stop reason: HOSPADM

## 2024-10-04 RX ORDER — SULFAMETHOXAZOLE/TRIMETHOPRIM 800-160 MG
1 TABLET ORAL 2 TIMES DAILY
Qty: 20 TABLET | Refills: 0 | Status: SHIPPED | OUTPATIENT
Start: 2024-10-04 | End: 2024-10-14

## 2024-10-04 RX ADMIN — POTASSIUM CHLORIDE 40 MEQ: 750 TABLET, EXTENDED RELEASE ORAL at 14:37

## 2024-10-04 RX ADMIN — ONDANSETRON 4 MG: 2 INJECTION INTRAMUSCULAR; INTRAVENOUS at 11:32

## 2024-10-04 RX ADMIN — CEFTRIAXONE SODIUM 1000 MG: 1 INJECTION, POWDER, FOR SOLUTION INTRAMUSCULAR; INTRAVENOUS at 14:37

## 2024-10-04 RX ADMIN — SODIUM CHLORIDE 1000 ML: 9 INJECTION, SOLUTION INTRAVENOUS at 11:32

## 2024-10-04 RX ADMIN — SODIUM CHLORIDE 1000 ML: 9 INJECTION, SOLUTION INTRAVENOUS at 14:54

## 2024-10-04 ASSESSMENT — PAIN - FUNCTIONAL ASSESSMENT
PAIN_FUNCTIONAL_ASSESSMENT: 0-10
PAIN_FUNCTIONAL_ASSESSMENT: 0-10

## 2024-10-04 ASSESSMENT — PAIN SCALES - GENERAL
PAINLEVEL_OUTOF10: 6
PAINLEVEL_OUTOF10: 0

## 2024-10-04 NOTE — DISCHARGE INSTRUCTIONS
Thank you for choosing our Emergency Department for your care.  It is our privilege to care for you in your time of need.  In the next several days, you may receive a survey via email or mailed to your home about your experience with our team.  We would greatly appreciate you taking a few minutes to complete the survey, as we use this information to learn what we have done well and what we could be doing better. Thank you for trusting us with your care!    Below you will find a list of your tests from today's visit.   Labs  Recent Results (from the past 12 hour(s))   BMP    Collection Time: 10/04/24 11:11 AM   Result Value Ref Range    Sodium 140 136 - 145 mmol/L    Potassium 3.2 (L) 3.5 - 5.1 mmol/L    Chloride 103 97 - 108 mmol/L    CO2 26 21 - 32 mmol/L    Anion Gap 11 2 - 12 mmol/L    Glucose 148 (H) 65 - 100 mg/dL    BUN 9 6 - 20 mg/dL    Creatinine 1.05 (H) 0.55 - 1.02 mg/dL    BUN/Creatinine Ratio 9 (L) 12 - 20      Est, Glom Filt Rate 51 (L) >60 ml/min/1.73m2    Calcium 9.2 8.5 - 10.1 mg/dL   CBC with Auto Differential    Collection Time: 10/04/24 11:11 AM   Result Value Ref Range    WBC 9.6 3.6 - 11.0 K/uL    RBC 3.71 (L) 3.80 - 5.20 M/uL    Hemoglobin 11.3 (L) 11.5 - 16.0 g/dL    Hematocrit 34.6 (L) 35.0 - 47.0 %    MCV 93.3 80.0 - 99.0 FL    MCH 30.5 26.0 - 34.0 PG    MCHC 32.7 30.0 - 36.5 g/dL    RDW 15.6 (H) 11.5 - 14.5 %    Platelets 203 150 - 400 K/uL    MPV 12.2 8.9 - 12.9 FL    Nucleated RBCs 0.0 0.0  WBC    nRBC 0.00 0.00 - 0.01 K/uL    Neutrophils % 72 32 - 75 %    Lymphocytes % 15 12 - 49 %    Monocytes % 9 5 - 13 %    Eosinophils % 2 0 - 7 %    Basophils % 1 0 - 1 %    Immature Granulocytes % 1 (H) 0 - 0.5 %    Neutrophils Absolute 6.9 1.8 - 8.0 K/UL    Lymphocytes Absolute 1.4 0.8 - 3.5 K/UL    Monocytes Absolute 0.9 0.0 - 1.0 K/UL    Eosinophils Absolute 0.2 0.0 - 0.4 K/UL    Basophils Absolute 0.1 0.0 - 0.1 K/UL    Immature Granulocytes Absolute 0.1 (H) 0.00 - 0.04 K/UL

## 2024-10-04 NOTE — ED PROVIDER NOTES
[facility] EMERGENCY DEPT  EMERGENCY DEPARTMENT HISTORY AND PHYSICAL EXAM      Date: 10/4/2024  Patient Name: Julita Metzger  MRN: [id number]  Birthdate 1935  Date of evaluation: 10/4/2024  Provider: Zayda Chicas MD   Note Started: 2:33 PM EDT 10/4/24    HISTORY OF PRESENT ILLNESS     Chief Complaint   Patient presents with    Dizziness    Abdominal Pain       History Provided By: Patient    HPI: Julita Metzger is a 89 y.o. female  89-year-old presents with feeling lightheaded dizzy concerned she may have a UTI.  Left lower quadrant abdominal pain.  Some diarrhea minimal.  Nauseated.  No chest pain or shortness of breath.    PAST MEDICAL HISTORY   Past Medical History:  Past Medical History:   Diagnosis Date    Arrhythmia     a. 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     Lung abnormality     middle lobe syndrome right lung    SOB (shortness of breath)     on exertion    Stroke (HCC) 12/2017       Past Surgical History:  Past Surgical History:   Procedure Laterality Date    BACK SURGERY      eight surgeries    CATARACT REMOVAL      left eye    COLONOSCOPY Left 9/12/2018    COLONOSCOPY performed by Shashank Tate MD at [facility] ENDOSCOPY    COLONOSCOPY N/A 1/26/2024    COLONOSCOPY DIAGNOSTIC performed by Cheryl Sy MD at [facility] ENDOSCOPY    HERNIA REPAIR      OTHER SURGICAL HISTORY      rt middle lobe removed d/t necrosis    PARTIAL HYSTERECTOMY (CERVIX NOT REMOVED)      REFRACTIVE SURGERY      REMOVAL GALLBLADDER      SKIN LESION EXCISION Left 10/30/2023    EXCISION LEFT INGUINAL MASS/LIPOMA performed by Raoul Steward MD at [facility] MAIN OR       Family History:  Family History   Problem Relation Age of Onset    Cancer Maternal Grandmother     Heart Disease Maternal Aunt     Diabetes Maternal Aunt     Cancer Brother     Diabetes Sister     Diabetes Mother      tablet Take 1 tablet by mouth nightly      estradiol (ESTRACE) 0.1 MG/GM vaginal cream Place 2 g vaginally daily      Omega-3 Fatty Acids (FISH OIL) 1000 MG capsule Take 1 capsule by mouth daily      vitamin D 25 MCG (1000 UT) CAPS Take 1 tablet by mouth daily      vitamin C (ASCORBIC ACID) 500 MG tablet Take 1 tablet by mouth daily      apixaban (ELIQUIS) 2.5 MG TABS tablet Take 1 tablet by mouth 2 times daily 60 tablet 3    docusate sodium (COLACE) 50 MG capsule Take 1 capsule by mouth daily      metFORMIN (GLUCOPHAGE) 500 MG tablet Take 1 tablet by mouth 2 times daily (with meals)      ferrous sulfate (IRON 325) 325 (65 Fe) MG tablet Take 1 tablet by mouth daily (with breakfast)      carvedilol (COREG) 3.125 MG tablet Take 1 tablet by mouth 2 times daily (with meals)      atorvastatin (LIPITOR) 40 MG tablet Take 1 tablet by mouth nightly      folic acid (FOLVITE) 1 MG tablet Take 1 tablet by mouth daily      furosemide (LASIX) 40 MG tablet Take 1 tablet by mouth daily      gabapentin (NEURONTIN) 300 MG capsule Take 1 capsule by mouth 2 times daily.      HYDROcodone-acetaminophen (NORCO)  MG per tablet Take 1 tablet by mouth every 4 hours as needed.      isosorbide mononitrate (IMDUR) 30 MG extended release tablet Take 1 tablet by mouth daily      losartan (COZAAR) 100 MG tablet Take 1 tablet by mouth daily      meclizine (ANTIVERT) 25 MG tablet Take 1 tablet by mouth 3 times daily as needed      melatonin 10 MG CAPS capsule Take by mouth nightly as needed      methotrexate (RHEUMATREX) 2.5 MG chemo tablet Take 8 tablets by mouth once a week Tuesdays 4 tablets twice a day      montelukast (SINGULAIR) 10 MG tablet Take 1 tablet by mouth daily      nitroGLYCERIN (NITROSTAT) 0.4 MG SL tablet Place 1 tablet under the tongue every 5 minutes as needed for Chest pain      pantoprazole (PROTONIX) 40 MG tablet Take 1 tablet by mouth      potassium chloride (MICRO-K) 10 MEQ extended release capsule Take 1 capsule by

## 2024-10-05 LAB
EKG ATRIAL RATE: 65 BPM
EKG DIAGNOSIS: NORMAL
EKG P AXIS: 116 DEGREES
EKG P-R INTERVAL: 180 MS
EKG Q-T INTERVAL: 444 MS
EKG QRS DURATION: 130 MS
EKG QTC CALCULATION (BAZETT): 461 MS
EKG R AXIS: -30 DEGREES
EKG T AXIS: 154 DEGREES
EKG VENTRICULAR RATE: 65 BPM

## 2024-10-06 LAB
BACTERIA SPEC CULT: NORMAL
BACTERIA SPEC CULT: NORMAL
Lab: NORMAL
Lab: NORMAL

## 2024-10-10 LAB
BACTERIA SPEC CULT: NORMAL
BACTERIA SPEC CULT: NORMAL
Lab: NORMAL
Lab: NORMAL

## 2024-10-17 ENCOUNTER — OFFICE VISIT (OUTPATIENT)
Age: 89
End: 2024-10-17
Payer: MEDICARE

## 2024-10-17 VITALS — DIASTOLIC BLOOD PRESSURE: 69 MMHG | HEART RATE: 103 BPM | SYSTOLIC BLOOD PRESSURE: 164 MMHG

## 2024-10-17 DIAGNOSIS — R35.0 FREQUENCY OF MICTURITION: ICD-10-CM

## 2024-10-17 DIAGNOSIS — N39.41 URGENCY INCONTINENCE: ICD-10-CM

## 2024-10-17 DIAGNOSIS — N39.0 COMPLICATED UTI (URINARY TRACT INFECTION): Primary | ICD-10-CM

## 2024-10-17 DIAGNOSIS — R31.29 MICROSCOPIC HEMATURIA: ICD-10-CM

## 2024-10-17 LAB
BILIRUBIN, URINE, POC: NEGATIVE
BLOOD URINE, POC: NORMAL
GLUCOSE URINE, POC: 1000
KETONES, URINE, POC: NEGATIVE
LEUKOCYTE ESTERASE, URINE, POC: NORMAL
NITRITE, URINE, POC: NEGATIVE
PH, URINE, POC: 6 (ref 4.6–8)
PROTEIN,URINE, POC: 100
PVR, POC: NORMAL CC
SPECIFIC GRAVITY, URINE, POC: 1.02 (ref 1–1.03)
URINALYSIS CLARITY, POC: NORMAL
URINALYSIS COLOR, POC: YELLOW
UROBILINOGEN, POC: NORMAL

## 2024-10-17 PROCEDURE — G8484 FLU IMMUNIZE NO ADMIN: HCPCS | Performed by: UROLOGY

## 2024-10-17 PROCEDURE — 81003 URINALYSIS AUTO W/O SCOPE: CPT | Performed by: UROLOGY

## 2024-10-17 PROCEDURE — G8419 CALC BMI OUT NRM PARAM NOF/U: HCPCS | Performed by: UROLOGY

## 2024-10-17 PROCEDURE — 1036F TOBACCO NON-USER: CPT | Performed by: UROLOGY

## 2024-10-17 PROCEDURE — 51798 US URINE CAPACITY MEASURE: CPT | Performed by: UROLOGY

## 2024-10-17 PROCEDURE — G8427 DOCREV CUR MEDS BY ELIG CLIN: HCPCS | Performed by: UROLOGY

## 2024-10-17 PROCEDURE — 99204 OFFICE O/P NEW MOD 45 MIN: CPT | Performed by: UROLOGY

## 2024-10-17 PROCEDURE — 1123F ACP DISCUSS/DSCN MKR DOCD: CPT | Performed by: UROLOGY

## 2024-10-17 PROCEDURE — 0509F URINE INCON PLAN DOCD: CPT | Performed by: UROLOGY

## 2024-10-17 PROCEDURE — 1090F PRES/ABSN URINE INCON ASSESS: CPT | Performed by: UROLOGY

## 2024-10-17 RX ORDER — CIPROFLOXACIN 500 MG/1
500 TABLET, FILM COATED ORAL 2 TIMES DAILY
Qty: 14 TABLET | Refills: 0 | Status: SHIPPED | OUTPATIENT
Start: 2024-10-17 | End: 2024-10-24

## 2024-10-17 ASSESSMENT — ENCOUNTER SYMPTOMS
COUGH: 1
SINUS PAIN: 1
ABDOMINAL DISTENTION: 1
CHOKING: 1

## 2024-10-17 NOTE — PROGRESS NOTES
Chief Complaint   Patient presents with    New Patient    Follow-Up from Hospital        BP (!) 164/69   Pulse (!) 103      PHQ-9 score is    Negative      1. \"Have you been to the ER, urgent care clinic since your last visit?  Hospitalized since your last visit?\" No    2. \"Have you seen or consulted any other health care providers outside of the  since your last visit?\" No     3. For patients aged 45-75: Has the patient had a colonoscopy / FIT/ Cologuard? NA - based on age      If the patient is female:    4. For patients aged 40-74: Has the patient had a mammogram within the past 2 years? NA - based on age or sex      5. For patients aged 21-65: Has the patient had a pap smear? NA - based on age or sex  
  Neurological:      General: No focal deficit present.      Mental Status: She is alert and oriented to person, place, and time.   Psychiatric:         Mood and Affect: Mood normal.         Behavior: Behavior normal.         Thought Content: Thought content normal.         Judgment: Judgment normal.         ASSESSMENT and PLAN  1. Complicated UTI (urinary tract infection)  -     AMB POC URINALYSIS DIP STICK AUTO W/O MICRO  -     Culture, Urine  -     Urinalysis with Microscopic  -     Cytology, urine  -     AMB POC PVR, TOMAS,POST-VOID RES,US,NON-IMAGING  -     ciprofloxacin (CIPRO) 500 MG tablet; Take 1 tablet by mouth 2 times daily for 14 doses, Disp-14 tablet, R-0Normal  2. Urgency incontinence  -     Culture, Urine  -     Urinalysis with Microscopic  -     Cytology, urine  -     AMB POC PVR, TOMAS,POST-VOID RES,US,NON-IMAGING  3. Frequency of micturition  -     Culture, Urine  -     Urinalysis with Microscopic  -     Cytology, urine  -     AMB POC PVR, TOMAS,POST-VOID RES,US,NON-IMAGING  4. Microscopic hematuria  -     Culture, Urine  -     Urinalysis with Microscopic  -     Cytology, urine  -     AMB POC PVR, TOMAS,POST-VOID RES,US,NON-IMAGING    Patient sent for cystoscopy cytology and placed on Cipro she is aware the risk and benefits of a cystoscopy including bleeding infection she has no questions    Keith Lemon MD      Please note that portions of this note was potentially completed with Dragon dictation, the computer voice recognition software.  Quite often unanticipated grammatical, syntax, homophones, and other interpretive errors are inadvertently transcribed by the computer software.  Please disregard these errors.  Please excuse any errors that have escaped final proofreading.  Thank you.

## 2024-10-18 LAB
APPEARANCE UR: CLEAR
BACTERIA #/AREA URNS HPF: ABNORMAL /[HPF]
BILIRUB UR QL STRIP: NEGATIVE
CASTS URNS QL MICRO: ABNORMAL /LPF
COLOR UR: YELLOW
EPI CELLS #/AREA URNS HPF: ABNORMAL /HPF (ref 0–10)
GLUCOSE UR QL STRIP: ABNORMAL
HGB UR QL STRIP: ABNORMAL
KETONES UR QL STRIP: NEGATIVE
LEUKOCYTE ESTERASE UR QL STRIP: ABNORMAL
MICRO URNS: ABNORMAL
NITRITE UR QL STRIP: NEGATIVE
PH UR STRIP: 6 [PH] (ref 5–7.5)
PROT UR QL STRIP: ABNORMAL
RBC #/AREA URNS HPF: >30 /HPF (ref 0–2)
SP GR UR STRIP: 1.02 (ref 1–1.03)
UROBILINOGEN UR STRIP-MCNC: 0.2 MG/DL (ref 0.2–1)
WBC #/AREA URNS HPF: >30 /HPF (ref 0–5)

## 2024-10-19 LAB — BACTERIA UR CULT: NO GROWTH

## 2024-10-23 ENCOUNTER — PROCEDURE VISIT (OUTPATIENT)
Age: 89
End: 2024-10-23
Payer: MEDICARE

## 2024-10-23 VITALS — HEART RATE: 73 BPM | SYSTOLIC BLOOD PRESSURE: 168 MMHG | DIASTOLIC BLOOD PRESSURE: 74 MMHG

## 2024-10-23 DIAGNOSIS — R30.0 DYSURIA: ICD-10-CM

## 2024-10-23 DIAGNOSIS — R35.0 FREQUENCY OF MICTURITION: ICD-10-CM

## 2024-10-23 DIAGNOSIS — N39.0 COMPLICATED UTI (URINARY TRACT INFECTION): Primary | ICD-10-CM

## 2024-10-23 DIAGNOSIS — R31.29 MICROSCOPIC HEMATURIA: ICD-10-CM

## 2024-10-23 DIAGNOSIS — R82.81 PYURIA: ICD-10-CM

## 2024-10-23 LAB
BILIRUBIN, URINE, POC: NEGATIVE
BLOOD URINE, POC: ABNORMAL
GLUCOSE URINE, POC: 1000
KETONES, URINE, POC: NEGATIVE
LEUKOCYTE ESTERASE, URINE, POC: ABNORMAL
NITRITE, URINE, POC: NEGATIVE
PH, URINE, POC: 5.5 (ref 4.6–8)
PROTEIN,URINE, POC: 100
PVR, POC: NORMAL CC
SPECIFIC GRAVITY, URINE, POC: 1.03 (ref 1–1.03)
URINALYSIS CLARITY, POC: CLEAR
URINALYSIS COLOR, POC: YELLOW
UROBILINOGEN, POC: NORMAL

## 2024-10-23 PROCEDURE — 99214 OFFICE O/P EST MOD 30 MIN: CPT | Performed by: UROLOGY

## 2024-10-23 PROCEDURE — 1090F PRES/ABSN URINE INCON ASSESS: CPT | Performed by: UROLOGY

## 2024-10-23 PROCEDURE — 1159F MED LIST DOCD IN RCRD: CPT | Performed by: UROLOGY

## 2024-10-23 PROCEDURE — 52000 CYSTOURETHROSCOPY: CPT | Performed by: UROLOGY

## 2024-10-23 PROCEDURE — 1036F TOBACCO NON-USER: CPT | Performed by: UROLOGY

## 2024-10-23 PROCEDURE — 81003 URINALYSIS AUTO W/O SCOPE: CPT | Performed by: UROLOGY

## 2024-10-23 PROCEDURE — G8484 FLU IMMUNIZE NO ADMIN: HCPCS | Performed by: UROLOGY

## 2024-10-23 PROCEDURE — 51798 US URINE CAPACITY MEASURE: CPT | Performed by: UROLOGY

## 2024-10-23 PROCEDURE — G8427 DOCREV CUR MEDS BY ELIG CLIN: HCPCS | Performed by: UROLOGY

## 2024-10-23 PROCEDURE — G8419 CALC BMI OUT NRM PARAM NOF/U: HCPCS | Performed by: UROLOGY

## 2024-10-23 PROCEDURE — 1123F ACP DISCUSS/DSCN MKR DOCD: CPT | Performed by: UROLOGY

## 2024-10-23 RX ORDER — METHENAMINE HIPPURATE 1000 MG/1
1 TABLET ORAL 2 TIMES DAILY WITH MEALS
Qty: 60 TABLET | Refills: 5 | Status: SHIPPED | OUTPATIENT
Start: 2024-10-23

## 2024-10-23 NOTE — PROGRESS NOTES
OFFICE CYSTOSCOPY    The patient presented for cystoscopy and was placed supine on the procedure table.  The genitals were prepped with chlorahexadine and a Urojet.  Using a 16F flexible cystoscope, cystourerthroscopy was performed.    Cystoscopy - Female    Findings:  Initial residual: minimal  Anterior urethra: normal mucosa  Bladder neck: Normal appearing  Bladder mucosa: Denuding of the upper layer of the trigone with mucus debris floating in the bladder no cancers or anything of note seen otherwise no bas fond deformity, did not descend with strain  Trabeculation: none  Diverticula: none  Ureteral orifices: normal, efflux clear urine        Impression: Signs of irritation of the lining of the bladder no cancers consistent with diabetes

## 2024-10-23 NOTE — PROGRESS NOTES
HISTORY OF PRESENT ILLNESS  Julita Metzger is a 89 y.o. female   Patient returns today with a pyuria and negative cultures.  I will send it for fungal cultures.  But I think she is a diabetic bladder with some denuding of her mucosa.  I told her  that before we even scoped her.  Need to treat her discomfort from this process and we will try some menthenamine.  Hopefully will have an answer from her urine culture  1. Complicated UTI (urinary tract infection)  Overview:  ER on 10/5/2024  visit with Left lower quadrant abdominal pain.   UA postive for leuk Tx with bactrim    10/2024 urine culture negative  Orders:  -     AMB POC URINALYSIS DIP STICK AUTO W/O MICRO  -     Culture, Urine  -     AMB POC UROFLOWMETRY  -     AMB POC PVR, TOMAS,POST-VOID RES,US,NON-IMAGING  -     methenamine (HIPREX) 1 g tablet; Take 1 tablet by mouth 2 times daily (with meals), Disp-60 tablet, R-5Normal  2. Pyuria  -     Culture, Fungus  -     AMB POC UROFLOWMETRY  -     AMB POC PVR, TOMAS,POST-VOID RES,US,NON-IMAGING  3. Microscopic hematuria  4. Frequency of micturition  -     methenamine (HIPREX) 1 g tablet; Take 1 tablet by mouth 2 times daily (with meals), Disp-60 tablet, R-5Normal  5. Dysuria  -     methenamine (HIPREX) 1 g tablet; Take 1 tablet by mouth 2 times daily (with meals), Disp-60 tablet, R-5Normal        PAST MEDICAL HISTORY  PMHx (including negatives):  has a past medical history of Arrhythmia, Chest pain, Diabetes mellitus (HCC), Heart attack (HCC), Heart valve replaced, Hyperlipidemia, Hypertension, Ill-defined condition, Lung abnormality, SOB (shortness of breath), and Stroke (HCC).   PSurgHx:  has a past surgical history that includes hernia repair; Colonoscopy (Left, 9/12/2018); other surgical history; Cataract removal; Partial hysterectomy; back surgery; removal gallbladder; Refractive surgery; Skin lesion excision (Left, 10/30/2023); Colonoscopy (N/A, 01/26/2024); and Cystocopy (10/23/2024).  PSocHx:  reports

## 2024-10-23 NOTE — PROGRESS NOTES
Chief Complaint   Patient presents with    Procedure        BP (!) 168/74   Pulse 73      PHQ-9 score is    Negative      1. \"Have you been to the ER, urgent care clinic since your last visit?  Hospitalized since your last visit?\" No    2. \"Have you seen or consulted any other health care providers outside of the [facility] since your last visit?\" No     3. For patients aged 45-75: Has the patient had a colonoscopy / FIT/ Cologuard? NA - based on age      If the patient is female:    4. For patients aged 40-74: Has the patient had a mammogram within the past 2 years? NA - based on age or sex      5. For patients aged 21-65: Has the patient had a pap smear? NA - based on age or sex

## 2024-10-24 ENCOUNTER — TELEPHONE (OUTPATIENT)
Age: 89
End: 2024-10-24

## 2024-10-24 NOTE — TELEPHONE ENCOUNTER
I've called the hiprex in verbally, will you call pt or spouse and let them know they have that now and should be working on it?

## 2024-10-24 NOTE — TELEPHONE ENCOUNTER
Pt Spouse Called Stating [facility] Hasn't Received Her Rx That Was Sent Over Yesterday. Can You Check Into This For Me Please

## 2024-10-25 LAB — BACTERIA UR CULT: NORMAL

## 2024-11-20 RX ORDER — METHENAMINE HIPPURATE 1000 MG/1
1 TABLET ORAL 2 TIMES DAILY WITH MEALS
Qty: 180 TABLET | Refills: 5 | Status: SHIPPED | OUTPATIENT
Start: 2024-11-20

## 2024-12-07 ENCOUNTER — HOSPITAL ENCOUNTER (OUTPATIENT)
Facility: HOSPITAL | Age: 89
Setting detail: OBSERVATION
Discharge: HOME HEALTH | End: 2024-12-09
Attending: STUDENT IN AN ORGANIZED HEALTH CARE EDUCATION/TRAINING PROGRAM | Admitting: INTERNAL MEDICINE
Payer: MEDICARE

## 2024-12-07 ENCOUNTER — APPOINTMENT (OUTPATIENT)
Facility: HOSPITAL | Age: 89
End: 2024-12-07
Payer: MEDICARE

## 2024-12-07 DIAGNOSIS — I63.9 CEREBROVASCULAR ACCIDENT (CVA), UNSPECIFIED MECHANISM (HCC): Primary | ICD-10-CM

## 2024-12-07 PROBLEM — R29.90 STROKE-LIKE SYMPTOMS: Status: ACTIVE | Noted: 2024-12-07

## 2024-12-07 LAB
ALBUMIN SERPL-MCNC: 3.7 G/DL (ref 3.5–5)
ALBUMIN/GLOB SERPL: 1.1 (ref 1.1–2.2)
ALP SERPL-CCNC: 75 U/L (ref 45–117)
ALT SERPL-CCNC: 22 U/L (ref 12–78)
ANION GAP SERPL CALC-SCNC: 6 MMOL/L (ref 2–12)
ANION GAP SERPL CALC-SCNC: 6 MMOL/L (ref 2–12)
APPEARANCE UR: CLEAR
AST SERPL W P-5'-P-CCNC: 13 U/L (ref 15–37)
BACTERIA URNS QL MICRO: NEGATIVE /HPF
BASOPHILS # BLD: 0.1 K/UL (ref 0–0.1)
BASOPHILS NFR BLD: 1 % (ref 0–1)
BILIRUB SERPL-MCNC: 0.4 MG/DL (ref 0.2–1)
BILIRUB UR QL: NEGATIVE
BUN SERPL-MCNC: 10 MG/DL (ref 6–20)
BUN SERPL-MCNC: 11 MG/DL (ref 6–20)
BUN/CREAT SERPL: 16 (ref 12–20)
BUN/CREAT SERPL: 19 (ref 12–20)
CA-I BLD-MCNC: 9.1 MG/DL (ref 8.5–10.1)
CA-I BLD-MCNC: 9.2 MG/DL (ref 8.5–10.1)
CHLORIDE SERPL-SCNC: 105 MMOL/L (ref 97–108)
CHLORIDE SERPL-SCNC: 106 MMOL/L (ref 97–108)
CO2 SERPL-SCNC: 28 MMOL/L (ref 21–32)
CO2 SERPL-SCNC: 29 MMOL/L (ref 21–32)
COLOR UR: ABNORMAL
CREAT SERPL-MCNC: 0.54 MG/DL (ref 0.55–1.02)
CREAT SERPL-MCNC: 0.7 MG/DL (ref 0.55–1.02)
DIFFERENTIAL METHOD BLD: ABNORMAL
EKG ATRIAL RATE: 55 BPM
EKG DIAGNOSIS: NORMAL
EKG P AXIS: -28 DEGREES
EKG P-R INTERVAL: 152 MS
EKG Q-T INTERVAL: 468 MS
EKG QRS DURATION: 114 MS
EKG QTC CALCULATION (BAZETT): 447 MS
EKG R AXIS: -53 DEGREES
EKG T AXIS: -79 DEGREES
EKG VENTRICULAR RATE: 55 BPM
EOSINOPHIL # BLD: 0.1 K/UL (ref 0–0.4)
EOSINOPHIL NFR BLD: 1 % (ref 0–7)
EPITH CASTS URNS QL MICRO: ABNORMAL /LPF
ERYTHROCYTE [DISTWIDTH] IN BLOOD BY AUTOMATED COUNT: 13.1 % (ref 11.5–14.5)
GLOBULIN SER CALC-MCNC: 3.4 G/DL (ref 2–4)
GLUCOSE BLD STRIP.AUTO-MCNC: 146 MG/DL (ref 65–100)
GLUCOSE BLD STRIP.AUTO-MCNC: 149 MG/DL (ref 65–100)
GLUCOSE SERPL-MCNC: 131 MG/DL (ref 65–100)
GLUCOSE SERPL-MCNC: 143 MG/DL (ref 65–100)
GLUCOSE UR STRIP.AUTO-MCNC: NEGATIVE MG/DL
HCT VFR BLD AUTO: 41.4 % (ref 35–47)
HGB BLD-MCNC: 13.9 G/DL (ref 11.5–16)
HGB UR QL STRIP: ABNORMAL
IMM GRANULOCYTES # BLD AUTO: 0.1 K/UL (ref 0–0.04)
IMM GRANULOCYTES NFR BLD AUTO: 1 % (ref 0–0.5)
INR PPP: 1 (ref 0.9–1.1)
KETONES UR QL STRIP.AUTO: NEGATIVE MG/DL
LEUKOCYTE ESTERASE UR QL STRIP.AUTO: ABNORMAL
LYMPHOCYTES # BLD: 1.7 K/UL (ref 0.8–3.5)
LYMPHOCYTES NFR BLD: 19 % (ref 12–49)
MCH RBC QN AUTO: 31.8 PG (ref 26–34)
MCHC RBC AUTO-ENTMCNC: 33.6 G/DL (ref 30–36.5)
MCV RBC AUTO: 94.7 FL (ref 80–99)
MONOCYTES # BLD: 0.9 K/UL (ref 0–1)
MONOCYTES NFR BLD: 10 % (ref 5–13)
MUCOUS THREADS URNS QL MICRO: NEGATIVE /LPF
NEUTS SEG # BLD: 6.2 K/UL (ref 1.8–8)
NEUTS SEG NFR BLD: 68 % (ref 32–75)
NITRITE UR QL STRIP.AUTO: NEGATIVE
NRBC # BLD: 0 K/UL (ref 0–0.01)
NRBC BLD-RTO: 0 PER 100 WBC
PERFORMED BY:: ABNORMAL
PERFORMED BY:: ABNORMAL
PH UR STRIP: 6 (ref 5–8)
PLATELET # BLD AUTO: 180 K/UL (ref 150–400)
PMV BLD AUTO: 12.5 FL (ref 8.9–12.9)
POTASSIUM SERPL-SCNC: 3.6 MMOL/L (ref 3.5–5.1)
POTASSIUM SERPL-SCNC: 3.7 MMOL/L (ref 3.5–5.1)
PROT SERPL-MCNC: 7.1 G/DL (ref 6.4–8.2)
PROT UR STRIP-MCNC: 30 MG/DL
PROTHROMBIN TIME: 13.9 SEC (ref 11.9–14.6)
RBC # BLD AUTO: 4.37 M/UL (ref 3.8–5.2)
RBC #/AREA URNS HPF: ABNORMAL /HPF (ref 0–5)
SODIUM SERPL-SCNC: 140 MMOL/L (ref 136–145)
SODIUM SERPL-SCNC: 140 MMOL/L (ref 136–145)
SP GR UR REFRACTOMETRY: 1.02 (ref 1–1.03)
TROPONIN I SERPL HS-MCNC: 26 NG/L (ref 0–51)
URINE CULTURE IF INDICATED: ABNORMAL
UROBILINOGEN UR QL STRIP.AUTO: 0.1 EU/DL (ref 0.1–1)
WBC # BLD AUTO: 9 K/UL (ref 3.6–11)
WBC URNS QL MICRO: ABNORMAL /HPF (ref 0–4)

## 2024-12-07 PROCEDURE — 6370000000 HC RX 637 (ALT 250 FOR IP): Performed by: INTERNAL MEDICINE

## 2024-12-07 PROCEDURE — 84484 ASSAY OF TROPONIN QUANT: CPT

## 2024-12-07 PROCEDURE — 85025 COMPLETE CBC W/AUTO DIFF WBC: CPT

## 2024-12-07 PROCEDURE — 6360000002 HC RX W HCPCS: Performed by: STUDENT IN AN ORGANIZED HEALTH CARE EDUCATION/TRAINING PROGRAM

## 2024-12-07 PROCEDURE — 6370000000 HC RX 637 (ALT 250 FOR IP): Performed by: STUDENT IN AN ORGANIZED HEALTH CARE EDUCATION/TRAINING PROGRAM

## 2024-12-07 PROCEDURE — 2580000003 HC RX 258: Performed by: INTERNAL MEDICINE

## 2024-12-07 PROCEDURE — 87086 URINE CULTURE/COLONY COUNT: CPT

## 2024-12-07 PROCEDURE — 80053 COMPREHEN METABOLIC PANEL: CPT

## 2024-12-07 PROCEDURE — 70498 CT ANGIOGRAPHY NECK: CPT

## 2024-12-07 PROCEDURE — G0378 HOSPITAL OBSERVATION PER HR: HCPCS

## 2024-12-07 PROCEDURE — 0042T CT BRAIN PERFUSION: CPT

## 2024-12-07 PROCEDURE — 93005 ELECTROCARDIOGRAM TRACING: CPT | Performed by: STUDENT IN AN ORGANIZED HEALTH CARE EDUCATION/TRAINING PROGRAM

## 2024-12-07 PROCEDURE — 85610 PROTHROMBIN TIME: CPT

## 2024-12-07 PROCEDURE — 82962 GLUCOSE BLOOD TEST: CPT

## 2024-12-07 PROCEDURE — 6360000004 HC RX CONTRAST MEDICATION: Performed by: STUDENT IN AN ORGANIZED HEALTH CARE EDUCATION/TRAINING PROGRAM

## 2024-12-07 PROCEDURE — 70450 CT HEAD/BRAIN W/O DYE: CPT

## 2024-12-07 PROCEDURE — 99285 EMERGENCY DEPT VISIT HI MDM: CPT

## 2024-12-07 PROCEDURE — 80048 BASIC METABOLIC PNL TOTAL CA: CPT

## 2024-12-07 PROCEDURE — 96374 THER/PROPH/DIAG INJ IV PUSH: CPT

## 2024-12-07 PROCEDURE — 81001 URINALYSIS AUTO W/SCOPE: CPT

## 2024-12-07 RX ORDER — INSULIN LISPRO 100 [IU]/ML
0-4 INJECTION, SOLUTION INTRAVENOUS; SUBCUTANEOUS
Status: DISCONTINUED | OUTPATIENT
Start: 2024-12-07 | End: 2024-12-09 | Stop reason: HOSPADM

## 2024-12-07 RX ORDER — POLYETHYLENE GLYCOL 3350 17 G/17G
17 POWDER, FOR SOLUTION ORAL DAILY PRN
Status: DISCONTINUED | OUTPATIENT
Start: 2024-12-07 | End: 2024-12-09 | Stop reason: HOSPADM

## 2024-12-07 RX ORDER — ATORVASTATIN CALCIUM 40 MG/1
80 TABLET, FILM COATED ORAL NIGHTLY
Status: DISCONTINUED | OUTPATIENT
Start: 2024-12-07 | End: 2024-12-09 | Stop reason: HOSPADM

## 2024-12-07 RX ORDER — HYDROXYZINE PAMOATE 25 MG/1
25 CAPSULE ORAL 3 TIMES DAILY PRN
Status: DISCONTINUED | OUTPATIENT
Start: 2024-12-07 | End: 2024-12-09 | Stop reason: HOSPADM

## 2024-12-07 RX ORDER — SODIUM CHLORIDE 0.9 % (FLUSH) 0.9 %
5-40 SYRINGE (ML) INJECTION PRN
Status: DISCONTINUED | OUTPATIENT
Start: 2024-12-07 | End: 2024-12-09 | Stop reason: HOSPADM

## 2024-12-07 RX ORDER — ASPIRIN 81 MG/1
81 TABLET, CHEWABLE ORAL ONCE
Status: COMPLETED | OUTPATIENT
Start: 2024-12-07 | End: 2024-12-07

## 2024-12-07 RX ORDER — LABETALOL HYDROCHLORIDE 5 MG/ML
10 INJECTION, SOLUTION INTRAVENOUS EVERY 4 HOURS PRN
Status: DISCONTINUED | OUTPATIENT
Start: 2024-12-07 | End: 2024-12-09 | Stop reason: HOSPADM

## 2024-12-07 RX ORDER — LOSARTAN POTASSIUM 50 MG/1
100 TABLET ORAL DAILY
Status: DISCONTINUED | OUTPATIENT
Start: 2024-12-08 | End: 2024-12-09 | Stop reason: HOSPADM

## 2024-12-07 RX ORDER — LOSARTAN POTASSIUM 50 MG/1
100 TABLET ORAL ONCE
Status: COMPLETED | OUTPATIENT
Start: 2024-12-07 | End: 2024-12-07

## 2024-12-07 RX ORDER — ONDANSETRON 2 MG/ML
4 INJECTION INTRAMUSCULAR; INTRAVENOUS EVERY 6 HOURS PRN
Status: DISCONTINUED | OUTPATIENT
Start: 2024-12-07 | End: 2024-12-09 | Stop reason: HOSPADM

## 2024-12-07 RX ORDER — SODIUM CHLORIDE 0.9 % (FLUSH) 0.9 %
5-40 SYRINGE (ML) INJECTION EVERY 12 HOURS SCHEDULED
Status: DISCONTINUED | OUTPATIENT
Start: 2024-12-07 | End: 2024-12-09 | Stop reason: HOSPADM

## 2024-12-07 RX ORDER — ONDANSETRON 4 MG/1
4 TABLET, ORALLY DISINTEGRATING ORAL EVERY 8 HOURS PRN
Status: DISCONTINUED | OUTPATIENT
Start: 2024-12-07 | End: 2024-12-09 | Stop reason: HOSPADM

## 2024-12-07 RX ORDER — SODIUM CHLORIDE 9 MG/ML
INJECTION, SOLUTION INTRAVENOUS PRN
Status: DISCONTINUED | OUTPATIENT
Start: 2024-12-07 | End: 2024-12-09 | Stop reason: HOSPADM

## 2024-12-07 RX ORDER — IOPAMIDOL 755 MG/ML
100 INJECTION, SOLUTION INTRAVASCULAR
Status: COMPLETED | OUTPATIENT
Start: 2024-12-07 | End: 2024-12-07

## 2024-12-07 RX ORDER — ASPIRIN 81 MG/1
81 TABLET, CHEWABLE ORAL DAILY
Status: DISCONTINUED | OUTPATIENT
Start: 2024-12-08 | End: 2024-12-09 | Stop reason: HOSPADM

## 2024-12-07 RX ORDER — DONEPEZIL HYDROCHLORIDE 5 MG/1
5 TABLET, FILM COATED ORAL NIGHTLY
Status: DISCONTINUED | OUTPATIENT
Start: 2024-12-07 | End: 2024-12-09 | Stop reason: HOSPADM

## 2024-12-07 RX ORDER — ASPIRIN 300 MG/1
300 SUPPOSITORY RECTAL DAILY
Status: DISCONTINUED | OUTPATIENT
Start: 2024-12-08 | End: 2024-12-09 | Stop reason: HOSPADM

## 2024-12-07 RX ADMIN — LABETALOL HYDROCHLORIDE 10 MG: 5 INJECTION, SOLUTION INTRAVENOUS at 23:03

## 2024-12-07 RX ADMIN — ATORVASTATIN CALCIUM 80 MG: 40 TABLET, FILM COATED ORAL at 22:10

## 2024-12-07 RX ADMIN — HYDROXYZINE PAMOATE 25 MG: 25 CAPSULE ORAL at 23:05

## 2024-12-07 RX ADMIN — DONEPEZIL HYDROCHLORIDE 5 MG: 5 TABLET, FILM COATED ORAL at 22:10

## 2024-12-07 RX ADMIN — SODIUM CHLORIDE, PRESERVATIVE FREE 10 ML: 5 INJECTION INTRAVENOUS at 22:23

## 2024-12-07 RX ADMIN — IOPAMIDOL 100 ML: 755 INJECTION, SOLUTION INTRAVENOUS at 14:50

## 2024-12-07 RX ADMIN — LOSARTAN POTASSIUM 100 MG: 50 TABLET, FILM COATED ORAL at 17:42

## 2024-12-07 RX ADMIN — Medication 3 MG: at 23:21

## 2024-12-07 RX ADMIN — ASPIRIN 81 MG: 81 TABLET, CHEWABLE ORAL at 17:42

## 2024-12-07 ASSESSMENT — PAIN DESCRIPTION - RADICULAR PAIN
RADICULAR_PAIN: ABSENT
RADICULAR_PAIN: ABSENT

## 2024-12-07 ASSESSMENT — PAIN SCALES - GENERAL
PAINLEVEL_OUTOF10: 0

## 2024-12-07 ASSESSMENT — PAIN - FUNCTIONAL ASSESSMENT: PAIN_FUNCTIONAL_ASSESSMENT: NONE - DENIES PAIN

## 2024-12-07 NOTE — H&P
V2.0  History and Physical      Name:  Julita Metzger /Age/Sex: 1935  (89 y.o. female)   MRN & CSN:  [id number] & [id number] Encounter Date/Time: 2024 5:44 PM EST   Location:  [place] [address] PCP: Royer Carreno MD       Hospital Day: 1    Assessment and Plan:   Julita Metzger is a 89 y.o. female with pmh of dementia, CVA, coronary disease, diabetes mellitus, hypertension, atrial fibrillation, pyelonephritis, GI bleed no longer anticoagulated slurred speech who presents with slurred speech.    Hospital Problems             Last Modified POA    * (Principal) Stroke-like symptoms 2024 Yes    Hypertension 2024 Yes    Diabetes mellitus (HCC) 2024 Yes         Principal Problem:    Stroke-like symptoms  Symptoms have resolved  Plan:   Admit with stroke protocol  MRI of the brain  Aspirin now and daily    Active Problems:    Hypertension  Goal will be to maintain systolic blood pressure less than 180  Plan:   Will monitor after losartan given to see if it is effective if not additional therapy will be utilized      Diabetes mellitus (HCC)  Plan:   Hold metformin secondary to IV contrast  Sliding scale insulin     Other:  Unclear why but prior records shows that she has been on methotrexate and sulfasalazine.  Continue appropriate home medications when list is updated  Continue Aricept  After home medications reconciled further decision to be made on what should be continued    Admit to 23 Hour with telemetry    Disposition:   Current Living situation: Home with spouse  Expected Disposition: Home with spouse  Estimated D/C: 1-2 days    Diet N.p.o. advance depending on results of bedside swallow exam   DVT Prophylaxis [] Lovenox, []  Heparin, [] SCDs, [x] Ambulation,  [] Eliquis, [] Xarelto, [] Coumadin   Code Status Full code   Surrogate Decision Maker/ POA Spouse     Personally reviewed Lab Studies and Imaging     Discussed management with the ED provider and agree with the plan for

## 2024-12-07 NOTE — ED TRIAGE NOTES
Presents with .  reports that patient began having difficulty speaking.   LKW 20 minutes prior to arrival  Level one stroke alert called 1415  Adjacent called 1415    Presents with right sided facial droop and dysarthria

## 2024-12-07 NOTE — ED NOTES
ED TO INPATIENT SBAR HANDOFF    Patient Name: Julita Metzger   Preferred Name: Kathy  : 1935  89 y.o.   Family/Caregiver Present: no   Code Status Order: Prior  PO Status: No order entered, passed swallow screen  Telemetry Order: Yes  C-SSRS: Risk of Suicide: No Risk  Sitter no   Restraints:   no  Sepsis Risk Score      Situation  Chief Complaint   Patient presents with    Dysarthria    Facial Droop     Brief Description of Patient's Condition: Presents with .  reports that patient began having difficulty speaking.   LKW 20 minutes prior to arrival  Level one stroke alert called 1415  Presents with right sided facial droop and dysarthria  Mental Status: oriented and alert  Arrived from:Home  Imaging:   CTA HEAD NECK W CONTRAST   Final Result      1. No acute large vessel arterial occlusion. Mild to moderate atherosclerosis.   2. Small perfusion defect in the midbrain and right cerebellum of uncertain   significance. If there are persistent neurologic symptoms, consider MR brain.   3. Tiny infundibulum versus aneurysm at the origin of the right posterior   communicating artery.   4. Small calcific right thyroid nodule.   5. Severe cervical spondylosis.               Electronically signed by Tommy Rodriguez      CT BRAIN PERFUSION   Final Result      1. No acute large vessel arterial occlusion. Mild to moderate atherosclerosis.   2. Small perfusion defect in the midbrain and right cerebellum of uncertain   significance. If there are persistent neurologic symptoms, consider MR brain.   3. Tiny infundibulum versus aneurysm at the origin of the right posterior   communicating artery.   4. Small calcific right thyroid nodule.   5. Severe cervical spondylosis.               Electronically signed by Tommy Rodriguez      CT HEAD WO CONTRAST   Final Result   No acute abnormality            Electronically signed by MARYANNE GARZA        Abnormal labs:   Abnormal Labs Reviewed   CBC WITH AUTO DIFFERENTIAL -

## 2024-12-07 NOTE — ED PROVIDER NOTES
EMERGENCY DEPARTMENT HISTORY AND PHYSICAL EXAM      Date: 12/7/2024  Patient Name: Julita Metzger  MRN: [id number]  YOB: 1935  Date of evaluation: 12/7/2024  Provider: Renan Mendoza MD     History of Present Illness     Chief Complaint   Patient presents with    Dysarthria    Facial Droop       History Provided By: Patient    HPI: Julita Metzger, 89 y.o. female with past medical history as listed and reviewed below presenting to the ED for possible stroke.  Level One stroke alert called on arrival.  Patient last known well about 1:50 PM, per  the patient started to have facial droop and trouble talking at that time.  Patient is reportedly not on Eliquis anymore.     Medical History     Past Medical History:  Past Medical History:   Diagnosis Date    Arrhythmia     a. 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     Lung abnormality     middle lobe syndrome right lung    SOB (shortness of breath)     on exertion    Stroke (HCC) 12/2017       Past Surgical History:  Past Surgical History:   Procedure Laterality Date    BACK SURGERY      eight surgeries    CATARACT REMOVAL      left eye    COLONOSCOPY Left 9/12/2018    COLONOSCOPY performed by Shashank Tate MD at \[facility]\"" ENDOSCOPY    COLONOSCOPY N/A 01/26/2024    COLONOSCOPY DIAGNOSTIC performed by Cheryl Sy MD at [facility] ENDOSCOPY    CYSTOSCOPY  10/23/2024    HERNIA REPAIR      OTHER SURGICAL HISTORY      rt middle lobe removed d/t necrosis    PARTIAL HYSTERECTOMY (CERVIX NOT REMOVED)      REFRACTIVE SURGERY      REMOVAL GALLBLADDER      SKIN LESION EXCISION Left 10/30/2023    EXCISION LEFT INGUINAL MASS/LIPOMA performed by Raoul Steward MD at [facility] MAIN OR       Family History:  Family History   Problem Relation Age of Onset    Cancer Maternal Grandmother     Heart Disease Maternal Aunt

## 2024-12-08 ENCOUNTER — APPOINTMENT (OUTPATIENT)
Facility: HOSPITAL | Age: 89
End: 2024-12-08
Payer: MEDICARE

## 2024-12-08 LAB
BACTERIA SPEC CULT: NORMAL
CHOLEST SERPL-MCNC: 131 MG/DL
COLONY COUNT, CNT: NORMAL
COLONY COUNT, CNT: NORMAL
ERYTHROCYTE [DISTWIDTH] IN BLOOD BY AUTOMATED COUNT: 13.2 % (ref 11.5–14.5)
GLUCOSE BLD STRIP.AUTO-MCNC: 129 MG/DL (ref 65–100)
GLUCOSE BLD STRIP.AUTO-MCNC: 164 MG/DL (ref 65–100)
GLUCOSE BLD STRIP.AUTO-MCNC: 231 MG/DL (ref 65–100)
GLUCOSE BLD STRIP.AUTO-MCNC: 287 MG/DL (ref 65–100)
HCT VFR BLD AUTO: 42.6 % (ref 35–47)
HDLC SERPL-MCNC: 46 MG/DL
HDLC SERPL: 2.8 (ref 0–5)
HGB BLD-MCNC: 14.1 G/DL (ref 11.5–16)
LDLC SERPL CALC-MCNC: 57.6 MG/DL (ref 0–100)
LIPID PANEL: NORMAL
Lab: NORMAL
MCH RBC QN AUTO: 31.5 PG (ref 26–34)
MCHC RBC AUTO-ENTMCNC: 33.1 G/DL (ref 30–36.5)
MCV RBC AUTO: 95.3 FL (ref 80–99)
NRBC # BLD: 0 K/UL (ref 0–0.01)
NRBC BLD-RTO: 0 PER 100 WBC
PERFORMED BY:: ABNORMAL
PLATELET # BLD AUTO: 165 K/UL (ref 150–400)
PMV BLD AUTO: 11.9 FL (ref 8.9–12.9)
RBC # BLD AUTO: 4.47 M/UL (ref 3.8–5.2)
TRIGL SERPL-MCNC: 137 MG/DL
VLDLC SERPL CALC-MCNC: 27.4 MG/DL
WBC # BLD AUTO: 7.5 K/UL (ref 3.6–11)

## 2024-12-08 PROCEDURE — 6370000000 HC RX 637 (ALT 250 FOR IP): Performed by: STUDENT IN AN ORGANIZED HEALTH CARE EDUCATION/TRAINING PROGRAM

## 2024-12-08 PROCEDURE — 97530 THERAPEUTIC ACTIVITIES: CPT

## 2024-12-08 PROCEDURE — 2580000003 HC RX 258: Performed by: INTERNAL MEDICINE

## 2024-12-08 PROCEDURE — 97161 PT EVAL LOW COMPLEX 20 MIN: CPT

## 2024-12-08 PROCEDURE — 6360000002 HC RX W HCPCS: Performed by: INTERNAL MEDICINE

## 2024-12-08 PROCEDURE — 99223 1ST HOSP IP/OBS HIGH 75: CPT | Performed by: PSYCHIATRY & NEUROLOGY

## 2024-12-08 PROCEDURE — G0378 HOSPITAL OBSERVATION PER HR: HCPCS

## 2024-12-08 PROCEDURE — 82962 GLUCOSE BLOOD TEST: CPT

## 2024-12-08 PROCEDURE — 80061 LIPID PANEL: CPT

## 2024-12-08 PROCEDURE — 70551 MRI BRAIN STEM W/O DYE: CPT

## 2024-12-08 PROCEDURE — 83036 HEMOGLOBIN GLYCOSYLATED A1C: CPT

## 2024-12-08 PROCEDURE — 92610 EVALUATE SWALLOWING FUNCTION: CPT

## 2024-12-08 PROCEDURE — 85027 COMPLETE CBC AUTOMATED: CPT

## 2024-12-08 PROCEDURE — 96372 THER/PROPH/DIAG INJ SC/IM: CPT

## 2024-12-08 PROCEDURE — 96375 TX/PRO/DX INJ NEW DRUG ADDON: CPT

## 2024-12-08 PROCEDURE — 36415 COLL VENOUS BLD VENIPUNCTURE: CPT

## 2024-12-08 PROCEDURE — 6370000000 HC RX 637 (ALT 250 FOR IP): Performed by: INTERNAL MEDICINE

## 2024-12-08 RX ORDER — HEPARIN SODIUM 5000 [USP'U]/ML
5000 INJECTION, SOLUTION INTRAVENOUS; SUBCUTANEOUS 2 TIMES DAILY
Status: DISCONTINUED | OUTPATIENT
Start: 2024-12-08 | End: 2024-12-09 | Stop reason: HOSPADM

## 2024-12-08 RX ADMIN — SODIUM CHLORIDE, PRESERVATIVE FREE 5 ML: 5 INJECTION INTRAVENOUS at 21:16

## 2024-12-08 RX ADMIN — INSULIN LISPRO 2 UNITS: 100 INJECTION, SOLUTION INTRAVENOUS; SUBCUTANEOUS at 13:31

## 2024-12-08 RX ADMIN — HEPARIN SODIUM 5000 UNITS: 5000 INJECTION INTRAVENOUS; SUBCUTANEOUS at 21:08

## 2024-12-08 RX ADMIN — ONDANSETRON 4 MG: 4 TABLET, ORALLY DISINTEGRATING ORAL at 17:57

## 2024-12-08 RX ADMIN — Medication 3 MG: at 21:35

## 2024-12-08 RX ADMIN — SODIUM CHLORIDE, PRESERVATIVE FREE 10 ML: 5 INJECTION INTRAVENOUS at 09:55

## 2024-12-08 RX ADMIN — INSULIN LISPRO 1 UNITS: 100 INJECTION, SOLUTION INTRAVENOUS; SUBCUTANEOUS at 10:01

## 2024-12-08 RX ADMIN — DONEPEZIL HYDROCHLORIDE 5 MG: 5 TABLET, FILM COATED ORAL at 21:08

## 2024-12-08 RX ADMIN — WATER 1000 MG: 1 INJECTION INTRAMUSCULAR; INTRAVENOUS; SUBCUTANEOUS at 09:54

## 2024-12-08 RX ADMIN — LOSARTAN POTASSIUM 100 MG: 50 TABLET, FILM COATED ORAL at 09:52

## 2024-12-08 RX ADMIN — HEPARIN SODIUM 5000 UNITS: 5000 INJECTION INTRAVENOUS; SUBCUTANEOUS at 15:54

## 2024-12-08 RX ADMIN — ASPIRIN 81 MG: 81 TABLET, CHEWABLE ORAL at 09:52

## 2024-12-08 RX ADMIN — ATORVASTATIN CALCIUM 80 MG: 40 TABLET, FILM COATED ORAL at 21:08

## 2024-12-08 ASSESSMENT — PAIN DESCRIPTION - RADICULAR PAIN
RADICULAR_PAIN: ABSENT

## 2024-12-08 NOTE — PLAN OF CARE
Problem: Chronic Conditions and Co-morbidities  Goal: Patient's chronic conditions and co-morbidity symptoms are monitored and maintained or improved  Outcome: Not Progressing     Problem: Chronic Conditions and Co-morbidities  Goal: Patient's chronic conditions and co-morbidity symptoms are monitored and maintained or improved  Outcome: Not Progressing      The patient is a 60y Male complaining of syncope. The patient is a 60y Male with no pmhx except varicose veins complaining of syncope and bleeding from varicose veins. At around 7 am today, pt was scratching RLE and varicose vein started to bleed profusely. Pt was able to apply pressure, bandage it up, and stop bleeding. About 4 hours later, pt was in kitchen when he felt lightheaded and fell to kitchen floor and LOC. Denies hitting head. Per wife, pt was unconscious for a few minutes. No tongue biting, tremors, self-micturition. No post-ictal period. Pt was able to ambulate after waking up. No HA, CP, SOB, abd pain, leg swelling, fevers, or pain in any extremity. No allergies. No hx of cardiac disease. The patient is a 60y Male with no pmhx except varicose veins complaining of syncope and bleeding from varicose veins. At around 7 am today, pt was scratching RLE and varicose vein started to bleed profusely. Pt was able to apply pressure, bandage it up, and stop bleeding. About 4 hours later, pt was in kitchen when he felt lightheaded and fell to kitchen floor and LOC. Denies hitting head. Per wife, pt was unconscious for a few minutes. No tongue biting, tremors, self-micturition. No post-ictal period. Pt was able to ambulate after waking up. No HA, CP, SOB, abd pain, leg swelling, fevers, or pain in any extremity. No allergies. No hx of cardiac disease.      attn - pt seen in Rm14 - agree with above - pt had syncopal event after bleeding R lower leg varicose vein this am.  no prodrome, cp, palp, recent illness, n/v,  diaphoresis.  currently feels back to usual self.

## 2024-12-08 NOTE — CONSULTS
[facility] NEUROLOGY CONSULTATION          Chief Complaint/Admission Diagnosis: Stroke-like symptoms [R29.90]  Cerebrovascular accident (CVA), unspecified mechanism (HCC) [I63.9]     I have been asked to see this 89 y.o. female in neurological consultation by Maurizio Miles MD  to render advice and opinion regarding stroke     ?     Impression/Recommendations:   Julita Metzger is a 89 y.o. female with pmh of dementia, CVA, coronary disease, diabetes mellitus, hypertension, atrial fibrillation, pyelonephritis, GI bleed no longer anticoagulated slurred speech who presents with slurred speech. MRI brain does not show any acute stroke. It shows microvascular changes.  She is diagnosed with UTI being treated. A1c is 6.4.   Patient's initial complaint is not a focal symptom of stroke and does not need a stroke work up.    No further neurological recommendations.  Thank you for the consult.       Vanita Anaya MD  Teleneurologist    HPI:   History was obtained from a review of the electronic record and from the patient and family.??   Julita Metzger is a 89 y.o. female with pmh of dementia, CVA, coronary disease, diabetes mellitus, hypertension, atrial fibrillation, pyelonephritis, GI bleed no longer anticoagulated slurred speech who presents with slurred speech.     Patient and spouse vacation twice a year in [place].  They were returning from one of their trips there when patient began having slurred speech.  Eventually got to the point where she could not get any words out.  No other abnormalities were noted.  They brought her to the emergency room.  Staff noted left facial droop such that her NIH stroke scale score was 2.  Imaging showed a possible perfusion defect in the midbrain and right cerebellum of unclear significance.  Neurology recommendation was to continue Eliquis, monitor and obtain follow-up MRI of the brain.  In the setting of prior GI bleed and having had her Eliquis stopped the

## 2024-12-09 VITALS
DIASTOLIC BLOOD PRESSURE: 86 MMHG | TEMPERATURE: 98.4 F | BODY MASS INDEX: 27.6 KG/M2 | OXYGEN SATURATION: 93 % | HEIGHT: 66 IN | HEART RATE: 85 BPM | RESPIRATION RATE: 18 BRPM | WEIGHT: 171.74 LBS | SYSTOLIC BLOOD PRESSURE: 119 MMHG

## 2024-12-09 LAB
GLUCOSE BLD STRIP.AUTO-MCNC: 148 MG/DL (ref 65–100)
GLUCOSE BLD STRIP.AUTO-MCNC: 160 MG/DL (ref 65–100)
PERFORMED BY:: ABNORMAL
PERFORMED BY:: ABNORMAL

## 2024-12-09 PROCEDURE — 2580000003 HC RX 258: Performed by: INTERNAL MEDICINE

## 2024-12-09 PROCEDURE — 6370000000 HC RX 637 (ALT 250 FOR IP): Performed by: INTERNAL MEDICINE

## 2024-12-09 PROCEDURE — 96372 THER/PROPH/DIAG INJ SC/IM: CPT

## 2024-12-09 PROCEDURE — 6370000000 HC RX 637 (ALT 250 FOR IP): Performed by: STUDENT IN AN ORGANIZED HEALTH CARE EDUCATION/TRAINING PROGRAM

## 2024-12-09 PROCEDURE — 6360000002 HC RX W HCPCS: Performed by: INTERNAL MEDICINE

## 2024-12-09 PROCEDURE — 96376 TX/PRO/DX INJ SAME DRUG ADON: CPT

## 2024-12-09 PROCEDURE — G0378 HOSPITAL OBSERVATION PER HR: HCPCS

## 2024-12-09 PROCEDURE — 82962 GLUCOSE BLOOD TEST: CPT

## 2024-12-09 RX ORDER — CARVEDILOL 3.12 MG/1
3.12 TABLET ORAL 2 TIMES DAILY WITH MEALS
Status: DISCONTINUED | OUTPATIENT
Start: 2024-12-09 | End: 2024-12-09 | Stop reason: HOSPADM

## 2024-12-09 RX ORDER — NITROFURANTOIN 25; 75 MG/1; MG/1
100 CAPSULE ORAL 2 TIMES DAILY
Qty: 20 CAPSULE | Refills: 0 | Status: SHIPPED | OUTPATIENT
Start: 2024-12-10 | End: 2024-12-20

## 2024-12-09 RX ORDER — AMLODIPINE BESYLATE 5 MG/1
5 TABLET ORAL DAILY
Status: DISCONTINUED | OUTPATIENT
Start: 2024-12-09 | End: 2024-12-09 | Stop reason: HOSPADM

## 2024-12-09 RX ORDER — ISOSORBIDE MONONITRATE 30 MG/1
30 TABLET, EXTENDED RELEASE ORAL DAILY
Status: DISCONTINUED | OUTPATIENT
Start: 2024-12-09 | End: 2024-12-09 | Stop reason: HOSPADM

## 2024-12-09 RX ADMIN — SODIUM CHLORIDE, PRESERVATIVE FREE 10 ML: 5 INJECTION INTRAVENOUS at 08:45

## 2024-12-09 RX ADMIN — ASPIRIN 81 MG: 81 TABLET, CHEWABLE ORAL at 08:43

## 2024-12-09 RX ADMIN — ISOSORBIDE MONONITRATE 30 MG: 30 TABLET, EXTENDED RELEASE ORAL at 12:17

## 2024-12-09 RX ADMIN — HYDROXYZINE PAMOATE 25 MG: 25 CAPSULE ORAL at 08:43

## 2024-12-09 RX ADMIN — WATER 1000 MG: 1 INJECTION INTRAMUSCULAR; INTRAVENOUS; SUBCUTANEOUS at 08:44

## 2024-12-09 RX ADMIN — LOSARTAN POTASSIUM 100 MG: 50 TABLET, FILM COATED ORAL at 08:43

## 2024-12-09 RX ADMIN — AMLODIPINE BESYLATE 5 MG: 5 TABLET ORAL at 12:17

## 2024-12-09 RX ADMIN — CARVEDILOL 3.12 MG: 3.12 TABLET, FILM COATED ORAL at 12:17

## 2024-12-09 RX ADMIN — HEPARIN SODIUM 5000 UNITS: 5000 INJECTION INTRAVENOUS; SUBCUTANEOUS at 08:44

## 2024-12-09 NOTE — PLAN OF CARE
Problem: Chronic Conditions and Co-morbidities  Goal: Patient's chronic conditions and co-morbidity symptoms are monitored and maintained or improved  Outcome: Progressing     Problem: Discharge Planning  Goal: Discharge to home or other facility with appropriate resources  Outcome: Progressing     Problem: Safety - Adult  Goal: Free from fall injury  Outcome: Progressing  Flowsheets (Taken 12/8/2024 1921)  Free From Fall Injury: Instruct family/caregiver on patient safety     Problem: Physical Therapy - Adult  Goal: By Discharge: Performs mobility at highest level of function for planned discharge setting.  See evaluation for individualized goals.  Description: FUNCTIONAL STATUS PRIOR TO ADMISSION: Patient was modified independent using a single point cane for functional mobility., The patient required min to mod A from  for basic and instrumental ADLs., and The patient and/or family endorse 0 falls within the last 3 months.    HOME SUPPORT PRIOR TO ADMISSION: The patient lived with  who provided assistance as needed for dressing and transfers.    Physical Therapy Goals  Initiated 12/8/2024  Pt stated goal: to go home  Pt will be I with [facility] in 7 days.  Pt will perform bed mobility with Modified [place] in 7 days.  Pt will perform transfers with Modified [place] in 7 days.   Pt will amb  feet with LRAD safely with Modified [place] in 7 days.  Pt will ascend/descend 4-5 steps with bilateral handrail(s) and Stand by Assist in 7 days to safely enter/navigate home.   Pt will demonstrate improvement in standing balance from Contact Guard Assist to Modified [place] in 7 days.      12/8/2024 1356 by Nerissa Palumbo, PT  Outcome: Progressing

## 2024-12-09 NOTE — CARE COORDINATION
DC Plan: Home with spouse and [facility] Care     Cm met with pt and spouse at the bedside. Cm discussed home health for therapy. Choice obtained for [facility]. Referral made via [employer].     Cm messaged attending via [employer] to modify home health dc order.     Transition of Care Plan:    RUR: N/A  Prior Level of Functioning: independent  Disposition: home health  JOSE: 12/9  If SNF or IPR: Date FOC offered: N/A  Date FOC received: N/A  Accepting facility: N/A  Date authorization started with reference number: N/A  Date authorization received and expires: N/A  Follow up appointments: Yes  DME needed: None  Transportation at discharge: Spouse  IM/IMM Medicare/ letter given: N/A - OBS  Is patient a  and connected with [facility]? N/A   If yes, was [place] transfer form completed and [facility] notified?   Caregiver Contact: Spouse  Discharge Caregiver contacted prior to discharge? Yes  Care Conference needed? No  Barriers to discharge: None

## 2024-12-09 NOTE — DISCHARGE SUMMARY
12/08/24 2251 (!) 172/60 97.7 °F (36.5 °C) Oral 65 18 96 %   12/08/24 2247 (!) 179/62 98.2 °F (36.8 °C) Oral 68 18 92 %   12/08/24 1921 (!) 187/68 98.1 °F (36.7 °C) Oral 75 18 92 %   12/08/24 1852 (!) 169/63 97.9 °F (36.6 °C) Oral 79 18 93 %   12/08/24 1543 -- -- -- 89 -- --   12/08/24 1509 (!) 164/88 97.5 °F (36.4 °C) Oral 84 18 98 %       Gen:    Not in distress  Chest: Clear lungs  CVS:   Regular rhythm.  No edema  Abd:  Soft, not distended, not tender  Neuro: awake, moving all exts    Discharge/Recent Laboratory Results:  Recent Labs     12/07/24 2005      K 3.7      CO2 28   BUN 10   CREATININE 0.54*   GLUCOSE 131*   CALCIUM 9.1     Recent Labs     12/08/24  1036   HGB 14.1   HCT 42.6   WBC 7.5          Discharge Medications:     Medication List        START taking these medications      nitrofurantoin (macrocrystal-monohydrate) 100 MG capsule  Commonly known as: MACROBID  Take 1 capsule by mouth 2 times daily for 10 days  Start taking on: December 10, 2024            CONTINUE taking these medications      amLODIPine 5 MG tablet  Commonly known as: NORVASC     atorvastatin 40 MG tablet  Commonly known as: LIPITOR     carvedilol 3.125 MG tablet  Commonly known as: COREG     cyclobenzaprine 10 MG tablet  Commonly known as: FLEXERIL     docusate sodium 50 MG capsule  Commonly known as: COLACE     donepezil 5 MG tablet  Commonly known as: ARICEPT     ferrous sulfate 325 (65 Fe) MG tablet  Commonly known as: IRON 325     fish oil 1000 MG capsule     folic acid 1 MG tablet  Commonly known as: FOLVITE     gabapentin 300 MG capsule  Commonly known as: NEURONTIN     Icosapent Ethyl 1 g Caps capsule  Commonly known as: VASCEPA     isosorbide mononitrate 30 MG extended release tablet  Commonly known as: IMDUR     losartan 100 MG tablet  Commonly known as: COZAAR     meclizine 25 MG tablet  Commonly known as: ANTIVERT     metFORMIN 500 MG tablet  Commonly known as: GLUCOPHAGE     methenamine 1 g

## 2024-12-09 NOTE — PROGRESS NOTES
Hospitalist Progress Note    NAME:   Julita Metzger   : 1935   MRN: [id number]     Date/Time: 2024 8:23 AM  Patient PCP: Royer Carreno MD    Estimated discharge date:  Barriers:       Assessment / Plan:    Slurred speech/expressive aphasia   History of previous CVA without any residual effects noted.  Symptoms have completely resolved since presenting to the ED.  Unlikely to be acute CVA.  Initially started on Eliquis however patient has history of GI bleeding.  Pending read of MRI of the brain  Continue aspirin, Lipitor.  Also concurrent UTI noted.  Appreciate teleneurology consultation.     UTI  Start IV Rocephin.  Obtain urine culture.    Hypertension  Initial permissive hypertension with consideration for possible CVA.  Restart patient's Cozaar.     Diabetes mellitus   Hold metformin secondary to IV contrast  Sliding scale insulin     History of dementia  Continue Aricept    Full CODE STATUS    DVT prophylaxis  Heparin SC      Medical Decision Making:   I personally reviewed labs:  I personally reviewed imaging:  I personally reviewed EKG:  Toxic drug monitoring:   Discussed case with:     Subjective:     Chief Complaint / Reason for Physician Visit  \" Slurred speech\".  Discussed with RN events overnight.      --  Admission H&P  89 y.o. female with pmh of dementia, CVA, coronary disease, diabetes mellitus, hypertension, atrial fibrillation, pyelonephritis, GI bleed no longer anticoagulated slurred speech who presents with slurred speech.     Patient and spouse vacation twice a year in [place].  They were returning from one of their trips there when patient began having slurred speech.  Eventually got to the point where she could not get any words out.  No other abnormalities were noted.  They brought her to the emergency room.  Staff noted left facial droop such that her NIH stroke scale score was 2.  Imaging showed a possible perfusion defect in the midbrain and right cerebellum of 
4 Eyes Skin Assessment     NAME:  Julita Metzger  YOB: 1935  MEDICAL RECORD NUMBER:  [id number]    The patient is being assessed for  Admission    I agree that at least one RN has performed a thorough Head to Toe Skin Assessment on the patient. ALL assessment sites listed below have been assessed.      Areas assessed by both nurses:    Head, Face, Ears, Shoulders, Back, Chest, Arms, Elbows, Hands, Sacrum. Buttock, Coccyx, Ischium, Legs. Feet and Heels, and Under Medical Devices         Does the Patient have a Wound? No noted wound(s)       Cuong Prevention initiated by RN: Yes  Wound Care Orders initiated by RN: No    Pressure Injury (Stage 3,4, Unstageable, DTI, NWPT, and Complex wounds) if present, place Wound referral order by RN under : No    New Ostomies, if present place, Ostomy referral order under : No     Nurse 1 eSignature: Electronically signed by Mara Cyr RN on 12/8/24 at 3:46 AM EST    **SHARE this note so that the co-signing nurse can place an eSignature**    Nurse 2 eSignature: Electronically signed by Kalina Govea RN on 12/8/24 at 4:13 AM EST   
Called adjacent. Spoke with diamond at 0117.  
Discharge plan of care/case management plan validated with provider discharge order.    Discharge instructions discussed with the patient and spouse. All concerns addressed at this time. PIV removed.     Patient wheeled downstairs to go home.  
OT eval order received and acknowledged. Pt screened and demonstrating baseline independence for self care tasks and functional mobility/transfers. Ms. Metzger has no need for skilled OT services at this time. OT evaluation order will therefore be discontinued this pt has no acute OT needs. Please reorder OT if pt's functional status changes. Thank you.         Functional Measure:  [facility] AM-PACTM \"6 Clicks\"                                                       Daily Activity Inpatient Short Form  How much help from another person does the patient currently need... Total; A Lot A Little None   1.  Putting on and taking off regular lower body clothing? []  1 []  2 []  3 [x]  4   2.  Bathing (including washing, rinsing, drying)? []  1 []  2 []  3 [x]  4   3.  Toileting, which includes using toilet, bedpan or urinal? [] 1 []  2 []  3 [x]  4   4.  Putting on and taking off regular upper body clothing? []  1 []  2 []  3 [x]  4   5.  Taking care of personal grooming such as brushing teeth? []  1 []  2 []  3 [x]  4   6.  Eating meals? []  1 []  2 []  3 [x]  4   © 2007, Trustees of [facility], under license to [employer]. All rights reserved     Score: 24/24     Interpretation of Tool:  Represents clinically-significant functional categories (i.e. Activities of daily living).  Percentage of Impairment CH    0%   CI    1-19% CJ    20-39% CK    40-59% CL    60-79% CM    80-99% CN     100%   AMPA  Score 6-24 24 23 20-22 15-19 10-14 7-9 6      
PT eval order received and acknowledged. PT eval attempted at 0750 however pt off the floor in MRI. Will continue to follow patient and attempt PT eval at a later time. Thank you.     
Pt very anxious. BP high. Dr contacted. Pt getting beligerent and threatening. Dr contacted.   Refusing to take BP meds until Dr gets here.    
Received Order for Telemetry     Julita Metzger   1935   [id number]   Stroke-like symptoms [R29.90]   Hill Barrera MD     Tele Box # [id number] placed on patient at  1856 pm  ER Room # 7  Admitting to Room 575  Verified with Primary Nurse Andres at  1935 pm    
Speech LAnguage Pathology Dysphagia EVALUATION/DISCHARGE    Patient: Julita Metzger (89 y.o. female)  Date: 12/8/2024  Primary Diagnosis: Stroke-like symptoms [R29.90]  Cerebrovascular accident (CVA), unspecified mechanism (HCC) [I63.9]       Precautions: Language/Communication                  DIET RECOMMENDATIONS: Regular and thin liquids    SWALLOW SAFETY PRECAUTIONS: Upright positioning during po intake and after po intake for at least 30-60 minutes, slow rate, small-single sips and bites   ASSESSMENT :  Patient seen upright on EOB, alert and cooperative. Word retrieval difficulty and requires model for following commands.  present. Speech clarity WNL.  Upon inspection upper/lower plate, facial asymmetry that appears to be resolving   PO trials: regular solids, puree, thin liquids  Based on the objective data described below, the patient presents with WFL swallow safety and function characterized by the following  Oral phase: adequately clears oral cavity  Pharyngeal phase: upon palpation, swallow initiation timely and HLE WFL.  Overt s/sx of aspiration/penetration: none    Concerns: Communication/Language skills with assessment as/if indicated    Patient will be discharged from skilled speech-language pathology services at this time.     PLAN :  Recommendations and Planned Interventions:  DIET RECOMMENDATIONS: Regular and thin liquids    SWALLOW SAFETY PRECAUTIONS: Upright positioning during po intake and after po intake for at least 30-60 minutes, slow rate, small-single sips and bites     Communication/Language assessment    Acute SLP Services: No, patient will be discharged from acute skilled speech-language pathology at this time.    Discharge Recommendations: To Be Determined     SUBJECTIVE:   Im okay    OBJECTIVE:     Past Medical History:   Diagnosis Date    Atrial fibrillation (HCC)     Anticoagulation stopped by cardiology due to GI bleed    Chest pain     Diabetes mellitus (HCC)     Heart 
follow-up     responded to spiritual consult. Pt is seated upright on the side of the bed; spouse is seated bedside. Pt shares her feelings/concerns regarding current hospital admission. She is unclear about her length of stay. Her spouse provides details of pt's current medical challenge. They are both affiliated with the [employer] tradition. Additional family members arrive. They share they are affiliated with the [employer] tradition as well. Pt/family express their appreciation for the visit.    Electronically signed by LYNN Batista on 12/8/2024 at 12:03 PM    
uncomplicated  Other outcome measures [facility] 6  MEDIUM      Based on the above components, the patient evaluation is determined to be of the following complexity level: LOW    Pain Ratin/10 reported  Pain Intervention(s):   pain is at a level acceptable to the patient    Activity Tolerance:   Good, tolerates ADLS without rest breaks, and SpO2 stable on room air    After treatment patient left in no apparent distress:   Bed locked and in lowest position Call bell within reach, Caregiver / family present, and seated safely EOB  and nsg updated.    COMMUNICATION/EDUCATION:   The patient’s plan of care was discussed with: Registered nurse and Case management     Patient Education  Education Given To: Patient;Family  Education Provided: Role of Therapy;Plan of Care;Home Exercise Program;Transfer Training;Energy Conservation;Equipment;Fall Prevention Strategies;Family Education  Education Provided Comments: DC recommendations, PT POCJILL, safety and importance of mobility including up to chair  Education Method: Demonstration;Verbal  Barriers to Learning: Cognition  Education Outcome: Verbalized understanding;Continued education needed     Thank you for this referral.  Nerissa Palumbo, PT, DPT  Minutes: 26

## 2024-12-10 LAB
EST. AVERAGE GLUCOSE BLD GHB EST-MCNC: 126 MG/DL
HBA1C MFR BLD: 6 % (ref 4–5.6)

## 2025-01-02 ENCOUNTER — APPOINTMENT (OUTPATIENT)
Facility: HOSPITAL | Age: 89
End: 2025-01-02
Payer: MEDICARE

## 2025-01-02 ENCOUNTER — HOSPITAL ENCOUNTER (EMERGENCY)
Facility: HOSPITAL | Age: 89
Discharge: HOME | End: 2025-01-02
Attending: STUDENT IN AN ORGANIZED HEALTH CARE EDUCATION/TRAINING PROGRAM
Payer: MEDICARE

## 2025-01-02 VITALS
OXYGEN SATURATION: 100 % | HEIGHT: 67 IN | RESPIRATION RATE: 25 BRPM | TEMPERATURE: 97.7 F | HEART RATE: 67 BPM | BODY MASS INDEX: 27.47 KG/M2 | DIASTOLIC BLOOD PRESSURE: 108 MMHG | SYSTOLIC BLOOD PRESSURE: 152 MMHG | WEIGHT: 175 LBS

## 2025-01-02 DIAGNOSIS — R11.2 NAUSEA AND VOMITING, UNSPECIFIED VOMITING TYPE: Primary | ICD-10-CM

## 2025-01-02 DIAGNOSIS — R05.1 ACUTE COUGH: ICD-10-CM

## 2025-01-02 LAB
ALBUMIN SERPL-MCNC: 3.6 G/DL (ref 3.5–5)
ALBUMIN/GLOB SERPL: 1.1 (ref 1.1–2.2)
ALP SERPL-CCNC: 70 U/L (ref 45–117)
ALT SERPL-CCNC: 26 U/L (ref 12–78)
ANION GAP SERPL CALC-SCNC: 4 MMOL/L (ref 2–12)
APPEARANCE UR: CLEAR
AST SERPL W P-5'-P-CCNC: 16 U/L (ref 15–37)
BACTERIA URNS QL MICRO: NEGATIVE /HPF
BASOPHILS # BLD: 0.1 K/UL (ref 0–0.1)
BASOPHILS NFR BLD: 1 % (ref 0–1)
BILIRUB SERPL-MCNC: 0.5 MG/DL (ref 0.2–1)
BILIRUB UR QL: NEGATIVE
BUN SERPL-MCNC: 5 MG/DL (ref 6–20)
BUN/CREAT SERPL: 8 (ref 12–20)
CA-I BLD-MCNC: 9.1 MG/DL (ref 8.5–10.1)
CHLORIDE SERPL-SCNC: 107 MMOL/L (ref 97–108)
CO2 SERPL-SCNC: 30 MMOL/L (ref 21–32)
COLOR UR: ABNORMAL
CREAT SERPL-MCNC: 0.62 MG/DL (ref 0.55–1.02)
DIFFERENTIAL METHOD BLD: ABNORMAL
EOSINOPHIL # BLD: 0.1 K/UL (ref 0–0.4)
EOSINOPHIL NFR BLD: 1 % (ref 0–7)
EPITH CASTS URNS QL MICRO: ABNORMAL /LPF
ERYTHROCYTE [DISTWIDTH] IN BLOOD BY AUTOMATED COUNT: 13.5 % (ref 11.5–14.5)
FLUAV RNA SPEC QL NAA+PROBE: NOT DETECTED
FLUBV RNA SPEC QL NAA+PROBE: NOT DETECTED
GLOBULIN SER CALC-MCNC: 3.3 G/DL (ref 2–4)
GLUCOSE SERPL-MCNC: 147 MG/DL (ref 65–100)
GLUCOSE UR STRIP.AUTO-MCNC: >1000 MG/DL
HCT VFR BLD AUTO: 40.3 % (ref 35–47)
HGB BLD-MCNC: 13.7 G/DL (ref 11.5–16)
HGB UR QL STRIP: ABNORMAL
IMM GRANULOCYTES # BLD AUTO: 0.1 K/UL (ref 0–0.04)
IMM GRANULOCYTES NFR BLD AUTO: 1 % (ref 0–0.5)
KETONES UR QL STRIP.AUTO: 15 MG/DL
LEUKOCYTE ESTERASE UR QL STRIP.AUTO: ABNORMAL
LYMPHOCYTES # BLD: 1.2 K/UL (ref 0.8–3.5)
LYMPHOCYTES NFR BLD: 14 % (ref 12–49)
MAGNESIUM SERPL-MCNC: 1.8 MG/DL (ref 1.6–2.4)
MCH RBC QN AUTO: 31.6 PG (ref 26–34)
MCHC RBC AUTO-ENTMCNC: 34 G/DL (ref 30–36.5)
MCV RBC AUTO: 92.9 FL (ref 80–99)
MONOCYTES # BLD: 0.8 K/UL (ref 0–1)
MONOCYTES NFR BLD: 9 % (ref 5–13)
NEUTS SEG # BLD: 6.3 K/UL (ref 1.8–8)
NEUTS SEG NFR BLD: 74 % (ref 32–75)
NITRITE UR QL STRIP.AUTO: NEGATIVE
NRBC # BLD: 0 K/UL (ref 0–0.01)
NRBC BLD-RTO: 0 PER 100 WBC
PH UR STRIP: 7
PLATELET # BLD AUTO: 151 K/UL (ref 150–400)
PMV BLD AUTO: 11.7 FL (ref 8.9–12.9)
POTASSIUM SERPL-SCNC: 3.2 MMOL/L (ref 3.5–5.1)
PROT SERPL-MCNC: 6.9 G/DL (ref 6.4–8.2)
PROT UR STRIP-MCNC: 30 MG/DL
RBC # BLD AUTO: 4.34 M/UL (ref 3.8–5.2)
RBC #/AREA URNS HPF: ABNORMAL /HPF (ref 0–5)
SARS-COV-2 RNA RESP QL NAA+PROBE: NOT DETECTED
SODIUM SERPL-SCNC: 141 MMOL/L (ref 136–145)
SP GR UR REFRACTOMETRY: 1 (ref 1–1.03)
TROPONIN I SERPL HS-MCNC: 35 NG/L (ref 0–51)
URINE CULTURE IF INDICATED: ABNORMAL
UROBILINOGEN UR QL STRIP.AUTO: 0.1 EU/DL (ref 0.2–1)
WBC # BLD AUTO: 8.5 K/UL (ref 3.6–11)
WBC URNS QL MICRO: ABNORMAL /HPF (ref 0–4)

## 2025-01-02 PROCEDURE — 80053 COMPREHEN METABOLIC PANEL: CPT

## 2025-01-02 PROCEDURE — 96361 HYDRATE IV INFUSION ADD-ON: CPT

## 2025-01-02 PROCEDURE — 81001 URINALYSIS AUTO W/SCOPE: CPT

## 2025-01-02 PROCEDURE — 71045 X-RAY EXAM CHEST 1 VIEW: CPT

## 2025-01-02 PROCEDURE — 84484 ASSAY OF TROPONIN QUANT: CPT

## 2025-01-02 PROCEDURE — 83735 ASSAY OF MAGNESIUM: CPT

## 2025-01-02 PROCEDURE — 96360 HYDRATION IV INFUSION INIT: CPT

## 2025-01-02 PROCEDURE — 87636 SARSCOV2 & INF A&B AMP PRB: CPT

## 2025-01-02 PROCEDURE — 2580000003 HC RX 258: Performed by: STUDENT IN AN ORGANIZED HEALTH CARE EDUCATION/TRAINING PROGRAM

## 2025-01-02 PROCEDURE — 99284 EMERGENCY DEPT VISIT MOD MDM: CPT

## 2025-01-02 PROCEDURE — 85025 COMPLETE CBC W/AUTO DIFF WBC: CPT

## 2025-01-02 RX ORDER — 0.9 % SODIUM CHLORIDE 0.9 %
500 INTRAVENOUS SOLUTION INTRAVENOUS ONCE
Status: COMPLETED | OUTPATIENT
Start: 2025-01-02 | End: 2025-01-02

## 2025-01-02 RX ORDER — ONDANSETRON 4 MG/1
4 TABLET, FILM COATED ORAL 3 TIMES DAILY PRN
Qty: 15 TABLET | Refills: 0 | Status: SHIPPED | OUTPATIENT
Start: 2025-01-02

## 2025-01-02 RX ADMIN — SODIUM CHLORIDE 500 ML: 9 INJECTION, SOLUTION INTRAVENOUS at 08:19

## 2025-01-02 ASSESSMENT — PAIN DESCRIPTION - LOCATION: LOCATION: ABDOMEN

## 2025-01-02 ASSESSMENT — PAIN SCALES - GENERAL: PAINLEVEL_OUTOF10: 5

## 2025-01-02 NOTE — ED TRIAGE NOTES
Pt report nasal congestion and cough since last night. Also reports vomiting 1-2x. Denies exposure to anyone ill.

## 2025-01-02 NOTE — ED NOTES
Pt reports she took a promethazine for nausea this morning. States she is having urinary retention.

## 2025-01-02 NOTE — ED PROVIDER NOTES
EMERGENCY DEPARTMENT HISTORY AND PHYSICAL EXAM      Date: 1/2/2025  Patient Name: Julita Metzger  MRN: [id number]  YOB: 1935  Date of evaluation: 1/2/2025  Provider: Renan Mendoza MD     History of Present Illness     Chief Complaint   Patient presents with    Nasal Congestion    Vomiting       History Provided By: Patient,     HPI: Julita Metzger, 89 y.o. female with past medical history as listed and reviewed below presenting to the ED for evaluation of multiple symptoms.  Per the patient she has been feeling unwell for the last 2 days, has had a decreased appetite, decreased energy associated with nasal congestion, had intermittent nausea and vomiting yesterday.  She reports an associated headache muscle aches today.  Has not had any chest pain or shortness of breath.  Not having any known sick exposures.  Per the  she has been weaker than usual for the last 2 days.  Has been less mobile as well.  She denies any other symptoms, she notes she does have chronic lower pelvic pain that she is being seen by GI for.    Medical History     Past Medical History:  Past Medical History:   Diagnosis Date    Atrial fibrillation (HCC)     Anticoagulation stopped by cardiology due to GI bleed    Chest pain     Diabetes mellitus (HCC)     Heart attack (HCC) 2016    Heart valve replaced     Hyperlipidemia     Hypertension     Lung abnormality     middle lobe syndrome right lung    Stroke (HCC) 12/2017       Past Surgical History:  Past Surgical History:   Procedure Laterality Date    BACK SURGERY      eight surgeries    CATARACT REMOVAL      left eye    COLONOSCOPY Left 9/12/2018    COLONOSCOPY performed by Shashank Tate MD at [facility] ENDOSCOPY    COLONOSCOPY N/A 01/26/2024    COLONOSCOPY DIAGNOSTIC performed by Cheryl Sy MD at [facility] ENDOSCOPY    CYSTOSCOPY  10/23/2024    HERNIA REPAIR      OTHER SURGICAL HISTORY      rt middle lobe removed d/t necrosis    PARTIAL HYSTERECTOMY (CERVIX NOT REMOVED)    Sister in law called to check to see if pre-authorization has been requested.  Writer informed sister-in-law that it has been submitted to Dr. Malone.  Sister-in-law indicated that patient only has 5 10 mg tablets left.     Medication List        STOP taking these medications       promethazine (PHENERGAN) 25 MG tablet Comments:   Reason for Stopping:               Procedures     Unless otherwise noted below, none.    Performed by: Renan Mendoza MD   Procedures      Critical Care Time     Critical care billing criteria and/or time were NOT met.    Documentation     I am the Primary Clinician of Record: Renan Mendoza MD (electronically signed)    (Please note that parts of this dictation were completed with voice recognition software. Quite often unanticipated grammatical, syntax, homophones, and other interpretive errors are inadvertently transcribed by the computer software. Please disregards these errors. Please excuse any errors that have escaped final proofreading.)        Renan Mendoza MD  01/02/25 [id number]

## 2025-01-02 NOTE — DISCHARGE INSTRUCTIONS
Thank you!    Thank you for allowing me to care for you in the emergency department.  I sincerely hope that you are satisfied with your visit today.  It is my goal to provide you with excellent care.    Below you will find a list of your labs and imaging from your visit today if applicable. Should you have any questions regarding these results please do not hesitate to call the emergency department. Please review [email or web address] for a more detailed result list since the below list may not be comprehensive. Instructions on how to sign up to [email or web address] should be provided in this packet.    Labs -     Recent Results (from the past 12 hour(s))   COVID-19 & Influenza Combo    Collection Time: 01/02/25  8:03 AM    Specimen: Nasopharyngeal   Result Value Ref Range    SARS-CoV-2, PCR Not Detected Not Detected      Rapid Influenza A By PCR Not Detected Not Detected      Rapid Influenza B By PCR Not Detected Not Detected     CBC with Auto Differential    Collection Time: 01/02/25  8:03 AM   Result Value Ref Range    WBC 8.5 3.6 - 11.0 K/uL    RBC 4.34 3.80 - 5.20 M/uL    Hemoglobin 13.7 11.5 - 16.0 g/dL    Hematocrit 40.3 35.0 - 47.0 %    MCV 92.9 80.0 - 99.0 FL    MCH 31.6 26.0 - 34.0 PG    MCHC 34.0 30.0 - 36.5 g/dL    RDW 13.5 11.5 - 14.5 %    Platelets 151 150 - 400 K/uL    MPV 11.7 8.9 - 12.9 FL    Nucleated RBCs 0.0 0.0  WBC    nRBC 0.00 0.00 - 0.01 K/uL    Neutrophils % 74 32 - 75 %    Lymphocytes % 14 12 - 49 %    Monocytes % 9 5 - 13 %    Eosinophils % 1 0 - 7 %    Basophils % 1 0 - 1 %    Immature Granulocytes % 1 (H) 0 - 0.5 %    Neutrophils Absolute 6.3 1.8 - 8.0 K/UL    Lymphocytes Absolute 1.2 0.8 - 3.5 K/UL    Monocytes Absolute 0.8 0.0 - 1.0 K/UL    Eosinophils Absolute 0.1 0.0 - 0.4 K/UL    Basophils Absolute 0.1 0.0 - 0.1 K/UL    Immature Granulocytes Absolute 0.1 (H) 0.00 - 0.04 K/UL    Differential Type AUTOMATED     Comprehensive Metabolic Panel    Collection Time: 01/02/25  8:03 AM   Result Value Ref Range  manager. Please choose us in the future for your continued health care needs.   ------------------------------------------------------------------------------------------------------------  The exam and treatment you received in the Emergency Department were for an urgent problem and are not intended as complete care. It is very important that you follow-up with a doctor, nurse practitioner, or physician assistant in a timely manner to:  (1) confirm your diagnosis and review all imaging and lab results,  (2) re-evaluation of changes in your illness and treatment, and  (3) for ongoing care.  If your symptoms become worse or you do not improve as expected and you are unable to reach your usual health care provider, you should return to the Emergency Department. We are available 24 hours a day.     Please take your discharge instructions with you when you go to your follow-up appointment.     If a prescription has been provided, please have it filled as soon as possible to prevent a delay in treatment. Read the entire medication instruction sheet provided to you by the pharmacy. If you have any questions or reservations about taking the medication due to side effects or interactions with other medications, please call your primary care physician or contact the ER to speak with the charge nurse.     Make an appointment with your family doctor or the physician you were referred to for follow-up of this visit as instructed on your discharge paperwork, as this is a mandatory follow-up. Return to the ER if you are unable to be seen or if you are unable to be seen in a timely manner.    If you have any problem arranging the follow-up visit, contact the Emergency Department immediately.

## 2025-01-03 ENCOUNTER — HOSPITAL ENCOUNTER (OUTPATIENT)
Facility: HOSPITAL | Age: 89
Setting detail: OBSERVATION
Discharge: HOME | End: 2025-01-05
Attending: EMERGENCY MEDICINE | Admitting: INTERNAL MEDICINE
Payer: MEDICARE

## 2025-01-03 ENCOUNTER — APPOINTMENT (OUTPATIENT)
Facility: HOSPITAL | Age: 89
End: 2025-01-03
Payer: MEDICARE

## 2025-01-03 DIAGNOSIS — I63.9 CEREBROVASCULAR ACCIDENT (CVA), UNSPECIFIED MECHANISM (HCC): Primary | ICD-10-CM

## 2025-01-03 DIAGNOSIS — R47.1 DYSARTHRIA: ICD-10-CM

## 2025-01-03 LAB
ALBUMIN SERPL-MCNC: 4.5 G/DL (ref 3.5–5)
ALBUMIN/GLOB SERPL: 1.2 (ref 1.1–2.2)
ALP SERPL-CCNC: 103 U/L (ref 45–117)
ALT SERPL-CCNC: 50 U/L (ref 12–78)
AMPHET UR QL SCN: NEGATIVE
ANION GAP SERPL CALC-SCNC: 7 MMOL/L (ref 2–12)
APPEARANCE UR: CLEAR
AST SERPL W P-5'-P-CCNC: 57 U/L (ref 15–37)
BACTERIA URNS QL MICRO: NEGATIVE /HPF
BARBITURATES UR QL SCN: POSITIVE
BASOPHILS # BLD: 0.1 K/UL (ref 0–0.1)
BASOPHILS # BLD: 0.1 K/UL (ref 0–0.1)
BASOPHILS NFR BLD: 1 % (ref 0–1)
BASOPHILS NFR BLD: 1 % (ref 0–1)
BENZODIAZ UR QL: NEGATIVE
BILIRUB SERPL-MCNC: 0.4 MG/DL (ref 0.2–1)
BILIRUB UR QL: NEGATIVE
BUN SERPL-MCNC: 6 MG/DL (ref 6–20)
BUN/CREAT SERPL: 8 (ref 12–20)
CA-I BLD-MCNC: 9.8 MG/DL (ref 8.5–10.1)
CANNABINOIDS UR QL SCN: NEGATIVE
CHLORIDE SERPL-SCNC: 102 MMOL/L (ref 97–108)
CO2 SERPL-SCNC: 30 MMOL/L (ref 21–32)
COCAINE UR QL SCN: NEGATIVE
COLOR UR: ABNORMAL
CREAT SERPL-MCNC: 0.77 MG/DL (ref 0.55–1.02)
DIFFERENTIAL METHOD BLD: ABNORMAL
DIFFERENTIAL METHOD BLD: ABNORMAL
EOSINOPHIL # BLD: 0.1 K/UL (ref 0–0.4)
EOSINOPHIL # BLD: 0.1 K/UL (ref 0–0.4)
EOSINOPHIL NFR BLD: 0 % (ref 0–7)
EOSINOPHIL NFR BLD: 1 % (ref 0–7)
EPITH CASTS URNS QL MICRO: ABNORMAL /LPF
ERYTHROCYTE [DISTWIDTH] IN BLOOD BY AUTOMATED COUNT: 13.8 % (ref 11.5–14.5)
ERYTHROCYTE [DISTWIDTH] IN BLOOD BY AUTOMATED COUNT: 13.9 % (ref 11.5–14.5)
GLOBULIN SER CALC-MCNC: 3.7 G/DL (ref 2–4)
GLUCOSE BLD STRIP.AUTO-MCNC: 145 MG/DL (ref 65–100)
GLUCOSE BLD STRIP.AUTO-MCNC: 165 MG/DL (ref 65–100)
GLUCOSE SERPL-MCNC: 171 MG/DL (ref 65–100)
GLUCOSE UR STRIP.AUTO-MCNC: >500 MG/DL
HCT VFR BLD AUTO: 40.3 % (ref 35–47)
HCT VFR BLD AUTO: 46.7 % (ref 35–47)
HGB BLD-MCNC: 13.7 G/DL (ref 11.5–16)
HGB BLD-MCNC: 15.9 G/DL (ref 11.5–16)
HGB UR QL STRIP: NEGATIVE
IMM GRANULOCYTES # BLD AUTO: 0.1 K/UL (ref 0–0.04)
IMM GRANULOCYTES # BLD AUTO: 0.1 K/UL (ref 0–0.04)
IMM GRANULOCYTES NFR BLD AUTO: 1 % (ref 0–0.5)
IMM GRANULOCYTES NFR BLD AUTO: 1 % (ref 0–0.5)
INR PPP: 1 (ref 0.9–1.1)
KETONES UR QL STRIP.AUTO: NEGATIVE MG/DL
LEUKOCYTE ESTERASE UR QL STRIP.AUTO: ABNORMAL
LYMPHOCYTES # BLD: 1.5 K/UL (ref 0.8–3.5)
LYMPHOCYTES # BLD: 1.6 K/UL (ref 0.8–3.5)
LYMPHOCYTES NFR BLD: 13 % (ref 12–49)
LYMPHOCYTES NFR BLD: 15 % (ref 12–49)
Lab: ABNORMAL
MCH RBC QN AUTO: 31.5 PG (ref 26–34)
MCH RBC QN AUTO: 31.6 PG (ref 26–34)
MCHC RBC AUTO-ENTMCNC: 34 G/DL (ref 30–36.5)
MCHC RBC AUTO-ENTMCNC: 34 G/DL (ref 30–36.5)
MCV RBC AUTO: 92.7 FL (ref 80–99)
MCV RBC AUTO: 93.1 FL (ref 80–99)
METHADONE UR QL: NEGATIVE
MONOCYTES # BLD: 0.8 K/UL (ref 0–1)
MONOCYTES # BLD: 1 K/UL (ref 0–1)
MONOCYTES NFR BLD: 8 % (ref 5–13)
MONOCYTES NFR BLD: 8 % (ref 5–13)
MUCOUS THREADS URNS QL MICRO: ABNORMAL /LPF
NEUTS SEG # BLD: 7.4 K/UL (ref 1.8–8)
NEUTS SEG # BLD: 9.5 K/UL (ref 1.8–8)
NEUTS SEG NFR BLD: 74 % (ref 32–75)
NEUTS SEG NFR BLD: 77 % (ref 32–75)
NITRITE UR QL STRIP.AUTO: NEGATIVE
NRBC # BLD: 0 K/UL (ref 0–0.01)
NRBC # BLD: 0 K/UL (ref 0–0.01)
NRBC BLD-RTO: 0 PER 100 WBC
NRBC BLD-RTO: 0 PER 100 WBC
OPIATES UR QL: NEGATIVE
PCP UR QL: NEGATIVE
PERFORMED BY:: ABNORMAL
PERFORMED BY:: ABNORMAL
PH UR STRIP: 7 (ref 5–8)
PLATELET # BLD AUTO: 167 K/UL (ref 150–400)
PLATELET # BLD AUTO: 188 K/UL (ref 150–400)
PMV BLD AUTO: 11.6 FL (ref 8.9–12.9)
PMV BLD AUTO: 12.6 FL (ref 8.9–12.9)
POTASSIUM SERPL-SCNC: 3.6 MMOL/L (ref 3.5–5.1)
PROT SERPL-MCNC: 8.2 G/DL (ref 6.4–8.2)
PROT UR STRIP-MCNC: 100 MG/DL
PROTHROMBIN TIME: 13.5 SEC (ref 11.9–14.6)
RBC # BLD AUTO: 4.33 M/UL (ref 3.8–5.2)
RBC # BLD AUTO: 5.04 M/UL (ref 3.8–5.2)
RBC #/AREA URNS HPF: ABNORMAL /HPF (ref 0–5)
SODIUM SERPL-SCNC: 139 MMOL/L (ref 136–145)
SP GR UR REFRACTOMETRY: 1.02 (ref 1–1.03)
TROPONIN I SERPL HS-MCNC: 41 NG/L (ref 0–51)
URINE CULTURE IF INDICATED: ABNORMAL
UROBILINOGEN UR QL STRIP.AUTO: 0.1 EU/DL (ref 0.1–1)
WBC # BLD AUTO: 10.1 K/UL (ref 3.6–11)
WBC # BLD AUTO: 12.2 K/UL (ref 3.6–11)
WBC URNS QL MICRO: ABNORMAL /HPF (ref 0–4)

## 2025-01-03 PROCEDURE — 2500000003 HC RX 250 WO HCPCS: Performed by: INTERNAL MEDICINE

## 2025-01-03 PROCEDURE — 0042T CT BRAIN PERFUSION: CPT

## 2025-01-03 PROCEDURE — 80307 DRUG TEST PRSMV CHEM ANLYZR: CPT

## 2025-01-03 PROCEDURE — 80061 LIPID PANEL: CPT

## 2025-01-03 PROCEDURE — G0378 HOSPITAL OBSERVATION PER HR: HCPCS

## 2025-01-03 PROCEDURE — 84484 ASSAY OF TROPONIN QUANT: CPT

## 2025-01-03 PROCEDURE — 85610 PROTHROMBIN TIME: CPT

## 2025-01-03 PROCEDURE — 96375 TX/PRO/DX INJ NEW DRUG ADDON: CPT

## 2025-01-03 PROCEDURE — 93005 ELECTROCARDIOGRAM TRACING: CPT | Performed by: EMERGENCY MEDICINE

## 2025-01-03 PROCEDURE — 2500000003 HC RX 250 WO HCPCS: Performed by: STUDENT IN AN ORGANIZED HEALTH CARE EDUCATION/TRAINING PROGRAM

## 2025-01-03 PROCEDURE — 6360000004 HC RX CONTRAST MEDICATION: Performed by: EMERGENCY MEDICINE

## 2025-01-03 PROCEDURE — 85025 COMPLETE CBC W/AUTO DIFF WBC: CPT

## 2025-01-03 PROCEDURE — 81001 URINALYSIS AUTO W/SCOPE: CPT

## 2025-01-03 PROCEDURE — 6370000000 HC RX 637 (ALT 250 FOR IP): Performed by: STUDENT IN AN ORGANIZED HEALTH CARE EDUCATION/TRAINING PROGRAM

## 2025-01-03 PROCEDURE — 83036 HEMOGLOBIN GLYCOSYLATED A1C: CPT

## 2025-01-03 PROCEDURE — 80053 COMPREHEN METABOLIC PANEL: CPT

## 2025-01-03 PROCEDURE — 70450 CT HEAD/BRAIN W/O DYE: CPT

## 2025-01-03 PROCEDURE — 6360000002 HC RX W HCPCS

## 2025-01-03 PROCEDURE — 87086 URINE CULTURE/COLONY COUNT: CPT

## 2025-01-03 PROCEDURE — 99291 CRITICAL CARE FIRST HOUR: CPT

## 2025-01-03 PROCEDURE — 96374 THER/PROPH/DIAG INJ IV PUSH: CPT

## 2025-01-03 PROCEDURE — 6360000002 HC RX W HCPCS: Performed by: STUDENT IN AN ORGANIZED HEALTH CARE EDUCATION/TRAINING PROGRAM

## 2025-01-03 PROCEDURE — 82962 GLUCOSE BLOOD TEST: CPT

## 2025-01-03 PROCEDURE — 70498 CT ANGIOGRAPHY NECK: CPT

## 2025-01-03 RX ORDER — ACETAMINOPHEN 325 MG/1
650 TABLET ORAL EVERY 6 HOURS PRN
Status: DISCONTINUED | OUTPATIENT
Start: 2025-01-03 | End: 2025-01-05 | Stop reason: HOSPADM

## 2025-01-03 RX ORDER — MEMANTINE HYDROCHLORIDE 5 MG/1
5 TABLET ORAL DAILY
COMMUNITY

## 2025-01-03 RX ORDER — IOPAMIDOL 755 MG/ML
100 INJECTION, SOLUTION INTRAVASCULAR
Status: COMPLETED | OUTPATIENT
Start: 2025-01-03 | End: 2025-01-03

## 2025-01-03 RX ORDER — GLUCAGON 1 MG/ML
1 KIT INJECTION PRN
Status: DISCONTINUED | OUTPATIENT
Start: 2025-01-03 | End: 2025-01-05 | Stop reason: HOSPADM

## 2025-01-03 RX ORDER — ASPIRIN 81 MG/1
81 TABLET, CHEWABLE ORAL DAILY
Status: DISCONTINUED | OUTPATIENT
Start: 2025-01-04 | End: 2025-01-05 | Stop reason: HOSPADM

## 2025-01-03 RX ORDER — INSULIN LISPRO 100 [IU]/ML
0-4 INJECTION, SOLUTION INTRAVENOUS; SUBCUTANEOUS
Status: DISCONTINUED | OUTPATIENT
Start: 2025-01-03 | End: 2025-01-05 | Stop reason: HOSPADM

## 2025-01-03 RX ORDER — SODIUM CHLORIDE 9 MG/ML
INJECTION, SOLUTION INTRAVENOUS PRN
Status: DISCONTINUED | OUTPATIENT
Start: 2025-01-03 | End: 2025-01-03 | Stop reason: SDUPTHER

## 2025-01-03 RX ORDER — POLYETHYLENE GLYCOL 3350 17 G/17G
17 POWDER, FOR SOLUTION ORAL DAILY PRN
Status: DISCONTINUED | OUTPATIENT
Start: 2025-01-03 | End: 2025-01-05 | Stop reason: HOSPADM

## 2025-01-03 RX ORDER — GABAPENTIN 300 MG/1
300 CAPSULE ORAL 2 TIMES DAILY
Status: DISCONTINUED | OUTPATIENT
Start: 2025-01-03 | End: 2025-01-05 | Stop reason: HOSPADM

## 2025-01-03 RX ORDER — LABETALOL HYDROCHLORIDE 5 MG/ML
10 INJECTION, SOLUTION INTRAVENOUS
Status: COMPLETED | OUTPATIENT
Start: 2025-01-03 | End: 2025-01-03

## 2025-01-03 RX ORDER — AMLODIPINE BESYLATE 5 MG/1
5 TABLET ORAL DAILY
Status: DISCONTINUED | OUTPATIENT
Start: 2025-01-03 | End: 2025-01-05 | Stop reason: HOSPADM

## 2025-01-03 RX ORDER — LABETALOL HYDROCHLORIDE 5 MG/ML
INJECTION, SOLUTION INTRAVENOUS
Status: COMPLETED
Start: 2025-01-03 | End: 2025-01-03

## 2025-01-03 RX ORDER — NITROFURANTOIN MACROCRYSTALS 50 MG/1
50 CAPSULE ORAL DAILY
Status: ON HOLD | COMMUNITY
End: 2025-01-05 | Stop reason: HOSPADM

## 2025-01-03 RX ORDER — ONDANSETRON 4 MG/1
4 TABLET, ORALLY DISINTEGRATING ORAL EVERY 8 HOURS PRN
Status: DISCONTINUED | OUTPATIENT
Start: 2025-01-03 | End: 2025-01-03

## 2025-01-03 RX ORDER — PANTOPRAZOLE SODIUM 40 MG/1
40 TABLET, DELAYED RELEASE ORAL
Status: DISCONTINUED | OUTPATIENT
Start: 2025-01-04 | End: 2025-01-05 | Stop reason: HOSPADM

## 2025-01-03 RX ORDER — ISOSORBIDE MONONITRATE 30 MG/1
30 TABLET, EXTENDED RELEASE ORAL DAILY
Status: DISCONTINUED | OUTPATIENT
Start: 2025-01-03 | End: 2025-01-05 | Stop reason: HOSPADM

## 2025-01-03 RX ORDER — MONTELUKAST SODIUM 10 MG/1
10 TABLET ORAL NIGHTLY
Status: DISCONTINUED | OUTPATIENT
Start: 2025-01-03 | End: 2025-01-05 | Stop reason: HOSPADM

## 2025-01-03 RX ORDER — ATORVASTATIN CALCIUM 40 MG/1
40 TABLET, FILM COATED ORAL NIGHTLY
Status: DISCONTINUED | OUTPATIENT
Start: 2025-01-03 | End: 2025-01-05 | Stop reason: HOSPADM

## 2025-01-03 RX ORDER — BUTALBITAL, ACETAMINOPHEN AND CAFFEINE 50; 325; 40 MG/1; MG/1; MG/1
1 TABLET ORAL EVERY 6 HOURS PRN
COMMUNITY

## 2025-01-03 RX ORDER — MEMANTINE HYDROCHLORIDE 5 MG/1
5 TABLET ORAL DAILY
Status: DISCONTINUED | OUTPATIENT
Start: 2025-01-04 | End: 2025-01-05 | Stop reason: HOSPADM

## 2025-01-03 RX ORDER — ONDANSETRON 2 MG/ML
4 INJECTION INTRAMUSCULAR; INTRAVENOUS EVERY 6 HOURS PRN
Status: DISCONTINUED | OUTPATIENT
Start: 2025-01-03 | End: 2025-01-03

## 2025-01-03 RX ORDER — PROMETHAZINE HYDROCHLORIDE 25 MG/1
25 TABLET ORAL EVERY 6 HOURS PRN
COMMUNITY

## 2025-01-03 RX ORDER — LABETALOL HYDROCHLORIDE 5 MG/ML
10 INJECTION, SOLUTION INTRAVENOUS EVERY 4 HOURS PRN
Status: DISCONTINUED | OUTPATIENT
Start: 2025-01-03 | End: 2025-01-04

## 2025-01-03 RX ORDER — ACETAMINOPHEN 650 MG/1
650 SUPPOSITORY RECTAL EVERY 6 HOURS PRN
Status: DISCONTINUED | OUTPATIENT
Start: 2025-01-03 | End: 2025-01-05 | Stop reason: HOSPADM

## 2025-01-03 RX ORDER — SODIUM CHLORIDE 0.9 % (FLUSH) 0.9 %
5-40 SYRINGE (ML) INJECTION PRN
Status: DISCONTINUED | OUTPATIENT
Start: 2025-01-03 | End: 2025-01-05 | Stop reason: HOSPADM

## 2025-01-03 RX ORDER — ONDANSETRON 4 MG/1
4 TABLET, ORALLY DISINTEGRATING ORAL EVERY 8 HOURS PRN
Status: DISCONTINUED | OUTPATIENT
Start: 2025-01-03 | End: 2025-01-05 | Stop reason: HOSPADM

## 2025-01-03 RX ORDER — CYCLOBENZAPRINE HCL 10 MG
10 TABLET ORAL 2 TIMES DAILY PRN
Status: DISCONTINUED | OUTPATIENT
Start: 2025-01-03 | End: 2025-01-05 | Stop reason: HOSPADM

## 2025-01-03 RX ORDER — POTASSIUM CHLORIDE 7.45 MG/ML
10 INJECTION INTRAVENOUS PRN
Status: DISCONTINUED | OUTPATIENT
Start: 2025-01-03 | End: 2025-01-05 | Stop reason: HOSPADM

## 2025-01-03 RX ORDER — POTASSIUM CHLORIDE 1500 MG/1
40 TABLET, EXTENDED RELEASE ORAL PRN
Status: DISCONTINUED | OUTPATIENT
Start: 2025-01-03 | End: 2025-01-05 | Stop reason: HOSPADM

## 2025-01-03 RX ORDER — DONEPEZIL HYDROCHLORIDE 5 MG/1
10 TABLET, FILM COATED ORAL NIGHTLY
Status: DISCONTINUED | OUTPATIENT
Start: 2025-01-03 | End: 2025-01-05 | Stop reason: HOSPADM

## 2025-01-03 RX ORDER — DAPAGLIFLOZIN 10 MG/1
10 TABLET, FILM COATED ORAL EVERY MORNING
COMMUNITY
Start: 2024-12-01

## 2025-01-03 RX ORDER — CARVEDILOL 3.12 MG/1
3.12 TABLET ORAL 2 TIMES DAILY WITH MEALS
Status: DISCONTINUED | OUTPATIENT
Start: 2025-01-03 | End: 2025-01-05

## 2025-01-03 RX ORDER — MONTELUKAST SODIUM 10 MG/1
10 TABLET ORAL NIGHTLY
COMMUNITY

## 2025-01-03 RX ORDER — SODIUM CHLORIDE 0.9 % (FLUSH) 0.9 %
5-40 SYRINGE (ML) INJECTION EVERY 12 HOURS SCHEDULED
Status: DISCONTINUED | OUTPATIENT
Start: 2025-01-03 | End: 2025-01-05 | Stop reason: HOSPADM

## 2025-01-03 RX ORDER — POLYETHYLENE GLYCOL 3350 17 G/17G
17 POWDER, FOR SOLUTION ORAL DAILY PRN
Status: DISCONTINUED | OUTPATIENT
Start: 2025-01-03 | End: 2025-01-03

## 2025-01-03 RX ORDER — MAGNESIUM SULFATE IN WATER 40 MG/ML
2000 INJECTION, SOLUTION INTRAVENOUS PRN
Status: DISCONTINUED | OUTPATIENT
Start: 2025-01-03 | End: 2025-01-05 | Stop reason: HOSPADM

## 2025-01-03 RX ORDER — SODIUM CHLORIDE 0.9 % (FLUSH) 0.9 %
5-40 SYRINGE (ML) INJECTION PRN
Status: DISCONTINUED | OUTPATIENT
Start: 2025-01-03 | End: 2025-01-03 | Stop reason: SDUPTHER

## 2025-01-03 RX ORDER — SODIUM CHLORIDE 0.9 % (FLUSH) 0.9 %
5-40 SYRINGE (ML) INJECTION EVERY 12 HOURS SCHEDULED
Status: DISCONTINUED | OUTPATIENT
Start: 2025-01-03 | End: 2025-01-03 | Stop reason: SDUPTHER

## 2025-01-03 RX ORDER — ONDANSETRON 2 MG/ML
4 INJECTION INTRAMUSCULAR; INTRAVENOUS EVERY 6 HOURS PRN
Status: DISCONTINUED | OUTPATIENT
Start: 2025-01-03 | End: 2025-01-05 | Stop reason: HOSPADM

## 2025-01-03 RX ORDER — SODIUM CHLORIDE 9 MG/ML
INJECTION, SOLUTION INTRAVENOUS PRN
Status: DISCONTINUED | OUTPATIENT
Start: 2025-01-03 | End: 2025-01-05 | Stop reason: HOSPADM

## 2025-01-03 RX ORDER — FERROUS SULFATE 325(65) MG
325 TABLET ORAL 2 TIMES DAILY
Status: DISCONTINUED | OUTPATIENT
Start: 2025-01-03 | End: 2025-01-05 | Stop reason: HOSPADM

## 2025-01-03 RX ORDER — LOSARTAN POTASSIUM 50 MG/1
100 TABLET ORAL DAILY
Status: DISCONTINUED | OUTPATIENT
Start: 2025-01-03 | End: 2025-01-05 | Stop reason: HOSPADM

## 2025-01-03 RX ORDER — BUTALBITAL, ACETAMINOPHEN AND CAFFEINE 50; 325; 40 MG/1; MG/1; MG/1
1 TABLET ORAL EVERY 6 HOURS PRN
Status: DISCONTINUED | OUTPATIENT
Start: 2025-01-03 | End: 2025-01-05 | Stop reason: HOSPADM

## 2025-01-03 RX ORDER — ASPIRIN 300 MG/1
300 SUPPOSITORY RECTAL DAILY
Status: DISCONTINUED | OUTPATIENT
Start: 2025-01-04 | End: 2025-01-05 | Stop reason: HOSPADM

## 2025-01-03 RX ORDER — SOTALOL HYDROCHLORIDE 80 MG/1
80 TABLET ORAL 2 TIMES DAILY
COMMUNITY

## 2025-01-03 RX ORDER — DICYCLOMINE HYDROCHLORIDE 10 MG/1
10 CAPSULE ORAL
COMMUNITY

## 2025-01-03 RX ORDER — DEXTROSE MONOHYDRATE 100 MG/ML
INJECTION, SOLUTION INTRAVENOUS CONTINUOUS PRN
Status: DISCONTINUED | OUTPATIENT
Start: 2025-01-03 | End: 2025-01-05 | Stop reason: HOSPADM

## 2025-01-03 RX ORDER — LABETALOL HYDROCHLORIDE 5 MG/ML
10 INJECTION, SOLUTION INTRAVENOUS EVERY 10 MIN PRN
Status: DISCONTINUED | OUTPATIENT
Start: 2025-01-03 | End: 2025-01-03

## 2025-01-03 RX ORDER — FOLIC ACID 1 MG/1
1 TABLET ORAL DAILY
Status: DISCONTINUED | OUTPATIENT
Start: 2025-01-04 | End: 2025-01-05 | Stop reason: HOSPADM

## 2025-01-03 RX ADMIN — SODIUM CHLORIDE, PRESERVATIVE FREE 10 ML: 5 INJECTION INTRAVENOUS at 20:50

## 2025-01-03 RX ADMIN — DONEPEZIL HYDROCHLORIDE 10 MG: 5 TABLET, FILM COATED ORAL at 22:44

## 2025-01-03 RX ADMIN — MONTELUKAST 10 MG: 10 TABLET, FILM COATED ORAL at 20:37

## 2025-01-03 RX ADMIN — LABETALOL HYDROCHLORIDE 10 MG: 5 INJECTION, SOLUTION INTRAVENOUS at 15:54

## 2025-01-03 RX ADMIN — FERROUS SULFATE TAB 325 MG (65 MG ELEMENTAL FE) 325 MG: 325 (65 FE) TAB at 20:37

## 2025-01-03 RX ADMIN — IOPAMIDOL 100 ML: 755 INJECTION, SOLUTION INTRAVENOUS at 16:17

## 2025-01-03 RX ADMIN — GABAPENTIN 300 MG: 300 CAPSULE ORAL at 20:37

## 2025-01-03 RX ADMIN — CEFTRIAXONE SODIUM 1000 MG: 1 INJECTION, POWDER, FOR SOLUTION INTRAMUSCULAR; INTRAVENOUS at 20:42

## 2025-01-03 RX ADMIN — ATORVASTATIN CALCIUM 40 MG: 40 TABLET, FILM COATED ORAL at 20:37

## 2025-01-03 ASSESSMENT — LIFESTYLE VARIABLES
HOW MANY STANDARD DRINKS CONTAINING ALCOHOL DO YOU HAVE ON A TYPICAL DAY: PATIENT UNABLE TO ANSWER
HOW OFTEN DO YOU HAVE A DRINK CONTAINING ALCOHOL: PATIENT UNABLE TO ANSWER

## 2025-01-03 ASSESSMENT — PAIN - FUNCTIONAL ASSESSMENT: PAIN_FUNCTIONAL_ASSESSMENT: NONE - DENIES PAIN

## 2025-01-03 ASSESSMENT — PAIN SCALES - GENERAL: PAINLEVEL_OUTOF10: 5

## 2025-01-03 NOTE — ED PROVIDER NOTES
>60 ml/min/1.73m2    Calcium 9.8 8.5 - 10.1 mg/dL    Total Bilirubin 0.4 0.2 - 1.0 mg/dL    AST 57 (H) 15 - 37 U/L    ALT 50 12 - 78 U/L    Alk Phosphatase 103 45 - 117 U/L    Total Protein 8.2 6.4 - 8.2 g/dL    Albumin 4.5 3.5 - 5.0 g/dL    Globulin 3.7 2.0 - 4.0 g/dL    Albumin/Globulin Ratio 1.2 1.1 - 2.2     CBC with Auto Differential    Collection Time: 01/03/25  3:31 PM   Result Value Ref Range    WBC 10.1 3.6 - 11.0 K/uL    RBC 5.04 3.80 - 5.20 M/uL    Hemoglobin 15.9 11.5 - 16.0 g/dL    Hematocrit 46.7 35.0 - 47.0 %    MCV 92.7 80.0 - 99.0 FL    MCH 31.5 26.0 - 34.0 PG    MCHC 34.0 30.0 - 36.5 g/dL    RDW 13.8 11.5 - 14.5 %    Platelets 188 150 - 400 K/uL    MPV 11.6 8.9 - 12.9 FL    Nucleated RBCs 0.0 0.0  WBC    nRBC 0.00 0.00 - 0.01 K/uL    Neutrophils % 74 32 - 75 %    Lymphocytes % 15 12 - 49 %    Monocytes % 8 5 - 13 %    Eosinophils % 1 0 - 7 %    Basophils % 1 0 - 1 %    Immature Granulocytes % 1 (H) 0 - 0.5 %    Neutrophils Absolute 7.4 1.8 - 8.0 K/UL    Lymphocytes Absolute 1.5 0.8 - 3.5 K/UL    Monocytes Absolute 0.8 0.0 - 1.0 K/UL    Eosinophils Absolute 0.1 0.0 - 0.4 K/UL    Basophils Absolute 0.1 0.0 - 0.1 K/UL    Immature Granulocytes Absolute 0.1 (H) 0.00 - 0.04 K/UL    Differential Type AUTOMATED     POCT Glucose    Collection Time: 01/03/25  3:34 PM   Result Value Ref Range    POC Glucose 165 (H) 65 - 100 mg/dL    Performed by: FELISHA SHEPHERD    Urine Drug Screen    Collection Time: 01/03/25  4:52 PM   Result Value Ref Range    Amphetamine, Urine Negative Negative      Barbiturates, Urine Positive (A) Negative      Benzodiazepines, Urine Negative Negative      Cocaine, Urine Negative Negative      Methadone, Urine Negative Negative      Opiates, Urine Negative Negative      Phencyclidine, Urine Negative Negative      THC, TH-Cannabinol, Urine Negative Negative      Comments:        This test is a screen for drugs of abuse in a medical setting only (i.e., they are unconfirmed results

## 2025-01-03 NOTE — ED TRIAGE NOTES
Pt presents to the ED via [facility]. According to EMS pt's  reported pt had AMS with dysarthria and aphasia 1 hour ago from EMS arrival. EMS reported /100  RR 12 SPO2 92% RA GSC 14 Glucose finger stick 210 and AxO x0. Pt lives at home with  and has a hx of strokes, was in the ED on 01/02/25 w concerns of stroke.

## 2025-01-04 ENCOUNTER — APPOINTMENT (OUTPATIENT)
Facility: HOSPITAL | Age: 89
End: 2025-01-04
Payer: MEDICARE

## 2025-01-04 LAB
AMMONIA PLAS-SCNC: 44 UMOL/L
ANION GAP SERPL CALC-SCNC: 6 MMOL/L (ref 2–12)
BUN SERPL-MCNC: 5 MG/DL (ref 6–20)
BUN/CREAT SERPL: 7 (ref 12–20)
CA-I BLD-MCNC: 8.9 MG/DL (ref 8.5–10.1)
CHLORIDE SERPL-SCNC: 106 MMOL/L (ref 97–108)
CHOLEST SERPL-MCNC: 160 MG/DL
CO2 SERPL-SCNC: 28 MMOL/L (ref 21–32)
CREAT SERPL-MCNC: 0.67 MG/DL (ref 0.55–1.02)
ERYTHROCYTE [DISTWIDTH] IN BLOOD BY AUTOMATED COUNT: 13.8 % (ref 11.5–14.5)
EST. AVERAGE GLUCOSE BLD GHB EST-MCNC: 123 MG/DL
GLUCOSE BLD STRIP.AUTO-MCNC: 148 MG/DL (ref 65–100)
GLUCOSE BLD STRIP.AUTO-MCNC: 156 MG/DL (ref 65–100)
GLUCOSE BLD STRIP.AUTO-MCNC: 169 MG/DL (ref 65–100)
GLUCOSE SERPL-MCNC: 137 MG/DL (ref 65–100)
HBA1C MFR BLD: 5.9 % (ref 4–5.6)
HCT VFR BLD AUTO: 40.8 % (ref 35–47)
HDLC SERPL-MCNC: 59 MG/DL
HDLC SERPL: 2.7 (ref 0–5)
HGB BLD-MCNC: 13.6 G/DL (ref 11.5–16)
LDLC SERPL CALC-MCNC: 58.2 MG/DL (ref 0–100)
LIPID PANEL: ABNORMAL
MCH RBC QN AUTO: 31.4 PG (ref 26–34)
MCHC RBC AUTO-ENTMCNC: 33.3 G/DL (ref 30–36.5)
MCV RBC AUTO: 94.2 FL (ref 80–99)
NRBC # BLD: 0 K/UL (ref 0–0.01)
NRBC BLD-RTO: 0 PER 100 WBC
PERFORMED BY:: ABNORMAL
PLATELET # BLD AUTO: 126 K/UL (ref 150–400)
PMV BLD AUTO: 12.3 FL (ref 8.9–12.9)
POTASSIUM SERPL-SCNC: 3 MMOL/L (ref 3.5–5.1)
RBC # BLD AUTO: 4.33 M/UL (ref 3.8–5.2)
SODIUM SERPL-SCNC: 140 MMOL/L (ref 136–145)
T4 FREE SERPL-MCNC: 1.1 NG/DL (ref 0.8–1.5)
TRIGL SERPL-MCNC: 214 MG/DL
TSH SERPL DL<=0.05 MIU/L-ACNC: 0.93 UIU/ML (ref 0.36–3.74)
VLDLC SERPL CALC-MCNC: 42.8 MG/DL
WBC # BLD AUTO: 9.1 K/UL (ref 3.6–11)

## 2025-01-04 PROCEDURE — 96366 THER/PROPH/DIAG IV INF ADDON: CPT

## 2025-01-04 PROCEDURE — 85027 COMPLETE CBC AUTOMATED: CPT

## 2025-01-04 PROCEDURE — 92610 EVALUATE SWALLOWING FUNCTION: CPT

## 2025-01-04 PROCEDURE — 84443 ASSAY THYROID STIM HORMONE: CPT

## 2025-01-04 PROCEDURE — 96365 THER/PROPH/DIAG IV INF INIT: CPT

## 2025-01-04 PROCEDURE — 82962 GLUCOSE BLOOD TEST: CPT

## 2025-01-04 PROCEDURE — G0426 INPT/ED TELECONSULT50: HCPCS | Performed by: STUDENT IN AN ORGANIZED HEALTH CARE EDUCATION/TRAINING PROGRAM

## 2025-01-04 PROCEDURE — 84439 ASSAY OF FREE THYROXINE: CPT

## 2025-01-04 PROCEDURE — G0378 HOSPITAL OBSERVATION PER HR: HCPCS

## 2025-01-04 PROCEDURE — 82140 ASSAY OF AMMONIA: CPT

## 2025-01-04 PROCEDURE — 6370000000 HC RX 637 (ALT 250 FOR IP): Performed by: INTERNAL MEDICINE

## 2025-01-04 PROCEDURE — 97530 THERAPEUTIC ACTIVITIES: CPT

## 2025-01-04 PROCEDURE — 36415 COLL VENOUS BLD VENIPUNCTURE: CPT

## 2025-01-04 PROCEDURE — 51798 US URINE CAPACITY MEASURE: CPT

## 2025-01-04 PROCEDURE — 2500000003 HC RX 250 WO HCPCS: Performed by: INTERNAL MEDICINE

## 2025-01-04 PROCEDURE — 80048 BASIC METABOLIC PNL TOTAL CA: CPT

## 2025-01-04 PROCEDURE — 6360000002 HC RX W HCPCS: Performed by: STUDENT IN AN ORGANIZED HEALTH CARE EDUCATION/TRAINING PROGRAM

## 2025-01-04 PROCEDURE — 70551 MRI BRAIN STEM W/O DYE: CPT

## 2025-01-04 PROCEDURE — 97161 PT EVAL LOW COMPLEX 20 MIN: CPT

## 2025-01-04 PROCEDURE — 96376 TX/PRO/DX INJ SAME DRUG ADON: CPT

## 2025-01-04 PROCEDURE — 6370000000 HC RX 637 (ALT 250 FOR IP): Performed by: STUDENT IN AN ORGANIZED HEALTH CARE EDUCATION/TRAINING PROGRAM

## 2025-01-04 PROCEDURE — 6370000000 HC RX 637 (ALT 250 FOR IP)

## 2025-01-04 RX ORDER — LABETALOL HYDROCHLORIDE 5 MG/ML
10 INJECTION, SOLUTION INTRAVENOUS EVERY 4 HOURS PRN
Status: DISCONTINUED | OUTPATIENT
Start: 2025-01-04 | End: 2025-01-05 | Stop reason: HOSPADM

## 2025-01-04 RX ADMIN — SODIUM CHLORIDE, PRESERVATIVE FREE 10 ML: 5 INJECTION INTRAVENOUS at 08:43

## 2025-01-04 RX ADMIN — MEMANTINE HYDROCHLORIDE 5 MG: 5 TABLET ORAL at 08:42

## 2025-01-04 RX ADMIN — POTASSIUM CHLORIDE 10 MEQ: 7.45 INJECTION INTRAVENOUS at 08:43

## 2025-01-04 RX ADMIN — FERROUS SULFATE TAB 325 MG (65 MG ELEMENTAL FE) 325 MG: 325 (65 FE) TAB at 08:43

## 2025-01-04 RX ADMIN — POTASSIUM CHLORIDE 10 MEQ: 7.45 INJECTION INTRAVENOUS at 12:57

## 2025-01-04 RX ADMIN — POTASSIUM CHLORIDE 10 MEQ: 7.45 INJECTION INTRAVENOUS at 09:38

## 2025-01-04 RX ADMIN — CARVEDILOL 3.12 MG: 3.12 TABLET, FILM COATED ORAL at 18:52

## 2025-01-04 RX ADMIN — GABAPENTIN 300 MG: 300 CAPSULE ORAL at 21:57

## 2025-01-04 RX ADMIN — LOSARTAN POTASSIUM 100 MG: 50 TABLET, FILM COATED ORAL at 18:52

## 2025-01-04 RX ADMIN — POTASSIUM CHLORIDE 10 MEQ: 7.45 INJECTION INTRAVENOUS at 10:38

## 2025-01-04 RX ADMIN — MONTELUKAST 10 MG: 10 TABLET, FILM COATED ORAL at 21:57

## 2025-01-04 RX ADMIN — PANTOPRAZOLE SODIUM 40 MG: 40 TABLET, DELAYED RELEASE ORAL at 05:54

## 2025-01-04 RX ADMIN — DONEPEZIL HYDROCHLORIDE 10 MG: 5 TABLET, FILM COATED ORAL at 21:57

## 2025-01-04 RX ADMIN — ASPIRIN 81 MG: 81 TABLET, CHEWABLE ORAL at 08:43

## 2025-01-04 RX ADMIN — GABAPENTIN 300 MG: 300 CAPSULE ORAL at 08:42

## 2025-01-04 RX ADMIN — FOLIC ACID 1 MG: 1 TABLET ORAL at 08:43

## 2025-01-04 RX ADMIN — ATORVASTATIN CALCIUM 40 MG: 40 TABLET, FILM COATED ORAL at 21:57

## 2025-01-04 RX ADMIN — FERROUS SULFATE TAB 325 MG (65 MG ELEMENTAL FE) 325 MG: 325 (65 FE) TAB at 21:57

## 2025-01-04 RX ADMIN — POTASSIUM CHLORIDE 10 MEQ: 7.45 INJECTION INTRAVENOUS at 13:58

## 2025-01-04 RX ADMIN — ISOSORBIDE MONONITRATE 30 MG: 30 TABLET, EXTENDED RELEASE ORAL at 18:51

## 2025-01-04 ASSESSMENT — PAIN SCALES - GENERAL: PAINLEVEL_OUTOF10: 0

## 2025-01-04 NOTE — CARE COORDINATION
Patient in Observation status.  MOON given to patient and spouse.  Patient is current with [facility] health and would like to continue with home health when discharged from the hospital.  CM sent referral.

## 2025-01-04 NOTE — ED NOTES
ED TO INPATIENT SBAR HANDOFF    Patient Name: Julita Metzger   Preferred Name: Kathy  : 1935  89 y.o.   Family/Caregiver Present: no   Code Status Order: Full Code  PO Status: NPO:No  Telemetry Order: Yes  C-SSRS:    Sitter no   Restraints:     Sepsis Risk Score      Situation  Chief Complaint   Patient presents with    Altered Mental Status     Brief Description of Patient's Condition: Stable; AMS with expressive aphasia; hx of Dementia  Mental Status: disoriented  Arrived from:Home  Imaging:   CT BRAIN PERFUSION         CTA HEAD NECK W CONTRAST         CT HEAD WO CONTRAST   Final Result      No acute intracranial abnormality. Ethmoid and left maxillary sinus inflammatory   changes.            Electronically signed by Parker Leo      MRI brain without contrast    (Results Pending)     Abnormal labs:   Abnormal Labs Reviewed   COMPREHENSIVE METABOLIC PANEL - Abnormal; Notable for the following components:       Result Value    Glucose 171 (*)     BUN/Creatinine Ratio 8 (*)     AST 57 (*)     All other components within normal limits   CBC WITH AUTO DIFFERENTIAL - Abnormal; Notable for the following components:    Immature Granulocytes % 1 (*)     Immature Granulocytes Absolute 0.1 (*)     All other components within normal limits   URINE DRUG SCREEN - Abnormal; Notable for the following components:    Barbiturates, Urine Positive (*)     All other components within normal limits   URINALYSIS WITH REFLEX TO CULTURE - Abnormal; Notable for the following components:    Protein,  (*)     Glucose, Ur >500 (*)     Leukocyte Esterase, Urine Small (*)     Epithelial Cells, UA Moderate (*)     Urine Culture if Indicated Urine Culture Ordered (*)     Mucus, UA Trace (*)     All other components within normal limits   CBC WITH AUTO DIFFERENTIAL - Abnormal; Notable for the following components:    WBC 12.2 (*)     Neutrophils % 77 (*)     Immature Granulocytes % 1 (*)     Neutrophils Absolute 9.5 (*)

## 2025-01-04 NOTE — CONSULTS
acute brain infarct or intracranial hemorrhage.  Chronic findings include nonspecific brain atrophy and mild to moderate  microangiopathic white matter changes.      Electronically signed by MARILYN Stevenson Attestation:      ?I discussed the risks and benefits of a telehealth visit and I obtained the patient's or legally authorized representative's informed verbal consent to conduct this assessment using telehealth tools.? All of the patient’s or legally authorized representative's questions regarding the telehealth interaction were addressed. I provided my name and disclosed to the patient or legally authorized representative my licensure, certification, or registration. ?This consultation was remotely performed at the request of Molly Rocha MD with the assistance of a trained virtual health liaison working at the originating site.? The consultation used real time licensed and encrypted telehealth equipment which allowed a live video connection between my location and the patient's location.? Aspects of the evaluation that could not be adequately evaluated by virtual assessment were shared with both the patient and the consulting provider.?     A total of 50 minutes of time was spent in direct care and coordination of care with the patient, of which 50% of the time was spent in reviewing pertinent medical records, test results, discussing and counseling treatment with patient, family and treatment team

## 2025-01-04 NOTE — H&P
8.5 - 10.1 mg/dL    Total Bilirubin 0.4 0.2 - 1.0 mg/dL    AST 57 (H) 15 - 37 U/L    ALT 50 12 - 78 U/L    Alk Phosphatase 103 45 - 117 U/L    Total Protein 8.2 6.4 - 8.2 g/dL    Albumin 4.5 3.5 - 5.0 g/dL    Globulin 3.7 2.0 - 4.0 g/dL    Albumin/Globulin Ratio 1.2 1.1 - 2.2     CBC with Auto Differential    Collection Time: 01/03/25  3:31 PM   Result Value Ref Range    WBC 10.1 3.6 - 11.0 K/uL    RBC 5.04 3.80 - 5.20 M/uL    Hemoglobin 15.9 11.5 - 16.0 g/dL    Hematocrit 46.7 35.0 - 47.0 %    MCV 92.7 80.0 - 99.0 FL    MCH 31.5 26.0 - 34.0 PG    MCHC 34.0 30.0 - 36.5 g/dL    RDW 13.8 11.5 - 14.5 %    Platelets 188 150 - 400 K/uL    MPV 11.6 8.9 - 12.9 FL    Nucleated RBCs 0.0 0.0  WBC    nRBC 0.00 0.00 - 0.01 K/uL    Neutrophils % 74 32 - 75 %    Lymphocytes % 15 12 - 49 %    Monocytes % 8 5 - 13 %    Eosinophils % 1 0 - 7 %    Basophils % 1 0 - 1 %    Immature Granulocytes % 1 (H) 0 - 0.5 %    Neutrophils Absolute 7.4 1.8 - 8.0 K/UL    Lymphocytes Absolute 1.5 0.8 - 3.5 K/UL    Monocytes Absolute 0.8 0.0 - 1.0 K/UL    Eosinophils Absolute 0.1 0.0 - 0.4 K/UL    Basophils Absolute 0.1 0.0 - 0.1 K/UL    Immature Granulocytes Absolute 0.1 (H) 0.00 - 0.04 K/UL    Differential Type AUTOMATED     POCT Glucose    Collection Time: 01/03/25  3:34 PM   Result Value Ref Range    POC Glucose 165 (H) 65 - 100 mg/dL    Performed by: FELISHA SHEPHERD    Urine Drug Screen    Collection Time: 01/03/25  4:52 PM   Result Value Ref Range    Amphetamine, Urine Negative Negative      Barbiturates, Urine Positive (A) Negative      Benzodiazepines, Urine Negative Negative      Cocaine, Urine Negative Negative      Methadone, Urine Negative Negative      Opiates, Urine Negative Negative      Phencyclidine, Urine Negative Negative      THC, TH-Cannabinol, Urine Negative Negative      Comments:        This test is a screen for drugs of abuse in a medical setting only (i.e., they are unconfirmed results and as such must not be used for

## 2025-01-05 LAB
ANION GAP SERPL CALC-SCNC: 4 MMOL/L (ref 2–12)
BACTERIA SPEC CULT: NORMAL
BUN SERPL-MCNC: 6 MG/DL (ref 6–20)
BUN/CREAT SERPL: 8 (ref 12–20)
CA-I BLD-MCNC: 9.1 MG/DL (ref 8.5–10.1)
CHLORIDE SERPL-SCNC: 104 MMOL/L (ref 97–108)
CO2 SERPL-SCNC: 30 MMOL/L (ref 21–32)
COLONY COUNT, CNT: NORMAL
COLONY COUNT, CNT: NORMAL
CREAT SERPL-MCNC: 0.78 MG/DL (ref 0.55–1.02)
ERYTHROCYTE [DISTWIDTH] IN BLOOD BY AUTOMATED COUNT: 14.2 % (ref 11.5–14.5)
GLUCOSE BLD STRIP.AUTO-MCNC: 153 MG/DL (ref 65–100)
GLUCOSE BLD STRIP.AUTO-MCNC: 174 MG/DL (ref 65–100)
GLUCOSE SERPL-MCNC: 199 MG/DL (ref 65–100)
HCT VFR BLD AUTO: 42.2 % (ref 35–47)
HGB BLD-MCNC: 13.9 G/DL (ref 11.5–16)
Lab: NORMAL
MCH RBC QN AUTO: 31.5 PG (ref 26–34)
MCHC RBC AUTO-ENTMCNC: 32.9 G/DL (ref 30–36.5)
MCV RBC AUTO: 95.7 FL (ref 80–99)
NRBC # BLD: 0 K/UL (ref 0–0.01)
NRBC BLD-RTO: 0 PER 100 WBC
PERFORMED BY:: ABNORMAL
PERFORMED BY:: ABNORMAL
PLATELET # BLD AUTO: 177 K/UL (ref 150–400)
PMV BLD AUTO: 12.3 FL (ref 8.9–12.9)
POTASSIUM SERPL-SCNC: 3.6 MMOL/L (ref 3.5–5.1)
RBC # BLD AUTO: 4.41 M/UL (ref 3.8–5.2)
SODIUM SERPL-SCNC: 138 MMOL/L (ref 136–145)
WBC # BLD AUTO: 8.1 K/UL (ref 3.6–11)

## 2025-01-05 PROCEDURE — 6360000002 HC RX W HCPCS: Performed by: STUDENT IN AN ORGANIZED HEALTH CARE EDUCATION/TRAINING PROGRAM

## 2025-01-05 PROCEDURE — 36415 COLL VENOUS BLD VENIPUNCTURE: CPT

## 2025-01-05 PROCEDURE — 85027 COMPLETE CBC AUTOMATED: CPT

## 2025-01-05 PROCEDURE — 80048 BASIC METABOLIC PNL TOTAL CA: CPT

## 2025-01-05 PROCEDURE — 82962 GLUCOSE BLOOD TEST: CPT

## 2025-01-05 PROCEDURE — 6370000000 HC RX 637 (ALT 250 FOR IP): Performed by: STUDENT IN AN ORGANIZED HEALTH CARE EDUCATION/TRAINING PROGRAM

## 2025-01-05 PROCEDURE — G0378 HOSPITAL OBSERVATION PER HR: HCPCS

## 2025-01-05 PROCEDURE — 96366 THER/PROPH/DIAG IV INF ADDON: CPT

## 2025-01-05 PROCEDURE — 6370000000 HC RX 637 (ALT 250 FOR IP): Performed by: INTERNAL MEDICINE

## 2025-01-05 PROCEDURE — 96376 TX/PRO/DX INJ SAME DRUG ADON: CPT

## 2025-01-05 PROCEDURE — 2500000003 HC RX 250 WO HCPCS: Performed by: INTERNAL MEDICINE

## 2025-01-05 PROCEDURE — 6370000000 HC RX 637 (ALT 250 FOR IP)

## 2025-01-05 PROCEDURE — 2500000003 HC RX 250 WO HCPCS: Performed by: STUDENT IN AN ORGANIZED HEALTH CARE EDUCATION/TRAINING PROGRAM

## 2025-01-05 RX ORDER — ASPIRIN 81 MG/1
81 TABLET, CHEWABLE ORAL DAILY
Qty: 30 TABLET | Refills: 3 | Status: SHIPPED | OUTPATIENT
Start: 2025-01-06

## 2025-01-05 RX ORDER — CARVEDILOL 3.12 MG/1
6.25 TABLET ORAL 2 TIMES DAILY WITH MEALS
Status: DISCONTINUED | OUTPATIENT
Start: 2025-01-05 | End: 2025-01-05 | Stop reason: HOSPADM

## 2025-01-05 RX ORDER — SULFAMETHOXAZOLE AND TRIMETHOPRIM 800; 160 MG/1; MG/1
1 TABLET ORAL 2 TIMES DAILY
Qty: 10 TABLET | Refills: 0 | Status: SHIPPED | OUTPATIENT
Start: 2025-01-05 | End: 2025-01-10

## 2025-01-05 RX ORDER — CARVEDILOL 6.25 MG/1
6.25 TABLET ORAL 2 TIMES DAILY WITH MEALS
Qty: 60 TABLET | Refills: 1 | Status: SHIPPED | OUTPATIENT
Start: 2025-01-05

## 2025-01-05 RX ORDER — AMLODIPINE BESYLATE 5 MG/1
5 TABLET ORAL DAILY
Qty: 30 TABLET | Refills: 1 | Status: SHIPPED | OUTPATIENT
Start: 2025-01-05

## 2025-01-05 RX ORDER — BLOOD PRESSURE TEST KIT
1 KIT MISCELLANEOUS DAILY
Qty: 1 KIT | Refills: 0 | Status: SHIPPED | OUTPATIENT
Start: 2025-01-05

## 2025-01-05 RX ADMIN — PANTOPRAZOLE SODIUM 40 MG: 40 TABLET, DELAYED RELEASE ORAL at 06:30

## 2025-01-05 RX ADMIN — CEFTRIAXONE SODIUM 1000 MG: 1 INJECTION, POWDER, FOR SOLUTION INTRAMUSCULAR; INTRAVENOUS at 12:36

## 2025-01-05 RX ADMIN — CARVEDILOL 3.12 MG: 3.12 TABLET, FILM COATED ORAL at 08:49

## 2025-01-05 RX ADMIN — SODIUM CHLORIDE, PRESERVATIVE FREE 10 ML: 5 INJECTION INTRAVENOUS at 08:49

## 2025-01-05 RX ADMIN — AMLODIPINE BESYLATE 5 MG: 5 TABLET ORAL at 08:49

## 2025-01-05 RX ADMIN — GABAPENTIN 300 MG: 300 CAPSULE ORAL at 08:49

## 2025-01-05 RX ADMIN — MEMANTINE HYDROCHLORIDE 5 MG: 5 TABLET ORAL at 08:49

## 2025-01-05 RX ADMIN — FOLIC ACID 1 MG: 1 TABLET ORAL at 08:49

## 2025-01-05 RX ADMIN — FERROUS SULFATE TAB 325 MG (65 MG ELEMENTAL FE) 325 MG: 325 (65 FE) TAB at 08:49

## 2025-01-05 RX ADMIN — LOSARTAN POTASSIUM 100 MG: 50 TABLET, FILM COATED ORAL at 08:49

## 2025-01-05 RX ADMIN — ASPIRIN 81 MG: 81 TABLET, CHEWABLE ORAL at 08:49

## 2025-01-05 RX ADMIN — ISOSORBIDE MONONITRATE 30 MG: 30 TABLET, EXTENDED RELEASE ORAL at 08:49

## 2025-01-05 NOTE — DISCHARGE SUMMARY
NORVASC  Take 1 tablet by mouth daily     atorvastatin 40 MG tablet  Commonly known as: LIPITOR     butalbital-acetaminophen-caffeine -40 MG per tablet  Commonly known as: FIORICET, ESGIC     cyclobenzaprine 10 MG tablet  Commonly known as: FLEXERIL     dapagliflozin 10 MG tablet  Commonly known as: FARXIGA     dicyclomine 10 MG capsule  Commonly known as: BENTYL     docusate sodium 50 MG capsule  Commonly known as: COLACE     donepezil 10 MG tablet  Commonly known as: ARICEPT     ferrous sulfate 325 (65 Fe) MG tablet  Commonly known as: IRON 325     fish oil 1000 MG capsule     folic acid 1 MG tablet  Commonly known as: FOLVITE     gabapentin 300 MG capsule  Commonly known as: NEURONTIN     Icosapent Ethyl 1 g Caps capsule  Commonly known as: VASCEPA     isosorbide mononitrate 30 MG extended release tablet  Commonly known as: IMDUR     losartan 100 MG tablet  Commonly known as: COZAAR     meclizine 25 MG tablet  Commonly known as: ANTIVERT     memantine 5 MG tablet  Commonly known as: NAMENDA     metFORMIN 500 MG tablet  Commonly known as: GLUCOPHAGE     methenamine 1 g tablet  Commonly known as: HIPREX  take 1 tablet by mouth twice a day with meals     methotrexate 2.5 MG chemo tablet  Commonly known as: RHEUMATREX     montelukast 10 MG tablet  Commonly known as: SINGULAIR     Nitrostat 0.4 MG SL tablet  Generic drug: nitroGLYCERIN     ondansetron 4 MG tablet  Commonly known as: ZOFRAN  Take 1 tablet by mouth 3 times daily as needed for Nausea or Vomiting     pantoprazole 40 MG tablet  Commonly known as: PROTONIX     promethazine 25 MG tablet  Commonly known as: PHENERGAN     sotalol 80 MG tablet  Commonly known as: BETAPACE     sulfaSALAzine 500 MG tablet  Commonly known as: AZULFIDINE     traZODone 50 MG tablet  Commonly known as: DESYREL     Trulance 3 MG Tabs  Generic drug: Plecanatide     vitamin C 500 MG tablet  Commonly known as: ASCORBIC ACID     vitamin D 25 MCG (1000 UT) Caps            STOP taking

## 2025-01-05 NOTE — PLAN OF CARE
Problem: Chronic Conditions and Co-morbidities  Goal: Patient's chronic conditions and co-morbidity symptoms are monitored and maintained or improved  Outcome: Progressing  Flowsheets (Taken 1/4/2025 0133)  Care Plan - Patient's Chronic Conditions and Co-Morbidity Symptoms are Monitored and Maintained or Improved: Monitor and assess patient's chronic conditions and comorbid symptoms for stability, deterioration, or improvement     
  Problem: Safety - Adult  Goal: Free from fall injury  Outcome: Progressing     Problem: Chronic Conditions and Co-morbidities  Goal: Patient's chronic conditions and co-morbidity symptoms are monitored and maintained or improved  Outcome: Progressing  Flowsheets (Taken 1/4/2025 0753 by Fifi Ceja, RN)  Care Plan - Patient's Chronic Conditions and Co-Morbidity Symptoms are Monitored and Maintained or Improved: Monitor and assess patient's chronic conditions and comorbid symptoms for stability, deterioration, or improvement     Problem: Discharge Planning  Goal: Discharge to home or other facility with appropriate resources  Outcome: Progressing  Flowsheets (Taken 1/4/2025 0753 by Fifi Ceja, RN)  Discharge to home or other facility with appropriate resources: Identify barriers to discharge with patient and caregiver     Problem: Pain  Goal: Verbalizes/displays adequate comfort level or baseline comfort level  Outcome: Progressing     Problem: ABCDS Injury Assessment  Goal: Absence of physical injury  Outcome: Progressing     Problem: Skin/Tissue Integrity  Goal: Absence of new skin breakdown  Description: 1.  Monitor for areas of redness and/or skin breakdown  2.  Assess vascular access sites hourly  3.  Every 4-6 hours minimum:  Change oxygen saturation probe site  4.  Every 4-6 hours:  If on nasal continuous positive airway pressure, respiratory therapy assess nares and determine need for appliance change or resting period.  Outcome: Progressing     Problem: Physical Therapy - Adult  Goal: By Discharge: Performs mobility at highest level of function for planned discharge setting.  See evaluation for individualized goals.  Description: FUNCTIONAL STATUS PRIOR TO ADMISSION: Patient was modified independent using a rolling walker and single point cane for functional mobility.    HOME SUPPORT PRIOR TO ADMISSION: The patient lived with  and needs min-mod A for ADLs.    Physical Therapy 
Standardized tests and measures addressing body structure, function, activity limitation and / or participation in recreation  LOW Complexity : Stable, uncomplicated  Other outcome measures [facility] 6  LOW      Based on the above components, the patient evaluation is determined to be of the following complexity level: LOW    Pain Ratin/10   Pain Intervention(s):       Activity Tolerance:   Fair     After treatment patient left in no apparent distress:   Bed locked and in lowest position Patient left in no apparent distress in bed, Call bell within reach, Bed/ chair alarm activated, Side rails x3, and Heels elevated for pressure relief and nsg updated.    COMMUNICATION/EDUCATION:   The patient’s plan of care was discussed with: Registered nurse and Case management    Patient Education  Education Given To: Patient  Education Provided: Role of Therapy;Plan of Care;Home Exercise Program;Transfer Training;Mobility Training;Precautions;Equipment;Fall Prevention Strategies  Education Method: Demonstration;Verbal;Teach Back  Barriers to Learning: None  Education Outcome: Verbalized understanding;Continued education needed         Thank you for this referral.  Enedina Munson, PT  Minutes: 20

## 2025-01-05 NOTE — PROGRESS NOTES
.4 Eyes Skin Assessment     NAME:  Julita Metzger  YOB: 1935  MEDICAL RECORD NUMBER:  [id number]    The patient is being assessed for  Admission    I agree that at least one RN has performed a thorough Head to Toe Skin Assessment on the patient. ALL assessment sites listed below have been assessed.      Areas assessed by both nurses:    Head, Face, Ears, Shoulders, Back, Chest, Arms, Elbows, Hands, Sacrum. Buttock, Coccyx, Ischium, Legs. Feet and Heels, and Under Medical Devices         Does the Patient have a Wound? No noted wound(s)       Cuong Prevention initiated by RN: Yes  Wound Care Orders initiated by RN: No    Pressure Injury (Stage 3,4, Unstageable, DTI, NWPT, and Complex wounds) if present, place Wound referral order by RN under : No    New Ostomies, if present place, Ostomy referral order under : No     Nurse 1 eSignature: Electronically signed by Abimael Cobb RN on 1/4/25 at 7:53 AM EST    **SHARE this note so that the co-signing nurse can place an eSignature**    Nurse 2 eSignature: Electronically signed by Yogesh Rodriguez RN on 1/4/25 at 7:53 AM EST   
.4 Eyes Skin Assessment     NAME:  Julita Metzger  YOB: 1935  MEDICAL RECORD NUMBER:  [id number]    The patient is being assessed for  Admission    I agree that at least one RN has performed a thorough Head to Toe Skin Assessment on the patient. ALL assessment sites listed below have been assessed.      Areas assessed by both nurses:    Head, Face, Ears, Shoulders, Back, Chest, Arms, Elbows, Hands, Sacrum. Buttock, Coccyx, Ischium, Legs. Feet and Heels, and Under Medical Devices         Does the Patient have a Wound? No noted wound(s)       Cuong Prevention initiated by RN: Yes  Wound Care Orders initiated by RN: No    Pressure Injury (Stage 3,4, Unstageable, DTI, NWPT, and Complex wounds) if present, place Wound referral order by RN under : No    New Ostomies, if present place, Ostomy referral order under : No     Nurse 1 eSignature: Electronically signed by Abimael Cobb RN on 1/4/25 at 7:27 AM EST    **SHARE this note so that the co-signing nurse can place an eSignature**    Nurse 2 eSignature: Electronically signed by Abimael Cobb RN on 1/4/25 at 7:27 AM EST   
2100 - Patient blood pressure was high; patient refused IV medications and even oral medicine; called patient's  and she agreed to take the oral meds but still refused the IV medicine. Explained the benefits of IV meds but still resists to receive it.  
Called [facility] and spoke to Zayda for the tele-neuro general rounding consult tomorrow.  
Neurology Brief Note    MRI negative for stroke  No further inpatient work up   Discussed with  yesterday to obtain home blood pressure machine and keep blood pressure log to titrate BP meds with PCP  Neurology will sign off   
Received Order for Telemetry     Julita Metzger   1935   [id number]   Dysarthria [R47.1]  Cerebrovascular accident (CVA), unspecified mechanism (HCC) [I63.9]   Vamshi Powers MD     Tele Box # [id number] placed on patient at  2337 pm  ER Room # 7  Admitting to Room 563  Verified with Primary Nurse Abimael at  2352 pm     
Speech LAnguage Pathology Dysphagia EVALUATION/DISCHARGE    Patient: Julita Metzger (89 y.o. female)  Date: 1/4/2025  Primary Diagnosis: Dysarthria [R47.1]  Cerebrovascular accident (CVA), unspecified mechanism (HCC) [I63.9]       Precautions: GERD                 DIET RECOMMENDATIONS: Regular and thin liquids    SWALLOW SAFETY PRECAUTIONS: Upright positioning during po intake and after po intake for at least 30-60 minutes, slow rate, small-single sips and bites   ASSESSMENT :  Patient seen upright in bed, alert and cooperative. Patient and  report word retrieval difficulty resolved. Speech clarity WNL.  Upon inspection upper/lower plate, no dysarthria   PO trials: regular solids, puree, thin liquids  Based on the objective data described below, the patient presents with WFL swallow safety and function characterized by the following  Oral phase: adequately clears oral cavity  Pharyngeal phase: upon palpation, swallow initiation timely and HLE WFL.  Overt s/sx of aspiration/penetration: none     Skilled education provided concerning overt s/sx of aspiration and penetration. Skilled education provided concerning safe swallow strategies and patient and  acknowledge and express understanding.      Patient will be discharged from skilled speech-language pathology services at this time.     PLAN :  Recommendations and Planned Interventions:  DIET RECOMMENDATIONS: Regular and thin liquids    SWALLOW SAFETY PRECAUTIONS: Upright positioning during po intake and after po intake for at least 30-60 minutes, slow rate, small-single sips and bites   Acute SLP Services: No, patient will be discharged from acute skilled speech-language pathology at this time.    Discharge Recommendations: None     SUBJECTIVE:   Im better now.     OBJECTIVE:     Past Medical History:   Diagnosis Date    Atrial fibrillation (HCC)     Anticoagulation stopped by cardiology due to GI bleed    Chest pain     Diabetes mellitus (HCC)     
yesterday/today were reviewed  [x] All current labs were reviewed and interpreted for clinical significance   [x] Appropriate follow-up labs were ordered  [] Collateral history obtained from:           Code Status: Full  DVT Prophylaxis: SCDs  GI Prophylaxis: Protonix    Subjective:     Chief Complaint / Reason for Physician Visit  Dysarthria.  Chart reviewed and discussed with RN events overnight.  Patient seen at bedside, not in acute distress.   also at bedside.  Patient is oriented to self but does answer questions appropriately.  Did not note dysarthria during our conversation.  No focal deficits, strength and sensation intact bilaterally.  Patient reports feeling much better than when she came in.  Patient and 's questions answered.      Objective:     VITALS:   Last 24hrs VS reviewed since prior progress note. Most recent are:  Patient Vitals for the past 24 hrs:   BP Temp Temp src Pulse Resp SpO2   01/04/25 1504 (!) 159/91 98.6 °F (37 °C) Oral 77 18 96 %   01/04/25 1500 -- -- -- 74 -- --   01/04/25 1457 (!) 164/81 98.1 °F (36.7 °C) Oral 75 18 95 %   01/04/25 1100 (!) 151/99 98.6 °F (37 °C) Oral 80 18 96 %   01/04/25 0700 -- -- -- 78 -- --   01/04/25 0646 134/76 98 °F (36.7 °C) Oral 76 18 95 %   01/04/25 0258 (!) 144/68 99 °F (37.2 °C) Oral 80 18 94 %   01/04/25 0005 (!) 159/71 99 °F (37.2 °C) Oral 86 18 93 %   01/03/25 2358 -- -- -- 87 -- --   01/03/25 2300 (!) 136/55 -- -- 84 25 93 %   01/03/25 2248 (!) 153/58 -- -- 84 23 93 %   01/03/25 2200 (!) 158/62 -- -- 88 25 93 %   01/03/25 2051 -- -- -- 96 25 93 %   01/03/25 2030 (!) 192/94 -- -- (!) 101 24 94 %   01/03/25 2000 (!) 190/146 -- -- 99 (!) 9 --   01/03/25 1930 (!) 191/159 -- -- (!) 103 15 94 %   01/03/25 1900 (!) 204/83 -- -- (!) 106 16 95 %       No intake or output data in the 24 hours ending 01/04/25 1842     I had a face to face encounter and independently examined this patient on 1/4/2025, as outlined below:    Review of Systems

## 2025-01-06 LAB
EKG ATRIAL RATE: 104 BPM
EKG DIAGNOSIS: NORMAL
EKG Q-T INTERVAL: 370 MS
EKG QRS DURATION: 108 MS
EKG QTC CALCULATION (BAZETT): 474 MS
EKG R AXIS: -53 DEGREES
EKG T AXIS: 85 DEGREES
EKG VENTRICULAR RATE: 99 BPM

## 2025-01-09 VITALS
OXYGEN SATURATION: 96 % | TEMPERATURE: 97.9 F | SYSTOLIC BLOOD PRESSURE: 142 MMHG | BODY MASS INDEX: 27.47 KG/M2 | DIASTOLIC BLOOD PRESSURE: 76 MMHG | RESPIRATION RATE: 18 BRPM | HEART RATE: 76 BPM | HEIGHT: 67 IN | WEIGHT: 175 LBS

## 2025-01-22 ENCOUNTER — OFFICE VISIT (OUTPATIENT)
Age: 89
End: 2025-01-22
Payer: MEDICARE

## 2025-01-22 VITALS
BODY MASS INDEX: 27.47 KG/M2 | SYSTOLIC BLOOD PRESSURE: 207 MMHG | DIASTOLIC BLOOD PRESSURE: 71 MMHG | HEIGHT: 67 IN | WEIGHT: 175 LBS

## 2025-01-22 DIAGNOSIS — R30.0 DYSURIA: ICD-10-CM

## 2025-01-22 DIAGNOSIS — R31.9 HEMATURIA, UNSPECIFIED TYPE: Primary | ICD-10-CM

## 2025-01-22 DIAGNOSIS — N39.0 COMPLICATED UTI (URINARY TRACT INFECTION): ICD-10-CM

## 2025-01-22 LAB
BILIRUBIN, URINE, POC: NEGATIVE
BLOOD URINE, POC: NORMAL
GLUCOSE URINE, POC: 1000
KETONES, URINE, POC: NEGATIVE
LEUKOCYTE ESTERASE, URINE, POC: NORMAL
NITRITE, URINE, POC: NEGATIVE
PH, URINE, POC: 6 (ref 4.6–8)
PROTEIN,URINE, POC: 300
SPECIFIC GRAVITY, URINE, POC: 1.02 (ref 1–1.03)
URINALYSIS CLARITY, POC: CLEAR
URINALYSIS COLOR, POC: YELLOW
UROBILINOGEN, POC: NORMAL

## 2025-01-22 PROCEDURE — 1036F TOBACCO NON-USER: CPT | Performed by: UROLOGY

## 2025-01-22 PROCEDURE — G8427 DOCREV CUR MEDS BY ELIG CLIN: HCPCS | Performed by: UROLOGY

## 2025-01-22 PROCEDURE — 1090F PRES/ABSN URINE INCON ASSESS: CPT | Performed by: UROLOGY

## 2025-01-22 PROCEDURE — G8419 CALC BMI OUT NRM PARAM NOF/U: HCPCS | Performed by: UROLOGY

## 2025-01-22 PROCEDURE — 99214 OFFICE O/P EST MOD 30 MIN: CPT | Performed by: UROLOGY

## 2025-01-22 PROCEDURE — 1159F MED LIST DOCD IN RCRD: CPT | Performed by: UROLOGY

## 2025-01-22 PROCEDURE — 81003 URINALYSIS AUTO W/O SCOPE: CPT | Performed by: UROLOGY

## 2025-01-22 PROCEDURE — 1123F ACP DISCUSS/DSCN MKR DOCD: CPT | Performed by: UROLOGY

## 2025-01-22 RX ORDER — METHENAMINE HIPPURATE 1000 MG/1
1 TABLET ORAL DAILY PRN
Qty: 90 TABLET | Refills: 3 | Status: SHIPPED | OUTPATIENT
Start: 2025-01-22

## 2025-01-22 ASSESSMENT — ENCOUNTER SYMPTOMS
EYE REDNESS: 1
ABDOMINAL DISTENTION: 1

## 2025-01-22 NOTE — PROGRESS NOTES
Chief Complaint   Patient presents with    Follow-up     Complicated UTI     1. Have you been to the ER, urgent care clinic since your last visit?  Hospitalized since your last visit? Yes Dysarthria    2. Have you seen or consulted any other health care providers outside of the [facility] since your last visit?  Include any pap smears or colon screening. No    BP (!) 207/71 (Site: Left Upper Arm, Position: Sitting, Cuff Size: Medium Adult)   Ht 1.702 m (5' 7\")   Wt 79.4 kg (175 lb)   BMI 27.41 kg/m²

## 2025-01-22 NOTE — PROGRESS NOTES
HISTORY OF PRESENT ILLNESS  Julita Metzger is a 89 y.o. female   Patient came in today she is not feeling that well.  She did not take her blood pressure pills this morning her blood pressure was over 200/70.  Biphetamine peers to be working pretty well for his takes once a day.  She gets forgetful.  The urine has a lot of blood little bit of pus on the sent for cytology and culture keep her on methenamine and see her back in 6 months  1. Hematuria, unspecified type  Overview:  UA 9/12/23 positive for small blood. Patient on blood thinners and has previous smoking history.   CT 9/12/23: No acute abdominal or pelvic pathology.   She is s/p cystoscopy on 10/23/2024 with findings of signs of irritation of the lining of the bladder no cancers consistent with diabetes   Orders:  -     AMB POC URINALYSIS DIP STICK AUTO W/O MICRO  -     methenamine (HIPREX) 1 g tablet; Take 1 tablet by mouth daily as needed (burning), Disp-90 tablet, R-3Normal  -     Cytology, urine  2. Complicated UTI (urinary tract infection)  Overview:  ER on 10/5/2024  visit with Left lower quadrant abdominal pain.   UA postive for leuk Tx with bactrim    10/2024 urine culture negative  Tx with methenamine   Orders:  -     AMB POC URINALYSIS DIP STICK AUTO W/O MICRO  -     methenamine (HIPREX) 1 g tablet; Take 1 tablet by mouth daily as needed (burning), Disp-90 tablet, R-3Normal  -     Culture, Urine  3. Dysuria  -     methenamine (HIPREX) 1 g tablet; Take 1 tablet by mouth daily as needed (burning), Disp-90 tablet, R-3Normal        PAST MEDICAL HISTORY  PMHx (including negatives):  has a past medical history of Atrial fibrillation (HCC), Chest pain, Diabetes mellitus (HCC), Heart attack (HCC), Heart valve replaced, Hyperlipidemia, Hypertension, Lung abnormality, and Stroke (HCC).   PSurgHx:  has a past surgical history that includes hernia repair; Colonoscopy (Left, 9/12/2018); other surgical history; Cataract removal; Partial hysterectomy; back

## 2025-01-27 ENCOUNTER — TELEPHONE (OUTPATIENT)
Age: 89
End: 2025-01-27

## 2025-01-27 NOTE — TELEPHONE ENCOUNTER
There was a lab error processing her culture, pt is coming in at 10:30 to do another sample, will send for culture.

## 2025-01-28 ENCOUNTER — NURSE ONLY (OUTPATIENT)
Age: 89
End: 2025-01-28

## 2025-01-31 LAB — BACTERIA UR CULT: ABNORMAL

## 2025-02-03 RX ORDER — AMOXICILLIN 500 MG/1
500 CAPSULE ORAL 2 TIMES DAILY
Qty: 14 CAPSULE | Refills: 0 | Status: SHIPPED | OUTPATIENT
Start: 2025-02-03 | End: 2025-02-10

## 2025-03-03 RX ORDER — ACYCLOVIR 400 MG/1
TABLET ORAL
COMMUNITY

## 2025-03-03 RX ORDER — GLIMEPIRIDE 4 MG/1
TABLET ORAL
COMMUNITY

## 2025-03-03 RX ORDER — ADALIMUMAB 40MG/0.4ML
KIT SUBCUTANEOUS
COMMUNITY

## 2025-03-10 ENCOUNTER — APPOINTMENT (OUTPATIENT)
Facility: HOSPITAL | Age: 89
DRG: 309 | End: 2025-03-10
Payer: MEDICARE

## 2025-03-10 ENCOUNTER — HOSPITAL ENCOUNTER (INPATIENT)
Facility: HOSPITAL | Age: 89
LOS: 1 days | Discharge: HOME HEALTH | DRG: 309 | End: 2025-03-13
Attending: EMERGENCY MEDICINE | Admitting: INTERNAL MEDICINE
Payer: MEDICARE

## 2025-03-10 DIAGNOSIS — R55 SYNCOPE AND COLLAPSE: Primary | ICD-10-CM

## 2025-03-10 DIAGNOSIS — R00.1 SYMPTOMATIC BRADYCARDIA: ICD-10-CM

## 2025-03-10 LAB
ALBUMIN SERPL-MCNC: 3.6 G/DL (ref 3.5–5)
ALBUMIN/GLOB SERPL: 1.1 (ref 1.1–2.2)
ALP SERPL-CCNC: 124 U/L (ref 45–117)
ALT SERPL-CCNC: 20 U/L (ref 12–78)
ANION GAP SERPL CALC-SCNC: 5 MMOL/L (ref 2–12)
AST SERPL W P-5'-P-CCNC: 14 U/L (ref 15–37)
BASOPHILS # BLD: 0.11 K/UL (ref 0–0.1)
BASOPHILS NFR BLD: 1.1 % (ref 0–1)
BILIRUB SERPL-MCNC: 0.4 MG/DL (ref 0.2–1)
BUN SERPL-MCNC: 21 MG/DL (ref 6–20)
BUN/CREAT SERPL: 18 (ref 12–20)
CA-I BLD-MCNC: 9.3 MG/DL (ref 8.5–10.1)
CHLORIDE SERPL-SCNC: 104 MMOL/L (ref 97–108)
CO2 SERPL-SCNC: 29 MMOL/L (ref 21–32)
CREAT SERPL-MCNC: 1.16 MG/DL (ref 0.55–1.02)
DIFFERENTIAL METHOD BLD: ABNORMAL
EKG ATRIAL RATE: 49 BPM
EKG ATRIAL RATE: 51 BPM
EKG DIAGNOSIS: NORMAL
EKG DIAGNOSIS: NORMAL
EKG P AXIS: 78 DEGREES
EKG P-R INTERVAL: 176 MS
EKG P-R INTERVAL: 206 MS
EKG Q-T INTERVAL: 556 MS
EKG Q-T INTERVAL: 572 MS
EKG QRS DURATION: 116 MS
EKG QRS DURATION: 118 MS
EKG QTC CALCULATION (BAZETT): 512 MS
EKG QTC CALCULATION (BAZETT): 516 MS
EKG R AXIS: -45 DEGREES
EKG R AXIS: -48 DEGREES
EKG T AXIS: -57 DEGREES
EKG T AXIS: -78 DEGREES
EKG VENTRICULAR RATE: 49 BPM
EKG VENTRICULAR RATE: 51 BPM
EOSINOPHIL # BLD: 0.25 K/UL (ref 0–0.4)
EOSINOPHIL NFR BLD: 2.4 % (ref 0–7)
ERYTHROCYTE [DISTWIDTH] IN BLOOD BY AUTOMATED COUNT: 13.2 % (ref 11.5–14.5)
GLOBULIN SER CALC-MCNC: 3.2 G/DL (ref 2–4)
GLUCOSE BLD STRIP.AUTO-MCNC: 149 MG/DL (ref 65–100)
GLUCOSE SERPL-MCNC: 162 MG/DL (ref 65–100)
HCT VFR BLD AUTO: 40.4 % (ref 35–47)
HGB BLD-MCNC: 13.2 G/DL (ref 11.5–16)
IMM GRANULOCYTES # BLD AUTO: 0.1 K/UL (ref 0–0.04)
IMM GRANULOCYTES NFR BLD AUTO: 1 % (ref 0–0.5)
LYMPHOCYTES # BLD: 1.59 K/UL (ref 0.8–3.5)
LYMPHOCYTES NFR BLD: 15.4 % (ref 12–49)
MAGNESIUM SERPL-MCNC: 1.8 MG/DL (ref 1.6–2.4)
MCH RBC QN AUTO: 30.9 PG (ref 26–34)
MCHC RBC AUTO-ENTMCNC: 32.7 G/DL (ref 30–36.5)
MCV RBC AUTO: 94.6 FL (ref 80–99)
MONOCYTES # BLD: 0.99 K/UL (ref 0–1)
MONOCYTES NFR BLD: 9.6 % (ref 5–13)
NEUTS SEG # BLD: 7.3 K/UL (ref 1.8–8)
NEUTS SEG NFR BLD: 70.5 % (ref 32–75)
NRBC # BLD: 0 K/UL (ref 0–0.01)
NRBC BLD-RTO: 0 PER 100 WBC
PERFORMED BY:: ABNORMAL
PLATELET # BLD AUTO: 214 K/UL (ref 150–400)
PMV BLD AUTO: 12 FL (ref 8.9–12.9)
POTASSIUM SERPL-SCNC: 4 MMOL/L (ref 3.5–5.1)
PROT SERPL-MCNC: 6.8 G/DL (ref 6.4–8.2)
RBC # BLD AUTO: 4.27 M/UL (ref 3.8–5.2)
SODIUM SERPL-SCNC: 138 MMOL/L (ref 136–145)
TROPONIN I SERPL HS-MCNC: 11 NG/L (ref 0–51)
TROPONIN I SERPL HS-MCNC: 12 NG/L (ref 0–51)
WBC # BLD AUTO: 10.3 K/UL (ref 3.6–11)

## 2025-03-10 PROCEDURE — 99285 EMERGENCY DEPT VISIT HI MDM: CPT

## 2025-03-10 PROCEDURE — 80053 COMPREHEN METABOLIC PANEL: CPT

## 2025-03-10 PROCEDURE — 6370000000 HC RX 637 (ALT 250 FOR IP): Performed by: INTERNAL MEDICINE

## 2025-03-10 PROCEDURE — 85025 COMPLETE CBC W/AUTO DIFF WBC: CPT

## 2025-03-10 PROCEDURE — 84484 ASSAY OF TROPONIN QUANT: CPT

## 2025-03-10 PROCEDURE — 83735 ASSAY OF MAGNESIUM: CPT

## 2025-03-10 PROCEDURE — 96360 HYDRATION IV INFUSION INIT: CPT

## 2025-03-10 PROCEDURE — 2500000003 HC RX 250 WO HCPCS: Performed by: INTERNAL MEDICINE

## 2025-03-10 PROCEDURE — 2580000003 HC RX 258: Performed by: EMERGENCY MEDICINE

## 2025-03-10 PROCEDURE — 36415 COLL VENOUS BLD VENIPUNCTURE: CPT

## 2025-03-10 PROCEDURE — G0378 HOSPITAL OBSERVATION PER HR: HCPCS

## 2025-03-10 PROCEDURE — 82962 GLUCOSE BLOOD TEST: CPT

## 2025-03-10 PROCEDURE — 93005 ELECTROCARDIOGRAM TRACING: CPT | Performed by: EMERGENCY MEDICINE

## 2025-03-10 PROCEDURE — 70450 CT HEAD/BRAIN W/O DYE: CPT

## 2025-03-10 RX ORDER — INSULIN LISPRO 100 [IU]/ML
0-4 INJECTION, SOLUTION INTRAVENOUS; SUBCUTANEOUS
Status: DISCONTINUED | OUTPATIENT
Start: 2025-03-10 | End: 2025-03-13 | Stop reason: HOSPADM

## 2025-03-10 RX ORDER — POLYETHYLENE GLYCOL 3350 17 G/17G
17 POWDER, FOR SOLUTION ORAL DAILY PRN
Status: DISCONTINUED | OUTPATIENT
Start: 2025-03-10 | End: 2025-03-13 | Stop reason: HOSPADM

## 2025-03-10 RX ORDER — ACETAMINOPHEN 650 MG/1
650 SUPPOSITORY RECTAL EVERY 6 HOURS PRN
Status: DISCONTINUED | OUTPATIENT
Start: 2025-03-10 | End: 2025-03-13 | Stop reason: HOSPADM

## 2025-03-10 RX ORDER — GLUCAGON 1 MG/ML
1 KIT INJECTION PRN
Status: DISCONTINUED | OUTPATIENT
Start: 2025-03-10 | End: 2025-03-13 | Stop reason: HOSPADM

## 2025-03-10 RX ORDER — DEXTROSE MONOHYDRATE 100 MG/ML
INJECTION, SOLUTION INTRAVENOUS CONTINUOUS PRN
Status: DISCONTINUED | OUTPATIENT
Start: 2025-03-10 | End: 2025-03-10 | Stop reason: SDUPTHER

## 2025-03-10 RX ORDER — ENOXAPARIN SODIUM 100 MG/ML
40 INJECTION SUBCUTANEOUS EVERY 24 HOURS
Status: DISCONTINUED | OUTPATIENT
Start: 2025-03-10 | End: 2025-03-10

## 2025-03-10 RX ORDER — MAGNESIUM SULFATE IN WATER 40 MG/ML
2000 INJECTION, SOLUTION INTRAVENOUS PRN
Status: DISCONTINUED | OUTPATIENT
Start: 2025-03-10 | End: 2025-03-13 | Stop reason: HOSPADM

## 2025-03-10 RX ORDER — SODIUM CHLORIDE 9 MG/ML
INJECTION, SOLUTION INTRAVENOUS PRN
Status: DISCONTINUED | OUTPATIENT
Start: 2025-03-10 | End: 2025-03-13 | Stop reason: HOSPADM

## 2025-03-10 RX ORDER — POTASSIUM CHLORIDE 7.45 MG/ML
10 INJECTION INTRAVENOUS PRN
Status: DISCONTINUED | OUTPATIENT
Start: 2025-03-10 | End: 2025-03-13 | Stop reason: HOSPADM

## 2025-03-10 RX ORDER — ONDANSETRON 2 MG/ML
4 INJECTION INTRAMUSCULAR; INTRAVENOUS EVERY 6 HOURS PRN
Status: DISCONTINUED | OUTPATIENT
Start: 2025-03-10 | End: 2025-03-13 | Stop reason: HOSPADM

## 2025-03-10 RX ORDER — DONEPEZIL HYDROCHLORIDE 5 MG/1
10 TABLET, FILM COATED ORAL NIGHTLY
Status: DISCONTINUED | OUTPATIENT
Start: 2025-03-10 | End: 2025-03-13 | Stop reason: HOSPADM

## 2025-03-10 RX ORDER — DEXTROSE MONOHYDRATE 100 MG/ML
INJECTION, SOLUTION INTRAVENOUS CONTINUOUS PRN
Status: DISCONTINUED | OUTPATIENT
Start: 2025-03-10 | End: 2025-03-13 | Stop reason: HOSPADM

## 2025-03-10 RX ORDER — GLUCAGON 1 MG/ML
1 KIT INJECTION PRN
Status: DISCONTINUED | OUTPATIENT
Start: 2025-03-10 | End: 2025-03-10 | Stop reason: SDUPTHER

## 2025-03-10 RX ORDER — SODIUM CHLORIDE 0.9 % (FLUSH) 0.9 %
5-40 SYRINGE (ML) INJECTION PRN
Status: DISCONTINUED | OUTPATIENT
Start: 2025-03-10 | End: 2025-03-13 | Stop reason: HOSPADM

## 2025-03-10 RX ORDER — ACETAMINOPHEN 325 MG/1
650 TABLET ORAL EVERY 6 HOURS PRN
Status: DISCONTINUED | OUTPATIENT
Start: 2025-03-10 | End: 2025-03-13 | Stop reason: HOSPADM

## 2025-03-10 RX ORDER — CARVEDILOL 3.12 MG/1
6.25 TABLET ORAL 2 TIMES DAILY WITH MEALS
Status: DISCONTINUED | OUTPATIENT
Start: 2025-03-10 | End: 2025-03-10

## 2025-03-10 RX ORDER — MONTELUKAST SODIUM 10 MG/1
10 TABLET ORAL NIGHTLY
Status: DISCONTINUED | OUTPATIENT
Start: 2025-03-10 | End: 2025-03-10

## 2025-03-10 RX ORDER — ISOSORBIDE MONONITRATE 30 MG/1
30 TABLET, EXTENDED RELEASE ORAL DAILY
Status: DISCONTINUED | OUTPATIENT
Start: 2025-03-11 | End: 2025-03-13 | Stop reason: HOSPADM

## 2025-03-10 RX ORDER — POTASSIUM CHLORIDE 1500 MG/1
40 TABLET, EXTENDED RELEASE ORAL PRN
Status: DISCONTINUED | OUTPATIENT
Start: 2025-03-10 | End: 2025-03-13 | Stop reason: HOSPADM

## 2025-03-10 RX ORDER — ATORVASTATIN CALCIUM 40 MG/1
40 TABLET, FILM COATED ORAL NIGHTLY
Status: DISCONTINUED | OUTPATIENT
Start: 2025-03-10 | End: 2025-03-10

## 2025-03-10 RX ORDER — LOSARTAN POTASSIUM 50 MG/1
100 TABLET ORAL DAILY
Status: DISCONTINUED | OUTPATIENT
Start: 2025-03-11 | End: 2025-03-13 | Stop reason: HOSPADM

## 2025-03-10 RX ORDER — ACETAMINOPHEN 500 MG
1000 TABLET ORAL ONCE
Status: COMPLETED | OUTPATIENT
Start: 2025-03-10 | End: 2025-03-10

## 2025-03-10 RX ORDER — GABAPENTIN 300 MG/1
300 CAPSULE ORAL 2 TIMES DAILY
Status: DISCONTINUED | OUTPATIENT
Start: 2025-03-10 | End: 2025-03-10

## 2025-03-10 RX ORDER — AMLODIPINE BESYLATE 5 MG/1
5 TABLET ORAL DAILY
Status: DISCONTINUED | OUTPATIENT
Start: 2025-03-10 | End: 2025-03-13 | Stop reason: HOSPADM

## 2025-03-10 RX ORDER — ISOSORBIDE MONONITRATE 30 MG/1
30 TABLET, EXTENDED RELEASE ORAL DAILY
Status: DISCONTINUED | OUTPATIENT
Start: 2025-03-11 | End: 2025-03-10

## 2025-03-10 RX ORDER — AMLODIPINE BESYLATE 5 MG/1
5 TABLET ORAL DAILY
Status: DISCONTINUED | OUTPATIENT
Start: 2025-03-11 | End: 2025-03-10

## 2025-03-10 RX ORDER — LOSARTAN POTASSIUM 50 MG/1
100 TABLET ORAL DAILY
Status: DISCONTINUED | OUTPATIENT
Start: 2025-03-11 | End: 2025-03-10

## 2025-03-10 RX ORDER — ONDANSETRON 4 MG/1
4 TABLET, ORALLY DISINTEGRATING ORAL EVERY 8 HOURS PRN
Status: DISCONTINUED | OUTPATIENT
Start: 2025-03-10 | End: 2025-03-13 | Stop reason: HOSPADM

## 2025-03-10 RX ORDER — DONEPEZIL HYDROCHLORIDE 5 MG/1
10 TABLET, FILM COATED ORAL NIGHTLY
Status: DISCONTINUED | OUTPATIENT
Start: 2025-03-10 | End: 2025-03-10

## 2025-03-10 RX ORDER — SODIUM CHLORIDE 0.9 % (FLUSH) 0.9 %
5-40 SYRINGE (ML) INJECTION EVERY 12 HOURS SCHEDULED
Status: DISCONTINUED | OUTPATIENT
Start: 2025-03-10 | End: 2025-03-13 | Stop reason: HOSPADM

## 2025-03-10 RX ORDER — MONTELUKAST SODIUM 10 MG/1
10 TABLET ORAL NIGHTLY
Status: DISCONTINUED | OUTPATIENT
Start: 2025-03-10 | End: 2025-03-13 | Stop reason: HOSPADM

## 2025-03-10 RX ORDER — ATORVASTATIN CALCIUM 40 MG/1
40 TABLET, FILM COATED ORAL NIGHTLY
Status: DISCONTINUED | OUTPATIENT
Start: 2025-03-10 | End: 2025-03-13 | Stop reason: HOSPADM

## 2025-03-10 RX ORDER — ASPIRIN 81 MG/1
81 TABLET, CHEWABLE ORAL DAILY
Status: DISCONTINUED | OUTPATIENT
Start: 2025-03-10 | End: 2025-03-10

## 2025-03-10 RX ORDER — 0.9 % SODIUM CHLORIDE 0.9 %
500 INTRAVENOUS SOLUTION INTRAVENOUS ONCE
Status: COMPLETED | OUTPATIENT
Start: 2025-03-10 | End: 2025-03-10

## 2025-03-10 RX ADMIN — ATORVASTATIN CALCIUM 40 MG: 40 TABLET, FILM COATED ORAL at 20:19

## 2025-03-10 RX ADMIN — SODIUM CHLORIDE, PRESERVATIVE FREE 10 ML: 5 INJECTION INTRAVENOUS at 20:20

## 2025-03-10 RX ADMIN — ACETAMINOPHEN 1000 MG: 500 TABLET, FILM COATED ORAL at 16:28

## 2025-03-10 RX ADMIN — AMLODIPINE BESYLATE 5 MG: 5 TABLET ORAL at 20:19

## 2025-03-10 RX ADMIN — SODIUM CHLORIDE 500 ML: 0.9 INJECTION, SOLUTION INTRAVENOUS at 13:32

## 2025-03-10 RX ADMIN — APIXABAN 2.5 MG: 2.5 TABLET, FILM COATED ORAL at 20:19

## 2025-03-10 RX ADMIN — MONTELUKAST 10 MG: 10 TABLET, FILM COATED ORAL at 20:19

## 2025-03-10 RX ADMIN — DONEPEZIL HYDROCHLORIDE 10 MG: 5 TABLET ORAL at 20:19

## 2025-03-10 ASSESSMENT — PAIN SCALES - GENERAL
PAINLEVEL_OUTOF10: 0
PAINLEVEL_OUTOF10: 8

## 2025-03-10 ASSESSMENT — PAIN - FUNCTIONAL ASSESSMENT: PAIN_FUNCTIONAL_ASSESSMENT: 0-10

## 2025-03-10 ASSESSMENT — PAIN DESCRIPTION - LOCATION: LOCATION: HEAD

## 2025-03-10 NOTE — ED TRIAGE NOTES
Pt arrived via ems from dr office for lightheadedness, episode of room spinning, and tunnel vision, felt like was going to pass out and per ems abnormal heart rate bradycardia.     Alert oriented and ambulatory at baseline.

## 2025-03-10 NOTE — H&P
History & Physical    Primary Care Provider: Royer Carreno MD  Source of Information: Patient/family     Chief complaint:   Chief Complaint   Patient presents with    Lightheadedness    Bradycardia        History of Presenting Illness:   Julita Metzger is a 89 y.o. female with a past medical history as noted below who presents today for chief complaint not feeling well.    Patient was offering more support to a friend and went with her to her friend's doctor's appointment.  While at the appointment patient started feeling ill and like her head was ballooning up.  She was dry heaving and reportedly somewhat hypotensive.  She was sent for further evaluation.  In the ED she was found to be in mild sinus bradycardia.  Patient denies a history of this.  She is on sotalol for atrial fibrillation.  Her last cardiology note from 10/28/2024 shows her on Coreg 3.125 mg twice daily but her bedside med rec shows her being off of this.  She does not have a pacemaker.  Patient follows with Dr. Mcgee for cardiology.    ED COURSE  97.3 °F RR 17 HR 50 /61 SpO2 98% on room air  CBC grossly unremarkable  BUN/creatinine 21/1.16 estimated GFR 46  Magnesium 1.8  Potassium 4  High-sensitivity troponin 11, 12  EKG sinus bradycardia T wave inversions in V3 - V6 and inferior leads  CT head no acute pathology  Tylenol 1 g p.o. x 1  NS bolus 500 mL IV x 1      Review of Systems:  A comprehensive review of systems was negative except for that written in the History of Present Illness.     Past Medical History:   Diagnosis Date    Atrial fibrillation (HCC)     Anticoagulation stopped by cardiology due to GI bleed    Chest pain     Diabetes mellitus (HCC)     Heart attack (HCC) 2016    Heart valve replaced     Hyperlipidemia     Hypertension     Lung abnormality     middle lobe syndrome right lung    Stroke (HCC) 12/2017        Past Surgical History:   Procedure Laterality Date    BACK SURGERY      eight surgeries    CATARACT REMOVAL    Colchicine Counseling:  Patient counseled regarding adverse effects including but not limited to stomach upset (nausea, vomiting, stomach pain, or diarrhea).  Patient instructed to limit alcohol consumption while taking this medication.  Colchicine may reduce blood counts especially with prolonged use.  The patient understands that monitoring of kidney function and blood counts may be required, especially at baseline. The patient verbalized understanding of the proper use and possible adverse effects of colchicine.  All of the patient's questions and concerns were addressed.

## 2025-03-10 NOTE — ED PROVIDER NOTES
Or    potassium bicarb-citric acid (EFFER-K) effervescent tablet 40 mEq  40 mEq Oral PRN Seipp, James, DO        Or    potassium chloride 10 mEq/100 mL IVPB (Peripheral Line)  10 mEq IntraVENous PRN Seipp, James, DO        magnesium sulfate 2000 mg in 50 mL IVPB premix  2,000 mg IntraVENous PRN Seipp, James, DO        enoxaparin (LOVENOX) injection 40 mg  40 mg SubCUTAneous Q24H Seipp, James, DO        ondansetron (ZOFRAN-ODT) disintegrating tablet 4 mg  4 mg Oral Q8H PRN Seipp, James, DO        Or    ondansetron (ZOFRAN) injection 4 mg  4 mg IntraVENous Q6H PRN Seipp, James, DO        polyethylene glycol (GLYCOLAX) packet 17 g  17 g Oral Daily PRN Seipp, James, DO        acetaminophen (TYLENOL) tablet 650 mg  650 mg Oral Q6H PRN Seipp, James, DO        Or    acetaminophen (TYLENOL) suppository 650 mg  650 mg Rectal Q6H PRN Seipp, James, DO         Current Outpatient Medications   Medication Sig Dispense Refill    acyclovir (ZOVIRAX) 400 MG tablet Take by mouth      adalimumab (HUMIRA, 2 PEN,) 40 MG/0.4ML AJKT injection pen kit 1 injection every other thursday      glimepiride (AMARYL) 4 MG tablet Take by mouth      methenamine (HIPREX) 1 g tablet Take 1 tablet by mouth daily as needed (burning) 90 tablet 3    aspirin 81 MG chewable tablet Take 1 tablet by mouth daily 30 tablet 3    carvedilol (COREG) 6.25 MG tablet Take 1 tablet by mouth 2 times daily (with meals) 60 tablet 1    amLODIPine (NORVASC) 5 MG tablet Take 1 tablet by mouth daily 30 tablet 1    Blood Pressure KIT 1 kit by Does not apply route daily 1 kit 0    butalbital-acetaminophen-caffeine (FIORICET, ESGIC) -40 MG per tablet Take 1 tablet by mouth every 6 hours as needed for Headaches      dapagliflozin (FARXIGA) 10 MG tablet Take 1 tablet by mouth every morning      sotalol (BETAPACE) 80 MG tablet Take 1 tablet by mouth 2 times daily      dicyclomine (BENTYL) 10 MG capsule Take 1 capsule by mouth 4 times daily (before meals and nightly)

## 2025-03-11 LAB
ALBUMIN SERPL-MCNC: 3.8 G/DL (ref 3.5–5)
ALBUMIN/GLOB SERPL: 0.9 (ref 1.1–2.2)
ALP SERPL-CCNC: 103 U/L (ref 45–117)
ALT SERPL-CCNC: 26 U/L (ref 12–78)
ANION GAP SERPL CALC-SCNC: 2 MMOL/L (ref 2–12)
APPEARANCE UR: CLEAR
AST SERPL W P-5'-P-CCNC: 55 U/L (ref 15–37)
BACTERIA URNS QL MICRO: NEGATIVE /HPF
BASOPHILS # BLD: 0.13 K/UL (ref 0–0.1)
BASOPHILS NFR BLD: 1 % (ref 0–1)
BILIRUB SERPL-MCNC: 0.7 MG/DL (ref 0.2–1)
BILIRUB UR QL: NEGATIVE
BUN SERPL-MCNC: 17 MG/DL (ref 6–20)
BUN/CREAT SERPL: 20 (ref 12–20)
CA-I BLD-MCNC: 10 MG/DL (ref 8.5–10.1)
CHLORIDE SERPL-SCNC: 105 MMOL/L (ref 97–108)
CO2 SERPL-SCNC: 26 MMOL/L (ref 21–32)
COLOR UR: ABNORMAL
CREAT SERPL-MCNC: 0.87 MG/DL (ref 0.55–1.02)
DIFFERENTIAL METHOD BLD: ABNORMAL
EKG ATRIAL RATE: 69 BPM
EKG DIAGNOSIS: NORMAL
EKG P AXIS: 4 DEGREES
EKG P-R INTERVAL: 154 MS
EKG Q-T INTERVAL: 466 MS
EKG QRS DURATION: 122 MS
EKG QTC CALCULATION (BAZETT): 499 MS
EKG R AXIS: -46 DEGREES
EKG T AXIS: 29 DEGREES
EKG VENTRICULAR RATE: 69 BPM
EOSINOPHIL # BLD: 0.18 K/UL (ref 0–0.4)
EOSINOPHIL NFR BLD: 1.4 % (ref 0–7)
EPITH CASTS URNS QL MICRO: ABNORMAL /LPF
ERYTHROCYTE [DISTWIDTH] IN BLOOD BY AUTOMATED COUNT: 13.1 % (ref 11.5–14.5)
GLOBULIN SER CALC-MCNC: 4.3 G/DL (ref 2–4)
GLUCOSE BLD STRIP.AUTO-MCNC: 120 MG/DL (ref 65–100)
GLUCOSE BLD STRIP.AUTO-MCNC: 137 MG/DL (ref 65–100)
GLUCOSE BLD STRIP.AUTO-MCNC: 138 MG/DL (ref 65–100)
GLUCOSE BLD STRIP.AUTO-MCNC: 173 MG/DL (ref 65–100)
GLUCOSE BLD STRIP.AUTO-MCNC: 192 MG/DL (ref 65–100)
GLUCOSE SERPL-MCNC: 165 MG/DL (ref 65–100)
GLUCOSE UR STRIP.AUTO-MCNC: >500 MG/DL
HCT VFR BLD AUTO: 44.1 % (ref 35–47)
HGB BLD-MCNC: 14.8 G/DL (ref 11.5–16)
HGB UR QL STRIP: ABNORMAL
IMM GRANULOCYTES # BLD AUTO: 0.07 K/UL (ref 0–0.04)
IMM GRANULOCYTES NFR BLD AUTO: 0.6 % (ref 0–0.5)
KETONES UR QL STRIP.AUTO: 5 MG/DL
LEUKOCYTE ESTERASE UR QL STRIP.AUTO: ABNORMAL
LYMPHOCYTES # BLD: 1.3 K/UL (ref 0.8–3.5)
LYMPHOCYTES NFR BLD: 10.3 % (ref 12–49)
MAGNESIUM SERPL-MCNC: 2 MG/DL (ref 1.6–2.4)
MCH RBC QN AUTO: 31.2 PG (ref 26–34)
MCHC RBC AUTO-ENTMCNC: 33.6 G/DL (ref 30–36.5)
MCV RBC AUTO: 92.8 FL (ref 80–99)
MONOCYTES # BLD: 0.98 K/UL (ref 0–1)
MONOCYTES NFR BLD: 7.8 % (ref 5–13)
MUCOUS THREADS URNS QL MICRO: NEGATIVE /LPF
NEUTS SEG # BLD: 9.94 K/UL (ref 1.8–8)
NEUTS SEG NFR BLD: 78.9 % (ref 32–75)
NITRITE UR QL STRIP.AUTO: NEGATIVE
NRBC # BLD: 0 K/UL (ref 0–0.01)
NRBC BLD-RTO: 0 PER 100 WBC
PERFORMED BY:: ABNORMAL
PH UR STRIP: 7 (ref 5–8)
PLATELET # BLD AUTO: 200 K/UL (ref 150–400)
POTASSIUM SERPL-SCNC: 6.2 MMOL/L (ref 3.5–5.1)
PROT SERPL-MCNC: 8.1 G/DL (ref 6.4–8.2)
PROT UR STRIP-MCNC: 30 MG/DL
RBC # BLD AUTO: 4.75 M/UL (ref 3.8–5.2)
RBC #/AREA URNS HPF: >100 /HPF (ref 0–5)
SODIUM SERPL-SCNC: 133 MMOL/L (ref 136–145)
SP GR UR REFRACTOMETRY: 1.01 (ref 1–1.03)
T4 FREE SERPL-MCNC: 1.1 NG/DL (ref 0.8–1.5)
TSH SERPL DL<=0.05 MIU/L-ACNC: 1.48 UIU/ML (ref 0.36–3.74)
URINE CULTURE IF INDICATED: ABNORMAL
UROBILINOGEN UR QL STRIP.AUTO: 0.1 EU/DL (ref 0.1–1)
WBC # BLD AUTO: 12.6 K/UL (ref 3.6–11)
WBC URNS QL MICRO: ABNORMAL /HPF (ref 0–4)

## 2025-03-11 PROCEDURE — 83735 ASSAY OF MAGNESIUM: CPT

## 2025-03-11 PROCEDURE — 2500000003 HC RX 250 WO HCPCS: Performed by: INTERNAL MEDICINE

## 2025-03-11 PROCEDURE — G0378 HOSPITAL OBSERVATION PER HR: HCPCS

## 2025-03-11 PROCEDURE — 6360000002 HC RX W HCPCS: Performed by: INTERNAL MEDICINE

## 2025-03-11 PROCEDURE — 87086 URINE CULTURE/COLONY COUNT: CPT

## 2025-03-11 PROCEDURE — 6360000002 HC RX W HCPCS

## 2025-03-11 PROCEDURE — 80053 COMPREHEN METABOLIC PANEL: CPT

## 2025-03-11 PROCEDURE — 6370000000 HC RX 637 (ALT 250 FOR IP)

## 2025-03-11 PROCEDURE — 84443 ASSAY THYROID STIM HORMONE: CPT

## 2025-03-11 PROCEDURE — 81001 URINALYSIS AUTO W/SCOPE: CPT

## 2025-03-11 PROCEDURE — 93005 ELECTROCARDIOGRAM TRACING: CPT | Performed by: INTERNAL MEDICINE

## 2025-03-11 PROCEDURE — 84439 ASSAY OF FREE THYROXINE: CPT

## 2025-03-11 PROCEDURE — 85025 COMPLETE CBC W/AUTO DIFF WBC: CPT

## 2025-03-11 PROCEDURE — 82962 GLUCOSE BLOOD TEST: CPT

## 2025-03-11 PROCEDURE — 6370000000 HC RX 637 (ALT 250 FOR IP): Performed by: INTERNAL MEDICINE

## 2025-03-11 RX ORDER — QUETIAPINE FUMARATE 25 MG/1
25 TABLET, FILM COATED ORAL NIGHTLY
Status: DISCONTINUED | OUTPATIENT
Start: 2025-03-11 | End: 2025-03-13 | Stop reason: HOSPADM

## 2025-03-11 RX ORDER — HYDROXYZINE PAMOATE 25 MG/1
25 CAPSULE ORAL 3 TIMES DAILY PRN
Status: DISCONTINUED | OUTPATIENT
Start: 2025-03-11 | End: 2025-03-13 | Stop reason: HOSPADM

## 2025-03-11 RX ORDER — HYDRALAZINE HYDROCHLORIDE 20 MG/ML
5 INJECTION INTRAMUSCULAR; INTRAVENOUS EVERY 6 HOURS PRN
Status: DISCONTINUED | OUTPATIENT
Start: 2025-03-11 | End: 2025-03-13 | Stop reason: HOSPADM

## 2025-03-11 RX ADMIN — SODIUM CHLORIDE, PRESERVATIVE FREE 10 ML: 5 INJECTION INTRAVENOUS at 09:04

## 2025-03-11 RX ADMIN — ATORVASTATIN CALCIUM 40 MG: 40 TABLET, FILM COATED ORAL at 20:23

## 2025-03-11 RX ADMIN — HYDROXYZINE PAMOATE 25 MG: 25 CAPSULE ORAL at 20:23

## 2025-03-11 RX ADMIN — ACETAMINOPHEN 650 MG: 325 TABLET, FILM COATED ORAL at 09:00

## 2025-03-11 RX ADMIN — LOSARTAN POTASSIUM 100 MG: 50 TABLET, FILM COATED ORAL at 09:01

## 2025-03-11 RX ADMIN — HYDRALAZINE HYDROCHLORIDE 5 MG: 20 INJECTION INTRAMUSCULAR; INTRAVENOUS at 04:03

## 2025-03-11 RX ADMIN — INSULIN LISPRO 1 UNITS: 100 INJECTION, SOLUTION INTRAVENOUS; SUBCUTANEOUS at 11:18

## 2025-03-11 RX ADMIN — MONTELUKAST 10 MG: 10 TABLET, FILM COATED ORAL at 20:23

## 2025-03-11 RX ADMIN — DONEPEZIL HYDROCHLORIDE 10 MG: 5 TABLET ORAL at 20:23

## 2025-03-11 RX ADMIN — APIXABAN 2.5 MG: 2.5 TABLET, FILM COATED ORAL at 09:01

## 2025-03-11 RX ADMIN — CEFTRIAXONE SODIUM 1000 MG: 1 INJECTION, POWDER, FOR SOLUTION INTRAMUSCULAR; INTRAVENOUS at 11:18

## 2025-03-11 RX ADMIN — QUETIAPINE FUMARATE 25 MG: 25 TABLET ORAL at 22:11

## 2025-03-11 RX ADMIN — ISOSORBIDE MONONITRATE 30 MG: 30 TABLET, EXTENDED RELEASE ORAL at 09:00

## 2025-03-11 RX ADMIN — AMLODIPINE BESYLATE 5 MG: 5 TABLET ORAL at 09:01

## 2025-03-11 RX ADMIN — ACETAMINOPHEN 650 MG: 325 TABLET, FILM COATED ORAL at 04:14

## 2025-03-11 ASSESSMENT — PAIN SCALES - GENERAL
PAINLEVEL_OUTOF10: 0
PAINLEVEL_OUTOF10: 0
PAINLEVEL_OUTOF10: 3

## 2025-03-11 ASSESSMENT — PAIN SCALES - PAIN ASSESSMENT IN ADVANCED DEMENTIA (PAINAD)
BODYLANGUAGE: TENSE, DISTRESSED PACING, FIDGETING
NEGVOCALIZATION: OCCASIONAL MOAN/GROAN, LOW SPEECH, NEGATIVE/DISAPPROVING QUALITY
CONSOLABILITY: DISTRACTED OR REASSURED BY VOICE/TOUCH

## 2025-03-11 ASSESSMENT — PAIN DESCRIPTION - DESCRIPTORS: DESCRIPTORS: ACHING

## 2025-03-11 ASSESSMENT — PAIN DESCRIPTION - LOCATION
LOCATION: HEAD
LOCATION: HEAD

## 2025-03-11 NOTE — ED NOTES
Spoke with Lucy in the pharmacy about giving amlodipine for HTN with bradycardia with the HR in the 50s, she states it does not have a significant effect on the Hr and to give medication

## 2025-03-11 NOTE — PROGRESS NOTES
Hospitalist Progress Note               Daily Progress Note: 3/11/2025      Hospital Day: 2     Chief complaint:   Chief Complaint   Patient presents with    Lightheadedness    Bradycardia        Subjective:   Hospital course to date:  Julita Metzger is a 89 y.o. female with PMHx significant for dementia, CVA, CAD, type 2 diabetes, hypertension, atrial fibrillation, history of GI bleed who presented to the emergency department for evaluation of near syncopal event. Patient was in the ortho office for evaluation of a right arm injury that occurred 3 years ago when she suddenly started feeling confused and like the \"room was closing in\". EMS found patient to be bradycardic in the 50s and hypotensive. Patient was responsive to IV fluids.     In the ED patient was found to be in mild sinus bradycardia. She was on Sotalol 80 mg for atrial fibrillation.  Labs were unremarkable. Troponin levels were normal.     She was admitted to cardiac telemetry    --------  Patient is seen today for follow-up.    Patient seen today complaining of gross hematuria and abdominal pain. Patient's  at bedside notes that patient has a history of UTIs. She denies any chest pain, dizziness, shortness of breath, or any other complaints. Patient denies any history of near syncopal events.  Her UA done on admission did show 10-20 WBCs, consistent with UTI    She was seen by cardiology today who recommends changing  Sotalol to 40 mg BID.       Medications reviewed  Current Facility-Administered Medications   Medication Dose Route Frequency    hydrALAZINE (APRESOLINE) injection 5 mg  5 mg IntraVENous Q6H PRN    hydrOXYzine pamoate (VISTARIL) capsule 25 mg  25 mg Oral TID PRN    cefTRIAXone (ROCEPHIN) 1,000 mg in sterile water 10 mL IV syringe  1,000 mg IntraVENous Q24H    sodium chloride flush 0.9 % injection 5-40 mL  5-40 mL IntraVENous 2 times per day    sodium chloride flush 0.9 % injection 5-40 mL  5-40 mL IntraVENous PRN    0.9 %

## 2025-03-11 NOTE — PROGRESS NOTES
Received Order for Telemetry     Julita Metzger   1935   [id number]   Syncope and collapse [R55]  Syncope, cardiogenic [R55]  Symptomatic bradycardia [R00.1]   No att. providers found     Tele Box # [id number] placed on patient at  2250 pm  [facility]  Admitting to Room [facility]  Transferring Nurse N/A  Verified with Primary Nurse GEOVANNI at  2250 pm

## 2025-03-11 NOTE — PROGRESS NOTES
Physician notification:    Pt refusing to take hydralazine. She states she does not take it usually and she would rather not. Education provided, she continues to refuse after multiple attempts. She states wants to wait until the AM. She continues to be intermittently confused, coming out into the hallway looking for random people/things. She refuses: bed alarm, slip resistant socks, and virtual sitter. She has now pulled her tele off and is putting her clothes on. Her gait is steady at the moment. I will call her . Thank you, Ramy

## 2025-03-11 NOTE — CONSULTS
Cardiology Consult    NAME: Julita Metzger   :  1935   MRN:  [id number]     Date/Time:  3/11/2025 9:03 AM    Patient PCP: Royer Carreno MD  ________________________________________________________________________     Assessment/Plan:   Bradycardia, heart rate does drop to the 40s.  Improved.  With paroxysmal A-fib.  Usually anticoagulated with Eliquis.?  Being evaluated for watchman.  Continue telemetry.  Patient is not on slowing agents at the present time.  Was on sotalol 80 twice daily at home.  Will repeat EKG and likely start on 40 twice daily if heart rate increases.  Admitted with near syncope.  Was also with low blood pressure on initial evaluation.    Hypertension, fair control.    Diabetes    Dementia    Status post TAVR           []        High complexity decision making was performed        Subjective:   CHIEF COMPLAINT:   Syncope and collapse    REASON FOR CONSULT:  Bradycardia HISTORY OF PRESENT ILLNESS:     Julita Metzger is a 89 y.o. White (non-) female who was at the orthopedic office when she started feeling poorly and felt room closing in.  Syncopized.  Found to be bradycardic and hypotensive.  In the hospital patient was noted to have heart rate in the 50s dropping to the 40s.  Usually home on sotalol 80 twice daily.  History of paroxysmal A-fib, anticoagulated with Eliquis.?  Being evaluated for watchman with history of bleeding.  Patient was agitated and confused at night.      Past Medical History:   Diagnosis Date    Atrial fibrillation (HCC)     Anticoagulation stopped by cardiology due to GI bleed    Chest pain     Diabetes mellitus (HCC)     Heart attack (HCC) 2016    Heart valve replaced     Hyperlipidemia     Hypertension     Lung abnormality     middle lobe syndrome right lung    Stroke (HCC) 2017      Past Surgical History:   Procedure Laterality Date    BACK SURGERY      eight surgeries    CATARACT REMOVAL      left eye    COLONOSCOPY Left 2018    COLONOSCOPY

## 2025-03-11 NOTE — PROGRESS NOTES
4 Eyes Skin Assessment     NAME:  Julita Metzger  YOB: 1935  MEDICAL RECORD NUMBER:  [id number]    The patient is being assessed for  Admission    I agree that at least one RN has performed a thorough Head to Toe Skin Assessment on the patient. ALL assessment sites listed below have been assessed.      Areas assessed by both nurses:    Head, Face, Ears, Shoulders, Back, Chest, Arms, Elbows, Hands, Sacrum. Buttock, Coccyx, Ischium, Legs. Feet and Heels, and Under Medical Devices         Does the Patient have a Wound? No noted wound(s)       Cuong Prevention initiated by RN: Yes  Wound Care Orders initiated by RN: No    Pressure Injury (Stage 3,4, Unstageable, DTI, NWPT, and Complex wounds) if present, place Wound referral order by RN under : No    New Ostomies, if present place, Ostomy referral order under : No     Nurse 1 eSignature: Electronically signed by Ramy Baltazar RN on 3/10/25 at 10:57 PM EDT    **SHARE this note so that the co-signing nurse can place an eSignature**    Nurse 2 eSignature: Electronically signed by Kunal Webster RN on 3/11/25 at 12:53 AM EDT

## 2025-03-11 NOTE — PROGRESS NOTES
Bed alarm going off. Pt in hallway, confused, stating that she was going downstairs to look for the people. She is upset that the bed alarm was on and states that she does not want it ever put back on. I educated her on the alarm and safety protocol and she states that she has to walk for \"cramps\". Advised her that she can not leave the unit to go downstairs. She then stated she was in the hallway looking for me because she knew I was out there. Advised pt I was going to get her Tylenol. She declined. Physician on unit advised of pt's current status.    0244: pt refuse virtual sitter in room. Attempted to do neuro assessment after pt states she never had anyone put alarm on in hotel. Pt states she was too upset to tell me anything. Charge RN notified

## 2025-03-11 NOTE — ED NOTES
ED TO INPATIENT SBAR HANDOFF    Patient Name: Julita Metzger   Preferred Name: Kathy  : 1935  89 y.o.   Family/Caregiver Present: no   Code Status Order: Full Code  PO Status: NPO:No  Telemetry Order: Yes  C-SSRS: Risk of Suicide: No Risk  Sitter no   Restraints:     Sepsis Risk Score      Situation  Chief Complaint   Patient presents with    Lightheadedness    Bradycardia     Brief Description of Patient's Condition: Pt arrived via ems from dr office for lightheadedness, episode of room spinning, and tunnel vision, felt like was going to pass out and per ems abnormal heart rate bradycardia.      Alert oriented and ambulatory at baseline.     Glucose 161 with ems     Mental Status: oriented, alert, coherent, logical, thought processes intact, and able to concentrate and follow conversation  Arrived from:Home  Imaging:   CT Head W/O Contrast   Final Result   No acute intracranial abnormality.         Electronically signed by EDMOND GAY        Abnormal labs:   Abnormal Labs Reviewed   CBC WITH AUTO DIFFERENTIAL - Abnormal; Notable for the following components:       Result Value    Basophils % 1.1 (*)     Immature Granulocytes % 1.0 (*)     Basophils Absolute 0.11 (*)     Immature Granulocytes Absolute 0.10 (*)     All other components within normal limits   COMPREHENSIVE METABOLIC PANEL - Abnormal; Notable for the following components:    Glucose 162 (*)     BUN 21 (*)     Creatinine 1.16 (*)     Est, Glom Filt Rate 46 (*)     AST 14 (*)     Alk Phosphatase 124 (*)     All other components within normal limits   POCT GLUCOSE - Abnormal; Notable for the following components:    POC Glucose 149 (*)     All other components within normal limits       Background  Allergies:   Allergies   Allergen Reactions    Latex Rash     Other Reaction(s): CAUSES EDEMA AND SWELLING TO FACE FROM ICE PACK WITH LATEX    Chicken Allergy Hives and Angioedema    Hydroxychloroquine Hives    Pneumococcal Vaccine      Other

## 2025-03-11 NOTE — PROGRESS NOTES
Hospitalist Progress Note               Daily Progress Note: 3/11/2025      Hospital Day: 2     Chief complaint:   Chief Complaint   Patient presents with    Lightheadedness    Bradycardia        Subjective:   Hospital course to date:  Julita Metzger is a 89 y.o. female with PMHx significant for dementia, CVA, CAD, type 2 diabetes, hypertension, atrial fibrillation, history of GI bleed who presented to the emergency department for evaluation of near syncopal event. Patient was in the ortho office for evaluation of a right arm injury that occurred 3 years ago when she suddenly started feeling confused and like the \"room was closing in\". EMS found patient to be bradycardic in the 50s and hypotensive. Patient was responsive to IV fluids.     In the ED patient was found to be in mild sinus bradycardia. She was on Sotalol 80 mg for atrial fibrillation.  Labs were unremarkable. Troponin levels were normal.     --------  Patient is seen today for follow-up.  Patient seen today complaining of hematuria and abdominal pain. Patient's  at bedside notes that patient has a history of UTIs. She denies any chest pain, dizziness, shortness of breath, or any other complaints. Patient denies any history of near syncopal events. She was seen by cardiology today who recommends changing  Sotalol to 40 mg BID.       Medications reviewed  Current Facility-Administered Medications   Medication Dose Route Frequency    hydrALAZINE (APRESOLINE) injection 5 mg  5 mg IntraVENous Q6H PRN    hydrOXYzine pamoate (VISTARIL) capsule 25 mg  25 mg Oral TID PRN    cefTRIAXone (ROCEPHIN) 1,000 mg in sterile water 10 mL IV syringe  1,000 mg IntraVENous Q24H    sodium chloride flush 0.9 % injection 5-40 mL  5-40 mL IntraVENous 2 times per day    sodium chloride flush 0.9 % injection 5-40 mL  5-40 mL IntraVENous PRN    0.9 % sodium chloride infusion   IntraVENous PRN    potassium chloride (KLOR-CON M) extended release tablet 40 mEq  40 mEq Oral

## 2025-03-12 ENCOUNTER — APPOINTMENT (OUTPATIENT)
Facility: HOSPITAL | Age: 89
DRG: 309 | End: 2025-03-12
Attending: INTERNAL MEDICINE
Payer: MEDICARE

## 2025-03-12 PROBLEM — I48.91 ATRIAL FIBRILLATION WITH RVR (HCC): Status: ACTIVE | Noted: 2025-03-12

## 2025-03-12 LAB
ALBUMIN SERPL-MCNC: 3.4 G/DL (ref 3.5–5)
ALBUMIN/GLOB SERPL: 1.1 (ref 1.1–2.2)
ALP SERPL-CCNC: 80 U/L (ref 45–117)
ALT SERPL-CCNC: 16 U/L (ref 12–78)
ANION GAP SERPL CALC-SCNC: 5 MMOL/L (ref 2–12)
AST SERPL W P-5'-P-CCNC: 15 U/L (ref 15–37)
BACTERIA SPEC CULT: NORMAL
BASOPHILS # BLD: 0.08 K/UL (ref 0–0.1)
BASOPHILS NFR BLD: 1.2 % (ref 0–1)
BILIRUB SERPL-MCNC: 0.7 MG/DL (ref 0.2–1)
BUN SERPL-MCNC: 14 MG/DL (ref 6–20)
BUN/CREAT SERPL: 21 (ref 12–20)
CA-I BLD-MCNC: 9.6 MG/DL (ref 8.5–10.1)
CHLORIDE SERPL-SCNC: 106 MMOL/L (ref 97–108)
CO2 SERPL-SCNC: 28 MMOL/L (ref 21–32)
CREAT SERPL-MCNC: 0.68 MG/DL (ref 0.55–1.02)
DIFFERENTIAL METHOD BLD: ABNORMAL
EOSINOPHIL # BLD: 0.21 K/UL (ref 0–0.4)
EOSINOPHIL NFR BLD: 3.1 % (ref 0–7)
ERYTHROCYTE [DISTWIDTH] IN BLOOD BY AUTOMATED COUNT: 13.3 % (ref 11.5–14.5)
GLOBULIN SER CALC-MCNC: 3.2 G/DL (ref 2–4)
GLUCOSE BLD STRIP.AUTO-MCNC: 161 MG/DL (ref 65–100)
GLUCOSE BLD STRIP.AUTO-MCNC: 168 MG/DL (ref 65–100)
GLUCOSE BLD STRIP.AUTO-MCNC: 210 MG/DL (ref 65–100)
GLUCOSE BLD STRIP.AUTO-MCNC: 213 MG/DL (ref 65–100)
GLUCOSE SERPL-MCNC: 123 MG/DL (ref 65–100)
HCT VFR BLD AUTO: 38.6 % (ref 35–47)
HGB BLD-MCNC: 12.9 G/DL (ref 11.5–16)
IMM GRANULOCYTES # BLD AUTO: 0.03 K/UL (ref 0–0.04)
IMM GRANULOCYTES NFR BLD AUTO: 0.4 % (ref 0–0.5)
LYMPHOCYTES # BLD: 1.38 K/UL (ref 0.8–3.5)
LYMPHOCYTES NFR BLD: 20.4 % (ref 12–49)
Lab: NORMAL
MAGNESIUM SERPL-MCNC: 2.1 MG/DL (ref 1.6–2.4)
MCH RBC QN AUTO: 31.4 PG (ref 26–34)
MCHC RBC AUTO-ENTMCNC: 33.4 G/DL (ref 30–36.5)
MCV RBC AUTO: 93.9 FL (ref 80–99)
MONOCYTES # BLD: 0.81 K/UL (ref 0–1)
MONOCYTES NFR BLD: 12 % (ref 5–13)
NEUTS SEG # BLD: 4.24 K/UL (ref 1.8–8)
NEUTS SEG NFR BLD: 62.9 % (ref 32–75)
NRBC # BLD: 0 K/UL (ref 0–0.01)
NRBC BLD-RTO: 0 PER 100 WBC
PERFORMED BY:: ABNORMAL
PLATELET # BLD AUTO: 171 K/UL (ref 150–400)
PMV BLD AUTO: 12.5 FL (ref 8.9–12.9)
POTASSIUM SERPL-SCNC: 3.5 MMOL/L (ref 3.5–5.1)
PROT SERPL-MCNC: 6.6 G/DL (ref 6.4–8.2)
RBC # BLD AUTO: 4.11 M/UL (ref 3.8–5.2)
SODIUM SERPL-SCNC: 139 MMOL/L (ref 136–145)
WBC # BLD AUTO: 6.8 K/UL (ref 3.6–11)

## 2025-03-12 PROCEDURE — 6360000002 HC RX W HCPCS: Performed by: INTERNAL MEDICINE

## 2025-03-12 PROCEDURE — 2500000003 HC RX 250 WO HCPCS: Performed by: INTERNAL MEDICINE

## 2025-03-12 PROCEDURE — 6370000000 HC RX 637 (ALT 250 FOR IP)

## 2025-03-12 PROCEDURE — 1100000000 HC RM PRIVATE

## 2025-03-12 PROCEDURE — 80053 COMPREHEN METABOLIC PANEL: CPT

## 2025-03-12 PROCEDURE — 6370000000 HC RX 637 (ALT 250 FOR IP): Performed by: INTERNAL MEDICINE

## 2025-03-12 PROCEDURE — 85025 COMPLETE CBC W/AUTO DIFF WBC: CPT

## 2025-03-12 PROCEDURE — 36415 COLL VENOUS BLD VENIPUNCTURE: CPT

## 2025-03-12 PROCEDURE — 74176 CT ABD & PELVIS W/O CONTRAST: CPT

## 2025-03-12 PROCEDURE — G0378 HOSPITAL OBSERVATION PER HR: HCPCS

## 2025-03-12 PROCEDURE — 82962 GLUCOSE BLOOD TEST: CPT

## 2025-03-12 PROCEDURE — 83735 ASSAY OF MAGNESIUM: CPT

## 2025-03-12 RX ORDER — BISACODYL 5 MG/1
10 TABLET, DELAYED RELEASE ORAL ONCE
Status: COMPLETED | OUTPATIENT
Start: 2025-03-12 | End: 2025-03-12

## 2025-03-12 RX ADMIN — SODIUM CHLORIDE, PRESERVATIVE FREE 10 ML: 5 INJECTION INTRAVENOUS at 20:11

## 2025-03-12 RX ADMIN — INSULIN LISPRO 1 UNITS: 100 INJECTION, SOLUTION INTRAVENOUS; SUBCUTANEOUS at 09:12

## 2025-03-12 RX ADMIN — ISOSORBIDE MONONITRATE 30 MG: 30 TABLET, EXTENDED RELEASE ORAL at 09:12

## 2025-03-12 RX ADMIN — LOSARTAN POTASSIUM 100 MG: 50 TABLET, FILM COATED ORAL at 09:12

## 2025-03-12 RX ADMIN — SODIUM CHLORIDE, PRESERVATIVE FREE 10 ML: 5 INJECTION INTRAVENOUS at 09:12

## 2025-03-12 RX ADMIN — MONTELUKAST 10 MG: 10 TABLET, FILM COATED ORAL at 20:11

## 2025-03-12 RX ADMIN — AMLODIPINE BESYLATE 5 MG: 5 TABLET ORAL at 09:12

## 2025-03-12 RX ADMIN — ATORVASTATIN CALCIUM 40 MG: 40 TABLET, FILM COATED ORAL at 20:11

## 2025-03-12 RX ADMIN — INSULIN LISPRO 1 UNITS: 100 INJECTION, SOLUTION INTRAVENOUS; SUBCUTANEOUS at 12:25

## 2025-03-12 RX ADMIN — QUETIAPINE FUMARATE 25 MG: 25 TABLET ORAL at 20:11

## 2025-03-12 RX ADMIN — BISACODYL 10 MG: 5 TABLET, COATED ORAL at 11:21

## 2025-03-12 RX ADMIN — CEFTRIAXONE SODIUM 1000 MG: 1 INJECTION, POWDER, FOR SOLUTION INTRAMUSCULAR; INTRAVENOUS at 11:20

## 2025-03-12 RX ADMIN — DONEPEZIL HYDROCHLORIDE 10 MG: 5 TABLET ORAL at 20:11

## 2025-03-12 RX ADMIN — HYDROXYZINE PAMOATE 25 MG: 25 CAPSULE ORAL at 20:11

## 2025-03-12 ASSESSMENT — PAIN SCALES - GENERAL
PAINLEVEL_OUTOF10: 0

## 2025-03-12 NOTE — PLAN OF CARE
Problem: Chronic Conditions and Co-morbidities  Goal: Patient's chronic conditions and co-morbidity symptoms are monitored and maintained or improved  3/12/2025 0406 by Alexa Kilpatrick RN  Outcome: Progressing  3/11/2025 1540 by Anna Pettit RN  Outcome: Progressing  Flowsheets (Taken 3/11/2025 0800)  Care Plan - Patient's Chronic Conditions and Co-Morbidity Symptoms are Monitored and Maintained or Improved:   Monitor and assess patient's chronic conditions and comorbid symptoms for stability, deterioration, or improvement   Collaborate with multidisciplinary team to address chronic and comorbid conditions and prevent exacerbation or deterioration   Update acute care plan with appropriate goals if chronic or comorbid symptoms are exacerbated and prevent overall improvement and discharge     Problem: Discharge Planning  Goal: Discharge to home or other facility with appropriate resources  3/12/2025 0406 by Alexa Kilpatrick RN  Outcome: Progressing  3/11/2025 1540 by Anna Pettit RN  Outcome: Progressing  Flowsheets (Taken 3/11/2025 0800)  Discharge to home or other facility with appropriate resources:   Identify barriers to discharge with patient and caregiver   Arrange for needed discharge resources and transportation as appropriate   Identify discharge learning needs (meds, wound care, etc)   Refer to discharge planning if patient needs post-hospital services based on physician order or complex needs related to functional status, cognitive ability or social support system     Problem: Pain  Goal: Verbalizes/displays adequate comfort level or baseline comfort level  3/12/2025 0406 by Alexa Kilpatrick RN  Outcome: Progressing  3/11/2025 1540 by Anna Pettit RN  Outcome: Progressing     Problem: ABCDS Injury Assessment  Goal: Absence of physical injury  3/12/2025 0406 by Alexa Kilpatrick RN  Outcome: Progressing  3/11/2025 1540 by Anna Pettit RN  Outcome: Progressing     Problem: Safety - Adult  Goal:

## 2025-03-12 NOTE — PROGRESS NOTES
2120 Pt out in the nurses station, yelling at staff and threatening bodily harm.   2150 finally able to get pt to go back into the room as long as she did not have to get back in the bed. MsDiana Franny, family hired staff, at bedside talking with pt helping to convince her to go back into the room. Reached out to PA for more medications.

## 2025-03-12 NOTE — CARE COORDINATION
03/12/25 1412   Service Assessment   Patient Orientation Alert and Oriented   Cognition Dementia / Early Alzheimer's   History Provided By Patient;Significant Other   Primary Caregiver Spouse   Support Systems Spouse/Significant Other   Patient's Healthcare Decision Maker is: Legal Next of Kin   PCP Verified by CM Yes   Last Visit to PCP Within last 3 months   Prior Functional Level Assistance with the following:;Mobility  (cane)   Current Functional Level Mobility;Assistance with the following:  (cane)   Can patient return to prior living arrangement Yes   Ability to make needs known: Good   Family able to assist with home care needs: Yes   Would you like for me to discuss the discharge plan with any other family members/significant others, and if so, who? Yes  (spouse)   Financial Resources Medicare   Community Resources None   CM/SW Referral Other (see comment)   Social/Functional History   Lives With Spouse   Type of Home House   Home Layout One level   Home Access Stairs to enter with rails   Entrance Stairs - Number of Steps 4   Home Equipment Cane;Walker - Rolling   Active  No   Patient's  Info spouse   Mode of Transportation Car       CM met with patient and spouse in room to complete DCP assessment.    Patient currently lives with spouse in a one story house with 4 steps to enter, address on chart confirmed.    Patient typically uses a cane to ambulate nut mostly physically independent, does have dementia and require supervision.    Patient is current with PCP, spouse drives to appts, uses [facility] for pharmacy and delivery medications.    No reported CM needs at this time.    CM continues to follow and monitor for needs.    Advance Care Planning   Healthcare Decision Maker:    Primary Decision Maker: [place] bell - Spouse - [phone]    Click here to complete Healthcare Decision Makers including selection of the Healthcare Decision Maker Relationship (ie \"Primary\").

## 2025-03-12 NOTE — PROGRESS NOTES
Hospitalist Progress Note               Daily Progress Note: 3/12/2025      Hospital Day: 3     Chief complaint:   Chief Complaint   Patient presents with    Lightheadedness    Bradycardia        Subjective:   Hospital course to date:  Julita Metzger is a 89 y.o. female with PMHx significant for dementia, CVA, CAD, type 2 diabetes, hypertension, atrial fibrillation, history of GI bleed who presented to the emergency department for evaluation of near syncopal event. Patient was in the ortho office for evaluation of a right arm injury that occurred 3 years ago when she suddenly started feeling confused and like the \"room was closing in\". EMS found patient to be bradycardic in the 50s and hypotensive. Patient was responsive to IV fluids.     In the ED patient was found to be in mild sinus bradycardia. She was on Sotalol 80 mg for atrial fibrillation.  Labs were unremarkable. Troponin levels were normal.     She was admitted to cardiac telemetry    --------  Patient is seen today for follow-up.    Patient seen today complaining of continued gross hematuria and abdominal pain. Pain worse in the RLQ. Of note Eliquis was put on hold and patient was started on Rocephin for UTI. She denies any chest pain, shortness of breath, or dizziness.      There is a caregiver in the room who has been looking after patient.  She expressed concern about polypharmacy.  It sounds like her  may be overmedicating her at home.  She has concerns that the  may have some dementia himself.    I reviewed her outside medications and reconciled it against the current list.  There are a lot of medications on her list that she is not or should not be taking.  Will make sure we have an updated list at discharge and try to cut out as many meds as possible.        Medications reviewed  Current Facility-Administered Medications   Medication Dose Route Frequency    hydrALAZINE (APRESOLINE) injection 5 mg  5 mg IntraVENous Q6H PRN     Statement Selected

## 2025-03-12 NOTE — PROGRESS NOTES
Hospitalist Progress Note               Daily Progress Note: 3/12/2025      Hospital Day: 3     Chief complaint:   Chief Complaint   Patient presents with    Lightheadedness    Bradycardia        Subjective:   Hospital course to date:  Julita Metzger is a 89 y.o. female with PMHx significant for dementia, CVA, CAD, type 2 diabetes, hypertension, atrial fibrillation, history of GI bleed who presented to the emergency department for evaluation of near syncopal event. Patient was in the ortho office for evaluation of a right arm injury that occurred 3 years ago when she suddenly started feeling confused and like the \"room was closing in\". EMS found patient to be bradycardic in the 50s and hypotensive. Patient was responsive to IV fluids.     In the ED patient was found to be in mild sinus bradycardia. She was on Sotalol 80 mg for atrial fibrillation.  Labs were unremarkable. Troponin levels were normal.     She was admitted to cardiac telemetry    --------  Patient is seen today for follow-up.    Patient seen today complaining of continued gross hematuria and abdominal pain. Pain worse in the RLQ. Of note Eliquis was put on hold and patient was started on Rocephin for UTI. She denies any chest pain, shortness of breath, or dizziness.          Medications reviewed  Current Facility-Administered Medications   Medication Dose Route Frequency    hydrALAZINE (APRESOLINE) injection 5 mg  5 mg IntraVENous Q6H PRN    hydrOXYzine pamoate (VISTARIL) capsule 25 mg  25 mg Oral TID PRN    cefTRIAXone (ROCEPHIN) 1,000 mg in sterile water 10 mL IV syringe  1,000 mg IntraVENous Q24H    QUEtiapine (SEROQUEL) tablet 25 mg  25 mg Oral Nightly    sodium chloride flush 0.9 % injection 5-40 mL  5-40 mL IntraVENous 2 times per day    sodium chloride flush 0.9 % injection 5-40 mL  5-40 mL IntraVENous PRN    0.9 % sodium chloride infusion   IntraVENous PRN    potassium chloride (KLOR-CON M) extended release tablet 40 mEq  40 mEq Oral PRN

## 2025-03-12 NOTE — PROGRESS NOTES
[facility] at [address] [address]  Phone: [phone]      Progress Note      3/12/2025 11:18 AM  NAME: Julita Metzger   MRN:  [id number]   Admit Diagnosis: Syncope and collapse [R55]  Syncope, cardiogenic [R55]  Symptomatic bradycardia [R00.1]  Atrial fibrillation with RVR (HCC) [I48.91]          Assessment/Plan:     Sinus bradycardia with lightheadedness, heart rate dipping into 40s.  For paroxysmal atrial fibrillation, patient's sotalol dose has been cut down from 80 mg. to 40 mg twice daily.  Heart rate now in 80s.  QTc 499.  Eliquis has been DC'd due to relatively high HAS -Bled score.  Hypertension continue current regimen of losartan and amlodipine.  Diabetes mellitus  Dementia  Status post TAVR for AS.  Continue statin with aspirin.         []       High complexity decision making was performed in this patient at high risk for decompensation with multiple organ involvement.    Subjective:     Julita Metzger denies chest pain, dyspnea.  Discussed with RN events overnight.     Review of Systems:     []         Unable to obtain  ROS due to ---   [x]         Total of 12 systems reviewed as follows:     Constitutional: negative fever, negative chills, negative weight loss  Eyes:               negative visual changes  ENT:                negative sore throat, tongue or lip swelling  Respiratory:     negative cough, negative dyspnea  Cards:             As per HPI  GI:                   negative for nausea, vomiting, diarrhea, and abdominal pain  Genitourinary: negative for frequency, dysuria  Integument:     negative for rash   Hematologic:   negative for easy bruising and gum/nose bleeding  Musculoskel:   negative for myalgias,  back pain  Neurological:   negative for headaches, dizziness, vertigo, weakness  Behavl/Psych: negative for feelings of anxiety, depression     Objective:      Physical Exam:    Last 24hrs VS reviewed since prior progress note. Most

## 2025-03-12 NOTE — PROGRESS NOTES
4 Eyes Skin Assessment     NAME:  Julita Metzger  YOB: 1935  MEDICAL RECORD NUMBER:  [id number]    The patient is being assessed for  Other Weekly Skin Reassessment    I agree that at least one RN has performed a thorough Head to Toe Skin Assessment on the patient. ALL assessment sites listed below have been assessed.      Areas assessed by both nurses:    Head, Face, Ears, Shoulders, Back, Chest, Arms, Elbows, Hands, Sacrum. Buttock, Coccyx, Ischium, Legs. Feet and Heels, and Under Medical Devices         Does the Patient have a Wound? No noted wound(s)       Cuong Prevention initiated by RN: Yes  Wound Care Orders initiated by RN: No    Pressure Injury (Stage 3,4, Unstageable, DTI, NWPT, and Complex wounds) if present, place Wound referral order by RN under : No    New Ostomies, if present place, Ostomy referral order under : No     Nurse 1 eSignature: Electronically signed by Lucia Cabral RN on 3/12/25 at 2:24 PM EDT    **SHARE this note so that the co-signing nurse can place an eSignature**    Nurse 2 eSignature: Electronically signed by Christel Stafford RN on 3/12/25 at 3:22 PM EDT

## 2025-03-13 VITALS
SYSTOLIC BLOOD PRESSURE: 124 MMHG | TEMPERATURE: 98.2 F | RESPIRATION RATE: 18 BRPM | OXYGEN SATURATION: 94 % | HEIGHT: 67 IN | HEART RATE: 84 BPM | DIASTOLIC BLOOD PRESSURE: 66 MMHG | BODY MASS INDEX: 26.3 KG/M2 | WEIGHT: 167.55 LBS

## 2025-03-13 LAB
ALBUMIN SERPL-MCNC: 3.6 G/DL (ref 3.5–5)
ALBUMIN/GLOB SERPL: 1 (ref 1.1–2.2)
ALP SERPL-CCNC: 97 U/L (ref 45–117)
ALT SERPL-CCNC: 18 U/L (ref 12–78)
ANION GAP SERPL CALC-SCNC: 6 MMOL/L (ref 2–12)
AST SERPL W P-5'-P-CCNC: 15 U/L (ref 15–37)
BASOPHILS # BLD: 0.1 K/UL (ref 0–0.1)
BASOPHILS NFR BLD: 1.3 % (ref 0–1)
BILIRUB SERPL-MCNC: 0.5 MG/DL (ref 0.2–1)
BUN SERPL-MCNC: 12 MG/DL (ref 6–20)
BUN/CREAT SERPL: 14 (ref 12–20)
CA-I BLD-MCNC: 9.6 MG/DL (ref 8.5–10.1)
CHLORIDE SERPL-SCNC: 107 MMOL/L (ref 97–108)
CO2 SERPL-SCNC: 27 MMOL/L (ref 21–32)
CREAT SERPL-MCNC: 0.88 MG/DL (ref 0.55–1.02)
DIFFERENTIAL METHOD BLD: ABNORMAL
EKG ATRIAL RATE: 79 BPM
EKG DIAGNOSIS: NORMAL
EKG P AXIS: 65 DEGREES
EKG P-R INTERVAL: 190 MS
EKG Q-T INTERVAL: 448 MS
EKG QRS DURATION: 134 MS
EKG QTC CALCULATION (BAZETT): 513 MS
EKG R AXIS: -50 DEGREES
EKG T AXIS: 83 DEGREES
EKG VENTRICULAR RATE: 79 BPM
EOSINOPHIL # BLD: 0.19 K/UL (ref 0–0.4)
EOSINOPHIL NFR BLD: 2.4 % (ref 0–7)
ERYTHROCYTE [DISTWIDTH] IN BLOOD BY AUTOMATED COUNT: 13.2 % (ref 11.5–14.5)
GLOBULIN SER CALC-MCNC: 3.5 G/DL (ref 2–4)
GLUCOSE BLD STRIP.AUTO-MCNC: 157 MG/DL (ref 65–100)
GLUCOSE BLD STRIP.AUTO-MCNC: 159 MG/DL (ref 65–100)
GLUCOSE SERPL-MCNC: 207 MG/DL (ref 65–100)
HCT VFR BLD AUTO: 42.1 % (ref 35–47)
HGB BLD-MCNC: 13.8 G/DL (ref 11.5–16)
IMM GRANULOCYTES # BLD AUTO: 0.06 K/UL (ref 0–0.04)
IMM GRANULOCYTES NFR BLD AUTO: 0.8 % (ref 0–0.5)
LYMPHOCYTES # BLD: 1.17 K/UL (ref 0.8–3.5)
LYMPHOCYTES NFR BLD: 14.8 % (ref 12–49)
MAGNESIUM SERPL-MCNC: 2.3 MG/DL (ref 1.6–2.4)
MCH RBC QN AUTO: 31.1 PG (ref 26–34)
MCHC RBC AUTO-ENTMCNC: 32.8 G/DL (ref 30–36.5)
MCV RBC AUTO: 94.8 FL (ref 80–99)
MONOCYTES # BLD: 0.99 K/UL (ref 0–1)
MONOCYTES NFR BLD: 12.6 % (ref 5–13)
NEUTS SEG # BLD: 5.37 K/UL (ref 1.8–8)
NEUTS SEG NFR BLD: 68.1 % (ref 32–75)
NRBC # BLD: 0 K/UL (ref 0–0.01)
NRBC BLD-RTO: 0 PER 100 WBC
PERFORMED BY:: ABNORMAL
PERFORMED BY:: ABNORMAL
PLATELET # BLD AUTO: 190 K/UL (ref 150–400)
PMV BLD AUTO: 13 FL (ref 8.9–12.9)
POTASSIUM SERPL-SCNC: 3.6 MMOL/L (ref 3.5–5.1)
PROT SERPL-MCNC: 7.1 G/DL (ref 6.4–8.2)
RBC # BLD AUTO: 4.44 M/UL (ref 3.8–5.2)
SODIUM SERPL-SCNC: 140 MMOL/L (ref 136–145)
WBC # BLD AUTO: 7.9 K/UL (ref 3.6–11)

## 2025-03-13 PROCEDURE — 6360000002 HC RX W HCPCS: Performed by: INTERNAL MEDICINE

## 2025-03-13 PROCEDURE — 83735 ASSAY OF MAGNESIUM: CPT

## 2025-03-13 PROCEDURE — 82962 GLUCOSE BLOOD TEST: CPT

## 2025-03-13 PROCEDURE — 93005 ELECTROCARDIOGRAM TRACING: CPT | Performed by: INTERNAL MEDICINE

## 2025-03-13 PROCEDURE — 36415 COLL VENOUS BLD VENIPUNCTURE: CPT

## 2025-03-13 PROCEDURE — 2500000003 HC RX 250 WO HCPCS: Performed by: INTERNAL MEDICINE

## 2025-03-13 PROCEDURE — 85025 COMPLETE CBC W/AUTO DIFF WBC: CPT

## 2025-03-13 PROCEDURE — 6370000000 HC RX 637 (ALT 250 FOR IP): Performed by: INTERNAL MEDICINE

## 2025-03-13 PROCEDURE — 80053 COMPREHEN METABOLIC PANEL: CPT

## 2025-03-13 RX ORDER — SOTALOL HYDROCHLORIDE 80 MG/1
40 TABLET ORAL 2 TIMES DAILY
Qty: 60 TABLET | Refills: 3 | Status: SHIPPED | OUTPATIENT
Start: 2025-03-13

## 2025-03-13 RX ADMIN — ISOSORBIDE MONONITRATE 30 MG: 30 TABLET, EXTENDED RELEASE ORAL at 08:47

## 2025-03-13 RX ADMIN — CEFTRIAXONE SODIUM 1000 MG: 1 INJECTION, POWDER, FOR SOLUTION INTRAMUSCULAR; INTRAVENOUS at 11:25

## 2025-03-13 RX ADMIN — POLYETHYLENE GLYCOL 3350 17 G: 17 POWDER, FOR SOLUTION ORAL at 04:46

## 2025-03-13 RX ADMIN — SODIUM CHLORIDE, PRESERVATIVE FREE 10 ML: 5 INJECTION INTRAVENOUS at 08:48

## 2025-03-13 RX ADMIN — AMLODIPINE BESYLATE 5 MG: 5 TABLET ORAL at 08:47

## 2025-03-13 RX ADMIN — LOSARTAN POTASSIUM 100 MG: 50 TABLET, FILM COATED ORAL at 08:46

## 2025-03-13 ASSESSMENT — PAIN SCALES - GENERAL
PAINLEVEL_OUTOF10: 0
PAINLEVEL_OUTOF10: 0

## 2025-03-13 NOTE — PROGRESS NOTES
Discharge order made by the Doctor,  aware.  Spoke with Dr. Castaneda patient cleared from cardiology point of view.  Vital signs stable.No complaints of chest pain or shortness of breathing  Patient alert and oriented,  Discharge instructions given and explained to the patient's    verbalized understanding.  IV access removed, telemetry box cleaned and returned to nurses station.No Estevez.

## 2025-03-13 NOTE — PROGRESS NOTES
Hospitalist Progress Note               Daily Progress Note: 3/13/2025      Hospital Day: 4     Chief complaint:   Chief Complaint   Patient presents with    Lightheadedness    Bradycardia        Subjective:   Hospital course to date:  Julita Metzger is a 89 y.o. female with PMHx significant for dementia, CVA, CAD, type 2 diabetes, hypertension, atrial fibrillation, history of GI bleed who presented to the emergency department for evaluation of near syncopal event. Patient was in the ortho office for evaluation of a right arm injury that occurred 3 years ago when she suddenly started feeling confused and like the \"room was closing in\". EMS found patient to be bradycardic in the 50s and hypotensive. Patient was responsive to IV fluids.     In the ED patient was found to be in mild sinus bradycardia. She was on Sotalol 80 mg for atrial fibrillation.  Labs were unremarkable. Troponin levels were normal.     She was admitted to cardiac telemetry    --------  Patient is seen today for follow-up.    Patient seen today complaining of continued gross hematuria and abdominal pain. Pain worse in the RLQ. Of note Eliquis was put on hold and patient was started on Rocephin for UTI. She denies any chest pain, shortness of breath, or dizziness.      There is a caregiver in the room who has been looking after patient.  She expressed concern about polypharmacy.  It sounds like her  may be overmedicating her at home.  She has concerns that the  may have some dementia himself.    I reviewed her outside medications and reconciled it against the current list.  There are a lot of medications on her list that she is not or should not be taking.  Will make sure we have an updated list at discharge and try to cut out as many meds as possible.        Medications reviewed  Current Facility-Administered Medications   Medication Dose Route Frequency    hydrALAZINE (APRESOLINE) injection 5 mg  5 mg IntraVENous Q6H PRN

## 2025-03-13 NOTE — DISCHARGE SUMMARY
Physician Discharge Summary     Patient ID:    Julita Metzger  [id number]  89 y.o.  1935    Admit date: 3/10/2025    Discharge date : 3/13/2025      Final Diagnoses:   Sinus Bradycardia, resolved  Paroxysmal Atrial Fibrillation  Secondary hypercoagulable state  Hypertension  Recurrent UTI, with gross hematuria  Diabetes Mellitus Type 2  Dementia with behavioral disturbance        Reason for Hospitalization:  Julita Metzger is a 89 y.o. female with PMHx significant for dementia, CVA, CAD, type 2 diabetes, hypertension, atrial fibrillation, history of GI bleed who presented to the emergency department for evaluation of near syncopal event. Patient was in the ortho office for evaluation of a right arm injury that occurred 3 years ago when she suddenly started feeling confused and like the \"room was closing in\". EMS found patient to be bradycardic in the 50s and hypotensive. Patient was responsive to IV fluids.      In the ED patient was found to be in mild sinus bradycardia. She was on Sotalol 80 mg for atrial fibrillation.  Labs were unremarkable. Troponin levels were normal.     Hospital Course:   Patient was admitted to cardiac telemetry. Per cardiology patient's Sotalol was decreased from 80 mg daily to 40 mg BID. Patient has been in sinus rhythm     UA done on admission did show 10-20 WBCs, consistent with UTI. Patient was started on Rocephin.    Patient with RLQ pain and gross hematuria. CT abdomen/pelvis showed no acute findings. No urinary tract calculi. Status post cholecystectomy. Hematuria has now resolved.       Caregiver expressed concern about polypharmacy.  It sounds like her  may be overmedicating her at home.  She has concerns that the  may have some dementia himself.     I reviewed her outside medications and reconciled it against the current list.  There are a lot of medications on her list that she is not or should not be taking.  Will make sure we have an 
CHIEF COMPLAINT:  Annual Physical    ROOMING NOTES:   Medication changes since last visit: No medication changes made since last visit  Compliant with medication?: yes  Medication Side Effects: patient reports none  Additional concerns: patient reports no concerns.    HEALTH MAINTENANCE  Health Maintenance Due   Topic Date Due   • Cervical Cancer Screening  06/28/2021       Patient is due for the topics as listed above and wishes to proceed with them. Orders placed for Cervical cancer screening.     HPI:   This is a 26 year old female who presents for an adult wellness visit.     Asthma: Patient has moderate persistent  asthma. Patient is on maintenance medication- flovent diskus 250 mcg 1 puff 2 times daily. Patient is using an emergency inhaler- albuterol. *** have asthma symptoms more than 2 times per week. *** wake up more than 2 times per month at night with asthma symptoms. {AEL Has/ Has not:763457} required corticosteroid use more than 2 times per year. Reports: {AEL Asthma ROS:796702}. Denies: {AEL Asthma ROS:121469}. Has not had pulmonary function testing in the past. Does does follow with asthma/allergy.  Reproductive: Currently using Combined oral contraceptive pills for birth control. {AEL Is/ Is not:833147} happy with current birth control method. {AEL Is/ Is not:927082} sexually active with {AEL Partner:729232} partner. *** desire STD screening today. Menses are *** regular.  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 She is monitored by neurology.   Neurofibromatosis: [employer] spots    Occupation:  ***  Relationship:  ***  Children:  ***  Diet: ***  Exercise:  ***  Caffeine:  ***  Last dental exam?  ***  Last eye exam?  ***  History of abnormal pap: ***  History of STDs: ***  Birth control: ***  Calcium source: ***    REVIEW OF SYSTEMS:  GENERALIZED: Denies Fevers/chills, Night sweats, Fatigue, Weight loss, Weight gain or Decreased appetite.   EYES: Denies Eye 
pain/redness, Double/blurry vision, Itchy/watery eyes, Vision loss or Eye drainage.   ENT: Denies Ear pain, Ear drainage, Hearing loss, Runny nose, Congestion, Sore throat, Hoarse voice, Difficulty swallowing or Abnormality teeth/gums.   CARDIORESPIRATORY: Denies Chest pain, Palpitations, Leg swelling, Racing heart, Shortness of breath, Cough, Wheezing, Difficulty breathing or Snoring.   GASTROINTESTINAL: Denies Nausea, Vomiting, Diarrhea, Constipation, Abdominal pain, Black/bloody stools, Heartburn, Belching/flatulence or Change in bowel pattern.   : Denies Urinary urgency, Urinary frequency, Pain with urination, Blood in urine, Nighttime urination or Incontinence.    REPRODUCTIVE: Denies Vaginal discharge, Vaginal itching, Abnormal bleeding, Pelvic pain or Concerns for STD.    MUSCULOSKELETAL: Denies Muscle pain, Stiffness or Joint swelling.    NEUROLOGIC: Denies Dizziness, Seizures, Weakness, Numbness/tingling, Memory problems or Balance problems.     INTEGUMENT: Denies Itching, Lumps/Bumps, Changing skin lesions, Rashes or Wounds.    ENDOCRINE: Denies Polyuria, Polydipsia or Polyphagia.    HEME/ LYMPH: Denies Easy bleeding, Easy bruising or Swollen glands.    PSYCHIATRIC: Denies Suicidal ideation, Difficulty Sleeping, Anxiety, Depression, Hallucinations or Mood swings.       No flowsheet data found.  Patient Active Problem List   Diagnosis   • Neurofibromatosis (CMS/HCC)   • Moderate persistent asthma without complication   • Brain mass     ALLERGIES:  No Known Allergies  Outpatient Medications Marked as Taking for the 7/2/21 encounter (Office Visit) with Jenny Chilel PA-C   Medication Sig Dispense Refill   • fluticasone (Flovent Diskus) 250 MCG/BLIST inhaler Inhale 1 puff into the lungs 2 times daily. 60 each 5   • levonorgestrel-ethinyl estradiol (NORDETTE) 0.15-30 MG-MCG per tablet Take 1 tablet by mouth daily. 84 tablet 3     Past Medical History:   Diagnosis Date   • Asthma    • Closed fracture of first 
        Follow up Care:    Follow-up Information    None              Diet:  diabetic diet    Disposition:  Home.    Advanced Directive:   FULL    DNR      Discharge Exam:                     Vitals:    03/13/25 0735   BP: (!) 146/79   Pulse: 81   Resp: 18   Temp: 98.2 °F (36.8 °C)   SpO2: 97%      General:  Alert, cooperative, no distress, appears stated age.   Lungs:   Clear to auscultation bilaterally.   Chest wall:  No tenderness or deformity.   Heart:  Regular rate and rhythm, S1, S2 normal, no murmur, click, rub or gallop.   Abdomen:   Soft, non-tender. Bowel sounds normal. No masses,  No organomegaly.   Extremities: Extremities normal, atraumatic, no cyanosis or edema.   Pulses: 2+ and symmetric all extremities.   Skin: Skin color, texture, turgor normal. No rashes or lesions   Neurologic: CNII-XII intact. No gross sensory or motor deficits             Significant Diagnostic Studies:   3/10/2025: BUN 21 mg/dL (H; Ref range: 6 - 20 mg/dL); Calcium 9.3 mg/dL (Ref range: 8.5 - 10.1 mg/dL); Chloride 104 mmol/L (Ref range: 97 - 108 mmol/L); CO2 29 mmol/L (Ref range: 21 - 32 mmol/L); Creatinine 1.16 mg/dL (H; Ref range: 0.55 - 1.02 mg/dL); Glucose 162 mg/dL (H; Ref range: 65 - 100 mg/dL); Hematocrit 40.4 % (Ref range: 35.0 - 47.0 %); Hemoglobin 13.2 g/dL (Ref range: 11.5 - 16.0 g/dL); Potassium 4.0 mmol/L (Ref range: 3.5 - 5.1 mmol/L); Sodium 138 mmol/L (Ref range: 136 - 145 mmol/L)  3/11/2025: BUN 17 mg/dL (Ref range: 6 - 20 mg/dL); Calcium 10.0 mg/dL (Ref range: 8.5 - 10.1 mg/dL); Chloride 105 mmol/L (Ref range: 97 - 108 mmol/L); CO2 26 mmol/L (Ref range: 21 - 32 mmol/L); Creatinine 0.87 mg/dL (Ref range: 0.55 - 1.02 mg/dL); Glucose 165 mg/dL (H; Ref range: 65 - 100 mg/dL); Hematocrit 44.1 % (Ref range: 35.0 - 47.0 %); Hemoglobin 14.8 g/dL (Ref range: 11.5 - 16.0 g/dL); Potassium 6.2 mmol/L (H; Ref range: 3.5 - 5.1 mmol/L); Sodium 133 mmol/L (L; Ref range: 136 - 145 mmol/L)  Recent Labs     03/12/25  0532 
cervical vertebra (CMS/HCC) 2/7/2019   • GERD (gastroesophageal reflux disease)    • Neurofibromatosis (CMS/[facility])      Past Surgical History:   Procedure Laterality Date   • No past surgeries       Family History   Problem Relation Age of Onset   • Asthma Mother    • Heart disease Maternal Uncle    • Heart disease Maternal Grandfather         MI   • Heart disease Other         MI     Social History     Tobacco Use   • Smoking status: Never Smoker   • Smokeless tobacco: Never Used   Substance Use Topics   • Alcohol use: Yes     Alcohol/week: 0.0 standard drinks     Comment: 4 drinks per week    • Drug use: No     Social History     Substance and Sexual Activity   Sexual Activity Not on file     Immunization History   Administered Date(s) Administered   • COVID-19 Pfizer-BioNtech 04/14/2021, 05/05/2021   • DTaP 1997, 1997, 1997, 08/18/1998, 05/01/2002   • HIB, Unspecified Formulation 1997, 1997, 1997, 1997, 1997, 1997, 08/18/1998, 08/18/1998   • HPV Quadrivalent 01/14/2014, 03/07/2014, 10/21/2015   • Hep B, adolescent or pediatric 1997, 1997, 02/24/1998   • Influenza Pres Free, MDCK 11/08/2017, 11/08/2017   • Influenza, injectable, quadrivalent 10/21/2015   • Influenza, injectable, quadrivalent, preservative-free 10/10/2016, 10/10/2016, 10/21/2019, 11/12/2020, 11/12/2020   • MMR 08/18/1998, 08/18/1998, 05/01/2002, 05/01/2002   • Meningococcal Conjugate MCV4P (Menactra) 07/20/2011, 07/20/2011   • Novel Influenza B7J0-35, Nasal 12/17/2009, 12/17/2009   • Novel Influenza X1I7-65, Unspecified Formulation 12/17/2009   • PPD 04/09/2021, 04/16/2021   • Pneumococcal polysaccharide, adult, 23 valent 11/14/2018   • Polio, INACTIVATED 1997, 1997, 08/18/1998, 05/01/2002   • Tdap 12/11/2008, 12/11/2008, 10/21/2019   • Varicella 08/18/1998, 08/18/1998, 08/26/2008, 08/26/2008         PHYSICAL EXAM:   Vitals:    07/02/21 0657   Weight: 77.2 kg   Height: 5' 
2.5\" (1.588 m)   LMP: 06/28/2021       Constitutional:  {[facility] General Exam :380032}  Eyes:  PERRL, EOMI, conjunctiva normal, no scleral icterus, no discharge.  HENT:  Atraumatic, normocephalic head. External ears symmetric with no lesions or masses. bilateral TM pearly gray with normal landmarks, nares patent without discharge, turbinates moist and pink, lips without lesions, oropharynx normal in appearance, *** dentition. Neck is supple, no thyromegaly.  Respiratory:  CTAB without wheezes, rales, or rhonchi. No increased work of breathing.   Cardiovascular:  {[facility] Cardiac Exam :320747}. *** BLE edema.  Breast: {ABWFPEXAMBREAST:197912}.  GI:  Soft, non-tender, non-distended, no HSM or masses, normoactive bowel sounds.  Genitourinary:  {ABFPEXAMGYN:362762}  Musculoskeletal: moves all four extremities well, no visible deformity of both upper and lower extremities.    Integument:  No rashes, lesions, or masses on inspection. Skin is pink, dry, and intact. No tenting noted.  Lymphatic:  No cervical or supraclavicular lymphadenopathy on inspection and palpation.  Neurologic:  CN 2-12 grossly intact, normal motor function, normal sensory function, no focal deficits noted. Gait is normal.  Psychiatric:  {[facility] Psych Exam :015152}    Chaperone {AEL Chaperone:505060}.    {Diabetic Foot Exam:960810}    Body mass index is 30.64 kg/m².  {[facility] BMI Follow-Up Plan:873310}      ASSESSMENT/ PLAN:    {FAP:268084}          Over the last 2 weeks, how often have you been bothered by the following problems?          PHQ2 Score: 0  PHQ2 Score Interpretation: No further screening needed  1. Little interest or pleasure in activity?: 0  2. Feeling down, depressed, or hopeless?: 0     PHQ9 Score: 0  3. Trouble falling, staying asleep or sleeping all the time?: 0  4. Feeling tired or having little energy?: 0  5. Poor appetite or overeating?: 0  6. Feeling bad about yourself - or that you are a failure or that you have let yourself or your family 
down?: 0  7. Trouble concentrating on things such as reading a newspaper or watching television?: 0  8. Moving or speaking so slowly that other people could have noticed? Or the opposite - being so fidgety or restless that you were moving around a lot more than usual?: 0  9. Thoughts that you would be better off dead, or of hurting yourself in some way?: 0         DEPRESSION ASSESSMENT/PLAN:  {[facility] Depression Screening Follow-Up:568392}    0 - 4 = Normal  5 - 9 = Mild Symptoms  10 - 14 = Moderate Symptoms  15 - 19 = Moderate - Severe Symptoms  20 - 27 = Severe Symptoms      Plan   Medications:  ***    Health Maintenance reviewed and discussed with patient as appropriate by Provider: {AEL  yes/ no :494071::\"Yes\"}    Additional Actions Completed at this Visit:  ***    Today's Plan of Care:  ***  No follow-ups on file.             Total time spent today on this visit  is  *** minutes which includes  preparing to see the patient by reviewing prior records, obtaining and reviewing history, performing a physical exam, counseling the patient ,documenting clinical information in the medical record, {time visit:203291:::0}.    {ABWFPPROVIDERLIST:388099}  [place] [address]  Phone: [phone]

## 2025-03-13 NOTE — CARE COORDINATION
Patient ordered for HH, CM met with patient and spouse to discuss, they are agreeable, preference given for [facility], referral sent.    Patient clear to d/c from  to home with HH at this time.    Transition of Care Plan:    RUR: 17%  Prior Level of Functioning: independent  Disposition:   JOSE:   If SNF or IPR: Date FOC offered:   Date FOC received:   Accepting facility: [facility]  Date authorization started with reference number:   Date authorization received and expires:   Follow up appointments:   DME needed:   Transportation at discharge: spouse  IM/IMM Medicare/ letter given: previously given  Is patient a  and connected with [facility]?    If yes, was [place] transfer form completed and [facility] notified?   Caregiver Contact: spouse  Discharge Caregiver contacted prior to discharge? yes  Care Conference needed?   Barriers to discharge: n/a

## 2025-04-09 ENCOUNTER — APPOINTMENT (OUTPATIENT)
Facility: HOSPITAL | Age: 89
End: 2025-04-09
Attending: EMERGENCY MEDICINE
Payer: MEDICARE

## 2025-04-09 ENCOUNTER — HOSPITAL ENCOUNTER (EMERGENCY)
Facility: HOSPITAL | Age: 89
Discharge: HOME | End: 2025-04-09
Attending: EMERGENCY MEDICINE
Payer: MEDICARE

## 2025-04-09 ENCOUNTER — APPOINTMENT (OUTPATIENT)
Facility: HOSPITAL | Age: 89
End: 2025-04-09
Payer: MEDICARE

## 2025-04-09 VITALS
BODY MASS INDEX: 28.99 KG/M2 | OXYGEN SATURATION: 98 % | DIASTOLIC BLOOD PRESSURE: 63 MMHG | SYSTOLIC BLOOD PRESSURE: 185 MMHG | HEIGHT: 65 IN | HEART RATE: 58 BPM | WEIGHT: 174 LBS | TEMPERATURE: 98.3 F | RESPIRATION RATE: 25 BRPM

## 2025-04-09 DIAGNOSIS — R51.9 ACUTE INTRACTABLE HEADACHE, UNSPECIFIED HEADACHE TYPE: Primary | ICD-10-CM

## 2025-04-09 DIAGNOSIS — I50.9 CONGESTIVE HEART FAILURE, UNSPECIFIED HF CHRONICITY, UNSPECIFIED HEART FAILURE TYPE (HCC): ICD-10-CM

## 2025-04-09 DIAGNOSIS — I16.0 HYPERTENSIVE URGENCY: ICD-10-CM

## 2025-04-09 LAB
ALBUMIN SERPL-MCNC: 3.8 G/DL (ref 3.5–5)
ALBUMIN/GLOB SERPL: 1.3 (ref 1.1–2.2)
ALP SERPL-CCNC: 100 U/L (ref 45–117)
ALT SERPL-CCNC: 24 U/L (ref 12–78)
ANION GAP SERPL CALC-SCNC: 6 MMOL/L (ref 2–12)
AST SERPL W P-5'-P-CCNC: 20 U/L (ref 15–37)
BASOPHILS # BLD: 0.09 K/UL (ref 0–0.1)
BASOPHILS NFR BLD: 0.9 % (ref 0–1)
BILIRUB SERPL-MCNC: 0.6 MG/DL (ref 0.2–1)
BNP SERPL-MCNC: 1171 PG/ML
BUN SERPL-MCNC: 8 MG/DL (ref 6–20)
BUN/CREAT SERPL: 10 (ref 12–20)
CA-I BLD-MCNC: 9.4 MG/DL (ref 8.5–10.1)
CHLORIDE SERPL-SCNC: 106 MMOL/L (ref 97–108)
CO2 SERPL-SCNC: 29 MMOL/L (ref 21–32)
CREAT SERPL-MCNC: 0.79 MG/DL (ref 0.55–1.02)
DIFFERENTIAL METHOD BLD: ABNORMAL
EKG ATRIAL RATE: 51 BPM
EKG DIAGNOSIS: NORMAL
EKG P AXIS: 47 DEGREES
EKG P-R INTERVAL: 172 MS
EKG Q-T INTERVAL: 562 MS
EKG QRS DURATION: 116 MS
EKG QTC CALCULATION (BAZETT): 517 MS
EKG R AXIS: -45 DEGREES
EKG T AXIS: -48 DEGREES
EKG VENTRICULAR RATE: 51 BPM
EOSINOPHIL # BLD: 0.27 K/UL (ref 0–0.4)
EOSINOPHIL NFR BLD: 2.8 % (ref 0–7)
ERYTHROCYTE [DISTWIDTH] IN BLOOD BY AUTOMATED COUNT: 13.6 % (ref 11.5–14.5)
GLOBULIN SER CALC-MCNC: 3 G/DL (ref 2–4)
GLUCOSE SERPL-MCNC: 122 MG/DL (ref 65–100)
HCT VFR BLD AUTO: 39.5 % (ref 35–47)
HGB BLD-MCNC: 13.3 G/DL (ref 11.5–16)
IMM GRANULOCYTES # BLD AUTO: 0.05 K/UL (ref 0–0.04)
IMM GRANULOCYTES NFR BLD AUTO: 0.5 % (ref 0–0.5)
LYMPHOCYTES # BLD: 1.43 K/UL (ref 0.8–3.5)
LYMPHOCYTES NFR BLD: 14.8 % (ref 12–49)
MAGNESIUM SERPL-MCNC: 1.8 MG/DL (ref 1.6–2.4)
MCH RBC QN AUTO: 31.4 PG (ref 26–34)
MCHC RBC AUTO-ENTMCNC: 33.7 G/DL (ref 30–36.5)
MCV RBC AUTO: 93.4 FL (ref 80–99)
MONOCYTES # BLD: 0.91 K/UL (ref 0–1)
MONOCYTES NFR BLD: 9.4 % (ref 5–13)
NEUTS SEG # BLD: 6.92 K/UL (ref 1.8–8)
NEUTS SEG NFR BLD: 71.6 % (ref 32–75)
NRBC # BLD: 0 K/UL (ref 0–0.01)
NRBC BLD-RTO: 0 PER 100 WBC
PLATELET # BLD AUTO: 233 K/UL (ref 150–400)
PMV BLD AUTO: 12 FL (ref 8.9–12.9)
POTASSIUM SERPL-SCNC: 3.8 MMOL/L (ref 3.5–5.1)
PROT SERPL-MCNC: 6.8 G/DL (ref 6.4–8.2)
RBC # BLD AUTO: 4.23 M/UL (ref 3.8–5.2)
SODIUM SERPL-SCNC: 141 MMOL/L (ref 136–145)
TROPONIN I SERPL HS-MCNC: 29 NG/L (ref 0–51)
WBC # BLD AUTO: 9.7 K/UL (ref 3.6–11)

## 2025-04-09 PROCEDURE — 80053 COMPREHEN METABOLIC PANEL: CPT

## 2025-04-09 PROCEDURE — 93010 ELECTROCARDIOGRAM REPORT: CPT | Performed by: SPECIALIST

## 2025-04-09 PROCEDURE — 99285 EMERGENCY DEPT VISIT HI MDM: CPT

## 2025-04-09 PROCEDURE — 6370000000 HC RX 637 (ALT 250 FOR IP): Performed by: EMERGENCY MEDICINE

## 2025-04-09 PROCEDURE — 83880 ASSAY OF NATRIURETIC PEPTIDE: CPT

## 2025-04-09 PROCEDURE — 71045 X-RAY EXAM CHEST 1 VIEW: CPT

## 2025-04-09 PROCEDURE — 93005 ELECTROCARDIOGRAM TRACING: CPT | Performed by: EMERGENCY MEDICINE

## 2025-04-09 PROCEDURE — 70450 CT HEAD/BRAIN W/O DYE: CPT

## 2025-04-09 PROCEDURE — 96374 THER/PROPH/DIAG INJ IV PUSH: CPT

## 2025-04-09 PROCEDURE — 96375 TX/PRO/DX INJ NEW DRUG ADDON: CPT

## 2025-04-09 PROCEDURE — 84484 ASSAY OF TROPONIN QUANT: CPT

## 2025-04-09 PROCEDURE — 6360000002 HC RX W HCPCS: Performed by: EMERGENCY MEDICINE

## 2025-04-09 PROCEDURE — 83735 ASSAY OF MAGNESIUM: CPT

## 2025-04-09 PROCEDURE — 85025 COMPLETE CBC W/AUTO DIFF WBC: CPT

## 2025-04-09 PROCEDURE — 36415 COLL VENOUS BLD VENIPUNCTURE: CPT

## 2025-04-09 RX ORDER — FUROSEMIDE 10 MG/ML
40 INJECTION INTRAMUSCULAR; INTRAVENOUS
Status: COMPLETED | OUTPATIENT
Start: 2025-04-09 | End: 2025-04-09

## 2025-04-09 RX ORDER — HYDRALAZINE HYDROCHLORIDE 20 MG/ML
20 INJECTION INTRAMUSCULAR; INTRAVENOUS
Status: COMPLETED | OUTPATIENT
Start: 2025-04-09 | End: 2025-04-09

## 2025-04-09 RX ORDER — ACETAMINOPHEN 500 MG
1000 TABLET ORAL
Status: COMPLETED | OUTPATIENT
Start: 2025-04-09 | End: 2025-04-09

## 2025-04-09 RX ORDER — ONDANSETRON 2 MG/ML
4 INJECTION INTRAMUSCULAR; INTRAVENOUS ONCE
Status: COMPLETED | OUTPATIENT
Start: 2025-04-09 | End: 2025-04-09

## 2025-04-09 RX ADMIN — FUROSEMIDE 40 MG: 10 INJECTION, SOLUTION INTRAMUSCULAR; INTRAVENOUS at 14:03

## 2025-04-09 RX ADMIN — ONDANSETRON 4 MG: 2 INJECTION, SOLUTION INTRAMUSCULAR; INTRAVENOUS at 11:37

## 2025-04-09 RX ADMIN — ACETAMINOPHEN 1000 MG: 500 TABLET, FILM COATED ORAL at 11:37

## 2025-04-09 RX ADMIN — HYDRALAZINE HYDROCHLORIDE 20 MG: 20 INJECTION INTRAMUSCULAR; INTRAVENOUS at 11:37

## 2025-04-09 ASSESSMENT — PAIN - FUNCTIONAL ASSESSMENT
PAIN_FUNCTIONAL_ASSESSMENT: 0-10
PAIN_FUNCTIONAL_ASSESSMENT: 0-10

## 2025-04-09 ASSESSMENT — PAIN SCALES - GENERAL
PAINLEVEL_OUTOF10: 6
PAINLEVEL_OUTOF10: 0
PAINLEVEL_OUTOF10: 7

## 2025-04-09 NOTE — DISCHARGE INSTRUCTIONS
shoulder arthroplasty, ORIF of the right humerus, and lumbar spine fusion, partially visualized     Mild pulmonary fluid overload. Electronically signed by Royer Gee MD    ------------------------------------------------------------------------------------------------------------  The evaluation and treatment you received in the Emergency Department were for an urgent problem. It is important that you follow-up with a doctor, nurse practitioner, or physician assistant to:  (1) confirm your diagnosis,  (2) re-evaluation of changes in your illness and treatment, and (3) for ongoing care. Please take your discharge instructions with you when you go to your follow-up appointment.     If you have any problem arranging a follow-up appointment, contact us!  If your symptoms become worse or you do not improve as expected, please return to us. We are available 24 hours a day.     If a prescription has been provided, please fill it as soon as possible to prevent a delay in treatment. If you have any questions or reservations about taking the medication due to side effects or interactions with other medications, please call your primary care provider or contact us directly.  Again, THANK YOU for choosing us to care for YOU!

## 2025-04-09 NOTE — ED TRIAGE NOTES
EMS called to pt was at doctors office due to HTN, 220/80 initial BP with EMS. Pt complains of nausea and lightheaded, Zofran 4mg given with EMS. GCS15

## 2025-05-04 ENCOUNTER — APPOINTMENT (OUTPATIENT)
Facility: HOSPITAL | Age: 89
End: 2025-05-04
Payer: MEDICARE

## 2025-05-04 ENCOUNTER — HOSPITAL ENCOUNTER (EMERGENCY)
Facility: HOSPITAL | Age: 89
Discharge: HOME | End: 2025-05-04
Attending: EMERGENCY MEDICINE
Payer: MEDICARE

## 2025-05-04 VITALS
TEMPERATURE: 98.2 F | HEIGHT: 65 IN | SYSTOLIC BLOOD PRESSURE: 167 MMHG | HEART RATE: 51 BPM | DIASTOLIC BLOOD PRESSURE: 58 MMHG | RESPIRATION RATE: 17 BRPM | WEIGHT: 180 LBS | OXYGEN SATURATION: 93 % | BODY MASS INDEX: 29.99 KG/M2

## 2025-05-04 DIAGNOSIS — G93.40 ENCEPHALOPATHY, UNSPECIFIED TYPE: ICD-10-CM

## 2025-05-04 DIAGNOSIS — N39.0 URINARY TRACT INFECTION WITHOUT HEMATURIA, SITE UNSPECIFIED: Primary | ICD-10-CM

## 2025-05-04 LAB
ALBUMIN SERPL-MCNC: 3.7 G/DL (ref 3.5–5)
ALBUMIN/GLOB SERPL: 1.1 (ref 1.1–2.2)
ALP SERPL-CCNC: 88 U/L (ref 45–117)
ALT SERPL-CCNC: 22 U/L (ref 12–78)
ANION GAP SERPL CALC-SCNC: 8 MMOL/L (ref 2–12)
APPEARANCE UR: ABNORMAL
AST SERPL W P-5'-P-CCNC: 18 U/L (ref 15–37)
BACTERIA URNS QL MICRO: NEGATIVE /HPF
BASOPHILS # BLD: 0.1 K/UL (ref 0–0.1)
BASOPHILS NFR BLD: 1.1 % (ref 0–1)
BILIRUB SERPL-MCNC: 0.5 MG/DL (ref 0.2–1)
BILIRUB UR QL: NEGATIVE
BUN SERPL-MCNC: 11 MG/DL (ref 6–20)
BUN/CREAT SERPL: 14 (ref 12–20)
CA-I BLD-MCNC: 9.2 MG/DL (ref 8.5–10.1)
CHLORIDE SERPL-SCNC: 105 MMOL/L (ref 97–108)
CO2 SERPL-SCNC: 28 MMOL/L (ref 21–32)
COLOR UR: ABNORMAL
CREAT SERPL-MCNC: 0.79 MG/DL (ref 0.55–1.02)
DIFFERENTIAL METHOD BLD: ABNORMAL
EOSINOPHIL # BLD: 0.19 K/UL (ref 0–0.4)
EOSINOPHIL NFR BLD: 2.1 % (ref 0–7)
EPITH CASTS URNS QL MICRO: ABNORMAL /LPF
ERYTHROCYTE [DISTWIDTH] IN BLOOD BY AUTOMATED COUNT: 13.4 % (ref 11.5–14.5)
GLOBULIN SER CALC-MCNC: 3.3 G/DL (ref 2–4)
GLUCOSE BLD STRIP.AUTO-MCNC: 94 MG/DL (ref 65–100)
GLUCOSE SERPL-MCNC: 72 MG/DL (ref 65–100)
GLUCOSE UR STRIP.AUTO-MCNC: NEGATIVE MG/DL
HCT VFR BLD AUTO: 40.1 % (ref 35–47)
HGB BLD-MCNC: 13.4 G/DL (ref 11.5–16)
HGB UR QL STRIP: ABNORMAL
IMM GRANULOCYTES # BLD AUTO: 0.05 K/UL (ref 0–0.04)
IMM GRANULOCYTES NFR BLD AUTO: 0.6 % (ref 0–0.5)
INR PPP: 1 (ref 0.9–1.1)
KETONES UR QL STRIP.AUTO: NEGATIVE MG/DL
LEUKOCYTE ESTERASE UR QL STRIP.AUTO: ABNORMAL
LIPASE SERPL-CCNC: 31 U/L (ref 13–75)
LYMPHOCYTES # BLD: 1.53 K/UL (ref 0.8–3.5)
LYMPHOCYTES NFR BLD: 17.3 % (ref 12–49)
MAGNESIUM SERPL-MCNC: 1.8 MG/DL (ref 1.6–2.4)
MCH RBC QN AUTO: 31 PG (ref 26–34)
MCHC RBC AUTO-ENTMCNC: 33.4 G/DL (ref 30–36.5)
MCV RBC AUTO: 92.8 FL (ref 80–99)
MONOCYTES # BLD: 0.92 K/UL (ref 0–1)
MONOCYTES NFR BLD: 10.4 % (ref 5–13)
MUCOUS THREADS URNS QL MICRO: ABNORMAL /LPF
NEUTS SEG # BLD: 6.05 K/UL (ref 1.8–8)
NEUTS SEG NFR BLD: 68.5 % (ref 32–75)
NITRITE UR QL STRIP.AUTO: NEGATIVE
NRBC # BLD: 0 K/UL (ref 0–0.01)
NRBC BLD-RTO: 0 PER 100 WBC
PERFORMED BY:: NORMAL
PH UR STRIP: 6 (ref 5–8)
PLATELET # BLD AUTO: 207 K/UL (ref 150–400)
PMV BLD AUTO: 12.3 FL (ref 8.9–12.9)
POTASSIUM SERPL-SCNC: 4 MMOL/L (ref 3.5–5.1)
PROT SERPL-MCNC: 7 G/DL (ref 6.4–8.2)
PROT UR STRIP-MCNC: 30 MG/DL
PROTHROMBIN TIME: 13 SEC (ref 11.9–14.6)
RBC # BLD AUTO: 4.32 M/UL (ref 3.8–5.2)
RBC #/AREA URNS HPF: ABNORMAL /HPF (ref 0–5)
SODIUM SERPL-SCNC: 141 MMOL/L (ref 136–145)
SP GR UR REFRACTOMETRY: 1.01 (ref 1–1.03)
TROPONIN I SERPL HS-MCNC: 22 NG/L (ref 0–51)
URINE CULTURE IF INDICATED: ABNORMAL
UROBILINOGEN UR QL STRIP.AUTO: 0.1 EU/DL (ref 0.1–1)
WBC # BLD AUTO: 8.8 K/UL (ref 3.6–11)
WBC URNS QL MICRO: >100 /HPF (ref 0–4)

## 2025-05-04 PROCEDURE — 0042T CT BRAIN PERFUSION: CPT

## 2025-05-04 PROCEDURE — 96374 THER/PROPH/DIAG INJ IV PUSH: CPT

## 2025-05-04 PROCEDURE — 84484 ASSAY OF TROPONIN QUANT: CPT

## 2025-05-04 PROCEDURE — 85610 PROTHROMBIN TIME: CPT

## 2025-05-04 PROCEDURE — 87086 URINE CULTURE/COLONY COUNT: CPT

## 2025-05-04 PROCEDURE — 6360000004 HC RX CONTRAST MEDICATION: Performed by: EMERGENCY MEDICINE

## 2025-05-04 PROCEDURE — 85025 COMPLETE CBC W/AUTO DIFF WBC: CPT

## 2025-05-04 PROCEDURE — 99285 EMERGENCY DEPT VISIT HI MDM: CPT

## 2025-05-04 PROCEDURE — 96375 TX/PRO/DX INJ NEW DRUG ADDON: CPT

## 2025-05-04 PROCEDURE — 6370000000 HC RX 637 (ALT 250 FOR IP): Performed by: EMERGENCY MEDICINE

## 2025-05-04 PROCEDURE — 83690 ASSAY OF LIPASE: CPT

## 2025-05-04 PROCEDURE — 93005 ELECTROCARDIOGRAM TRACING: CPT | Performed by: EMERGENCY MEDICINE

## 2025-05-04 PROCEDURE — 82962 GLUCOSE BLOOD TEST: CPT

## 2025-05-04 PROCEDURE — 87147 CULTURE TYPE IMMUNOLOGIC: CPT

## 2025-05-04 PROCEDURE — 2500000003 HC RX 250 WO HCPCS: Performed by: EMERGENCY MEDICINE

## 2025-05-04 PROCEDURE — 83735 ASSAY OF MAGNESIUM: CPT

## 2025-05-04 PROCEDURE — 81001 URINALYSIS AUTO W/SCOPE: CPT

## 2025-05-04 PROCEDURE — 74176 CT ABD & PELVIS W/O CONTRAST: CPT

## 2025-05-04 PROCEDURE — 6360000002 HC RX W HCPCS: Performed by: EMERGENCY MEDICINE

## 2025-05-04 PROCEDURE — 6360000002 HC RX W HCPCS

## 2025-05-04 PROCEDURE — 36415 COLL VENOUS BLD VENIPUNCTURE: CPT

## 2025-05-04 PROCEDURE — 70450 CT HEAD/BRAIN W/O DYE: CPT

## 2025-05-04 PROCEDURE — 70498 CT ANGIOGRAPHY NECK: CPT

## 2025-05-04 PROCEDURE — 80053 COMPREHEN METABOLIC PANEL: CPT

## 2025-05-04 PROCEDURE — 71045 X-RAY EXAM CHEST 1 VIEW: CPT

## 2025-05-04 RX ORDER — ONDANSETRON 2 MG/ML
INJECTION INTRAMUSCULAR; INTRAVENOUS
Status: COMPLETED
Start: 2025-05-04 | End: 2025-05-04

## 2025-05-04 RX ORDER — IOPAMIDOL 755 MG/ML
100 INJECTION, SOLUTION INTRAVASCULAR
Status: COMPLETED | OUTPATIENT
Start: 2025-05-04 | End: 2025-05-04

## 2025-05-04 RX ORDER — CEPHALEXIN 500 MG/1
500 CAPSULE ORAL 4 TIMES DAILY
Qty: 40 CAPSULE | Refills: 0 | Status: SHIPPED | OUTPATIENT
Start: 2025-05-04 | End: 2025-05-14

## 2025-05-04 RX ORDER — ACETAMINOPHEN 325 MG/1
650 TABLET ORAL
Status: COMPLETED | OUTPATIENT
Start: 2025-05-04 | End: 2025-05-04

## 2025-05-04 RX ORDER — ONDANSETRON 2 MG/ML
4 INJECTION INTRAMUSCULAR; INTRAVENOUS ONCE
Status: COMPLETED | OUTPATIENT
Start: 2025-05-04 | End: 2025-05-04

## 2025-05-04 RX ADMIN — ONDANSETRON 4 MG: 2 INJECTION, SOLUTION INTRAMUSCULAR; INTRAVENOUS at 13:05

## 2025-05-04 RX ADMIN — ACETAMINOPHEN 650 MG: 325 TABLET ORAL at 13:55

## 2025-05-04 RX ADMIN — IOPAMIDOL 100 ML: 755 INJECTION, SOLUTION INTRAVENOUS at 12:51

## 2025-05-04 RX ADMIN — CEFTRIAXONE SODIUM 1000 MG: 1 INJECTION, POWDER, FOR SOLUTION INTRAMUSCULAR; INTRAVENOUS at 14:50

## 2025-05-04 RX ADMIN — ONDANSETRON 4 MG: 2 INJECTION INTRAMUSCULAR; INTRAVENOUS at 13:05

## 2025-05-04 ASSESSMENT — PAIN DESCRIPTION - LOCATION: LOCATION: HEAD

## 2025-05-04 ASSESSMENT — PAIN SCALES - GENERAL
PAINLEVEL_OUTOF10: 0
PAINLEVEL_OUTOF10: 3
PAINLEVEL_OUTOF10: 2
PAINLEVEL_OUTOF10: 2
PAINLEVEL_OUTOF10: 6
PAINLEVEL_OUTOF10: 3

## 2025-05-04 NOTE — ED PROVIDER NOTES
[facility] EMERGENCY DEPT  EMERGENCY DEPARTMENT HISTORY AND PHYSICAL EXAM      Date of evaluation: 5/4/2025  Patient Name: Julita Metzger  Birthdate 1935  MRN: [id number]  ED Provider: Wilder Pathak DO   Note Started: 12:13 PM EDT 5/4/25    HISTORY OF PRESENT ILLNESS     Chief Complaint   Patient presents with    Dizziness     History Provided By: Patient and Patient's     HPI: 89-year-old female with a past medical history of atrial fibrillation (not anticoagulated due to prior GI bleed), diabetes, prior MI, heart valve replacement, hypertension, and prior stroke who presents with acute dizziness, nausea, vomiting, and confusion. She has had abdominal pain. Somewhat ill feeling at 7:30 AM.  Noticed to be confused by her  around 10:30 AM. Her  reports sudden symptom onset including abnormal speech.  Last known normal possibly last night.    PAST MEDICAL HISTORY   Past Medical History:  Past Medical History:   Diagnosis Date    Atrial fibrillation (HCC)     Anticoagulation stopped by cardiology due to GI bleed    Chest pain     Diabetes mellitus (HCC)     Heart attack (HCC) 2016    Heart valve replaced     Hyperlipidemia     Hypertension     Lung abnormality     middle lobe syndrome right lung    Stroke (HCC) 12/2017       Past Surgical History:  Past Surgical History:   Procedure Laterality Date    BACK SURGERY      eight surgeries    CATARACT REMOVAL      left eye    COLONOSCOPY Left 9/12/2018    COLONOSCOPY performed by Shashank Tate MD at \[facility]"" ENDOSCOPY    COLONOSCOPY N/A 01/26/2024    COLONOSCOPY DIAGNOSTIC performed by Cheryl Sy MD at [facility] ENDOSCOPY    CYSTOSCOPY  10/23/2024    HERNIA REPAIR      OTHER SURGICAL HISTORY      rt middle lobe removed d/t necrosis    PARTIAL HYSTERECTOMY (CERVIX NOT REMOVED)      REFRACTIVE SURGERY      REMOVAL GALLBLADDER      SKIN LESION EXCISION Left 10/30/2023    EXCISION LEFT INGUINAL MASS/LIPOMA performed by Raoul Steward MD at [facility] MAIN OR

## 2025-05-04 NOTE — ED TRIAGE NOTES
Arrives with , complains of feeling \"weird\" sudden onset at 1030; reports dizziness and vomiting. LKW 1000; pt  reports speech abnormalities as well at 1000. BG 94    Dr. Pathak in triage @ 2733.

## 2025-05-05 LAB
EKG ATRIAL RATE: 64 BPM
EKG DIAGNOSIS: NORMAL
EKG P AXIS: 77 DEGREES
EKG P-R INTERVAL: 164 MS
EKG Q-T INTERVAL: 484 MS
EKG QRS DURATION: 110 MS
EKG QTC CALCULATION (BAZETT): 499 MS
EKG R AXIS: -45 DEGREES
EKG T AXIS: -80 DEGREES
EKG VENTRICULAR RATE: 64 BPM

## 2025-05-06 LAB
BACTERIA SPEC CULT: ABNORMAL
COLONY COUNT, CNT: ABNORMAL
COLONY COUNT, CNT: ABNORMAL
Lab: ABNORMAL

## 2025-05-19 ENCOUNTER — APPOINTMENT (OUTPATIENT)
Facility: HOSPITAL | Age: 89
End: 2025-05-19
Payer: MEDICARE

## 2025-05-19 ENCOUNTER — HOSPITAL ENCOUNTER (EMERGENCY)
Facility: HOSPITAL | Age: 89
Discharge: HOME | End: 2025-05-19
Attending: STUDENT IN AN ORGANIZED HEALTH CARE EDUCATION/TRAINING PROGRAM
Payer: MEDICARE

## 2025-05-19 VITALS
BODY MASS INDEX: 29.99 KG/M2 | TEMPERATURE: 98.4 F | DIASTOLIC BLOOD PRESSURE: 92 MMHG | OXYGEN SATURATION: 91 % | HEART RATE: 77 BPM | RESPIRATION RATE: 18 BRPM | HEIGHT: 65 IN | SYSTOLIC BLOOD PRESSURE: 116 MMHG | WEIGHT: 180 LBS

## 2025-05-19 DIAGNOSIS — R10.32 LEFT LOWER QUADRANT ABDOMINAL PAIN: Primary | ICD-10-CM

## 2025-05-19 LAB
ALBUMIN SERPL-MCNC: 3.8 G/DL (ref 3.5–5)
ALBUMIN/GLOB SERPL: 1.2 (ref 1.1–2.2)
ALP SERPL-CCNC: 90 U/L (ref 45–117)
ALT SERPL-CCNC: 24 U/L (ref 12–78)
ANION GAP SERPL CALC-SCNC: 7 MMOL/L (ref 2–12)
APPEARANCE UR: CLEAR
AST SERPL W P-5'-P-CCNC: 20 U/L (ref 15–37)
BACTERIA URNS QL MICRO: NEGATIVE /HPF
BASOPHILS # BLD: 0.09 K/UL (ref 0–0.1)
BASOPHILS NFR BLD: 1.3 % (ref 0–1)
BILIRUB SERPL-MCNC: 0.4 MG/DL (ref 0.2–1)
BILIRUB UR QL: NEGATIVE
BUN SERPL-MCNC: 9 MG/DL (ref 6–20)
BUN/CREAT SERPL: 12 (ref 12–20)
CA-I BLD-MCNC: 9.6 MG/DL (ref 8.5–10.1)
CHLORIDE SERPL-SCNC: 104 MMOL/L (ref 97–108)
CO2 SERPL-SCNC: 31 MMOL/L (ref 21–32)
COLOR UR: ABNORMAL
CREAT SERPL-MCNC: 0.74 MG/DL (ref 0.55–1.02)
DIFFERENTIAL METHOD BLD: ABNORMAL
EOSINOPHIL # BLD: 0.14 K/UL (ref 0–0.4)
EOSINOPHIL NFR BLD: 2 % (ref 0–7)
EPITH CASTS URNS QL MICRO: ABNORMAL /LPF
ERYTHROCYTE [DISTWIDTH] IN BLOOD BY AUTOMATED COUNT: 13.7 % (ref 11.5–14.5)
GLOBULIN SER CALC-MCNC: 3.3 G/DL (ref 2–4)
GLUCOSE SERPL-MCNC: 115 MG/DL (ref 65–100)
GLUCOSE UR STRIP.AUTO-MCNC: NEGATIVE MG/DL
HCT VFR BLD AUTO: 40.2 % (ref 35–47)
HGB BLD-MCNC: 13.5 G/DL (ref 11.5–16)
HGB UR QL STRIP: NEGATIVE
IMM GRANULOCYTES # BLD AUTO: 0.05 K/UL (ref 0–0.04)
IMM GRANULOCYTES NFR BLD AUTO: 0.7 % (ref 0–0.5)
KETONES UR QL STRIP.AUTO: NEGATIVE MG/DL
LEUKOCYTE ESTERASE UR QL STRIP.AUTO: NEGATIVE
LIPASE SERPL-CCNC: 26 U/L (ref 13–75)
LYMPHOCYTES # BLD: 1.14 K/UL (ref 0.8–3.5)
LYMPHOCYTES NFR BLD: 16.5 % (ref 12–49)
MCH RBC QN AUTO: 31.4 PG (ref 26–34)
MCHC RBC AUTO-ENTMCNC: 33.6 G/DL (ref 30–36.5)
MCV RBC AUTO: 93.5 FL (ref 80–99)
MONOCYTES # BLD: 0.68 K/UL (ref 0–1)
MONOCYTES NFR BLD: 9.8 % (ref 5–13)
MUCOUS THREADS URNS QL MICRO: ABNORMAL /LPF
NEUTS SEG # BLD: 4.82 K/UL (ref 1.8–8)
NEUTS SEG NFR BLD: 69.7 % (ref 32–75)
NITRITE UR QL STRIP.AUTO: NEGATIVE
NRBC # BLD: 0 K/UL (ref 0–0.01)
NRBC BLD-RTO: 0 PER 100 WBC
PH UR STRIP: 7
PLATELET # BLD AUTO: 195 K/UL (ref 150–400)
PMV BLD AUTO: 11.9 FL (ref 8.9–12.9)
POTASSIUM SERPL-SCNC: 3.5 MMOL/L (ref 3.5–5.1)
PROT SERPL-MCNC: 7.1 G/DL (ref 6.4–8.2)
PROT UR STRIP-MCNC: NEGATIVE MG/DL
RBC # BLD AUTO: 4.3 M/UL (ref 3.8–5.2)
RBC #/AREA URNS HPF: ABNORMAL /HPF (ref 0–5)
SODIUM SERPL-SCNC: 142 MMOL/L (ref 136–145)
SP GR UR REFRACTOMETRY: <1.005 (ref 1–1.03)
TROPONIN I SERPL HS-MCNC: 27 NG/L (ref 0–51)
URINE CULTURE IF INDICATED: ABNORMAL
UROBILINOGEN UR QL STRIP.AUTO: 0.2 EU/DL (ref 0.2–1)
WBC # BLD AUTO: 6.9 K/UL (ref 3.6–11)
WBC URNS QL MICRO: ABNORMAL /HPF (ref 0–4)

## 2025-05-19 PROCEDURE — 96375 TX/PRO/DX INJ NEW DRUG ADDON: CPT

## 2025-05-19 PROCEDURE — 85025 COMPLETE CBC W/AUTO DIFF WBC: CPT

## 2025-05-19 PROCEDURE — 80053 COMPREHEN METABOLIC PANEL: CPT

## 2025-05-19 PROCEDURE — 74177 CT ABD & PELVIS W/CONTRAST: CPT

## 2025-05-19 PROCEDURE — 99285 EMERGENCY DEPT VISIT HI MDM: CPT

## 2025-05-19 PROCEDURE — 84484 ASSAY OF TROPONIN QUANT: CPT

## 2025-05-19 PROCEDURE — 83690 ASSAY OF LIPASE: CPT

## 2025-05-19 PROCEDURE — 6360000002 HC RX W HCPCS: Performed by: STUDENT IN AN ORGANIZED HEALTH CARE EDUCATION/TRAINING PROGRAM

## 2025-05-19 PROCEDURE — 6360000004 HC RX CONTRAST MEDICATION: Performed by: STUDENT IN AN ORGANIZED HEALTH CARE EDUCATION/TRAINING PROGRAM

## 2025-05-19 PROCEDURE — 81001 URINALYSIS AUTO W/SCOPE: CPT

## 2025-05-19 PROCEDURE — 96374 THER/PROPH/DIAG INJ IV PUSH: CPT

## 2025-05-19 RX ORDER — IOPAMIDOL 755 MG/ML
100 INJECTION, SOLUTION INTRAVASCULAR
Status: COMPLETED | OUTPATIENT
Start: 2025-05-19 | End: 2025-05-19

## 2025-05-19 RX ORDER — MORPHINE SULFATE 4 MG/ML
4 INJECTION, SOLUTION INTRAMUSCULAR; INTRAVENOUS
Status: COMPLETED | OUTPATIENT
Start: 2025-05-19 | End: 2025-05-19

## 2025-05-19 RX ORDER — ONDANSETRON 2 MG/ML
4 INJECTION INTRAMUSCULAR; INTRAVENOUS ONCE
Status: COMPLETED | OUTPATIENT
Start: 2025-05-19 | End: 2025-05-19

## 2025-05-19 RX ADMIN — ONDANSETRON 4 MG: 2 INJECTION, SOLUTION INTRAMUSCULAR; INTRAVENOUS at 12:16

## 2025-05-19 RX ADMIN — MORPHINE SULFATE 4 MG: 4 INJECTION, SOLUTION INTRAMUSCULAR; INTRAVENOUS at 12:19

## 2025-05-19 RX ADMIN — IOPAMIDOL 100 ML: 755 INJECTION, SOLUTION INTRAVENOUS at 13:55

## 2025-05-19 ASSESSMENT — PAIN - FUNCTIONAL ASSESSMENT
PAIN_FUNCTIONAL_ASSESSMENT: 0-10
PAIN_FUNCTIONAL_ASSESSMENT: 0-10

## 2025-05-19 ASSESSMENT — PAIN SCALES - GENERAL
PAINLEVEL_OUTOF10: 10
PAINLEVEL_OUTOF10: 0

## 2025-05-19 NOTE — ED PROVIDER NOTES
EMERGENCY DEPARTMENT HISTORY AND PHYSICAL EXAM      Patient Name: Julita Metzger  MRN: [id number]  YOB: 1935  Date of evaluation: 5/19/2025  Provider: Renan Mendoza MD     History of Present Illness     Chief Complaint   Patient presents with    Abdominal Pain    Dysuria       History Provided By: Patient,     HPI: Julita Metzger, 89 y.o. female with past medical history as listed and reviewed below presenting to the ED for evaluation of left lower quadrant abdominal pain.  Per the  patient has been having the pain intermittently over the last month.  Per the patient she states she awoke with the pain earlier this morning suddenly and states is a lot worse than usual, present left lower quadrant, nonradiating, no aggravating relieving factors.  Patient notes associated urinary symptoms, notes unable to hold her urine at times.  She notes associated nausea.  She denies any vomiting, no fever or chills, no trauma, she denies any abnormal vaginal discharge or bleeding, denies any back pain, she does report a history of kidney stones, she notes her gallbladder was taken out she is unsure about her appendix.  She does have a history of heart attack denies any chest pain or shortness of breath at this time.  She does not take anticoagulation at this time.    Medical History     Past Medical History:  Past Medical History:   Diagnosis Date    Atrial fibrillation (HCC)     Anticoagulation stopped by cardiology due to GI bleed    Chest pain     Diabetes mellitus (HCC)     Heart attack (HCC) 2016    Heart valve replaced     Hyperlipidemia     Hypertension     Lung abnormality     middle lobe syndrome right lung    Stroke (HCC) 12/2017       Past Surgical History:  Past Surgical History:   Procedure Laterality Date    BACK SURGERY      eight surgeries    CATARACT REMOVAL      left eye    COLONOSCOPY Left 9/12/2018    COLONOSCOPY performed by Shashank Tate MD at [facility] ENDOSCOPY    COLONOSCOPY N/A

## 2025-05-19 NOTE — DISCHARGE INSTRUCTIONS
195 150 - 400 K/uL    MPV 11.9 8.9 - 12.9 FL    Nucleated RBCs 0.0 0.0  WBC    nRBC 0.00 0.00 - 0.01 K/uL    Neutrophils % 69.7 32.0 - 75.0 %    Lymphocytes % 16.5 12.0 - 49.0 %    Monocytes % 9.8 5.0 - 13.0 %    Eosinophils % 2.0 0.0 - 7.0 %    Basophils % 1.3 (H) 0.0 - 1.0 %    Immature Granulocytes % 0.7 (H) 0 - 0.5 %    Neutrophils Absolute 4.82 1.80 - 8.00 K/UL    Lymphocytes Absolute 1.14 0.80 - 3.50 K/UL    Monocytes Absolute 0.68 0.00 - 1.00 K/UL    Eosinophils Absolute 0.14 0.00 - 0.40 K/UL    Basophils Absolute 0.09 0.00 - 0.10 K/UL    Immature Granulocytes Absolute 0.05 (H) 0.00 - 0.04 K/UL    Differential Type AUTOMATED     Comprehensive Metabolic Panel    Collection Time: 05/19/25 11:24 AM   Result Value Ref Range    Sodium 142 136 - 145 mmol/L    Potassium 3.5 3.5 - 5.1 mmol/L    Chloride 104 97 - 108 mmol/L    CO2 31 21 - 32 mmol/L    Anion Gap 7 2 - 12 mmol/L    Glucose 115 (H) 65 - 100 mg/dL    BUN 9 6 - 20 mg/dL    Creatinine 0.74 0.55 - 1.02 mg/dL    BUN/Creatinine Ratio 12 12 - 20      Est, Glom Filt Rate 78 >60 ml/min/1.73m2    Calcium 9.6 8.5 - 10.1 mg/dL    Total Bilirubin 0.4 0.2 - 1.0 mg/dL    AST 20 15 - 37 U/L    ALT 24 12 - 78 U/L    Alk Phosphatase 90 45 - 117 U/L    Total Protein 7.1 6.4 - 8.2 g/dL    Albumin 3.8 3.5 - 5.0 g/dL    Globulin 3.3 2.0 - 4.0 g/dL    Albumin/Globulin Ratio 1.2 1.1 - 2.2     Lipase    Collection Time: 05/19/25 11:24 AM   Result Value Ref Range    Lipase 26 13 - 75 U/L   Troponin    Collection Time: 05/19/25 11:24 AM   Result Value Ref Range    Troponin, High Sensitivity 27 0 - 51 ng/L     CT ABDOMEN PELVIS W IV CONTRAST Additional Contrast? None  Result Date: 5/19/2025  EXAM: CT ABDOMEN PELVIS W IV CONTRAST INDICATION: LLQ abd pain COMPARISON: May 4, 2025 CONTRAST: 100 mL of Isovue-370. ORAL CONTRAST: Not given TECHNIQUE: Following intravenous administration of contrast, thin axial images were obtained through the abdomen and pelvis. Coronal and

## (undated) DEVICE — GLOVE ORANGE PI 7   MSG9070

## (undated) DEVICE — SOUTHSIDE TURNOVER: Brand: MEDLINE INDUSTRIES, INC.

## (undated) DEVICE — GLOVE SURG SZ 7 L12IN FNGR THK79MIL GRN LTX FREE

## (undated) DEVICE — HYPODERMIC SAFETY NEEDLE: Brand: MONOJECT

## (undated) DEVICE — SYRINGE MED 10ML LUERLOCK TIP W/O SFTY DISP

## (undated) DEVICE — APPLICATOR MEDICATED 26 CC SOLUTION HI LT ORNG CHLORAPREP

## (undated) DEVICE — SOLIDIFIER MEDC 1200ML -- CONVERT TO 356117

## (undated) DEVICE — GARMENT,MEDLINE,DVT,INT,CALF,MED, GEN2: Brand: MEDLINE

## (undated) DEVICE — MINOR GENERAL PACK: Brand: MEDLINE INDUSTRIES, INC.

## (undated) DEVICE — MASK O2 MED AD 7 FT 3 IN 1 W/ STD CONN LTX

## (undated) DEVICE — FIAPC® PROBE W/ FILTER 2200 C OD 2.3MM/6.9FR; L 2.2M/7.2FT: Brand: ERBE

## (undated) DEVICE — SOLUTION IRRIG 500ML 0.9% SOD CHLO USP POUR PLAS BTL

## (undated) DEVICE — NEONATAL-ADULT SPO2 SENSOR: Brand: NELLCOR

## (undated) DEVICE — DRAIN SURG L18IN DIA025IN 100% SIL RADPQ FOR CLS WND DRNAGE

## (undated) DEVICE — ULNAR NERVE PROTECTOR FOAM POSITIONER: Brand: CARDINAL HEALTH

## (undated) DEVICE — MEDI-VAC YANK SUCT HNDL W/TPRD BULBOUS TIP: Brand: CARDINAL HEALTH

## (undated) DEVICE — SET ADMIN 16ML TBNG L100IN 2 Y INJ SITE IV PIGGY BK DISP

## (undated) DEVICE — SYR 3ML LL TIP 1/10ML GRAD --

## (undated) DEVICE — NEEDLE HYPO 18GA L1.5IN PNK S STL HUB POLYPR SHLD REG BVL

## (undated) DEVICE — LIQUIBAND RAPID ADHESIVE 36/CS 0.8ML: Brand: MEDLINE

## (undated) DEVICE — MASK ANES INF SZ 2 PREM TAIL VLV INFL PRT UNSCENTED SGL PT

## (undated) DEVICE — SYRINGE IRRIG 60ML SFT PLIABLE BLB EZ TO GRP 1 HND USE W/

## (undated) DEVICE — BLOCK BITE ENDOSCP AD 21 MM W/ DIL BLU LF DISP

## (undated) DEVICE — CONTAINER SPEC 20 ML LID NEUT BUFF FORMALIN 10 % POLYPR STS

## (undated) DEVICE — BAG SPEC BIOHZRD 10 X 10 IN --

## (undated) DEVICE — BASIN EMSIS 16OZ GRAPHITE PLAS KID SHP MOLD GRAD FOR ORAL

## (undated) DEVICE — REM POLYHESIVE ADULT PATIENT RETURN ELECTRODE: Brand: VALLEYLAB

## (undated) DEVICE — SUTURE ABSORBABLE MONOFILAMENT 3-0 SH 27 IN UD MONOCRYL + MCP316H

## (undated) DEVICE — SYR 10ML LUER LOK 1/5ML GRAD --

## (undated) DEVICE — BLADE,CARBON-STEEL,10,STRL,DISPOSABLE,TB: Brand: MEDLINE

## (undated) DEVICE — 1200 GUARD II KIT W/5MM TUBE W/O VAC TUBE: Brand: GUARDIAN

## (undated) DEVICE — CATH IV AUTOGRD BC PNK 20GA 25 -- INSYTE

## (undated) DEVICE — LINE SAMPLING AD NSL O2 TBNG MICROSTREAM ADV FILTERLINE MVAO

## (undated) DEVICE — Device

## (undated) DEVICE — TOWEL 4 PLY TISS 19X30 SUE WHT

## (undated) DEVICE — SUTURE PROL SZ 2-0 L30IN NONABSORBABLE BLU L26MM CT-2 1/2 8411H

## (undated) DEVICE — FORCEPS BX L160CM DIA8MM GRSP DISECT CUP TIP NONLOCKING ROT

## (undated) DEVICE — BLADE,CARBON-STEEL,15,STRL,DISPOSABLE,TB: Brand: MEDLINE

## (undated) DEVICE — KIT ENDOSCOPIC  PROC VIA

## (undated) DEVICE — SUTURE MCRYL + SZ 4-0 L27IN ABSRB UD L19MM PS-2 3/8 CIR MCP426H

## (undated) DEVICE — ENDOSCOPY PUMP TUBING/ CAP SET: Brand: ERBE

## (undated) DEVICE — KENDALL RADIOLUCENT FOAM MONITORING ELECTRODE RECTANGULAR SHAPE: Brand: KENDALL

## (undated) DEVICE — Z DISCONTINUED PER MEDLINE LINE GAS SAMPLING O2/CO2 LNG AD 13 FT NSL W/ TBNG FILTERLINE